# Patient Record
Sex: FEMALE | Race: BLACK OR AFRICAN AMERICAN | NOT HISPANIC OR LATINO | Employment: FULL TIME | URBAN - METROPOLITAN AREA
[De-identification: names, ages, dates, MRNs, and addresses within clinical notes are randomized per-mention and may not be internally consistent; named-entity substitution may affect disease eponyms.]

---

## 2017-05-17 ENCOUNTER — HOSPITAL ENCOUNTER (EMERGENCY)
Facility: OTHER | Age: 27
Discharge: HOME | End: 2017-05-17
Attending: EMERGENCY MEDICINE
Payer: COMMERCIAL

## 2017-05-17 VITALS
HEART RATE: 66 BPM | OXYGEN SATURATION: 98 % | WEIGHT: 114 LBS | TEMPERATURE: 98 F | SYSTOLIC BLOOD PRESSURE: 116 MMHG | BODY MASS INDEX: 22.26 KG/M2 | RESPIRATION RATE: 18 BRPM | DIASTOLIC BLOOD PRESSURE: 63 MMHG
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
## 2017-05-17 DIAGNOSIS — T78.40XA ALLERGIC REACTION, INITIAL ENCOUNTER: ICD-10-CM

## 2017-05-17 PROCEDURE — 96372 THER/PROPH/DIAG INJ SC/IM: CPT

## 2017-05-17 PROCEDURE — 63600175 PHARM REV CODE 636 W HCPCS: Performed by: EMERGENCY MEDICINE

## 2017-05-17 PROCEDURE — 99283 EMERGENCY DEPT VISIT LOW MDM: CPT | Mod: 25

## 2017-05-17 PROCEDURE — 25000003 PHARM REV CODE 250: Performed by: EMERGENCY MEDICINE

## 2017-05-17 RX ORDER — DIPHENHYDRAMINE HCL 25 MG
25 CAPSULE ORAL EVERY 6 HOURS PRN
Qty: 20 CAPSULE | Refills: 0 | Status: SHIPPED | OUTPATIENT
Start: 2017-05-17 | End: 2021-09-16 | Stop reason: ALTCHOICE

## 2017-05-17 RX ORDER — METHYLPREDNISOLONE SOD SUCC 125 MG
125 VIAL (EA) INJECTION
Status: COMPLETED | OUTPATIENT
Start: 2017-05-17 | End: 2017-05-17

## 2017-05-17 RX ORDER — FAMOTIDINE 20 MG/1
20 TABLET, FILM COATED ORAL 2 TIMES DAILY
Qty: 20 TABLET | Refills: 0 | Status: SHIPPED | OUTPATIENT
Start: 2017-05-17 | End: 2021-09-16 | Stop reason: ALTCHOICE

## 2017-05-17 RX ORDER — FAMOTIDINE 20 MG/1
20 TABLET, FILM COATED ORAL
Status: COMPLETED | OUTPATIENT
Start: 2017-05-17 | End: 2017-05-17

## 2017-05-17 RX ORDER — PREDNISONE 20 MG/1
40 TABLET ORAL DAILY
Qty: 10 TABLET | Refills: 0 | Status: SHIPPED | OUTPATIENT
Start: 2017-05-17 | End: 2017-05-22

## 2017-05-17 RX ADMIN — FAMOTIDINE 20 MG: 20 TABLET ORAL at 03:05

## 2017-05-17 RX ADMIN — METHYLPREDNISOLONE SODIUM SUCCINATE 125 MG: 125 INJECTION, POWDER, FOR SOLUTION INTRAMUSCULAR; INTRAVENOUS at 03:05

## 2017-05-17 NOTE — ED AVS SNAPSHOT
[facility] EMERGENCY DEPARTMENT  [address] [facility] [address]               Esperanza Peters   2017  2:28 PM   ED    Description:  Female : 1990   Department:  [facility] Emergency Department           Your Care was Coordinated By:     Provider Role From To    Liza Quintana DO Attending Provider [phone] --      Reason for Visit     Facial Swelling           Diagnoses this Visit        Comments    Insect bite, initial encounter    -  Primary     Allergic reaction, initial encounter           ED Disposition     None           To Do List           Follow-up Information     Follow up with [facility] Emergency Department.    Specialty:  Emergency Medicine    Why:  If symptoms worsen    Contact information:    [address] [facility] [address]  [phone]       These Medications        Disp Refills Start End    predniSONE (DELTASONE) 20 MG tablet 10 tablet 0 2017    Take 2 tablets (40 mg total) by mouth once daily. - Oral    Pharmacy: [facility] [address] [place] [address] Ph #: [phone]       diphenhydrAMINE (BENADRYL) 25 mg capsule 20 capsule 0 2017     Take 1 each (25 mg total) by mouth every 6 (six) hours as needed for Itching or Allergies. - Oral    Pharmacy: [address] Ph #: [phone]       famotidine (PEPCID) 20 MG tablet 20 tablet 0 2017    Take 1 tablet (20 mg total) by mouth 2 (two) times daily. - Oral    Pharmacy: [address] Ph #: [phone]         [facility]     [facility] Nurse Care Line -  Assistance  Unless otherwise directed by your provider, please contact [facility], our nurse care line that is available for  assistance.     Registered nurses in the [facility] provide: appointment scheduling, clinical advisement, health  education, and other advisory services.  Call: [phone] (toll free)               Medications           Message regarding Medications     Verify the changes and/or additions to your medication regime listed below are the same as discussed with your clinician today.  If any of these changes or additions are incorrect, please notify your healthcare provider.        START taking these NEW medications        Refills    predniSONE (DELTASONE) 20 MG tablet 0    Sig: Take 2 tablets (40 mg total) by mouth once daily.    Class: Print    Route: Oral    diphenhydrAMINE (BENADRYL) 25 mg capsule 0    Sig: Take 1 each (25 mg total) by mouth every 6 (six) hours as needed for Itching or Allergies.    Class: Print    Route: Oral    famotidine (PEPCID) 20 MG tablet 0    Sig: Take 1 tablet (20 mg total) by mouth 2 (two) times daily.    Class: Print    Route: Oral      These medications were administered today        Dose Freq    methylPREDNISolone sodium succinate injection 125 mg 125 mg ED 1 Time    Sig: Inject 125 mg into the muscle ED 1 Time.    Class: Normal    Route: Intramuscular    famotidine tablet 20 mg 20 mg ED 1 Time    Sig: Take 1 tablet (20 mg total) by mouth ED 1 Time.    Class: Normal    Route: Oral           Verify that the below list of medications is an accurate representation of the medications you are currently taking.  If none reported, the list may be blank. If incorrect, please contact your healthcare provider. Carry this list with you in case of emergency.           Current Medications     azithromycin (ZITHROMAX) 500 MG tablet 2 tabs PO for a single dose    diphenhydrAMINE (BENADRYL) 25 mg capsule Take 1 each (25 mg total) by mouth every 6 (six) hours as needed for Itching or Allergies.    famotidine (PEPCID) 20 MG tablet Take 1 tablet (20 mg total) by mouth 2 (two) times daily.    famotidine tablet 20 mg Take 1 tablet (20 mg total) by mouth ED 1 Time.    methylPREDNISolone sodium succinate injection 125  mg Inject 125 mg into the muscle ED 1 Time.    norgestimate-ethinyl estradiol (ORTHO-CYCLEN) 0.25-35 mg-mcg per tablet Take 1 tablet by mouth once daily.    predniSONE (DELTASONE) 20 MG tablet Take 2 tablets (40 mg total) by mouth once daily.           Clinical Reference Information           Your Vitals Were     BP Pulse Temp Resp Weight SpO2    106/68 71 99.9 °F (37.7 °C) 18 51.7 kg (114 lb) 100%    BMI                22.26 kg/m2          Allergies as of 5/17/2017     No Known Allergies      Immunizations Administered on Date of Encounter - 5/17/2017     None      ED Micro, Lab, POCT     None      ED Imaging Orders     None        Discharge Instructions         Local Allergic Reaction, Other  You are having an allergic reaction. Almost anything can cause one. Different people are allergic to different things. It is usually something that you ate or swallowed, came into contact with by getting or putting it on your skin or clothes, or something you breathed in the air. This can be very annoying and sometimes scary.  Symptoms of an allergic reaction can include:  · Rash, hives, redness, welts, blisters  · Itching, burning, stinging, pain  · Dry, flaky, cracking, scaly skin  · Swelling of the face, lips or other parts of the body  Sometimes the cause of an allergic reaction may be obvious. To help identify your allergen, remember:  · When it started  · What you were doing at the time or just before that  · Any activities you were involved in  · Any new products or contacts  Here are some common causes, but remember almost anything can cause a reaction, and you may not even be aware that you came into contact with one of these things.  · Dust, mold, pollen  · Plants such as poison ivy and poison oak are common ones, but there are many others  · Animals  · Foods such as shrimp, shellfish, peanuts, milk products, gluten, eggs; also colorings, flavorings, additives  · Insect bites or stings such as bees, mosquitos,  flees, ticks  · Medicines such as penicillin, sulfa drugs, amoxicillin, aspirin, ibuprofen; any medicine can cause a reaction  · Jewelry such as nickel, gold  (new, or something youve worn for a while including zippers, and  buttons)  · Latex such as in gloves, clothes, toys, balloons, or some tapes (some people allergic to latex may also have problems with foods like bananas, avocados, kiwi, papaya, or chestnuts)  · Lotions, perfumes, cosmetics, soaps, shampoos, skincare products, nail products  · Chemicals or dyes in clothing, linen, , hair dyes, soaps, iodine  Home care    The goal of our treatment is to help relieve the symptoms, and get you feeling better. The rash will usually fade over several days, but can sometimes last a couple of weeks. Over the next couple of days, there may be times when it is gets a little worse, and then better again. Here are some things to do:  · If you know what you are allergic to, avoid it because future reactions could be worse than this one.  · Avoid tight clothing and anything that heats up your skin (hot showers/baths, direct sunlight) since heat will make itching worse.  · An ice pack will relieve local areas of intense itching and redness. Dont put the ice directly on the skin, because it can damage the skin. You can also ice put it in a plastic bag. Wrap it in something like a towel, dang shirt, or cloth.  · Oral Benadryl (diphenhydramine) is an antihistamine available at drug and grocery stores. Unless a prescription antihistamine was given, Benadryl may be used to reduce itching if large areas of the skin are involved. It may make you sleepy, so be careful using it in the daytime or when going to school, working, or driving. [NOTE: Do not use Benadryl if you have glaucoma or if you are a man with trouble urinating due to an enlarged prostate.] There are antihistamines that causes less drowsiness and is a good alternatives for daytime use. Ask your pharmacist for  suggestions.  · Do not use Benadryl cream on your skin, because in some people it can cause a further reaction, and make you allergic to Benadryl.  · Try not to scratch. This can tear the skin and cause an infection.  · Using heat-steam to clean your home, using high-efficiency particulate (HEPA) vacuums and filters, avoiding food and pet triggers, exterminating cockroaches, and frequent house cleaning are a few of the strategies used to decrease allergic reactions.  Follow-up care  Follow up with your healthcare provider, or as advised if your symptoms do not continue to improve or get worse.  Call 911  Call 911 if any of these occur:  · Trouble breathing or swallowing, wheezing  · New or worsening swelling in the mouth, throat, or tongue  · Hoarse voice or trouble speaking  · Confused   · Very drowsy or trouble awakening  · Fainting or loss of consciousness  · Rapid heart rate  · Low blood pressure  · Feeling of doom  · Nausea, vomiting, abdominal pain, diarrhea  · Vomiting blood, or large amounts of blood in stool  · Seizure  When to seek medical advice  Call your healthcare provider right away if any of the following occur:  · Spreading areas of itching, redness or swelling  · New or worse swelling in the face, eyelids, or  lips  · Dizziness, weakness  · Signs of infection:  ¨ Spreading redness  ¨ Increased pain or swelling  ¨ Fever of 100.4ºF (38ºC) or higher, or as directed by your healthcare provider  ¨ Colored fluid draining from the inflamed areas  Date Last Reviewed: 7/30/2015  © 0079-9677 [employer]. [address] [place] [address], [place] [address]. All rights reserved. This information is not intended as a substitute for professional medical care. Always follow your healthcare professional's instructions.        Insect, Spider, and Scorpion Bites and Stings  Most insect bites are harmless and cause only minor swelling or itching. But if youre allergic to insects such as wasps or bees, a sting  "can cause a life-threatening allergic reaction. The venom (poison) from scorpions and certain spiders can also be deadly, although this is rare. Knowing when to seek emergency care could save your life.     The black  (top) and brown recluse (bottom) are two poisonous spiders found in the [place].   When to go to the emergency room (ER)  Call 911 right away for any:  · Scorpion sting  · Bite from a black, red, or brown  spider or brown recluse spider  · Signs of an allergic reaction such as:  ¨ Hives  ¨ Swelling of your eyes, lips, or the inside of your throat  ¨ Trouble breathing  ¨ Dizziness or confusion  What to expect in the ER  · If youre having trouble breathing, youll be given oxygen through a mask. In case of severe breathing difficulty, you may have a tube inserted in your throat and be placed on a ventilator (breathing machine).  · If you are having a severe allergic reaction from a sting (called anaphylaxis), you may be given a shot of epinephrine. If it is known that you are allergic to bee or wasp stings, your doctor may give you a prescription for an "epi-pen" that you can keep with you at all times in case of a sting.  · You may receive antivenin (a substance that reverses the effects of poison) for some spider bites and scorpion stings. Because antivenin can sometimes cause other problems, your doctor will weigh the risks and benefits of this treatment.  · Steroids such as prednisone are often used to treat allergic reactions. In many cases, your doctor will prescribe an antihistamine to help relieve itching.  Easing symptoms of an insect bite or sting  · Try to remove a stinger you can see. Use your fingernail, a knife edge, or credit card to scrape against the skin. Do not squeeze or pull.  · Apply ice or a cold compress to reduce pain and swelling (keep a thin cloth between the cold source and the skin).   Date Last Reviewed: 11/20/2014  © 4014-3642 [employer] [address] [place] [address]. All rights reserved. This information is not intended as a substitute for professional medical care. Always follow your healthcare professional's instructions.           [facility] complies with applicable Federal civil rights laws and does not discriminate on the basis of race, color, national origin, age, disability, or sex.        Language Assistance Services     ATTENTION: Language assistance services are available, free of charge. Please call [phone].      ATENCIÓN: Si habla español, tiene a crow disposición servicios gratuitos de asistencia lingüística. Llame al [phone].     CHÚ Ý: N?u b?n nói Ti?ng Vi?t, có các d?ch v? h? tr? ngôn ng? mi?n phí dành cho b?n. G?i s? [phone].

## 2017-05-17 NOTE — ED NOTES
Patient has verified the spelling of their name and  on armband    LOC: The patient is awake, alert, and aware of environment with an appropriate affect, the patient is oriented x 4 and speaking appropriately.     APPEARANCE: Patient resting comfortably and in no acute distress, patient is clean and well groomed, patient's clothing is properly fastened.     RESPIRATORY: Airway is open and patent, respirations are spontaneous, patient has a normal effort and rate, no accessory muscle use noted, bilateral breath sounds clear, denies SOB     CARDIAC:  No chest pain, chest pressure or chest discomfort. No palpitations.     HEENT: -visual changes. Periorbital swelling.     GASTROINTESTINAL: Soft and non tender to palpation, no distention noted, normoactive bowel sounds present in all four quadrants. No anorexia, nausea, vomiting or diarrhea.     SKIN: Rash noted to face and bilateral arms.     COMPLAINT: Patient complain of right periorbital swelling x 1 day. Unknown cause.

## 2017-05-17 NOTE — DISCHARGE INSTRUCTIONS
Local Allergic Reaction, Other  You are having an allergic reaction. Almost anything can cause one. Different people are allergic to different things. It is usually something that you ate or swallowed, came into contact with by getting or putting it on your skin or clothes, or something you breathed in the air. This can be very annoying and sometimes scary.  Symptoms of an allergic reaction can include:  · Rash, hives, redness, welts, blisters  · Itching, burning, stinging, pain  · Dry, flaky, cracking, scaly skin  · Swelling of the face, lips or other parts of the body  Sometimes the cause of an allergic reaction may be obvious. To help identify your allergen, remember:  · When it started  · What you were doing at the time or just before that  · Any activities you were involved in  · Any new products or contacts  Here are some common causes, but remember almost anything can cause a reaction, and you may not even be aware that you came into contact with one of these things.  · Dust, mold, pollen  · Plants such as poison ivy and poison oak are common ones, but there are many others  · Animals  · Foods such as shrimp, shellfish, peanuts, milk products, gluten, eggs; also colorings, flavorings, additives  · Insect bites or stings such as bees, mosquitos, flees, ticks  · Medicines such as penicillin, sulfa drugs, amoxicillin, aspirin, ibuprofen; any medicine can cause a reaction  · Jewelry such as nickel, gold  (new, or something youve worn for a while including zippers, and  buttons)  · Latex such as in gloves, clothes, toys, balloons, or some tapes (some people allergic to latex may also have problems with foods like bananas, avocados, kiwi, papaya, or chestnuts)  · Lotions, perfumes, cosmetics, soaps, shampoos, skincare products, nail products  · Chemicals or dyes in clothing, linen, , hair dyes, soaps, iodine  Home care    The goal of our treatment is to help relieve the symptoms, and get you feeling  better. The rash will usually fade over several days, but can sometimes last a couple of weeks. Over the next couple of days, there may be times when it is gets a little worse, and then better again. Here are some things to do:  · If you know what you are allergic to, avoid it because future reactions could be worse than this one.  · Avoid tight clothing and anything that heats up your skin (hot showers/baths, direct sunlight) since heat will make itching worse.  · An ice pack will relieve local areas of intense itching and redness. Dont put the ice directly on the skin, because it can damage the skin. You can also ice put it in a plastic bag. Wrap it in something like a towel, dang shirt, or cloth.  · Oral Benadryl (diphenhydramine) is an antihistamine available at drug and grocery stores. Unless a prescription antihistamine was given, Benadryl may be used to reduce itching if large areas of the skin are involved. It may make you sleepy, so be careful using it in the daytime or when going to school, working, or driving. [NOTE: Do not use Benadryl if you have glaucoma or if you are a man with trouble urinating due to an enlarged prostate.] There are antihistamines that causes less drowsiness and is a good alternatives for daytime use. Ask your pharmacist for suggestions.  · Do not use Benadryl cream on your skin, because in some people it can cause a further reaction, and make you allergic to Benadryl.  · Try not to scratch. This can tear the skin and cause an infection.  · Using heat-steam to clean your home, using high-efficiency particulate (HEPA) vacuums and filters, avoiding food and pet triggers, exterminating cockroaches, and frequent house cleaning are a few of the strategies used to decrease allergic reactions.  Follow-up care  Follow up with your healthcare provider, or as advised if your symptoms do not continue to improve or get worse.  Call 911  Call 911 if any of these occur:  · Trouble breathing or  swallowing, wheezing  · New or worsening swelling in the mouth, throat, or tongue  · Hoarse voice or trouble speaking  · Confused   · Very drowsy or trouble awakening  · Fainting or loss of consciousness  · Rapid heart rate  · Low blood pressure  · Feeling of doom  · Nausea, vomiting, abdominal pain, diarrhea  · Vomiting blood, or large amounts of blood in stool  · Seizure  When to seek medical advice  Call your healthcare provider right away if any of the following occur:  · Spreading areas of itching, redness or swelling  · New or worse swelling in the face, eyelids, or  lips  · Dizziness, weakness  · Signs of infection:  ¨ Spreading redness  ¨ Increased pain or swelling  ¨ Fever of 100.4ºF (38ºC) or higher, or as directed by your healthcare provider  ¨ Colored fluid draining from the inflamed areas  Date Last Reviewed: 7/30/2015 © 2000-2016 [employer]. [address] [place] [address]. All rights reserved. This information is not intended as a substitute for professional medical care. Always follow your healthcare professional's instructions.        Insect, Spider, and Scorpion Bites and Stings  Most insect bites are harmless and cause only minor swelling or itching. But if youre allergic to insects such as wasps or bees, a sting can cause a life-threatening allergic reaction. The venom (poison) from scorpions and certain spiders can also be deadly, although this is rare. Knowing when to seek emergency care could save your life.     The black  (top) and brown recluse (bottom) are two poisonous spiders found in the [place].   When to go to the emergency room (ER)  Call 911 right away for any:  · Scorpion sting  · Bite from a black, red, or brown  spider or brown recluse spider  · Signs of an allergic reaction such as:  ¨ Hives  ¨ Swelling of your eyes, lips, or the inside of your throat  ¨ Trouble breathing  ¨ Dizziness or confusion  What to expect in the ER  · If youre  "having trouble breathing, youll be given oxygen through a mask. In case of severe breathing difficulty, you may have a tube inserted in your throat and be placed on a ventilator (breathing machine).  · If you are having a severe allergic reaction from a sting (called anaphylaxis), you may be given a shot of epinephrine. If it is known that you are allergic to bee or wasp stings, your doctor may give you a prescription for an "epi-pen" that you can keep with you at all times in case of a sting.  · You may receive antivenin (a substance that reverses the effects of poison) for some spider bites and scorpion stings. Because antivenin can sometimes cause other problems, your doctor will weigh the risks and benefits of this treatment.  · Steroids such as prednisone are often used to treat allergic reactions. In many cases, your doctor will prescribe an antihistamine to help relieve itching.  Easing symptoms of an insect bite or sting  · Try to remove a stinger you can see. Use your fingernail, a knife edge, or credit card to scrape against the skin. Do not squeeze or pull.  · Apply ice or a cold compress to reduce pain and swelling (keep a thin cloth between the cold source and the skin).   Date Last Reviewed: 11/20/2014  © 3157-7217 [employer]. [address] [place] [address], [place] [address]. All rights reserved. This information is not intended as a substitute for professional medical care. Always follow your healthcare professional's instructions.        "

## 2017-05-23 NOTE — ED PROVIDER NOTES
Encounter Date: 5/17/2017       History     Chief Complaint   Patient presents with    Facial Swelling     right eye noticed this a.m.     Review of patient's allergies indicates:  No Known Allergies  CC insect bite to right eye.  Felt a bite last night.  This morning area is red,swollen, and itching.        The history is provided by the patient.   Eye Pain    This is a new problem. The current episode started today. The problem occurs constantly. The problem has been unchanged. The right eye is affected. Injury mechanism: insect bite. The pain is at a severity of 0/10. There is no history of trauma to the eye. There is no known exposure to pink eye. She does not wear contacts. Associated symptoms include eye redness and itching. Pertinent negatives include no numbness, no blurred vision, no decreased vision, no discharge, no double vision, no foreign body sensation, no nausea and no weakness. She has tried nothing for the symptoms. The treatment provided no relief.     No past medical history on file.  No past surgical history on file.  No family history on file.  Social History   Substance Use Topics    Smoking status: Never Smoker    Smokeless tobacco: Never Used    Alcohol use No     Review of Systems   Constitutional: Negative for fever.   HENT: Negative for sore throat.    Eyes: Positive for pain and redness. Negative for blurred vision, double vision and discharge.   Respiratory: Negative for shortness of breath.    Cardiovascular: Negative for chest pain.   Gastrointestinal: Negative for nausea.   Genitourinary: Negative for dysuria.   Musculoskeletal: Negative for back pain.   Skin: Positive for itching. Negative for rash.   Neurological: Negative for weakness and numbness.   Hematological: Does not bruise/bleed easily.   All other systems reviewed and are negative.      Physical Exam     Initial Vitals [05/17/17 1400]   BP Pulse Resp Temp SpO2   106/68 71 18 99.9 °F (37.7 °C) 100 %     Physical  Exam    Nursing note and vitals reviewed.  Constitutional: She appears well-developed and well-nourished.   HENT:   Head: Normocephalic and atraumatic.   Right Ear: External ear normal.   Left Ear: External ear normal.   Nose: Nose normal.   Mouth/Throat: Oropharynx is clear and moist.   Eyes: Conjunctivae and EOM are normal. Pupils are equal, round, and reactive to light. Right eye exhibits no discharge. No scleral icterus.   Neck: Normal range of motion. Neck supple.   Cardiovascular: Normal rate and regular rhythm.   Pulmonary/Chest: Breath sounds normal.   Abdominal: Soft. There is no tenderness. There is no rebound and no guarding.   Musculoskeletal: Normal range of motion. She exhibits no tenderness.   Neurological: She is alert and oriented to person, place, and time. She has normal strength.   Skin: Skin is warm and dry. Capillary refill takes less than 2 seconds. Rash noted. Rash is not vesicular and not urticarial. There is erythema.        Psychiatric: She has a normal mood and affect.         ED Course   Procedures  Labs Reviewed - No data to display                            ED Course       CC Insect bite  DDx insect bite, allergic reaction, localized allergic reaction, cellulitis,  & conjunctivitis.   Treatment in the ED Physical Exam,  Steroids, and Pepcid.  Pt declined benadryl at she wants to drive home.  Patient feels much better and is ready for discharge.  Discussed out patient treatment plan.    Fill and take prescriptions as directed.  Return to the ED if symptoms worsen or do not resolve.   Answered questions and discussed discharge plan.    Follow up with PCP in 1 days.    Clinical Impression:   The primary encounter diagnosis was Insect bite, initial encounter. A diagnosis of Allergic reaction, initial encounter was also pertinent to this visit.          Liza Quintana DO  05/23/17 [id number]

## 2021-01-22 ENCOUNTER — LAB VISIT (OUTPATIENT)
Dept: LAB | Facility: OTHER | Age: 31
End: 2021-01-22
Payer: COMMERCIAL

## 2021-01-22 DIAGNOSIS — Z20.822 ENCOUNTER FOR LABORATORY TESTING FOR COVID-19 VIRUS: ICD-10-CM

## 2021-01-22 PROCEDURE — U0003 INFECTIOUS AGENT DETECTION BY NUCLEIC ACID (DNA OR RNA); SEVERE ACUTE RESPIRATORY SYNDROME CORONAVIRUS 2 (SARS-COV-2) (CORONAVIRUS DISEASE [COVID-19]), AMPLIFIED PROBE TECHNIQUE, MAKING USE OF HIGH THROUGHPUT TECHNOLOGIES AS DESCRIBED BY CMS-2020-01-R: HCPCS

## 2021-01-24 LAB — SARS-COV-2 RNA RESP QL NAA+PROBE: NOT DETECTED

## 2021-01-29 ENCOUNTER — LAB VISIT (OUTPATIENT)
Dept: LAB | Facility: OTHER | Age: 31
End: 2021-01-29
Payer: COMMERCIAL

## 2021-01-29 DIAGNOSIS — Z20.822 ENCOUNTER FOR LABORATORY TESTING FOR COVID-19 VIRUS: ICD-10-CM

## 2021-01-29 PROCEDURE — U0003 INFECTIOUS AGENT DETECTION BY NUCLEIC ACID (DNA OR RNA); SEVERE ACUTE RESPIRATORY SYNDROME CORONAVIRUS 2 (SARS-COV-2) (CORONAVIRUS DISEASE [COVID-19]), AMPLIFIED PROBE TECHNIQUE, MAKING USE OF HIGH THROUGHPUT TECHNOLOGIES AS DESCRIBED BY CMS-2020-01-R: HCPCS

## 2021-01-31 LAB — SARS-COV-2 RNA RESP QL NAA+PROBE: NOT DETECTED

## 2021-02-05 ENCOUNTER — LAB VISIT (OUTPATIENT)
Dept: LAB | Facility: OTHER | Age: 31
End: 2021-02-05
Payer: OTHER GOVERNMENT

## 2021-02-05 DIAGNOSIS — Z20.822 ENCOUNTER FOR LABORATORY TESTING FOR COVID-19 VIRUS: ICD-10-CM

## 2021-02-05 PROCEDURE — U0003 INFECTIOUS AGENT DETECTION BY NUCLEIC ACID (DNA OR RNA); SEVERE ACUTE RESPIRATORY SYNDROME CORONAVIRUS 2 (SARS-COV-2) (CORONAVIRUS DISEASE [COVID-19]), AMPLIFIED PROBE TECHNIQUE, MAKING USE OF HIGH THROUGHPUT TECHNOLOGIES AS DESCRIBED BY CMS-2020-01-R: HCPCS

## 2021-02-07 LAB — SARS-COV-2 RNA RESP QL NAA+PROBE: NOT DETECTED

## 2021-02-26 ENCOUNTER — LAB VISIT (OUTPATIENT)
Dept: LAB | Facility: OTHER | Age: 31
End: 2021-02-26
Payer: OTHER GOVERNMENT

## 2021-02-26 DIAGNOSIS — Z20.822 ENCOUNTER FOR LABORATORY TESTING FOR COVID-19 VIRUS: ICD-10-CM

## 2021-02-26 PROCEDURE — U0003 INFECTIOUS AGENT DETECTION BY NUCLEIC ACID (DNA OR RNA); SEVERE ACUTE RESPIRATORY SYNDROME CORONAVIRUS 2 (SARS-COV-2) (CORONAVIRUS DISEASE [COVID-19]), AMPLIFIED PROBE TECHNIQUE, MAKING USE OF HIGH THROUGHPUT TECHNOLOGIES AS DESCRIBED BY CMS-2020-01-R: HCPCS

## 2021-02-27 LAB — SARS-COV-2 RNA RESP QL NAA+PROBE: NOT DETECTED

## 2021-03-04 ENCOUNTER — LAB VISIT (OUTPATIENT)
Dept: LAB | Facility: OTHER | Age: 31
End: 2021-03-04
Payer: OTHER GOVERNMENT

## 2021-03-04 DIAGNOSIS — Z20.822 ENCOUNTER FOR LABORATORY TESTING FOR COVID-19 VIRUS: ICD-10-CM

## 2021-03-04 PROCEDURE — U0003 INFECTIOUS AGENT DETECTION BY NUCLEIC ACID (DNA OR RNA); SEVERE ACUTE RESPIRATORY SYNDROME CORONAVIRUS 2 (SARS-COV-2) (CORONAVIRUS DISEASE [COVID-19]), AMPLIFIED PROBE TECHNIQUE, MAKING USE OF HIGH THROUGHPUT TECHNOLOGIES AS DESCRIBED BY CMS-2020-01-R: HCPCS | Performed by: NURSE PRACTITIONER

## 2021-03-05 LAB — SARS-COV-2 RNA RESP QL NAA+PROBE: NOT DETECTED

## 2021-03-11 ENCOUNTER — LAB VISIT (OUTPATIENT)
Dept: LAB | Facility: OTHER | Age: 31
End: 2021-03-11
Payer: COMMERCIAL

## 2021-03-11 DIAGNOSIS — Z20.822 ENCOUNTER FOR LABORATORY TESTING FOR COVID-19 VIRUS: ICD-10-CM

## 2021-03-11 PROCEDURE — U0003 INFECTIOUS AGENT DETECTION BY NUCLEIC ACID (DNA OR RNA); SEVERE ACUTE RESPIRATORY SYNDROME CORONAVIRUS 2 (SARS-COV-2) (CORONAVIRUS DISEASE [COVID-19]), AMPLIFIED PROBE TECHNIQUE, MAKING USE OF HIGH THROUGHPUT TECHNOLOGIES AS DESCRIBED BY CMS-2020-01-R: HCPCS | Performed by: NURSE PRACTITIONER

## 2021-03-12 LAB — SARS-COV-2 RNA RESP QL NAA+PROBE: NOT DETECTED

## 2021-03-18 ENCOUNTER — LAB VISIT (OUTPATIENT)
Dept: LAB | Facility: OTHER | Age: 31
End: 2021-03-18
Payer: COMMERCIAL

## 2021-03-18 DIAGNOSIS — Z20.822 ENCOUNTER FOR LABORATORY TESTING FOR COVID-19 VIRUS: ICD-10-CM

## 2021-03-18 PROCEDURE — U0003 INFECTIOUS AGENT DETECTION BY NUCLEIC ACID (DNA OR RNA); SEVERE ACUTE RESPIRATORY SYNDROME CORONAVIRUS 2 (SARS-COV-2) (CORONAVIRUS DISEASE [COVID-19]), AMPLIFIED PROBE TECHNIQUE, MAKING USE OF HIGH THROUGHPUT TECHNOLOGIES AS DESCRIBED BY CMS-2020-01-R: HCPCS | Performed by: NURSE PRACTITIONER

## 2021-03-19 LAB — SARS-COV-2 RNA RESP QL NAA+PROBE: NOT DETECTED

## 2021-03-25 ENCOUNTER — LAB VISIT (OUTPATIENT)
Dept: LAB | Facility: OTHER | Age: 31
End: 2021-03-25
Payer: COMMERCIAL

## 2021-03-25 DIAGNOSIS — Z20.822 ENCOUNTER FOR LABORATORY TESTING FOR COVID-19 VIRUS: ICD-10-CM

## 2021-03-25 PROCEDURE — U0003 INFECTIOUS AGENT DETECTION BY NUCLEIC ACID (DNA OR RNA); SEVERE ACUTE RESPIRATORY SYNDROME CORONAVIRUS 2 (SARS-COV-2) (CORONAVIRUS DISEASE [COVID-19]), AMPLIFIED PROBE TECHNIQUE, MAKING USE OF HIGH THROUGHPUT TECHNOLOGIES AS DESCRIBED BY CMS-2020-01-R: HCPCS | Performed by: NURSE PRACTITIONER

## 2021-03-26 LAB — SARS-COV-2 RNA RESP QL NAA+PROBE: NOT DETECTED

## 2021-04-08 ENCOUNTER — LAB VISIT (OUTPATIENT)
Dept: LAB | Facility: OTHER | Age: 31
End: 2021-04-08
Payer: OTHER GOVERNMENT

## 2021-04-08 DIAGNOSIS — Z20.822 ENCOUNTER FOR LABORATORY TESTING FOR COVID-19 VIRUS: ICD-10-CM

## 2021-04-08 PROCEDURE — U0003 INFECTIOUS AGENT DETECTION BY NUCLEIC ACID (DNA OR RNA); SEVERE ACUTE RESPIRATORY SYNDROME CORONAVIRUS 2 (SARS-COV-2) (CORONAVIRUS DISEASE [COVID-19]), AMPLIFIED PROBE TECHNIQUE, MAKING USE OF HIGH THROUGHPUT TECHNOLOGIES AS DESCRIBED BY CMS-2020-01-R: HCPCS | Performed by: NURSE PRACTITIONER

## 2021-04-09 LAB — SARS-COV-2 RNA RESP QL NAA+PROBE: NOT DETECTED

## 2021-04-15 ENCOUNTER — PATIENT MESSAGE (OUTPATIENT)
Dept: RESEARCH | Facility: HOSPITAL | Age: 31
End: 2021-04-15

## 2021-05-06 ENCOUNTER — LAB VISIT (OUTPATIENT)
Dept: LAB | Facility: OTHER | Age: 31
End: 2021-05-06
Payer: COMMERCIAL

## 2021-05-06 DIAGNOSIS — Z20.822 ENCOUNTER FOR LABORATORY TESTING FOR COVID-19 VIRUS: ICD-10-CM

## 2021-05-06 PROCEDURE — U0003 INFECTIOUS AGENT DETECTION BY NUCLEIC ACID (DNA OR RNA); SEVERE ACUTE RESPIRATORY SYNDROME CORONAVIRUS 2 (SARS-COV-2) (CORONAVIRUS DISEASE [COVID-19]), AMPLIFIED PROBE TECHNIQUE, MAKING USE OF HIGH THROUGHPUT TECHNOLOGIES AS DESCRIBED BY CMS-2020-01-R: HCPCS | Performed by: NURSE PRACTITIONER

## 2021-05-07 LAB — SARS-COV-2 RNA RESP QL NAA+PROBE: NOT DETECTED

## 2021-05-27 ENCOUNTER — LAB VISIT (OUTPATIENT)
Dept: LAB | Facility: OTHER | Age: 31
End: 2021-05-27
Payer: OTHER GOVERNMENT

## 2021-05-27 DIAGNOSIS — Z20.822 ENCOUNTER FOR LABORATORY TESTING FOR COVID-19 VIRUS: ICD-10-CM

## 2021-05-27 PROCEDURE — U0003 INFECTIOUS AGENT DETECTION BY NUCLEIC ACID (DNA OR RNA); SEVERE ACUTE RESPIRATORY SYNDROME CORONAVIRUS 2 (SARS-COV-2) (CORONAVIRUS DISEASE [COVID-19]), AMPLIFIED PROBE TECHNIQUE, MAKING USE OF HIGH THROUGHPUT TECHNOLOGIES AS DESCRIBED BY CMS-2020-01-R: HCPCS | Performed by: NURSE PRACTITIONER

## 2021-05-28 LAB — SARS-COV-2 RNA RESP QL NAA+PROBE: NOT DETECTED

## 2021-07-01 ENCOUNTER — LAB VISIT (OUTPATIENT)
Dept: LAB | Facility: OTHER | Age: 31
End: 2021-07-01
Payer: OTHER GOVERNMENT

## 2021-07-01 DIAGNOSIS — Z20.822 ENCOUNTER FOR LABORATORY TESTING FOR COVID-19 VIRUS: ICD-10-CM

## 2021-07-01 PROCEDURE — U0003 INFECTIOUS AGENT DETECTION BY NUCLEIC ACID (DNA OR RNA); SEVERE ACUTE RESPIRATORY SYNDROME CORONAVIRUS 2 (SARS-COV-2) (CORONAVIRUS DISEASE [COVID-19]), AMPLIFIED PROBE TECHNIQUE, MAKING USE OF HIGH THROUGHPUT TECHNOLOGIES AS DESCRIBED BY CMS-2020-01-R: HCPCS | Performed by: NURSE PRACTITIONER

## 2021-07-02 LAB — SARS-COV-2 RNA RESP QL NAA+PROBE: NOT DETECTED

## 2021-07-08 ENCOUNTER — LAB VISIT (OUTPATIENT)
Dept: LAB | Facility: OTHER | Age: 31
End: 2021-07-08
Payer: COMMERCIAL

## 2021-07-08 DIAGNOSIS — Z20.822 ENCOUNTER FOR LABORATORY TESTING FOR COVID-19 VIRUS: ICD-10-CM

## 2021-07-08 PROCEDURE — U0003 INFECTIOUS AGENT DETECTION BY NUCLEIC ACID (DNA OR RNA); SEVERE ACUTE RESPIRATORY SYNDROME CORONAVIRUS 2 (SARS-COV-2) (CORONAVIRUS DISEASE [COVID-19]), AMPLIFIED PROBE TECHNIQUE, MAKING USE OF HIGH THROUGHPUT TECHNOLOGIES AS DESCRIBED BY CMS-2020-01-R: HCPCS | Performed by: NURSE PRACTITIONER

## 2021-07-09 LAB
SARS-COV-2 RNA RESP QL NAA+PROBE: NOT DETECTED
SARS-COV-2- CYCLE NUMBER: -1

## 2021-07-22 ENCOUNTER — LAB VISIT (OUTPATIENT)
Dept: LAB | Facility: OTHER | Age: 31
End: 2021-07-22
Payer: COMMERCIAL

## 2021-07-22 DIAGNOSIS — Z20.822 ENCOUNTER FOR LABORATORY TESTING FOR COVID-19 VIRUS: ICD-10-CM

## 2021-07-22 PROCEDURE — U0003 INFECTIOUS AGENT DETECTION BY NUCLEIC ACID (DNA OR RNA); SEVERE ACUTE RESPIRATORY SYNDROME CORONAVIRUS 2 (SARS-COV-2) (CORONAVIRUS DISEASE [COVID-19]), AMPLIFIED PROBE TECHNIQUE, MAKING USE OF HIGH THROUGHPUT TECHNOLOGIES AS DESCRIBED BY CMS-2020-01-R: HCPCS | Performed by: NURSE PRACTITIONER

## 2021-07-23 LAB
SARS-COV-2 RNA RESP QL NAA+PROBE: NOT DETECTED
SARS-COV-2- CYCLE NUMBER: -1

## 2021-08-17 DIAGNOSIS — N64.89 GALACTOCELE: Primary | ICD-10-CM

## 2021-08-18 ENCOUNTER — TELEPHONE (OUTPATIENT)
Dept: SURGERY | Facility: CLINIC | Age: 31
End: 2021-08-18

## 2021-08-19 ENCOUNTER — LAB VISIT (OUTPATIENT)
Dept: LAB | Facility: OTHER | Age: 31
End: 2021-08-19
Payer: OTHER GOVERNMENT

## 2021-08-19 DIAGNOSIS — Z20.822 ENCOUNTER FOR LABORATORY TESTING FOR COVID-19 VIRUS: ICD-10-CM

## 2021-08-19 PROCEDURE — U0003 INFECTIOUS AGENT DETECTION BY NUCLEIC ACID (DNA OR RNA); SEVERE ACUTE RESPIRATORY SYNDROME CORONAVIRUS 2 (SARS-COV-2) (CORONAVIRUS DISEASE [COVID-19]), AMPLIFIED PROBE TECHNIQUE, MAKING USE OF HIGH THROUGHPUT TECHNOLOGIES AS DESCRIBED BY CMS-2020-01-R: HCPCS | Performed by: NURSE PRACTITIONER

## 2021-08-20 ENCOUNTER — HOSPITAL ENCOUNTER (OUTPATIENT)
Dept: RADIOLOGY | Facility: HOSPITAL | Age: 31
Discharge: HOME | End: 2021-08-20
Attending: OBSTETRICS & GYNECOLOGY
Payer: COMMERCIAL

## 2021-08-20 VITALS — HEIGHT: 62 IN | WEIGHT: 140 LBS | BODY MASS INDEX: 25.76 KG/M2

## 2021-08-20 DIAGNOSIS — N64.89 GALACTOCELE: ICD-10-CM

## 2021-08-20 LAB
SARS-COV-2 RNA RESP QL NAA+PROBE: NOT DETECTED
SARS-COV-2- CYCLE NUMBER: -1

## 2021-08-20 PROCEDURE — 76642 US BREAST LEFT LIMITED: ICD-10-PCS | Mod: 26,LT,, | Performed by: RADIOLOGY

## 2021-08-20 PROCEDURE — 77062 BREAST TOMOSYNTHESIS BI: CPT | Mod: TC

## 2021-08-20 PROCEDURE — 76642 ULTRASOUND BREAST LIMITED: CPT | Mod: 26,LT,, | Performed by: RADIOLOGY

## 2021-08-20 PROCEDURE — 77066 DX MAMMO INCL CAD BI: CPT | Mod: 26,,, | Performed by: RADIOLOGY

## 2021-08-20 PROCEDURE — 76642 ULTRASOUND BREAST LIMITED: CPT | Mod: TC,LT

## 2021-08-20 PROCEDURE — 77062 MAMMO DIGITAL DIAGNOSTIC BILAT WITH TOMO: ICD-10-PCS | Mod: 26,,, | Performed by: RADIOLOGY

## 2021-08-20 PROCEDURE — 77066 MAMMO DIGITAL DIAGNOSTIC BILAT WITH TOMO: ICD-10-PCS | Mod: 26,,, | Performed by: RADIOLOGY

## 2021-08-20 PROCEDURE — 77062 BREAST TOMOSYNTHESIS BI: CPT | Mod: 26,,, | Performed by: RADIOLOGY

## 2021-08-23 ENCOUNTER — TELEPHONE (OUTPATIENT)
Dept: RADIOLOGY | Facility: HOSPITAL | Age: 31
End: 2021-08-23

## 2021-08-26 ENCOUNTER — LAB VISIT (OUTPATIENT)
Dept: LAB | Facility: OTHER | Age: 31
End: 2021-08-26
Payer: OTHER GOVERNMENT

## 2021-08-26 DIAGNOSIS — Z20.822 ENCOUNTER FOR LABORATORY TESTING FOR COVID-19 VIRUS: ICD-10-CM

## 2021-08-26 PROCEDURE — U0003 INFECTIOUS AGENT DETECTION BY NUCLEIC ACID (DNA OR RNA); SEVERE ACUTE RESPIRATORY SYNDROME CORONAVIRUS 2 (SARS-COV-2) (CORONAVIRUS DISEASE [COVID-19]), AMPLIFIED PROBE TECHNIQUE, MAKING USE OF HIGH THROUGHPUT TECHNOLOGIES AS DESCRIBED BY CMS-2020-01-R: HCPCS | Performed by: NURSE PRACTITIONER

## 2021-08-30 LAB — SARS-COV-2 RNA RESP QL NAA+PROBE: NOT DETECTED

## 2021-09-09 ENCOUNTER — TELEPHONE (OUTPATIENT)
Dept: HEMATOLOGY/ONCOLOGY | Facility: CLINIC | Age: 31
End: 2021-09-09

## 2021-09-12 PROBLEM — C50.812 MALIGNANT NEOPLASM OF OVERLAPPING SITES OF LEFT FEMALE BREAST: Status: ACTIVE | Noted: 2021-09-12

## 2021-09-12 PROBLEM — N63.25 MASS OVERLAPPING MULTIPLE QUADRANTS OF LEFT BREAST: Status: ACTIVE | Noted: 2021-09-12

## 2021-09-12 PROBLEM — N63.24 MASS OF LOWER INNER QUADRANT OF LEFT BREAST: Status: ACTIVE | Noted: 2021-09-12

## 2021-09-12 PROBLEM — N63.21 MASS OF UPPER OUTER QUADRANT OF LEFT BREAST: Status: ACTIVE | Noted: 2021-09-12

## 2021-09-13 ENCOUNTER — OFFICE VISIT (OUTPATIENT)
Dept: HEMATOLOGY/ONCOLOGY | Facility: CLINIC | Age: 31
End: 2021-09-13
Payer: COMMERCIAL

## 2021-09-13 VITALS
TEMPERATURE: 98 F | WEIGHT: 139.13 LBS | RESPIRATION RATE: 18 BRPM | DIASTOLIC BLOOD PRESSURE: 58 MMHG | HEART RATE: 74 BPM | SYSTOLIC BLOOD PRESSURE: 102 MMHG | HEIGHT: 62 IN | BODY MASS INDEX: 25.6 KG/M2

## 2021-09-13 DIAGNOSIS — N63.25 MASS OVERLAPPING MULTIPLE QUADRANTS OF LEFT BREAST: ICD-10-CM

## 2021-09-13 DIAGNOSIS — N63.21 MASS OF UPPER OUTER QUADRANT OF LEFT BREAST: ICD-10-CM

## 2021-09-13 DIAGNOSIS — N63.24 MASS OF LOWER INNER QUADRANT OF LEFT BREAST: ICD-10-CM

## 2021-09-13 DIAGNOSIS — C50.812 MALIGNANT NEOPLASM OF OVERLAPPING SITES OF LEFT FEMALE BREAST, UNSPECIFIED ESTROGEN RECEPTOR STATUS: ICD-10-CM

## 2021-09-13 PROCEDURE — 1160F PR REVIEW ALL MEDS BY PRESCRIBER/CLIN PHARMACIST DOCUMENTED: ICD-10-PCS | Mod: S$GLB,,, | Performed by: INTERNAL MEDICINE

## 2021-09-13 PROCEDURE — 99204 OFFICE O/P NEW MOD 45 MIN: CPT | Mod: S$GLB,,, | Performed by: INTERNAL MEDICINE

## 2021-09-13 PROCEDURE — 3008F PR BODY MASS INDEX (BMI) DOCUMENTED: ICD-10-PCS | Mod: S$GLB,,, | Performed by: INTERNAL MEDICINE

## 2021-09-13 PROCEDURE — 3074F PR MOST RECENT SYSTOLIC BLOOD PRESSURE < 130 MM HG: ICD-10-PCS | Mod: S$GLB,,, | Performed by: INTERNAL MEDICINE

## 2021-09-13 PROCEDURE — 99204 PR OFFICE/OUTPT VISIT, NEW, LEVL IV, 45-59 MIN: ICD-10-PCS | Mod: S$GLB,,, | Performed by: INTERNAL MEDICINE

## 2021-09-13 PROCEDURE — 3078F PR MOST RECENT DIASTOLIC BLOOD PRESSURE < 80 MM HG: ICD-10-PCS | Mod: S$GLB,,, | Performed by: INTERNAL MEDICINE

## 2021-09-13 PROCEDURE — 3074F SYST BP LT 130 MM HG: CPT | Mod: S$GLB,,, | Performed by: INTERNAL MEDICINE

## 2021-09-13 PROCEDURE — 3078F DIAST BP <80 MM HG: CPT | Mod: S$GLB,,, | Performed by: INTERNAL MEDICINE

## 2021-09-13 PROCEDURE — 1160F RVW MEDS BY RX/DR IN RCRD: CPT | Mod: S$GLB,,, | Performed by: INTERNAL MEDICINE

## 2021-09-13 PROCEDURE — 3008F BODY MASS INDEX DOCD: CPT | Mod: S$GLB,,, | Performed by: INTERNAL MEDICINE

## 2021-09-16 ENCOUNTER — LAB VISIT (OUTPATIENT)
Dept: LAB | Facility: HOSPITAL | Age: 31
End: 2021-09-16
Attending: SURGERY
Payer: COMMERCIAL

## 2021-09-16 ENCOUNTER — HOSPITAL ENCOUNTER (OUTPATIENT)
Dept: PREADMISSION TESTING | Facility: HOSPITAL | Age: 31
Discharge: HOME | End: 2021-09-16
Attending: SURGERY
Payer: COMMERCIAL

## 2021-09-16 ENCOUNTER — HOSPITAL ENCOUNTER (OUTPATIENT)
Dept: RADIOLOGY | Facility: HOSPITAL | Age: 31
Discharge: HOME | End: 2021-09-16
Attending: SURGERY
Payer: COMMERCIAL

## 2021-09-16 VITALS
BODY MASS INDEX: 25.76 KG/M2 | SYSTOLIC BLOOD PRESSURE: 117 MMHG | WEIGHT: 140 LBS | DIASTOLIC BLOOD PRESSURE: 77 MMHG | OXYGEN SATURATION: 99 % | TEMPERATURE: 99 F | HEIGHT: 62 IN | HEART RATE: 86 BPM | RESPIRATION RATE: 18 BRPM

## 2021-09-16 DIAGNOSIS — Z01.818 PRE-OP TESTING: Primary | ICD-10-CM

## 2021-09-16 DIAGNOSIS — Z01.818 PRE-OP TESTING: ICD-10-CM

## 2021-09-16 LAB
ALBUMIN SERPL BCP-MCNC: 3.9 G/DL (ref 3.5–5.2)
ALP SERPL-CCNC: 33 U/L (ref 55–135)
ALT SERPL W/O P-5'-P-CCNC: 16 U/L (ref 10–44)
ANION GAP SERPL CALC-SCNC: 8 MMOL/L (ref 8–16)
AST SERPL-CCNC: 17 U/L (ref 10–40)
BILIRUB SERPL-MCNC: 0.5 MG/DL (ref 0.1–1)
BILIRUB UR QL STRIP: NEGATIVE
BUN SERPL-MCNC: 8 MG/DL (ref 6–20)
CALCIUM SERPL-MCNC: 8.9 MG/DL (ref 8.7–10.5)
CHLORIDE SERPL-SCNC: 108 MMOL/L (ref 95–110)
CLARITY UR: CLEAR
CO2 SERPL-SCNC: 26 MMOL/L (ref 23–29)
COLOR UR: YELLOW
CREAT SERPL-MCNC: 0.8 MG/DL (ref 0.5–1.4)
ERYTHROCYTE [DISTWIDTH] IN BLOOD BY AUTOMATED COUNT: 14 % (ref 11.5–14.5)
EST. GFR  (AFRICAN AMERICAN): >60 ML/MIN/1.73 M^2
EST. GFR  (NON AFRICAN AMERICAN): >60 ML/MIN/1.73 M^2
GLUCOSE SERPL-MCNC: 100 MG/DL (ref 70–110)
GLUCOSE UR QL STRIP: NEGATIVE
HCT VFR BLD AUTO: 38.6 % (ref 37–48.5)
HGB BLD-MCNC: 11.9 G/DL (ref 12–16)
HGB UR QL STRIP: NEGATIVE
KETONES UR QL STRIP: NEGATIVE
LEUKOCYTE ESTERASE UR QL STRIP: NEGATIVE
MCH RBC QN AUTO: 24.8 PG (ref 27–31)
MCHC RBC AUTO-ENTMCNC: 30.8 G/DL (ref 32–36)
MCV RBC AUTO: 81 FL (ref 82–98)
NITRITE UR QL STRIP: NEGATIVE
PH UR STRIP: 7 [PH] (ref 5–8)
PLATELET # BLD AUTO: 236 K/UL (ref 150–450)
PMV BLD AUTO: 9.8 FL (ref 9.2–12.9)
POTASSIUM SERPL-SCNC: 3.8 MMOL/L (ref 3.5–5.1)
PROT SERPL-MCNC: 7.4 G/DL (ref 6–8.4)
PROT UR QL STRIP: ABNORMAL
RBC # BLD AUTO: 4.79 M/UL (ref 4–5.4)
SODIUM SERPL-SCNC: 142 MMOL/L (ref 136–145)
SP GR UR STRIP: 1.03 (ref 1–1.03)
URN SPEC COLLECT METH UR: ABNORMAL
UROBILINOGEN UR STRIP-ACNC: ABNORMAL EU/DL
WBC # BLD AUTO: 7.99 K/UL (ref 3.9–12.7)

## 2021-09-16 PROCEDURE — 85027 COMPLETE CBC AUTOMATED: CPT | Performed by: SURGERY

## 2021-09-16 PROCEDURE — 71046 X-RAY EXAM CHEST 2 VIEWS: CPT | Mod: TC

## 2021-09-16 PROCEDURE — 36415 COLL VENOUS BLD VENIPUNCTURE: CPT | Performed by: SURGERY

## 2021-09-16 PROCEDURE — 80053 COMPREHEN METABOLIC PANEL: CPT | Performed by: SURGERY

## 2021-09-16 PROCEDURE — 81003 URINALYSIS AUTO W/O SCOPE: CPT | Performed by: SURGERY

## 2021-09-16 RX ORDER — CEFAZOLIN SODIUM 2 G/50ML
2 SOLUTION INTRAVENOUS ONCE
Status: CANCELLED | OUTPATIENT
Start: 2021-09-20

## 2021-09-20 ENCOUNTER — HOSPITAL ENCOUNTER (OUTPATIENT)
Facility: HOSPITAL | Age: 31
Discharge: HOME | End: 2021-09-20
Attending: SURGERY | Admitting: SURGERY
Payer: COMMERCIAL

## 2021-09-20 ENCOUNTER — ANESTHESIA EVENT (OUTPATIENT)
Dept: SURGERY | Facility: HOSPITAL | Age: 31
End: 2021-09-20
Payer: COMMERCIAL

## 2021-09-20 ENCOUNTER — TELEPHONE (OUTPATIENT)
Dept: HEMATOLOGY/ONCOLOGY | Facility: CLINIC | Age: 31
End: 2021-09-20

## 2021-09-20 ENCOUNTER — HOSPITAL ENCOUNTER (OUTPATIENT)
Dept: RADIOLOGY | Facility: HOSPITAL | Age: 31
Discharge: HOME | End: 2021-09-20
Attending: SURGERY | Admitting: SURGERY
Payer: COMMERCIAL

## 2021-09-20 ENCOUNTER — ANESTHESIA (OUTPATIENT)
Dept: SURGERY | Facility: HOSPITAL | Age: 31
End: 2021-09-20
Payer: COMMERCIAL

## 2021-09-20 VITALS
DIASTOLIC BLOOD PRESSURE: 77 MMHG | HEART RATE: 68 BPM | HEIGHT: 62 IN | TEMPERATURE: 98 F | BODY MASS INDEX: 25.76 KG/M2 | RESPIRATION RATE: 16 BRPM | WEIGHT: 140 LBS | SYSTOLIC BLOOD PRESSURE: 106 MMHG | OXYGEN SATURATION: 100 %

## 2021-09-20 DIAGNOSIS — N63.25 MASS OVERLAPPING MULTIPLE QUADRANTS OF LEFT BREAST: Primary | ICD-10-CM

## 2021-09-20 DIAGNOSIS — C50.812 MALIGNANT NEOPLASM OF OVERLAPPING SITES OF LEFT FEMALE BREAST, UNSPECIFIED ESTROGEN RECEPTOR STATUS: Primary | ICD-10-CM

## 2021-09-20 DIAGNOSIS — C50.919 BREAST CA: ICD-10-CM

## 2021-09-20 DIAGNOSIS — Z01.818 PRE-OP TESTING: ICD-10-CM

## 2021-09-20 LAB
B-HCG UR QL: NEGATIVE
CTP QC/QA: YES
SARS-COV-2 RDRP RESP QL NAA+PROBE: NEGATIVE

## 2021-09-20 PROCEDURE — 27200651 HC AIRWAY, LMA: Performed by: ANESTHESIOLOGY

## 2021-09-20 PROCEDURE — 27000671 HC TUBING MICROBORE EXT: Performed by: ANESTHESIOLOGY

## 2021-09-20 PROCEDURE — 71000015 HC POSTOP RECOV 1ST HR: Performed by: SURGERY

## 2021-09-20 PROCEDURE — 37000009 HC ANESTHESIA EA ADD 15 MINS: Performed by: SURGERY

## 2021-09-20 PROCEDURE — 27000673 HC TUBING BLOOD Y: Performed by: ANESTHESIOLOGY

## 2021-09-20 PROCEDURE — 37000008 HC ANESTHESIA 1ST 15 MINUTES: Performed by: SURGERY

## 2021-09-20 PROCEDURE — 25000003 PHARM REV CODE 250: Performed by: NURSE ANESTHETIST, CERTIFIED REGISTERED

## 2021-09-20 PROCEDURE — 81025 URINE PREGNANCY TEST: CPT | Performed by: SURGERY

## 2021-09-20 PROCEDURE — U0002 COVID-19 LAB TEST NON-CDC: HCPCS | Performed by: SURGERY

## 2021-09-20 PROCEDURE — 71000039 HC RECOVERY, EACH ADD'L HOUR: Performed by: SURGERY

## 2021-09-20 PROCEDURE — 36000707: Performed by: SURGERY

## 2021-09-20 PROCEDURE — 27202107 HC XP QUATRO SENSOR: Performed by: ANESTHESIOLOGY

## 2021-09-20 PROCEDURE — 71000033 HC RECOVERY, INTIAL HOUR: Performed by: SURGERY

## 2021-09-20 PROCEDURE — C1769 GUIDE WIRE: HCPCS | Performed by: SURGERY

## 2021-09-20 PROCEDURE — C1788 PORT, INDWELLING, IMP: HCPCS | Performed by: SURGERY

## 2021-09-20 PROCEDURE — 25000003 PHARM REV CODE 250: Performed by: SURGERY

## 2021-09-20 PROCEDURE — 27201423 OPTIME MED/SURG SUP & DEVICES STERILE SUPPLY: Performed by: SURGERY

## 2021-09-20 PROCEDURE — 77001 FLUOROGUIDE FOR VEIN DEVICE: CPT | Mod: TC

## 2021-09-20 PROCEDURE — 63600175 PHARM REV CODE 636 W HCPCS: Performed by: SURGERY

## 2021-09-20 PROCEDURE — 63600175 PHARM REV CODE 636 W HCPCS: Performed by: NURSE ANESTHETIST, CERTIFIED REGISTERED

## 2021-09-20 PROCEDURE — 36000706: Performed by: SURGERY

## 2021-09-20 DEVICE — PORT POWER MRI 8FR 1808000: Type: IMPLANTABLE DEVICE | Site: SUBCLAVIAN | Status: FUNCTIONAL

## 2021-09-20 RX ORDER — HYDROCODONE BITARTRATE AND ACETAMINOPHEN 5; 325 MG/1; MG/1
1 TABLET ORAL EVERY 6 HOURS PRN
Qty: 15 TABLET | Refills: 0 | Status: SHIPPED | OUTPATIENT
Start: 2021-09-20 | End: 2021-10-15

## 2021-09-20 RX ORDER — SODIUM CHLORIDE, SODIUM LACTATE, POTASSIUM CHLORIDE, CALCIUM CHLORIDE 600; 310; 30; 20 MG/100ML; MG/100ML; MG/100ML; MG/100ML
INJECTION, SOLUTION INTRAVENOUS CONTINUOUS PRN
Status: DISCONTINUED | OUTPATIENT
Start: 2021-09-20 | End: 2021-09-20

## 2021-09-20 RX ORDER — FENTANYL CITRATE 50 UG/ML
25 INJECTION, SOLUTION INTRAMUSCULAR; INTRAVENOUS
Status: DISCONTINUED | OUTPATIENT
Start: 2021-09-20 | End: 2021-09-20 | Stop reason: HOSPADM

## 2021-09-20 RX ORDER — BUPIVACAINE HYDROCHLORIDE 5 MG/ML
INJECTION, SOLUTION EPIDURAL; INTRACAUDAL
Status: DISCONTINUED | OUTPATIENT
Start: 2021-09-20 | End: 2021-09-20 | Stop reason: HOSPADM

## 2021-09-20 RX ORDER — LIDOCAINE HYDROCHLORIDE 20 MG/ML
INJECTION, SOLUTION EPIDURAL; INFILTRATION; INTRACAUDAL; PERINEURAL
Status: DISCONTINUED | OUTPATIENT
Start: 2021-09-20 | End: 2021-09-20

## 2021-09-20 RX ORDER — OXYCODONE HYDROCHLORIDE 5 MG/1
5 TABLET ORAL
Status: DISCONTINUED | OUTPATIENT
Start: 2021-09-20 | End: 2021-09-20 | Stop reason: HOSPADM

## 2021-09-20 RX ORDER — HYDROCODONE BITARTRATE AND ACETAMINOPHEN 5; 325 MG/1; MG/1
1 TABLET ORAL EVERY 6 HOURS PRN
Status: CANCELLED | OUTPATIENT
Start: 2021-09-20

## 2021-09-20 RX ORDER — FENTANYL CITRATE 50 UG/ML
INJECTION, SOLUTION INTRAMUSCULAR; INTRAVENOUS
Status: DISCONTINUED | OUTPATIENT
Start: 2021-09-20 | End: 2021-09-20

## 2021-09-20 RX ORDER — ACETAMINOPHEN 325 MG/1
650 TABLET ORAL EVERY 4 HOURS PRN
Status: CANCELLED | OUTPATIENT
Start: 2021-09-20

## 2021-09-20 RX ORDER — HEPARIN SODIUM 1000 [USP'U]/ML
INJECTION, SOLUTION INTRAVENOUS; SUBCUTANEOUS
Status: DISCONTINUED | OUTPATIENT
Start: 2021-09-20 | End: 2021-09-20 | Stop reason: HOSPADM

## 2021-09-20 RX ORDER — SODIUM CHLORIDE 0.9 % (FLUSH) 0.9 %
10 SYRINGE (ML) INJECTION
Status: DISCONTINUED | OUTPATIENT
Start: 2021-09-20 | End: 2021-09-20 | Stop reason: HOSPADM

## 2021-09-20 RX ORDER — ONDANSETRON 2 MG/ML
4 INJECTION INTRAMUSCULAR; INTRAVENOUS DAILY PRN
Status: DISCONTINUED | OUTPATIENT
Start: 2021-09-20 | End: 2021-09-20 | Stop reason: HOSPADM

## 2021-09-20 RX ORDER — CEFAZOLIN SODIUM 2 G/50ML
2 SOLUTION INTRAVENOUS ONCE
Status: COMPLETED | OUTPATIENT
Start: 2021-09-20 | End: 2021-09-20

## 2021-09-20 RX ORDER — DEXAMETHASONE SODIUM PHOSPHATE 4 MG/ML
INJECTION, SOLUTION INTRA-ARTICULAR; INTRALESIONAL; INTRAMUSCULAR; INTRAVENOUS; SOFT TISSUE
Status: DISCONTINUED | OUTPATIENT
Start: 2021-09-20 | End: 2021-09-20

## 2021-09-20 RX ORDER — ONDANSETRON 2 MG/ML
4 INJECTION INTRAMUSCULAR; INTRAVENOUS EVERY 12 HOURS PRN
Status: CANCELLED | OUTPATIENT
Start: 2021-09-20

## 2021-09-20 RX ORDER — MEPERIDINE HYDROCHLORIDE 50 MG/ML
12.5 INJECTION INTRAMUSCULAR; INTRAVENOUS; SUBCUTANEOUS EVERY 10 MIN PRN
Status: DISCONTINUED | OUTPATIENT
Start: 2021-09-20 | End: 2021-09-20 | Stop reason: HOSPADM

## 2021-09-20 RX ORDER — MIDAZOLAM HYDROCHLORIDE 1 MG/ML
INJECTION INTRAMUSCULAR; INTRAVENOUS
Status: DISCONTINUED | OUTPATIENT
Start: 2021-09-20 | End: 2021-09-20

## 2021-09-20 RX ORDER — PHENYLEPHRINE HYDROCHLORIDE 10 MG/ML
INJECTION INTRAVENOUS
Status: DISCONTINUED | OUTPATIENT
Start: 2021-09-20 | End: 2021-09-20

## 2021-09-20 RX ORDER — PROPOFOL 10 MG/ML
VIAL (ML) INTRAVENOUS
Status: DISCONTINUED | OUTPATIENT
Start: 2021-09-20 | End: 2021-09-20

## 2021-09-20 RX ORDER — ACETAMINOPHEN 10 MG/ML
1000 INJECTION, SOLUTION INTRAVENOUS ONCE
Status: CANCELLED | OUTPATIENT
Start: 2021-09-20 | End: 2021-09-20

## 2021-09-20 RX ORDER — FAMOTIDINE 10 MG/ML
INJECTION INTRAVENOUS
Status: DISCONTINUED | OUTPATIENT
Start: 2021-09-20 | End: 2021-09-20

## 2021-09-20 RX ORDER — LIDOCAINE HYDROCHLORIDE 20 MG/ML
JELLY TOPICAL
Status: DISCONTINUED | OUTPATIENT
Start: 2021-09-20 | End: 2021-09-20

## 2021-09-20 RX ORDER — ONDANSETRON 2 MG/ML
INJECTION INTRAMUSCULAR; INTRAVENOUS
Status: DISCONTINUED | OUTPATIENT
Start: 2021-09-20 | End: 2021-09-20

## 2021-09-20 RX ORDER — DIPHENHYDRAMINE HYDROCHLORIDE 50 MG/ML
12.5 INJECTION INTRAMUSCULAR; INTRAVENOUS
Status: DISCONTINUED | OUTPATIENT
Start: 2021-09-20 | End: 2021-09-20 | Stop reason: HOSPADM

## 2021-09-20 RX ADMIN — ONDANSETRON 4 MG: 2 INJECTION INTRAMUSCULAR; INTRAVENOUS at 09:09

## 2021-09-20 RX ADMIN — LIDOCAINE HYDROCHLORIDE 5 ML: 20 JELLY TOPICAL at 09:09

## 2021-09-20 RX ADMIN — DEXAMETHASONE SODIUM PHOSPHATE 8 MG: 4 INJECTION, SOLUTION INTRAMUSCULAR; INTRAVENOUS at 09:09

## 2021-09-20 RX ADMIN — SODIUM CHLORIDE, SODIUM LACTATE, POTASSIUM CHLORIDE, AND CALCIUM CHLORIDE: .6; .31; .03; .02 INJECTION, SOLUTION INTRAVENOUS at 10:09

## 2021-09-20 RX ADMIN — PHENYLEPHRINE HYDROCHLORIDE 100 MCG: 10 INJECTION INTRAVENOUS at 09:09

## 2021-09-20 RX ADMIN — PROPOFOL 130 MG: 10 INJECTION, EMULSION INTRAVENOUS at 09:09

## 2021-09-20 RX ADMIN — LIDOCAINE HYDROCHLORIDE 100 MG: 20 INJECTION, SOLUTION INTRAVENOUS at 09:09

## 2021-09-20 RX ADMIN — FENTANYL CITRATE 50 MCG: 50 INJECTION INTRAMUSCULAR; INTRAVENOUS at 09:09

## 2021-09-20 RX ADMIN — PHENYLEPHRINE HYDROCHLORIDE 100 MCG: 10 INJECTION INTRAVENOUS at 10:09

## 2021-09-20 RX ADMIN — SODIUM CHLORIDE, SODIUM LACTATE, POTASSIUM CHLORIDE, AND CALCIUM CHLORIDE: .6; .31; .03; .02 INJECTION, SOLUTION INTRAVENOUS at 09:09

## 2021-09-20 RX ADMIN — CEFAZOLIN SODIUM 2 G: 2 SOLUTION INTRAVENOUS at 09:09

## 2021-09-20 RX ADMIN — FAMOTIDINE 20 MG: 10 INJECTION, SOLUTION INTRAVENOUS at 09:09

## 2021-09-20 RX ADMIN — MIDAZOLAM HYDROCHLORIDE 2 MG: 1 INJECTION, SOLUTION INTRAMUSCULAR; INTRAVENOUS at 09:09

## 2021-09-21 ENCOUNTER — OFFICE VISIT (OUTPATIENT)
Dept: HEMATOLOGY/ONCOLOGY | Facility: CLINIC | Age: 31
End: 2021-09-21
Payer: COMMERCIAL

## 2021-09-21 VITALS
HEART RATE: 86 BPM | RESPIRATION RATE: 18 BRPM | DIASTOLIC BLOOD PRESSURE: 77 MMHG | HEIGHT: 62 IN | SYSTOLIC BLOOD PRESSURE: 115 MMHG | BODY MASS INDEX: 25.95 KG/M2 | WEIGHT: 141 LBS

## 2021-09-21 DIAGNOSIS — C50.812 MALIGNANT NEOPLASM OF OVERLAPPING SITES OF LEFT FEMALE BREAST, UNSPECIFIED ESTROGEN RECEPTOR STATUS: ICD-10-CM

## 2021-09-21 PROCEDURE — 99215 PR OFFICE/OUTPT VISIT, EST, LEVL V, 40-54 MIN: ICD-10-PCS | Mod: S$GLB,,, | Performed by: NURSE PRACTITIONER

## 2021-09-21 PROCEDURE — 99215 OFFICE O/P EST HI 40 MIN: CPT | Mod: S$GLB,,, | Performed by: NURSE PRACTITIONER

## 2021-09-21 PROCEDURE — 3008F PR BODY MASS INDEX (BMI) DOCUMENTED: ICD-10-PCS | Mod: S$GLB,,, | Performed by: NURSE PRACTITIONER

## 2021-09-21 PROCEDURE — 3008F BODY MASS INDEX DOCD: CPT | Mod: S$GLB,,, | Performed by: NURSE PRACTITIONER

## 2021-09-21 PROCEDURE — 3078F PR MOST RECENT DIASTOLIC BLOOD PRESSURE < 80 MM HG: ICD-10-PCS | Mod: S$GLB,,, | Performed by: NURSE PRACTITIONER

## 2021-09-21 PROCEDURE — 3078F DIAST BP <80 MM HG: CPT | Mod: S$GLB,,, | Performed by: NURSE PRACTITIONER

## 2021-09-21 PROCEDURE — 3074F PR MOST RECENT SYSTOLIC BLOOD PRESSURE < 130 MM HG: ICD-10-PCS | Mod: S$GLB,,, | Performed by: NURSE PRACTITIONER

## 2021-09-21 PROCEDURE — 3074F SYST BP LT 130 MM HG: CPT | Mod: S$GLB,,, | Performed by: NURSE PRACTITIONER

## 2021-09-21 RX ORDER — DEXAMETHASONE 4 MG/1
TABLET ORAL
Qty: 120 TABLET | Refills: 1 | Status: SHIPPED | OUTPATIENT
Start: 2021-09-21 | End: 2021-10-15

## 2021-09-21 RX ORDER — ONDANSETRON HYDROCHLORIDE 8 MG/1
8 TABLET, FILM COATED ORAL EVERY 8 HOURS PRN
Qty: 30 TABLET | Refills: 2 | Status: SHIPPED | OUTPATIENT
Start: 2021-09-21 | End: 2022-01-26

## 2021-09-21 RX ORDER — PROMETHAZINE HYDROCHLORIDE 25 MG/1
25 TABLET ORAL
Qty: 30 TABLET | Refills: 2 | Status: SHIPPED | OUTPATIENT
Start: 2021-09-21 | End: 2021-10-15

## 2021-09-23 ENCOUNTER — TELEPHONE (OUTPATIENT)
Dept: CARDIOLOGY | Facility: HOSPITAL | Age: 31
End: 2021-09-23

## 2021-09-23 ENCOUNTER — TELEPHONE (OUTPATIENT)
Dept: HEMATOLOGY/ONCOLOGY | Facility: CLINIC | Age: 31
End: 2021-09-23

## 2021-09-23 DIAGNOSIS — N63.21 MASS OF UPPER OUTER QUADRANT OF LEFT BREAST: Primary | ICD-10-CM

## 2021-09-24 ENCOUNTER — CLINICAL SUPPORT (OUTPATIENT)
Dept: CARDIOLOGY | Facility: HOSPITAL | Age: 31
End: 2021-09-24
Attending: INTERNAL MEDICINE
Payer: COMMERCIAL

## 2021-09-24 DIAGNOSIS — C50.812 MALIGNANT NEOPLASM OF OVERLAPPING SITES OF LEFT FEMALE BREAST, UNSPECIFIED ESTROGEN RECEPTOR STATUS: ICD-10-CM

## 2021-09-24 DIAGNOSIS — N63.25 MASS OVERLAPPING MULTIPLE QUADRANTS OF LEFT BREAST: ICD-10-CM

## 2021-09-24 DIAGNOSIS — N63.24 MASS OF LOWER INNER QUADRANT OF LEFT BREAST: ICD-10-CM

## 2021-09-24 DIAGNOSIS — N63.21 MASS OF UPPER OUTER QUADRANT OF LEFT BREAST: ICD-10-CM

## 2021-09-24 LAB
AV INDEX (PROSTH): 0.68
AV MEAN GRADIENT: 4 MMHG
AV PEAK GRADIENT: 6 MMHG
AV VALVE AREA: 1.45 CM2
AV VELOCITY RATIO: 80.15
CV ECHO LV RWT: 0.34 CM
DOP CALC AO PEAK VEL: 1.22 M/S
DOP CALC AO VTI: 26.37 CM
DOP CALC LVOT AREA: 2.1 CM2
DOP CALC LVOT DIAMETER: 1.65 CM
DOP CALC LVOT PEAK VEL: 97.78 M/S
DOP CALC LVOT STROKE VOLUME: 38.36 CM3
DOP CALCLVOT PEAK VEL VTI: 17.95 CM
E WAVE DECELERATION TIME: 236.2 MSEC
E/A RATIO: 1.37
E/E' RATIO: 8.19 M/S
ECHO LV POSTERIOR WALL: 0.74 CM (ref 0.6–1.1)
EJECTION FRACTION: 60 %
FRACTIONAL SHORTENING: 34 % (ref 28–44)
INTERVENTRICULAR SEPTUM: 0.59 CM (ref 0.6–1.1)
LEFT ATRIUM SIZE: 2.89 CM
LEFT INTERNAL DIMENSION IN SYSTOLE: 2.89 CM (ref 2.1–4)
LEFT VENTRICLE DIASTOLIC VOLUME: 51.2 ML
LEFT VENTRICLE SYSTOLIC VOLUME: 13.9 ML
LEFT VENTRICULAR INTERNAL DIMENSION IN DIASTOLE: 4.36 CM (ref 3.5–6)
LEFT VENTRICULAR MASS: 84.91 G
LV LATERAL E/E' RATIO: 6.62 M/S
LV SEPTAL E/E' RATIO: 10.75 M/S
MV PEAK A VEL: 0.63 M/S
MV PEAK E VEL: 0.86 M/S
PISA TR MAX VEL: 1.96 M/S
PV PEAK VELOCITY: 102.67 CM/S
RA PRESSURE: 3 MMHG
RIGHT VENTRICULAR END-DIASTOLIC DIMENSION: 207 CM
TDI LATERAL: 0.13 M/S
TDI SEPTAL: 0.08 M/S
TDI: 0.11 M/S
TR MAX PG: 15 MMHG
TV REST PULMONARY ARTERY PRESSURE: 18 MMHG

## 2021-09-24 PROCEDURE — 93306 TTE W/DOPPLER COMPLETE: CPT | Mod: 26,,, | Performed by: INTERNAL MEDICINE

## 2021-09-24 PROCEDURE — 93306 ECHO (CUPID ONLY): ICD-10-PCS | Mod: 26,,, | Performed by: INTERNAL MEDICINE

## 2021-09-24 PROCEDURE — 93306 TTE W/DOPPLER COMPLETE: CPT

## 2021-09-27 ENCOUNTER — HOSPITAL ENCOUNTER (OUTPATIENT)
Dept: RADIOLOGY | Facility: HOSPITAL | Age: 31
Discharge: HOME | End: 2021-09-27
Attending: NURSE PRACTITIONER
Payer: COMMERCIAL

## 2021-09-27 DIAGNOSIS — N63.25 MASS OVERLAPPING MULTIPLE QUADRANTS OF LEFT BREAST: ICD-10-CM

## 2021-09-27 PROCEDURE — 78306 BONE IMAGING WHOLE BODY: CPT | Mod: TC

## 2021-09-27 PROCEDURE — A9503 TC99M MEDRONATE: HCPCS

## 2021-09-28 ENCOUNTER — HOSPITAL ENCOUNTER (OUTPATIENT)
Dept: RADIOLOGY | Facility: HOSPITAL | Age: 31
Discharge: HOME | End: 2021-09-28
Attending: INTERNAL MEDICINE
Payer: COMMERCIAL

## 2021-09-28 DIAGNOSIS — C50.812 MALIGNANT NEOPLASM OF OVERLAPPING SITES OF LEFT FEMALE BREAST, UNSPECIFIED ESTROGEN RECEPTOR STATUS: ICD-10-CM

## 2021-09-28 DIAGNOSIS — N63.25 MASS OVERLAPPING MULTIPLE QUADRANTS OF LEFT BREAST: ICD-10-CM

## 2021-09-28 DIAGNOSIS — N63.21 MASS OF UPPER OUTER QUADRANT OF LEFT BREAST: ICD-10-CM

## 2021-09-28 DIAGNOSIS — N63.24 MASS OF LOWER INNER QUADRANT OF LEFT BREAST: ICD-10-CM

## 2021-09-28 PROCEDURE — A9585 GADOBUTROL INJECTION: HCPCS | Mod: PO | Performed by: INTERNAL MEDICINE

## 2021-09-28 PROCEDURE — 25500020 PHARM REV CODE 255: Mod: PO | Performed by: INTERNAL MEDICINE

## 2021-09-28 PROCEDURE — 77049 MRI BREAST C-+ W/CAD BI: CPT | Mod: TC,PO

## 2021-09-28 RX ORDER — GADOBUTROL 604.72 MG/ML
6.5 INJECTION INTRAVENOUS
Status: COMPLETED | OUTPATIENT
Start: 2021-09-28 | End: 2021-09-28

## 2021-09-28 RX ADMIN — GADOBUTROL 6.5 ML: 604.72 INJECTION INTRAVENOUS at 08:09

## 2021-09-29 ENCOUNTER — HOSPITAL ENCOUNTER (OUTPATIENT)
Dept: RADIOLOGY | Facility: HOSPITAL | Age: 31
Discharge: HOME | End: 2021-09-29
Attending: NURSE PRACTITIONER
Payer: COMMERCIAL

## 2021-09-29 ENCOUNTER — TELEPHONE (OUTPATIENT)
Dept: HEMATOLOGY/ONCOLOGY | Facility: CLINIC | Age: 31
End: 2021-09-29

## 2021-09-29 DIAGNOSIS — N63.25 MASS OVERLAPPING MULTIPLE QUADRANTS OF LEFT BREAST: ICD-10-CM

## 2021-09-29 DIAGNOSIS — R59.0 HILAR LYMPHADENOPATHY: ICD-10-CM

## 2021-09-29 DIAGNOSIS — N63.25 MASS OVERLAPPING MULTIPLE QUADRANTS OF LEFT BREAST: Primary | ICD-10-CM

## 2021-09-29 DIAGNOSIS — R93.89 ABNORMAL CT SCAN: ICD-10-CM

## 2021-09-29 PROBLEM — Z17.0 ER+ (ESTROGEN RECEPTOR POSITIVE STATUS): Status: ACTIVE | Noted: 2021-09-29

## 2021-09-29 PROBLEM — C50.919 HER2-POSITIVE CARCINOMA OF BREAST: Status: ACTIVE | Noted: 2021-09-29

## 2021-09-29 PROCEDURE — 25500020 PHARM REV CODE 255: Mod: PO | Performed by: NURSE PRACTITIONER

## 2021-09-29 PROCEDURE — 74177 CT ABD & PELVIS W/CONTRAST: CPT | Mod: TC,PO

## 2021-09-29 PROCEDURE — 71260 CT THORAX DX C+: CPT | Mod: TC,PO

## 2021-09-29 RX ADMIN — IOHEXOL 100 ML: 350 INJECTION, SOLUTION INTRAVENOUS at 02:09

## 2021-09-30 ENCOUNTER — TELEPHONE (OUTPATIENT)
Dept: HEMATOLOGY/ONCOLOGY | Facility: CLINIC | Age: 31
End: 2021-09-30

## 2021-09-30 ENCOUNTER — OFFICE VISIT (OUTPATIENT)
Dept: HEMATOLOGY/ONCOLOGY | Facility: CLINIC | Age: 31
End: 2021-09-30
Payer: COMMERCIAL

## 2021-09-30 VITALS
RESPIRATION RATE: 18 BRPM | DIASTOLIC BLOOD PRESSURE: 75 MMHG | SYSTOLIC BLOOD PRESSURE: 110 MMHG | WEIGHT: 138 LBS | BODY MASS INDEX: 25.4 KG/M2 | HEART RATE: 82 BPM | HEIGHT: 62 IN

## 2021-09-30 DIAGNOSIS — N63.25 MASS OVERLAPPING MULTIPLE QUADRANTS OF LEFT BREAST: ICD-10-CM

## 2021-09-30 DIAGNOSIS — N63.21 MASS OF UPPER OUTER QUADRANT OF LEFT BREAST: ICD-10-CM

## 2021-09-30 DIAGNOSIS — N63.24 MASS OF LOWER INNER QUADRANT OF LEFT BREAST: ICD-10-CM

## 2021-09-30 DIAGNOSIS — C50.812 MALIGNANT NEOPLASM OF OVERLAPPING SITES OF LEFT FEMALE BREAST, UNSPECIFIED ESTROGEN RECEPTOR STATUS: Primary | ICD-10-CM

## 2021-09-30 DIAGNOSIS — C50.919 HER2-POSITIVE CARCINOMA OF BREAST: ICD-10-CM

## 2021-09-30 DIAGNOSIS — Z17.0 ER+ (ESTROGEN RECEPTOR POSITIVE STATUS): ICD-10-CM

## 2021-09-30 PROCEDURE — 3074F PR MOST RECENT SYSTOLIC BLOOD PRESSURE < 130 MM HG: ICD-10-PCS | Mod: S$GLB,,, | Performed by: INTERNAL MEDICINE

## 2021-09-30 PROCEDURE — 99215 OFFICE O/P EST HI 40 MIN: CPT | Mod: S$GLB,,, | Performed by: INTERNAL MEDICINE

## 2021-09-30 PROCEDURE — 99215 PR OFFICE/OUTPT VISIT, EST, LEVL V, 40-54 MIN: ICD-10-PCS | Mod: S$GLB,,, | Performed by: INTERNAL MEDICINE

## 2021-09-30 PROCEDURE — 1160F PR REVIEW ALL MEDS BY PRESCRIBER/CLIN PHARMACIST DOCUMENTED: ICD-10-PCS | Mod: S$GLB,,, | Performed by: INTERNAL MEDICINE

## 2021-09-30 PROCEDURE — 1160F RVW MEDS BY RX/DR IN RCRD: CPT | Mod: S$GLB,,, | Performed by: INTERNAL MEDICINE

## 2021-09-30 PROCEDURE — 3008F BODY MASS INDEX DOCD: CPT | Mod: S$GLB,,, | Performed by: INTERNAL MEDICINE

## 2021-09-30 PROCEDURE — 3008F PR BODY MASS INDEX (BMI) DOCUMENTED: ICD-10-PCS | Mod: S$GLB,,, | Performed by: INTERNAL MEDICINE

## 2021-09-30 PROCEDURE — 3078F PR MOST RECENT DIASTOLIC BLOOD PRESSURE < 80 MM HG: ICD-10-PCS | Mod: S$GLB,,, | Performed by: INTERNAL MEDICINE

## 2021-09-30 PROCEDURE — 3074F SYST BP LT 130 MM HG: CPT | Mod: S$GLB,,, | Performed by: INTERNAL MEDICINE

## 2021-09-30 PROCEDURE — 3078F DIAST BP <80 MM HG: CPT | Mod: S$GLB,,, | Performed by: INTERNAL MEDICINE

## 2021-10-03 ENCOUNTER — LAB VISIT (OUTPATIENT)
Dept: PRIMARY CARE CLINIC | Facility: CLINIC | Age: 31
End: 2021-10-03
Payer: COMMERCIAL

## 2021-10-03 DIAGNOSIS — Z01.818 PREOP TESTING: ICD-10-CM

## 2021-10-03 PROCEDURE — U0005 INFEC AGEN DETEC AMPLI PROBE: HCPCS | Performed by: INTERNAL MEDICINE

## 2021-10-03 PROCEDURE — U0003 INFECTIOUS AGENT DETECTION BY NUCLEIC ACID (DNA OR RNA); SEVERE ACUTE RESPIRATORY SYNDROME CORONAVIRUS 2 (SARS-COV-2) (CORONAVIRUS DISEASE [COVID-19]), AMPLIFIED PROBE TECHNIQUE, MAKING USE OF HIGH THROUGHPUT TECHNOLOGIES AS DESCRIBED BY CMS-2020-01-R: HCPCS | Performed by: INTERNAL MEDICINE

## 2021-10-04 LAB
SARS-COV-2 RNA RESP QL NAA+PROBE: NOT DETECTED
SARS-COV-2- CYCLE NUMBER: NORMAL

## 2021-10-05 ENCOUNTER — OFFICE VISIT (OUTPATIENT)
Dept: HEMATOLOGY/ONCOLOGY | Facility: CLINIC | Age: 31
End: 2021-10-05
Payer: MEDICAID

## 2021-10-05 ENCOUNTER — INFUSION (OUTPATIENT)
Dept: INFUSION THERAPY | Facility: HOSPITAL | Age: 31
End: 2021-10-05
Attending: INTERNAL MEDICINE
Payer: COMMERCIAL

## 2021-10-05 ENCOUNTER — CLINICAL SUPPORT (OUTPATIENT)
Dept: HEMATOLOGY/ONCOLOGY | Facility: CLINIC | Age: 31
End: 2021-10-05
Payer: MEDICAID

## 2021-10-05 ENCOUNTER — DOCUMENTATION ONLY (OUTPATIENT)
Dept: HEMATOLOGY/ONCOLOGY | Facility: CLINIC | Age: 31
End: 2021-10-05

## 2021-10-05 VITALS
WEIGHT: 145.63 LBS | RESPIRATION RATE: 18 BRPM | HEART RATE: 101 BPM | BODY MASS INDEX: 26.8 KG/M2 | SYSTOLIC BLOOD PRESSURE: 119 MMHG | TEMPERATURE: 98 F | HEIGHT: 62 IN | DIASTOLIC BLOOD PRESSURE: 79 MMHG

## 2021-10-05 VITALS
HEIGHT: 62 IN | BODY MASS INDEX: 26.68 KG/M2 | WEIGHT: 145 LBS | TEMPERATURE: 98 F | HEART RATE: 108 BPM | SYSTOLIC BLOOD PRESSURE: 125 MMHG | RESPIRATION RATE: 16 BRPM | DIASTOLIC BLOOD PRESSURE: 77 MMHG

## 2021-10-05 DIAGNOSIS — C50.812 MALIGNANT NEOPLASM OF OVERLAPPING SITES OF LEFT FEMALE BREAST, UNSPECIFIED ESTROGEN RECEPTOR STATUS: ICD-10-CM

## 2021-10-05 DIAGNOSIS — C50.919 HER2-POSITIVE CARCINOMA OF BREAST: ICD-10-CM

## 2021-10-05 DIAGNOSIS — C50.812 MALIGNANT NEOPLASM OF OVERLAPPING SITES OF LEFT FEMALE BREAST, UNSPECIFIED ESTROGEN RECEPTOR STATUS: Primary | ICD-10-CM

## 2021-10-05 DIAGNOSIS — Z17.0 MALIGNANT NEOPLASM OF OVERLAPPING SITES OF LEFT BREAST IN FEMALE, ESTROGEN RECEPTOR POSITIVE: Primary | ICD-10-CM

## 2021-10-05 DIAGNOSIS — K13.79 MOUTH SORES: ICD-10-CM

## 2021-10-05 DIAGNOSIS — C50.812 MALIGNANT NEOPLASM OF OVERLAPPING SITES OF LEFT BREAST IN FEMALE, ESTROGEN RECEPTOR POSITIVE: Primary | ICD-10-CM

## 2021-10-05 PROCEDURE — 63600175 PHARM REV CODE 636 W HCPCS: Performed by: INTERNAL MEDICINE

## 2021-10-05 PROCEDURE — 99214 PR OFFICE/OUTPT VISIT, EST, LEVL IV, 30-39 MIN: ICD-10-PCS | Mod: S$GLB,,, | Performed by: NURSE PRACTITIONER

## 2021-10-05 PROCEDURE — 96413 CHEMO IV INFUSION 1 HR: CPT

## 2021-10-05 PROCEDURE — 96417 CHEMO IV INFUS EACH ADDL SEQ: CPT

## 2021-10-05 PROCEDURE — 25000003 PHARM REV CODE 250: Performed by: INTERNAL MEDICINE

## 2021-10-05 PROCEDURE — 99214 OFFICE O/P EST MOD 30 MIN: CPT | Mod: S$GLB,,, | Performed by: NURSE PRACTITIONER

## 2021-10-05 PROCEDURE — 96375 TX/PRO/DX INJ NEW DRUG ADDON: CPT

## 2021-10-05 PROCEDURE — 96367 TX/PROPH/DG ADDL SEQ IV INF: CPT

## 2021-10-05 PROCEDURE — 96415 CHEMO IV INFUSION ADDL HR: CPT

## 2021-10-05 RX ORDER — HEPARIN 100 UNIT/ML
500 SYRINGE INTRAVENOUS
Status: CANCELLED | OUTPATIENT
Start: 2021-10-05

## 2021-10-05 RX ORDER — SODIUM CHLORIDE 0.9 % (FLUSH) 0.9 %
10 SYRINGE (ML) INJECTION
Status: CANCELLED | OUTPATIENT
Start: 2021-10-05

## 2021-10-05 RX ORDER — HEPARIN 100 UNIT/ML
500 SYRINGE INTRAVENOUS
Status: DISCONTINUED | OUTPATIENT
Start: 2021-10-05 | End: 2021-10-05 | Stop reason: HOSPADM

## 2021-10-05 RX ORDER — DIPHENHYDRAMINE HYDROCHLORIDE 50 MG/ML
50 INJECTION INTRAMUSCULAR; INTRAVENOUS ONCE AS NEEDED
Status: CANCELLED | OUTPATIENT
Start: 2021-10-05

## 2021-10-05 RX ORDER — EPINEPHRINE 0.3 MG/.3ML
0.3 INJECTION SUBCUTANEOUS ONCE AS NEEDED
Status: CANCELLED | OUTPATIENT
Start: 2021-10-05

## 2021-10-05 RX ADMIN — CARBOPLATIN 760 MG: 10 INJECTION, SOLUTION INTRAVENOUS at 02:10

## 2021-10-05 RX ADMIN — HEPARIN 500 UNITS: 100 SYRINGE at 03:10

## 2021-10-05 RX ADMIN — TRASTUZUMAB 512 MG: 150 INJECTION, POWDER, LYOPHILIZED, FOR SOLUTION INTRAVENOUS at 10:10

## 2021-10-05 RX ADMIN — DEXAMETHASONE SODIUM PHOSPHATE: 4 INJECTION, SOLUTION INTRA-ARTICULAR; INTRALESIONAL; INTRAMUSCULAR; INTRAVENOUS; SOFT TISSUE at 12:10

## 2021-10-05 RX ADMIN — APREPITANT 130 MG: 130 INJECTION, EMULSION INTRAVENOUS at 12:10

## 2021-10-05 RX ADMIN — DOCETAXEL ANHYDROUS 125 MG: 10 INJECTION, SOLUTION INTRAVENOUS at 12:10

## 2021-10-05 RX ADMIN — PERTUZUMAB 840 MG: 30 INJECTION, SOLUTION, CONCENTRATE INTRAVENOUS at 08:10

## 2021-10-11 ENCOUNTER — LAB VISIT (OUTPATIENT)
Dept: LAB | Facility: HOSPITAL | Age: 31
End: 2021-10-11
Attending: INTERNAL MEDICINE
Payer: COMMERCIAL

## 2021-10-11 DIAGNOSIS — C50.812 MALIGNANT NEOPLASM OF OVERLAPPING SITES OF LEFT FEMALE BREAST, UNSPECIFIED ESTROGEN RECEPTOR STATUS: ICD-10-CM

## 2021-10-11 DIAGNOSIS — N63.24 MASS OF LOWER INNER QUADRANT OF LEFT BREAST: ICD-10-CM

## 2021-10-11 DIAGNOSIS — N63.21 MASS OF UPPER OUTER QUADRANT OF LEFT BREAST: ICD-10-CM

## 2021-10-11 DIAGNOSIS — N63.25 MASS OVERLAPPING MULTIPLE QUADRANTS OF LEFT BREAST: ICD-10-CM

## 2021-10-11 LAB
ALBUMIN SERPL BCP-MCNC: 4.1 G/DL (ref 3.5–5.2)
ALP SERPL-CCNC: 34 U/L (ref 55–135)
ALT SERPL W/O P-5'-P-CCNC: 52 U/L (ref 10–44)
ANION GAP SERPL CALC-SCNC: 8 MMOL/L (ref 8–16)
AST SERPL-CCNC: 38 U/L (ref 10–40)
BASOPHILS # BLD AUTO: 0.04 K/UL (ref 0–0.2)
BASOPHILS NFR BLD: 1.4 % (ref 0–1.9)
BILIRUB SERPL-MCNC: 0.9 MG/DL (ref 0.1–1)
BUN SERPL-MCNC: 13 MG/DL (ref 6–20)
CALCIUM SERPL-MCNC: 8.8 MG/DL (ref 8.7–10.5)
CHLORIDE SERPL-SCNC: 99 MMOL/L (ref 95–110)
CO2 SERPL-SCNC: 23 MMOL/L (ref 23–29)
CREAT SERPL-MCNC: 1 MG/DL (ref 0.5–1.4)
DIFFERENTIAL METHOD: ABNORMAL
EOSINOPHIL # BLD AUTO: 0 K/UL (ref 0–0.5)
EOSINOPHIL NFR BLD: 0.4 % (ref 0–8)
ERYTHROCYTE [DISTWIDTH] IN BLOOD BY AUTOMATED COUNT: 13.2 % (ref 11.5–14.5)
EST. GFR  (AFRICAN AMERICAN): >60 ML/MIN/1.73 M^2
EST. GFR  (NON AFRICAN AMERICAN): >60 ML/MIN/1.73 M^2
GLUCOSE SERPL-MCNC: 119 MG/DL (ref 70–110)
HCT VFR BLD AUTO: 38.2 % (ref 37–48.5)
HGB BLD-MCNC: 12.1 G/DL (ref 12–16)
IMM GRANULOCYTES # BLD AUTO: 0.05 K/UL (ref 0–0.04)
IMM GRANULOCYTES NFR BLD AUTO: 1.8 % (ref 0–0.5)
LYMPHOCYTES # BLD AUTO: 1.1 K/UL (ref 1–4.8)
LYMPHOCYTES NFR BLD: 37.3 % (ref 18–48)
MAGNESIUM SERPL-MCNC: 2.2 MG/DL (ref 1.6–2.6)
MCH RBC QN AUTO: 24.4 PG (ref 27–31)
MCHC RBC AUTO-ENTMCNC: 31.7 G/DL (ref 32–36)
MCV RBC AUTO: 77 FL (ref 82–98)
MONOCYTES # BLD AUTO: 0.1 K/UL (ref 0.3–1)
MONOCYTES NFR BLD: 2.8 % (ref 4–15)
NEUTROPHILS # BLD AUTO: 1.6 K/UL (ref 1.8–7.7)
NEUTROPHILS NFR BLD: 56.3 % (ref 38–73)
NRBC BLD-RTO: 0 /100 WBC
PLATELET # BLD AUTO: 181 K/UL (ref 150–450)
PMV BLD AUTO: 10.8 FL (ref 9.2–12.9)
POTASSIUM SERPL-SCNC: 3.7 MMOL/L (ref 3.5–5.1)
PROT SERPL-MCNC: 7.7 G/DL (ref 6–8.4)
RBC # BLD AUTO: 4.95 M/UL (ref 4–5.4)
SODIUM SERPL-SCNC: 130 MMOL/L (ref 136–145)
WBC # BLD AUTO: 2.84 K/UL (ref 3.9–12.7)

## 2021-10-11 PROCEDURE — 83735 ASSAY OF MAGNESIUM: CPT | Performed by: NURSE PRACTITIONER

## 2021-10-11 PROCEDURE — 80053 COMPREHEN METABOLIC PANEL: CPT | Performed by: INTERNAL MEDICINE

## 2021-10-11 PROCEDURE — 36415 COLL VENOUS BLD VENIPUNCTURE: CPT | Performed by: INTERNAL MEDICINE

## 2021-10-11 PROCEDURE — 85025 COMPLETE CBC W/AUTO DIFF WBC: CPT | Performed by: INTERNAL MEDICINE

## 2021-10-13 ENCOUNTER — TELEPHONE (OUTPATIENT)
Dept: HEMATOLOGY/ONCOLOGY | Facility: CLINIC | Age: 31
End: 2021-10-13

## 2021-10-13 ENCOUNTER — PATIENT MESSAGE (OUTPATIENT)
Dept: HEMATOLOGY/ONCOLOGY | Facility: CLINIC | Age: 31
End: 2021-10-13
Payer: COMMERCIAL

## 2021-10-13 DIAGNOSIS — R30.0 DYSURIA: Primary | ICD-10-CM

## 2021-10-13 RX ORDER — PHENAZOPYRIDINE HYDROCHLORIDE 200 MG/1
200 TABLET, FILM COATED ORAL 3 TIMES DAILY PRN
Qty: 20 TABLET | Refills: 0 | Status: SHIPPED | OUTPATIENT
Start: 2021-10-13 | End: 2022-01-26

## 2021-10-13 RX ORDER — NITROFURANTOIN 25; 75 MG/1; MG/1
100 CAPSULE ORAL 2 TIMES DAILY
Qty: 14 CAPSULE | Refills: 0 | Status: SHIPPED | OUTPATIENT
Start: 2021-10-13 | End: 2021-10-20

## 2021-10-14 ENCOUNTER — PATIENT MESSAGE (OUTPATIENT)
Dept: HEMATOLOGY/ONCOLOGY | Facility: CLINIC | Age: 31
End: 2021-10-14

## 2021-10-14 ENCOUNTER — TELEPHONE (OUTPATIENT)
Dept: HEMATOLOGY/ONCOLOGY | Facility: CLINIC | Age: 31
End: 2021-10-14

## 2021-10-14 DIAGNOSIS — L70.0 ACNE VULGARIS: Primary | ICD-10-CM

## 2021-10-14 RX ORDER — MINOCYCLINE HYDROCHLORIDE 100 MG/1
100 CAPSULE ORAL EVERY 12 HOURS
Qty: 20 CAPSULE | Refills: 0 | Status: SHIPPED | OUTPATIENT
Start: 2021-10-14 | End: 2022-01-26

## 2021-10-15 ENCOUNTER — INFUSION (OUTPATIENT)
Dept: INFUSION THERAPY | Facility: HOSPITAL | Age: 31
DRG: 810 | End: 2021-10-15
Attending: INTERNAL MEDICINE
Payer: COMMERCIAL

## 2021-10-15 ENCOUNTER — TELEPHONE (OUTPATIENT)
Dept: HEMATOLOGY/ONCOLOGY | Facility: CLINIC | Age: 31
End: 2021-10-15

## 2021-10-15 ENCOUNTER — OFFICE VISIT (OUTPATIENT)
Dept: HEMATOLOGY/ONCOLOGY | Facility: CLINIC | Age: 31
End: 2021-10-15
Payer: MEDICAID

## 2021-10-15 VITALS
RESPIRATION RATE: 18 BRPM | DIASTOLIC BLOOD PRESSURE: 81 MMHG | TEMPERATURE: 100 F | SYSTOLIC BLOOD PRESSURE: 118 MMHG | BODY MASS INDEX: 24.84 KG/M2 | HEART RATE: 98 BPM | WEIGHT: 135 LBS | HEIGHT: 62 IN

## 2021-10-15 VITALS
WEIGHT: 135.31 LBS | HEIGHT: 62 IN | BODY MASS INDEX: 24.9 KG/M2 | HEART RATE: 90 BPM | SYSTOLIC BLOOD PRESSURE: 117 MMHG | DIASTOLIC BLOOD PRESSURE: 71 MMHG | OXYGEN SATURATION: 97 % | TEMPERATURE: 99 F | RESPIRATION RATE: 18 BRPM

## 2021-10-15 DIAGNOSIS — R59.0 HILAR LYMPHADENOPATHY: ICD-10-CM

## 2021-10-15 DIAGNOSIS — T45.1X5A CHEMOTHERAPY INDUCED NEUTROPENIA: ICD-10-CM

## 2021-10-15 DIAGNOSIS — E86.0 DEHYDRATION: ICD-10-CM

## 2021-10-15 DIAGNOSIS — C50.812 MALIGNANT NEOPLASM OF OVERLAPPING SITES OF LEFT BREAST IN FEMALE, ESTROGEN RECEPTOR POSITIVE: Primary | ICD-10-CM

## 2021-10-15 DIAGNOSIS — D70.1 CHEMOTHERAPY INDUCED NEUTROPENIA: ICD-10-CM

## 2021-10-15 DIAGNOSIS — D70.1 CHEMOTHERAPY-INDUCED NEUTROPENIA: ICD-10-CM

## 2021-10-15 DIAGNOSIS — T45.1X5A CHEMOTHERAPY-INDUCED NEUTROPENIA: Primary | ICD-10-CM

## 2021-10-15 DIAGNOSIS — Z17.0 MALIGNANT NEOPLASM OF OVERLAPPING SITES OF LEFT BREAST IN FEMALE, ESTROGEN RECEPTOR POSITIVE: Primary | ICD-10-CM

## 2021-10-15 DIAGNOSIS — C50.919 HER2-POSITIVE CARCINOMA OF BREAST: ICD-10-CM

## 2021-10-15 DIAGNOSIS — L70.0 ACNE VULGARIS: ICD-10-CM

## 2021-10-15 DIAGNOSIS — T45.1X5A CHEMOTHERAPY-INDUCED NEUTROPENIA: ICD-10-CM

## 2021-10-15 DIAGNOSIS — D70.1 CHEMOTHERAPY-INDUCED NEUTROPENIA: Primary | ICD-10-CM

## 2021-10-15 PROCEDURE — 96360 HYDRATION IV INFUSION INIT: CPT

## 2021-10-15 PROCEDURE — 96361 HYDRATE IV INFUSION ADD-ON: CPT

## 2021-10-15 PROCEDURE — 99214 OFFICE O/P EST MOD 30 MIN: CPT | Mod: S$GLB,,, | Performed by: NURSE PRACTITIONER

## 2021-10-15 PROCEDURE — 96372 THER/PROPH/DIAG INJ SC/IM: CPT

## 2021-10-15 PROCEDURE — 25000003 PHARM REV CODE 250: Performed by: NURSE PRACTITIONER

## 2021-10-15 PROCEDURE — 99214 PR OFFICE/OUTPT VISIT, EST, LEVL IV, 30-39 MIN: ICD-10-PCS | Mod: S$GLB,,, | Performed by: NURSE PRACTITIONER

## 2021-10-15 PROCEDURE — 63600175 PHARM REV CODE 636 W HCPCS: Mod: JG | Performed by: NURSE PRACTITIONER

## 2021-10-15 RX ORDER — SODIUM CHLORIDE 0.9 % (FLUSH) 0.9 %
10 SYRINGE (ML) INJECTION
Status: CANCELLED | OUTPATIENT
Start: 2021-10-15

## 2021-10-15 RX ORDER — HEPARIN 100 UNIT/ML
500 SYRINGE INTRAVENOUS
Status: CANCELLED | OUTPATIENT
Start: 2021-10-15

## 2021-10-15 RX ORDER — HEPARIN 100 UNIT/ML
500 SYRINGE INTRAVENOUS
Status: DISCONTINUED | OUTPATIENT
Start: 2021-10-15 | End: 2021-10-15 | Stop reason: HOSPADM

## 2021-10-15 RX ADMIN — HEPARIN 500 UNITS: 100 SYRINGE at 03:10

## 2021-10-15 RX ADMIN — SODIUM CHLORIDE 1000 ML: 0.9 INJECTION, SOLUTION INTRAVENOUS at 01:10

## 2021-10-15 RX ADMIN — FILGRASTIM-SNDZ 300 MCG: 300 INJECTION, SOLUTION INTRAVENOUS; SUBCUTANEOUS at 01:10

## 2021-10-16 ENCOUNTER — INFUSION (OUTPATIENT)
Dept: INFUSION THERAPY | Facility: HOSPITAL | Age: 31
DRG: 810 | End: 2021-10-16
Attending: INTERNAL MEDICINE
Payer: COMMERCIAL

## 2021-10-16 ENCOUNTER — HOSPITAL ENCOUNTER (INPATIENT)
Facility: HOSPITAL | Age: 31
LOS: 1 days | Discharge: HOME | DRG: 810 | End: 2021-10-17
Attending: EMERGENCY MEDICINE | Admitting: INTERNAL MEDICINE
Payer: COMMERCIAL

## 2021-10-16 VITALS
HEART RATE: 105 BPM | TEMPERATURE: 98 F | SYSTOLIC BLOOD PRESSURE: 112 MMHG | RESPIRATION RATE: 16 BRPM | OXYGEN SATURATION: 95 % | DIASTOLIC BLOOD PRESSURE: 87 MMHG

## 2021-10-16 DIAGNOSIS — T45.1X5A CHEMOTHERAPY-INDUCED NEUTROPENIA: Primary | ICD-10-CM

## 2021-10-16 DIAGNOSIS — R50.9 FEVER: ICD-10-CM

## 2021-10-16 DIAGNOSIS — D70.1 CHEMOTHERAPY-INDUCED NEUTROPENIA: Primary | ICD-10-CM

## 2021-10-16 DIAGNOSIS — R07.9 CHEST PAIN: ICD-10-CM

## 2021-10-16 DIAGNOSIS — C50.919 MALIGNANT NEOPLASM OF FEMALE BREAST, UNSPECIFIED ESTROGEN RECEPTOR STATUS, UNSPECIFIED LATERALITY, UNSPECIFIED SITE OF BREAST: Primary | ICD-10-CM

## 2021-10-16 PROBLEM — Z95.828 PORT-A-CATH IN PLACE: Chronic | Status: ACTIVE | Noted: 2021-10-16

## 2021-10-16 PROBLEM — D50.9 MICROCYTIC ANEMIA: Chronic | Status: ACTIVE | Noted: 2021-10-16

## 2021-10-16 PROBLEM — E87.6 HYPOKALEMIA: Status: ACTIVE | Noted: 2021-10-16

## 2021-10-16 LAB
ALBUMIN SERPL BCP-MCNC: 3.5 G/DL (ref 3.5–5.2)
ALP SERPL-CCNC: 37 U/L (ref 55–135)
ALT SERPL W/O P-5'-P-CCNC: 36 U/L (ref 10–44)
ANION GAP SERPL CALC-SCNC: 7 MMOL/L (ref 8–16)
AST SERPL-CCNC: 26 U/L (ref 10–40)
BACTERIA #/AREA URNS HPF: NEGATIVE /HPF
BASOPHILS NFR BLD: 0 % (ref 0–1.9)
BILIRUB SERPL-MCNC: 0.3 MG/DL (ref 0.1–1)
BILIRUB UR QL STRIP: NEGATIVE
BUN SERPL-MCNC: 7 MG/DL (ref 6–20)
CALCIUM SERPL-MCNC: 8.7 MG/DL (ref 8.7–10.5)
CHLORIDE SERPL-SCNC: 112 MMOL/L (ref 95–110)
CLARITY UR: CLEAR
CO2 SERPL-SCNC: 25 MMOL/L (ref 23–29)
COLOR UR: YELLOW
CREAT SERPL-MCNC: 0.8 MG/DL (ref 0.5–1.4)
DIFFERENTIAL METHOD: ABNORMAL
EOSINOPHIL NFR BLD: 0 % (ref 0–8)
ERYTHROCYTE [DISTWIDTH] IN BLOOD BY AUTOMATED COUNT: 13.3 % (ref 11.5–14.5)
EST. GFR  (AFRICAN AMERICAN): >60 ML/MIN/1.73 M^2
EST. GFR  (NON AFRICAN AMERICAN): >60 ML/MIN/1.73 M^2
GLUCOSE SERPL-MCNC: 109 MG/DL (ref 70–110)
GLUCOSE UR QL STRIP: NEGATIVE
HCT VFR BLD AUTO: 33.8 % (ref 37–48.5)
HGB BLD-MCNC: 10.4 G/DL (ref 12–16)
HGB UR QL STRIP: ABNORMAL
HYALINE CASTS #/AREA URNS LPF: 4 /LPF
IMM GRANULOCYTES # BLD AUTO: ABNORMAL K/UL (ref 0–0.04)
IMM GRANULOCYTES NFR BLD AUTO: ABNORMAL % (ref 0–0.5)
INFLUENZA A, MOLECULAR: NEGATIVE
INFLUENZA B, MOLECULAR: NEGATIVE
KETONES UR QL STRIP: NEGATIVE
LACTATE SERPL-SCNC: 1.7 MMOL/L (ref 0.5–1.9)
LEUKOCYTE ESTERASE UR QL STRIP: NEGATIVE
LYMPHOCYTES NFR BLD: 26 % (ref 18–48)
MCH RBC QN AUTO: 24.5 PG (ref 27–31)
MCHC RBC AUTO-ENTMCNC: 30.8 G/DL (ref 32–36)
MCV RBC AUTO: 80 FL (ref 82–98)
METAMYELOCYTES NFR BLD MANUAL: 3 %
MICROSCOPIC COMMENT: ABNORMAL
MONOCYTES NFR BLD: 19 % (ref 4–15)
NEUTROPHILS NFR BLD: 26 % (ref 38–73)
NEUTS BAND NFR BLD MANUAL: 26 %
NITRITE UR QL STRIP: NEGATIVE
NRBC BLD-RTO: 0 /100 WBC
PH UR STRIP: 6 [PH] (ref 5–8)
PLATELET # BLD AUTO: 202 K/UL (ref 150–450)
PMV BLD AUTO: 9.6 FL (ref 9.2–12.9)
POTASSIUM SERPL-SCNC: 3.4 MMOL/L (ref 3.5–5.1)
PROT SERPL-MCNC: 6.8 G/DL (ref 6–8.4)
PROT UR QL STRIP: NEGATIVE
RBC # BLD AUTO: 4.25 M/UL (ref 4–5.4)
RBC #/AREA URNS HPF: 1 /HPF (ref 0–4)
SARS-COV-2 RDRP RESP QL NAA+PROBE: NEGATIVE
SODIUM SERPL-SCNC: 144 MMOL/L (ref 136–145)
SP GR UR STRIP: 1 (ref 1–1.03)
SPECIMEN SOURCE: NORMAL
SQUAMOUS #/AREA URNS HPF: 1 /HPF
URN SPEC COLLECT METH UR: ABNORMAL
UROBILINOGEN UR STRIP-ACNC: NEGATIVE EU/DL
WBC # BLD AUTO: 8.63 K/UL (ref 3.9–12.7)
WBC #/AREA URNS HPF: 1 /HPF (ref 0–5)

## 2021-10-16 PROCEDURE — 25000003 PHARM REV CODE 250: Performed by: EMERGENCY MEDICINE

## 2021-10-16 PROCEDURE — U0002 COVID-19 LAB TEST NON-CDC: HCPCS | Performed by: NURSE PRACTITIONER

## 2021-10-16 PROCEDURE — 80053 COMPREHEN METABOLIC PANEL: CPT | Performed by: NURSE PRACTITIONER

## 2021-10-16 PROCEDURE — 63600175 PHARM REV CODE 636 W HCPCS: Performed by: INTERNAL MEDICINE

## 2021-10-16 PROCEDURE — 85027 COMPLETE CBC AUTOMATED: CPT | Performed by: NURSE PRACTITIONER

## 2021-10-16 PROCEDURE — 63600175 PHARM REV CODE 636 W HCPCS: Performed by: EMERGENCY MEDICINE

## 2021-10-16 PROCEDURE — 87040 BLOOD CULTURE FOR BACTERIA: CPT | Mod: 59 | Performed by: NURSE PRACTITIONER

## 2021-10-16 PROCEDURE — 81003 URINALYSIS AUTO W/O SCOPE: CPT | Performed by: NURSE PRACTITIONER

## 2021-10-16 PROCEDURE — 81001 URINALYSIS AUTO W/SCOPE: CPT | Performed by: NURSE PRACTITIONER

## 2021-10-16 PROCEDURE — 87502 INFLUENZA DNA AMP PROBE: CPT | Performed by: NURSE PRACTITIONER

## 2021-10-16 PROCEDURE — 85007 BL SMEAR W/DIFF WBC COUNT: CPT | Performed by: NURSE PRACTITIONER

## 2021-10-16 PROCEDURE — 87086 URINE CULTURE/COLONY COUNT: CPT | Performed by: NURSE PRACTITIONER

## 2021-10-16 PROCEDURE — 96365 THER/PROPH/DIAG IV INF INIT: CPT

## 2021-10-16 PROCEDURE — 12000002 HC ACUTE/MED SURGE SEMI-PRIVATE ROOM

## 2021-10-16 PROCEDURE — 99284 EMERGENCY DEPT VISIT MOD MDM: CPT | Mod: 25

## 2021-10-16 PROCEDURE — 96372 THER/PROPH/DIAG INJ SC/IM: CPT

## 2021-10-16 PROCEDURE — 83605 ASSAY OF LACTIC ACID: CPT | Performed by: NURSE PRACTITIONER

## 2021-10-16 PROCEDURE — 25000003 PHARM REV CODE 250: Performed by: INTERNAL MEDICINE

## 2021-10-16 PROCEDURE — 36415 COLL VENOUS BLD VENIPUNCTURE: CPT | Performed by: NURSE PRACTITIONER

## 2021-10-16 PROCEDURE — 63600175 PHARM REV CODE 636 W HCPCS: Mod: JG | Performed by: NURSE PRACTITIONER

## 2021-10-16 RX ORDER — MAGNESIUM SULFATE HEPTAHYDRATE 40 MG/ML
4 INJECTION, SOLUTION INTRAVENOUS
Status: DISCONTINUED | OUTPATIENT
Start: 2021-10-16 | End: 2021-10-17 | Stop reason: HOSPADM

## 2021-10-16 RX ORDER — POTASSIUM CHLORIDE 7.45 MG/ML
40 INJECTION INTRAVENOUS
Status: DISCONTINUED | OUTPATIENT
Start: 2021-10-16 | End: 2021-10-17 | Stop reason: HOSPADM

## 2021-10-16 RX ORDER — MAGNESIUM SULFATE 1 G/100ML
1 INJECTION INTRAVENOUS
Status: DISCONTINUED | OUTPATIENT
Start: 2021-10-16 | End: 2021-10-17 | Stop reason: HOSPADM

## 2021-10-16 RX ORDER — POTASSIUM CHLORIDE 20 MEQ/1
40 TABLET, EXTENDED RELEASE ORAL
Status: DISCONTINUED | OUTPATIENT
Start: 2021-10-16 | End: 2021-10-17 | Stop reason: HOSPADM

## 2021-10-16 RX ORDER — ONDANSETRON 2 MG/ML
4 INJECTION INTRAMUSCULAR; INTRAVENOUS EVERY 8 HOURS PRN
Status: DISCONTINUED | OUTPATIENT
Start: 2021-10-16 | End: 2021-10-17 | Stop reason: HOSPADM

## 2021-10-16 RX ORDER — POTASSIUM CHLORIDE 7.45 MG/ML
20 INJECTION INTRAVENOUS
Status: DISCONTINUED | OUTPATIENT
Start: 2021-10-16 | End: 2021-10-17 | Stop reason: HOSPADM

## 2021-10-16 RX ORDER — TALC
6 POWDER (GRAM) TOPICAL NIGHTLY PRN
Status: DISCONTINUED | OUTPATIENT
Start: 2021-10-16 | End: 2021-10-17 | Stop reason: HOSPADM

## 2021-10-16 RX ORDER — VANCOMYCIN HCL IN 5 % DEXTROSE 1G/250ML
1000 PLASTIC BAG, INJECTION (ML) INTRAVENOUS ONCE
Status: COMPLETED | OUTPATIENT
Start: 2021-10-16 | End: 2021-10-16

## 2021-10-16 RX ORDER — MEROPENEM AND SODIUM CHLORIDE 1 G/50ML
1 INJECTION, SOLUTION INTRAVENOUS
Status: COMPLETED | OUTPATIENT
Start: 2021-10-16 | End: 2021-10-16

## 2021-10-16 RX ORDER — MAGNESIUM SULFATE HEPTAHYDRATE 40 MG/ML
2 INJECTION, SOLUTION INTRAVENOUS
Status: DISCONTINUED | OUTPATIENT
Start: 2021-10-16 | End: 2021-10-17 | Stop reason: HOSPADM

## 2021-10-16 RX ORDER — AMOXICILLIN 250 MG
2 CAPSULE ORAL 2 TIMES DAILY PRN
Status: DISCONTINUED | OUTPATIENT
Start: 2021-10-16 | End: 2021-10-17 | Stop reason: HOSPADM

## 2021-10-16 RX ORDER — POTASSIUM CHLORIDE 20 MEQ/1
20 TABLET, EXTENDED RELEASE ORAL
Status: DISCONTINUED | OUTPATIENT
Start: 2021-10-16 | End: 2021-10-17 | Stop reason: HOSPADM

## 2021-10-16 RX ORDER — DEXAMETHASONE 6 MG/1
6 TABLET ORAL
COMMUNITY
End: 2022-01-26

## 2021-10-16 RX ORDER — ACETAMINOPHEN 325 MG/1
650 TABLET ORAL EVERY 4 HOURS PRN
Status: DISCONTINUED | OUTPATIENT
Start: 2021-10-16 | End: 2021-10-17 | Stop reason: HOSPADM

## 2021-10-16 RX ORDER — SODIUM CHLORIDE 0.9 % (FLUSH) 0.9 %
10 SYRINGE (ML) INJECTION EVERY 6 HOURS PRN
Status: DISCONTINUED | OUTPATIENT
Start: 2021-10-16 | End: 2021-10-17 | Stop reason: HOSPADM

## 2021-10-16 RX ORDER — GLUCAGON 1 MG
1 KIT INJECTION
Status: DISCONTINUED | OUTPATIENT
Start: 2021-10-16 | End: 2021-10-17 | Stop reason: HOSPADM

## 2021-10-16 RX ORDER — ACETAMINOPHEN 500 MG
1000 TABLET ORAL
Status: COMPLETED | OUTPATIENT
Start: 2021-10-16 | End: 2021-10-16

## 2021-10-16 RX ORDER — VANCOMYCIN HCL IN 5 % DEXTROSE 1G/250ML
1000 PLASTIC BAG, INJECTION (ML) INTRAVENOUS
Status: DISCONTINUED | OUTPATIENT
Start: 2021-10-17 | End: 2021-10-17 | Stop reason: HOSPADM

## 2021-10-16 RX ORDER — SODIUM CHLORIDE 9 MG/ML
INJECTION, SOLUTION INTRAVENOUS CONTINUOUS
Status: DISCONTINUED | OUTPATIENT
Start: 2021-10-16 | End: 2021-10-17 | Stop reason: HOSPADM

## 2021-10-16 RX ORDER — LANOLIN ALCOHOL/MO/W.PET/CERES
800 CREAM (GRAM) TOPICAL
Status: DISCONTINUED | OUTPATIENT
Start: 2021-10-16 | End: 2021-10-17 | Stop reason: HOSPADM

## 2021-10-16 RX ORDER — ACETAMINOPHEN 325 MG/1
650 TABLET ORAL EVERY 6 HOURS PRN
Status: DISCONTINUED | OUTPATIENT
Start: 2021-10-16 | End: 2021-10-17 | Stop reason: HOSPADM

## 2021-10-16 RX ORDER — IBUPROFEN 200 MG
16 TABLET ORAL
Status: DISCONTINUED | OUTPATIENT
Start: 2021-10-16 | End: 2021-10-17 | Stop reason: HOSPADM

## 2021-10-16 RX ORDER — IBUPROFEN 200 MG
24 TABLET ORAL
Status: DISCONTINUED | OUTPATIENT
Start: 2021-10-16 | End: 2021-10-17 | Stop reason: HOSPADM

## 2021-10-16 RX ORDER — POTASSIUM CHLORIDE 20 MEQ/1
40 TABLET, EXTENDED RELEASE ORAL
Status: COMPLETED | OUTPATIENT
Start: 2021-10-16 | End: 2021-10-16

## 2021-10-16 RX ADMIN — DIPHENHYDRAMINE 12.5 MG/5 ML ORAL LIQUID 10 ML: at 09:10

## 2021-10-16 RX ADMIN — ACETAMINOPHEN 1000 MG: 500 TABLET, FILM COATED ORAL at 06:10

## 2021-10-16 RX ADMIN — VANCOMYCIN HYDROCHLORIDE 1000 MG: 1 INJECTION, POWDER, LYOPHILIZED, FOR SOLUTION INTRAVENOUS at 09:10

## 2021-10-16 RX ADMIN — CEFEPIME HYDROCHLORIDE 500 MG: 1 INJECTION, POWDER, FOR SOLUTION INTRAMUSCULAR; INTRAVENOUS at 09:10

## 2021-10-16 RX ADMIN — MEROPENEM AND SODIUM CHLORIDE 1 G: 1 INJECTION, SOLUTION INTRAVENOUS at 05:10

## 2021-10-16 RX ADMIN — FILGRASTIM-SNDZ 300 MCG: 300 INJECTION, SOLUTION INTRAVENOUS; SUBCUTANEOUS at 09:10

## 2021-10-16 RX ADMIN — POTASSIUM CHLORIDE 40 MEQ: 1500 TABLET, EXTENDED RELEASE ORAL at 05:10

## 2021-10-16 RX ADMIN — SODIUM CHLORIDE: 0.9 INJECTION, SOLUTION INTRAVENOUS at 09:10

## 2021-10-17 VITALS
BODY MASS INDEX: 27.48 KG/M2 | HEIGHT: 60 IN | SYSTOLIC BLOOD PRESSURE: 121 MMHG | TEMPERATURE: 99 F | RESPIRATION RATE: 14 BRPM | HEART RATE: 97 BPM | WEIGHT: 140 LBS | DIASTOLIC BLOOD PRESSURE: 87 MMHG | OXYGEN SATURATION: 100 %

## 2021-10-17 LAB
ALBUMIN SERPL BCP-MCNC: 3.1 G/DL (ref 3.5–5.2)
ALP SERPL-CCNC: 32 U/L (ref 55–135)
ALT SERPL W/O P-5'-P-CCNC: 31 U/L (ref 10–44)
ANION GAP SERPL CALC-SCNC: 6 MMOL/L (ref 8–16)
AST SERPL-CCNC: 20 U/L (ref 10–40)
BASOPHILS # BLD AUTO: ABNORMAL K/UL (ref 0–0.2)
BASOPHILS NFR BLD: 0 % (ref 0–1.9)
BILIRUB SERPL-MCNC: 0.5 MG/DL (ref 0.1–1)
BUN SERPL-MCNC: 6 MG/DL (ref 6–20)
CALCIUM SERPL-MCNC: 8.3 MG/DL (ref 8.7–10.5)
CHLORIDE SERPL-SCNC: 110 MMOL/L (ref 95–110)
CO2 SERPL-SCNC: 25 MMOL/L (ref 23–29)
CREAT SERPL-MCNC: 0.9 MG/DL (ref 0.5–1.4)
CRP SERPL-MCNC: 0.91 MG/DL
DIFFERENTIAL METHOD: ABNORMAL
DOHLE BOD BLD QL SMEAR: PRESENT
EOSINOPHIL # BLD AUTO: ABNORMAL K/UL (ref 0–0.5)
EOSINOPHIL NFR BLD: 0 % (ref 0–8)
ERYTHROCYTE [DISTWIDTH] IN BLOOD BY AUTOMATED COUNT: 13.6 % (ref 11.5–14.5)
EST. GFR  (AFRICAN AMERICAN): >60 ML/MIN/1.73 M^2
EST. GFR  (NON AFRICAN AMERICAN): >60 ML/MIN/1.73 M^2
FERRITIN SERPL-MCNC: 105 NG/ML (ref 20–300)
GLUCOSE SERPL-MCNC: 80 MG/DL (ref 70–110)
HCT VFR BLD AUTO: 32.5 % (ref 37–48.5)
HGB BLD-MCNC: 9.9 G/DL (ref 12–16)
IMM GRANULOCYTES # BLD AUTO: ABNORMAL K/UL (ref 0–0.04)
IMM GRANULOCYTES NFR BLD AUTO: ABNORMAL % (ref 0–0.5)
LYMPHOCYTES # BLD AUTO: ABNORMAL K/UL (ref 1–4.8)
LYMPHOCYTES NFR BLD: 18 % (ref 18–48)
MAGNESIUM SERPL-MCNC: 1.7 MG/DL (ref 1.6–2.6)
MCH RBC QN AUTO: 24.4 PG (ref 27–31)
MCHC RBC AUTO-ENTMCNC: 30.5 G/DL (ref 32–36)
MCV RBC AUTO: 80 FL (ref 82–98)
METAMYELOCYTES NFR BLD MANUAL: 2 %
MONOCYTES # BLD AUTO: ABNORMAL K/UL (ref 0.3–1)
MONOCYTES NFR BLD: 5 % (ref 4–15)
NEUTROPHILS # BLD AUTO: ABNORMAL K/UL (ref 1.8–7.7)
NEUTROPHILS NFR BLD: 57 % (ref 38–73)
NEUTS BAND NFR BLD MANUAL: 18 %
NRBC BLD-RTO: 0 /100 WBC
PLATELET # BLD AUTO: 176 K/UL (ref 150–450)
PLATELET BLD QL SMEAR: ABNORMAL
PMV BLD AUTO: 9.5 FL (ref 9.2–12.9)
POTASSIUM SERPL-SCNC: 3.6 MMOL/L (ref 3.5–5.1)
PROCALCITONIN SERPL IA-MCNC: 0.08 NG/ML (ref 0–0.5)
PROT SERPL-MCNC: 5.8 G/DL (ref 6–8.4)
RBC # BLD AUTO: 4.05 M/UL (ref 4–5.4)
SODIUM SERPL-SCNC: 141 MMOL/L (ref 136–145)
WBC # BLD AUTO: 19.29 K/UL (ref 3.9–12.7)

## 2021-10-17 PROCEDURE — 85027 COMPLETE CBC AUTOMATED: CPT | Performed by: INTERNAL MEDICINE

## 2021-10-17 PROCEDURE — 84145 PROCALCITONIN (PCT): CPT | Performed by: INTERNAL MEDICINE

## 2021-10-17 PROCEDURE — 86140 C-REACTIVE PROTEIN: CPT | Performed by: INTERNAL MEDICINE

## 2021-10-17 PROCEDURE — 82728 ASSAY OF FERRITIN: CPT | Performed by: INTERNAL MEDICINE

## 2021-10-17 PROCEDURE — 25000003 PHARM REV CODE 250: Performed by: INTERNAL MEDICINE

## 2021-10-17 PROCEDURE — 85007 BL SMEAR W/DIFF WBC COUNT: CPT | Performed by: INTERNAL MEDICINE

## 2021-10-17 PROCEDURE — 36415 COLL VENOUS BLD VENIPUNCTURE: CPT | Performed by: INTERNAL MEDICINE

## 2021-10-17 PROCEDURE — 80053 COMPREHEN METABOLIC PANEL: CPT | Performed by: INTERNAL MEDICINE

## 2021-10-17 PROCEDURE — 63600175 PHARM REV CODE 636 W HCPCS: Performed by: INTERNAL MEDICINE

## 2021-10-17 PROCEDURE — 83735 ASSAY OF MAGNESIUM: CPT | Performed by: INTERNAL MEDICINE

## 2021-10-17 RX ORDER — AMOXICILLIN AND CLAVULANATE POTASSIUM 875; 125 MG/1; MG/1
1 TABLET, FILM COATED ORAL EVERY 12 HOURS
Qty: 24 TABLET | Refills: 0 | Status: SHIPPED | OUTPATIENT
Start: 2021-10-17 | End: 2021-10-29

## 2021-10-17 RX ORDER — AMOXICILLIN AND CLAVULANATE POTASSIUM 875; 125 MG/1; MG/1
1 TABLET, FILM COATED ORAL EVERY 12 HOURS
Qty: 24 TABLET | Refills: 0 | Status: SHIPPED | OUTPATIENT
Start: 2021-10-17 | End: 2021-10-17 | Stop reason: SDUPTHER

## 2021-10-17 RX ADMIN — CEFEPIME HYDROCHLORIDE 500 MG: 1 INJECTION, POWDER, FOR SOLUTION INTRAMUSCULAR; INTRAVENOUS at 11:10

## 2021-10-17 RX ADMIN — MAGNESIUM OXIDE 800 MG: 400 TABLET ORAL at 09:10

## 2021-10-17 RX ADMIN — FILGRASTIM-SNDZ 300 MCG: 300 INJECTION, SOLUTION INTRAVENOUS; SUBCUTANEOUS at 09:10

## 2021-10-17 RX ADMIN — VANCOMYCIN HYDROCHLORIDE 1000 MG: 1 INJECTION, POWDER, LYOPHILIZED, FOR SOLUTION INTRAVENOUS at 09:10

## 2021-10-17 RX ADMIN — POTASSIUM CHLORIDE 20 MEQ: 1500 TABLET, EXTENDED RELEASE ORAL at 09:10

## 2021-10-17 RX ADMIN — CEFEPIME HYDROCHLORIDE 500 MG: 1 INJECTION, POWDER, FOR SOLUTION INTRAMUSCULAR; INTRAVENOUS at 04:10

## 2021-10-19 ENCOUNTER — PATIENT MESSAGE (OUTPATIENT)
Dept: HEMATOLOGY/ONCOLOGY | Facility: CLINIC | Age: 31
End: 2021-10-19
Payer: COMMERCIAL

## 2021-10-19 ENCOUNTER — LAB VISIT (OUTPATIENT)
Dept: LAB | Facility: HOSPITAL | Age: 31
End: 2021-10-19
Attending: INTERNAL MEDICINE
Payer: MEDICAID

## 2021-10-19 DIAGNOSIS — N63.25 MASS OVERLAPPING MULTIPLE QUADRANTS OF LEFT BREAST: ICD-10-CM

## 2021-10-19 DIAGNOSIS — N63.21 MASS OF UPPER OUTER QUADRANT OF LEFT BREAST: ICD-10-CM

## 2021-10-19 DIAGNOSIS — C50.812 MALIGNANT NEOPLASM OF OVERLAPPING SITES OF LEFT FEMALE BREAST, UNSPECIFIED ESTROGEN RECEPTOR STATUS: ICD-10-CM

## 2021-10-19 DIAGNOSIS — N63.24 MASS OF LOWER INNER QUADRANT OF LEFT BREAST: ICD-10-CM

## 2021-10-19 LAB
ALBUMIN SERPL BCP-MCNC: 3.4 G/DL (ref 3.5–5.2)
ALP SERPL-CCNC: 72 U/L (ref 55–135)
ALT SERPL W/O P-5'-P-CCNC: 28 U/L (ref 10–44)
ANION GAP SERPL CALC-SCNC: 7 MMOL/L (ref 8–16)
ANISOCYTOSIS BLD QL SMEAR: SLIGHT
AST SERPL-CCNC: 25 U/L (ref 10–40)
BACTERIA UR CULT: NO GROWTH
BASOPHILS NFR BLD: 0 % (ref 0–1.9)
BILIRUB SERPL-MCNC: 0.4 MG/DL (ref 0.1–1)
BUN SERPL-MCNC: 6 MG/DL (ref 6–20)
CALCIUM SERPL-MCNC: 9.2 MG/DL (ref 8.7–10.5)
CHLORIDE SERPL-SCNC: 108 MMOL/L (ref 95–110)
CO2 SERPL-SCNC: 24 MMOL/L (ref 23–29)
CREAT SERPL-MCNC: 0.8 MG/DL (ref 0.5–1.4)
DACRYOCYTES BLD QL SMEAR: ABNORMAL
DIFFERENTIAL METHOD: ABNORMAL
EOSINOPHIL NFR BLD: 0 % (ref 0–8)
ERYTHROCYTE [DISTWIDTH] IN BLOOD BY AUTOMATED COUNT: 14.1 % (ref 11.5–14.5)
EST. GFR  (AFRICAN AMERICAN): >60 ML/MIN/1.73 M^2
EST. GFR  (NON AFRICAN AMERICAN): >60 ML/MIN/1.73 M^2
GLUCOSE SERPL-MCNC: 107 MG/DL (ref 70–110)
HCT VFR BLD AUTO: 32.7 % (ref 37–48.5)
HGB BLD-MCNC: 10.3 G/DL (ref 12–16)
IMM GRANULOCYTES # BLD AUTO: ABNORMAL K/UL (ref 0–0.04)
IMM GRANULOCYTES NFR BLD AUTO: ABNORMAL % (ref 0–0.5)
LYMPHOCYTES NFR BLD: 11 % (ref 18–48)
MAGNESIUM SERPL-MCNC: 1.7 MG/DL (ref 1.6–2.6)
MCH RBC QN AUTO: 25.1 PG (ref 27–31)
MCHC RBC AUTO-ENTMCNC: 31.5 G/DL (ref 32–36)
MCV RBC AUTO: 80 FL (ref 82–98)
METAMYELOCYTES NFR BLD MANUAL: 2 %
MONOCYTES NFR BLD: 9 % (ref 4–15)
MYELOCYTES NFR BLD MANUAL: 18 %
NEUTROPHILS NFR BLD: 30 % (ref 38–73)
NEUTS BAND NFR BLD MANUAL: 30 %
NRBC BLD-RTO: 0 /100 WBC
OVALOCYTES BLD QL SMEAR: ABNORMAL
PLATELET # BLD AUTO: 145 K/UL (ref 150–450)
PLATELET BLD QL SMEAR: ABNORMAL
PMV BLD AUTO: 9.3 FL (ref 9.2–12.9)
POIKILOCYTOSIS BLD QL SMEAR: SLIGHT
POTASSIUM SERPL-SCNC: 3.8 MMOL/L (ref 3.5–5.1)
PROT SERPL-MCNC: 6.6 G/DL (ref 6–8.4)
RBC # BLD AUTO: 4.11 M/UL (ref 4–5.4)
SODIUM SERPL-SCNC: 139 MMOL/L (ref 136–145)
WBC # BLD AUTO: 22.83 K/UL (ref 3.9–12.7)

## 2021-10-19 PROCEDURE — 85027 COMPLETE CBC AUTOMATED: CPT | Performed by: INTERNAL MEDICINE

## 2021-10-19 PROCEDURE — 80053 COMPREHEN METABOLIC PANEL: CPT | Performed by: INTERNAL MEDICINE

## 2021-10-19 PROCEDURE — 83735 ASSAY OF MAGNESIUM: CPT | Performed by: NURSE PRACTITIONER

## 2021-10-19 PROCEDURE — 36415 COLL VENOUS BLD VENIPUNCTURE: CPT | Performed by: INTERNAL MEDICINE

## 2021-10-19 PROCEDURE — 85007 BL SMEAR W/DIFF WBC COUNT: CPT | Performed by: INTERNAL MEDICINE

## 2021-10-21 ENCOUNTER — HOSPITAL ENCOUNTER (OUTPATIENT)
Dept: RADIOLOGY | Facility: HOSPITAL | Age: 31
Discharge: HOME | End: 2021-10-21
Attending: NURSE PRACTITIONER
Payer: COMMERCIAL

## 2021-10-21 VITALS — BODY MASS INDEX: 26.5 KG/M2 | HEIGHT: 60 IN | WEIGHT: 135 LBS

## 2021-10-21 DIAGNOSIS — R93.89 ABNORMAL CT SCAN: ICD-10-CM

## 2021-10-21 DIAGNOSIS — N63.25 MASS OVERLAPPING MULTIPLE QUADRANTS OF LEFT BREAST: ICD-10-CM

## 2021-10-21 DIAGNOSIS — R59.0 HILAR LYMPHADENOPATHY: ICD-10-CM

## 2021-10-21 LAB
BACTERIA BLD CULT: NORMAL
BACTERIA BLD CULT: NORMAL
GLUCOSE SERPL-MCNC: 81 MG/DL (ref 70–110)

## 2021-10-21 PROCEDURE — 78815 PET IMAGE W/CT SKULL-THIGH: CPT | Mod: TC,PO

## 2021-10-25 ENCOUNTER — LAB VISIT (OUTPATIENT)
Dept: LAB | Facility: HOSPITAL | Age: 31
End: 2021-10-25
Attending: INTERNAL MEDICINE
Payer: COMMERCIAL

## 2021-10-25 DIAGNOSIS — N63.24 MASS OF LOWER INNER QUADRANT OF LEFT BREAST: ICD-10-CM

## 2021-10-25 DIAGNOSIS — N63.21 MASS OF UPPER OUTER QUADRANT OF LEFT BREAST: ICD-10-CM

## 2021-10-25 DIAGNOSIS — N63.25 MASS OVERLAPPING MULTIPLE QUADRANTS OF LEFT BREAST: ICD-10-CM

## 2021-10-25 DIAGNOSIS — C50.812 MALIGNANT NEOPLASM OF OVERLAPPING SITES OF LEFT FEMALE BREAST, UNSPECIFIED ESTROGEN RECEPTOR STATUS: ICD-10-CM

## 2021-10-25 LAB
ALBUMIN SERPL BCP-MCNC: 3.8 G/DL (ref 3.5–5.2)
ALP SERPL-CCNC: 48 U/L (ref 55–135)
ALT SERPL W/O P-5'-P-CCNC: 23 U/L (ref 10–44)
ANION GAP SERPL CALC-SCNC: 8 MMOL/L (ref 8–16)
AST SERPL-CCNC: 22 U/L (ref 10–40)
BASOPHILS # BLD AUTO: 0.04 K/UL (ref 0–0.2)
BASOPHILS NFR BLD: 0.5 % (ref 0–1.9)
BILIRUB SERPL-MCNC: 0.3 MG/DL (ref 0.1–1)
BUN SERPL-MCNC: 11 MG/DL (ref 6–20)
CALCIUM SERPL-MCNC: 9.3 MG/DL (ref 8.7–10.5)
CHLORIDE SERPL-SCNC: 106 MMOL/L (ref 95–110)
CO2 SERPL-SCNC: 25 MMOL/L (ref 23–29)
CREAT SERPL-MCNC: 0.6 MG/DL (ref 0.5–1.4)
DIFFERENTIAL METHOD: ABNORMAL
EOSINOPHIL # BLD AUTO: 0.1 K/UL (ref 0–0.5)
EOSINOPHIL NFR BLD: 0.7 % (ref 0–8)
ERYTHROCYTE [DISTWIDTH] IN BLOOD BY AUTOMATED COUNT: 14.7 % (ref 11.5–14.5)
EST. GFR  (AFRICAN AMERICAN): >60 ML/MIN/1.73 M^2
EST. GFR  (NON AFRICAN AMERICAN): >60 ML/MIN/1.73 M^2
GLUCOSE SERPL-MCNC: 94 MG/DL (ref 70–110)
HCT VFR BLD AUTO: 36.5 % (ref 37–48.5)
HGB BLD-MCNC: 11.2 G/DL (ref 12–16)
IMM GRANULOCYTES # BLD AUTO: 0.28 K/UL (ref 0–0.04)
IMM GRANULOCYTES NFR BLD AUTO: 3.5 % (ref 0–0.5)
LYMPHOCYTES # BLD AUTO: 1.9 K/UL (ref 1–4.8)
LYMPHOCYTES NFR BLD: 24 % (ref 18–48)
MAGNESIUM SERPL-MCNC: 1.9 MG/DL (ref 1.6–2.6)
MCH RBC QN AUTO: 24.1 PG (ref 27–31)
MCHC RBC AUTO-ENTMCNC: 30.7 G/DL (ref 32–36)
MCV RBC AUTO: 79 FL (ref 82–98)
MONOCYTES # BLD AUTO: 0.6 K/UL (ref 0.3–1)
MONOCYTES NFR BLD: 7.1 % (ref 4–15)
NEUTROPHILS # BLD AUTO: 5.2 K/UL (ref 1.8–7.7)
NEUTROPHILS NFR BLD: 64.2 % (ref 38–73)
NRBC BLD-RTO: 0 /100 WBC
PLATELET # BLD AUTO: 178 K/UL (ref 150–450)
PMV BLD AUTO: 10 FL (ref 9.2–12.9)
POTASSIUM SERPL-SCNC: 3.9 MMOL/L (ref 3.5–5.1)
PROT SERPL-MCNC: 7.2 G/DL (ref 6–8.4)
RBC # BLD AUTO: 4.64 M/UL (ref 4–5.4)
SODIUM SERPL-SCNC: 139 MMOL/L (ref 136–145)
WBC # BLD AUTO: 8.08 K/UL (ref 3.9–12.7)

## 2021-10-25 PROCEDURE — 85025 COMPLETE CBC W/AUTO DIFF WBC: CPT | Performed by: INTERNAL MEDICINE

## 2021-10-25 PROCEDURE — 83735 ASSAY OF MAGNESIUM: CPT | Performed by: NURSE PRACTITIONER

## 2021-10-25 PROCEDURE — 36415 COLL VENOUS BLD VENIPUNCTURE: CPT | Performed by: INTERNAL MEDICINE

## 2021-10-25 PROCEDURE — 80053 COMPREHEN METABOLIC PANEL: CPT | Performed by: INTERNAL MEDICINE

## 2021-10-25 RX ORDER — SODIUM CHLORIDE 0.9 % (FLUSH) 0.9 %
10 SYRINGE (ML) INJECTION
Status: CANCELLED | OUTPATIENT
Start: 2021-10-26

## 2021-10-25 RX ORDER — EPINEPHRINE 0.3 MG/.3ML
0.3 INJECTION SUBCUTANEOUS ONCE AS NEEDED
Status: CANCELLED | OUTPATIENT
Start: 2021-10-26

## 2021-10-25 RX ORDER — HEPARIN 100 UNIT/ML
500 SYRINGE INTRAVENOUS
Status: CANCELLED | OUTPATIENT
Start: 2021-10-26

## 2021-10-25 RX ORDER — DIPHENHYDRAMINE HYDROCHLORIDE 50 MG/ML
50 INJECTION INTRAMUSCULAR; INTRAVENOUS ONCE AS NEEDED
Status: CANCELLED | OUTPATIENT
Start: 2021-10-26

## 2021-10-26 ENCOUNTER — INFUSION (OUTPATIENT)
Dept: INFUSION THERAPY | Facility: HOSPITAL | Age: 31
End: 2021-10-26
Attending: INTERNAL MEDICINE
Payer: COMMERCIAL

## 2021-10-26 ENCOUNTER — OFFICE VISIT (OUTPATIENT)
Dept: HEMATOLOGY/ONCOLOGY | Facility: CLINIC | Age: 31
End: 2021-10-26
Payer: MEDICAID

## 2021-10-26 VITALS
WEIGHT: 140.13 LBS | HEIGHT: 60 IN | BODY MASS INDEX: 27.51 KG/M2 | TEMPERATURE: 98 F | OXYGEN SATURATION: 100 % | RESPIRATION RATE: 18 BRPM | SYSTOLIC BLOOD PRESSURE: 141 MMHG | HEART RATE: 66 BPM | DIASTOLIC BLOOD PRESSURE: 85 MMHG

## 2021-10-26 VITALS
WEIGHT: 140 LBS | HEART RATE: 97 BPM | TEMPERATURE: 97 F | OXYGEN SATURATION: 98 % | BODY MASS INDEX: 27.48 KG/M2 | SYSTOLIC BLOOD PRESSURE: 111 MMHG | HEIGHT: 60 IN | DIASTOLIC BLOOD PRESSURE: 72 MMHG | RESPIRATION RATE: 18 BRPM

## 2021-10-26 DIAGNOSIS — N63.21 MASS OF UPPER OUTER QUADRANT OF LEFT BREAST: ICD-10-CM

## 2021-10-26 DIAGNOSIS — Z95.828 PORT-A-CATH IN PLACE: Chronic | ICD-10-CM

## 2021-10-26 DIAGNOSIS — N63.25 MASS OVERLAPPING MULTIPLE QUADRANTS OF LEFT BREAST: ICD-10-CM

## 2021-10-26 DIAGNOSIS — Z17.0 ER+ (ESTROGEN RECEPTOR POSITIVE STATUS): ICD-10-CM

## 2021-10-26 DIAGNOSIS — T45.1X5A CHEMOTHERAPY-INDUCED NEUTROPENIA: ICD-10-CM

## 2021-10-26 DIAGNOSIS — C50.812 MALIGNANT NEOPLASM OF OVERLAPPING SITES OF LEFT FEMALE BREAST, UNSPECIFIED ESTROGEN RECEPTOR STATUS: Primary | ICD-10-CM

## 2021-10-26 DIAGNOSIS — D70.1 CHEMOTHERAPY-INDUCED NEUTROPENIA: ICD-10-CM

## 2021-10-26 DIAGNOSIS — D50.9 MICROCYTIC ANEMIA: Chronic | ICD-10-CM

## 2021-10-26 DIAGNOSIS — N63.24 MASS OF LOWER INNER QUADRANT OF LEFT BREAST: ICD-10-CM

## 2021-10-26 DIAGNOSIS — C50.919 HER2-POSITIVE CARCINOMA OF BREAST: ICD-10-CM

## 2021-10-26 PROCEDURE — 63600175 PHARM REV CODE 636 W HCPCS: Performed by: INTERNAL MEDICINE

## 2021-10-26 PROCEDURE — 25000003 PHARM REV CODE 250: Performed by: INTERNAL MEDICINE

## 2021-10-26 PROCEDURE — 99214 OFFICE O/P EST MOD 30 MIN: CPT | Mod: S$GLB,,, | Performed by: INTERNAL MEDICINE

## 2021-10-26 PROCEDURE — 96375 TX/PRO/DX INJ NEW DRUG ADDON: CPT

## 2021-10-26 PROCEDURE — 96417 CHEMO IV INFUS EACH ADDL SEQ: CPT

## 2021-10-26 PROCEDURE — 96367 TX/PROPH/DG ADDL SEQ IV INF: CPT

## 2021-10-26 PROCEDURE — 99214 PR OFFICE/OUTPT VISIT, EST, LEVL IV, 30-39 MIN: ICD-10-PCS | Mod: S$GLB,,, | Performed by: INTERNAL MEDICINE

## 2021-10-26 PROCEDURE — 96413 CHEMO IV INFUSION 1 HR: CPT

## 2021-10-26 RX ORDER — EPINEPHRINE 0.3 MG/.3ML
0.3 INJECTION SUBCUTANEOUS ONCE AS NEEDED
Status: DISCONTINUED | OUTPATIENT
Start: 2021-10-26 | End: 2021-10-26 | Stop reason: HOSPADM

## 2021-10-26 RX ORDER — HEPARIN 100 UNIT/ML
500 SYRINGE INTRAVENOUS
Status: DISCONTINUED | OUTPATIENT
Start: 2021-10-26 | End: 2021-10-26 | Stop reason: HOSPADM

## 2021-10-26 RX ORDER — DIPHENHYDRAMINE HYDROCHLORIDE 50 MG/ML
50 INJECTION INTRAMUSCULAR; INTRAVENOUS ONCE AS NEEDED
Status: DISCONTINUED | OUTPATIENT
Start: 2021-10-26 | End: 2021-10-26 | Stop reason: HOSPADM

## 2021-10-26 RX ORDER — SODIUM CHLORIDE 0.9 % (FLUSH) 0.9 %
10 SYRINGE (ML) INJECTION
Status: DISCONTINUED | OUTPATIENT
Start: 2021-10-26 | End: 2021-10-26 | Stop reason: HOSPADM

## 2021-10-26 RX ADMIN — DEXAMETHASONE SODIUM PHOSPHATE: 4 INJECTION, SOLUTION INTRA-ARTICULAR; INTRALESIONAL; INTRAMUSCULAR; INTRAVENOUS; SOFT TISSUE at 10:10

## 2021-10-26 RX ADMIN — CARBOPLATIN 760 MG: 10 INJECTION, SOLUTION INTRAVENOUS at 12:10

## 2021-10-26 RX ADMIN — APREPITANT 130 MG: 130 INJECTION, EMULSION INTRAVENOUS at 11:10

## 2021-10-26 RX ADMIN — TRASTUZUMAB 384 MG: KIT at 09:10

## 2021-10-26 RX ADMIN — HEPARIN 500 UNITS: 100 SYRINGE at 01:10

## 2021-10-26 RX ADMIN — SODIUM CHLORIDE: 9 INJECTION, SOLUTION INTRAVENOUS at 08:10

## 2021-10-26 RX ADMIN — PERTUZUMAB 420 MG: 30 INJECTION, SOLUTION, CONCENTRATE INTRAVENOUS at 08:10

## 2021-10-26 RX ADMIN — DOCETAXEL ANHYDROUS 125 MG: 10 INJECTION, SOLUTION INTRAVENOUS at 11:10

## 2021-10-27 ENCOUNTER — INFUSION (OUTPATIENT)
Dept: INFUSION THERAPY | Facility: HOSPITAL | Age: 31
End: 2021-10-27
Attending: INTERNAL MEDICINE
Payer: COMMERCIAL

## 2021-10-27 VITALS
RESPIRATION RATE: 18 BRPM | BODY MASS INDEX: 27.09 KG/M2 | HEART RATE: 80 BPM | WEIGHT: 138.69 LBS | DIASTOLIC BLOOD PRESSURE: 82 MMHG | TEMPERATURE: 97 F | SYSTOLIC BLOOD PRESSURE: 125 MMHG | OXYGEN SATURATION: 100 %

## 2021-10-27 DIAGNOSIS — T45.1X5A CHEMOTHERAPY-INDUCED NEUTROPENIA: ICD-10-CM

## 2021-10-27 DIAGNOSIS — E86.0 DEHYDRATION: Primary | ICD-10-CM

## 2021-10-27 DIAGNOSIS — D70.1 CHEMOTHERAPY-INDUCED NEUTROPENIA: ICD-10-CM

## 2021-10-27 DIAGNOSIS — C50.812 MALIGNANT NEOPLASM OF OVERLAPPING SITES OF LEFT FEMALE BREAST, UNSPECIFIED ESTROGEN RECEPTOR STATUS: ICD-10-CM

## 2021-10-27 PROCEDURE — 96369 SC THER INFUSION UP TO 1 HR: CPT

## 2021-10-27 PROCEDURE — 63600175 PHARM REV CODE 636 W HCPCS: Mod: TB | Performed by: INTERNAL MEDICINE

## 2021-10-27 PROCEDURE — 25000003 PHARM REV CODE 250: Performed by: NURSE PRACTITIONER

## 2021-10-27 PROCEDURE — 96361 HYDRATE IV INFUSION ADD-ON: CPT

## 2021-10-27 PROCEDURE — 96360 HYDRATION IV INFUSION INIT: CPT

## 2021-10-27 PROCEDURE — 96372 THER/PROPH/DIAG INJ SC/IM: CPT | Mod: 59

## 2021-10-27 RX ORDER — HEPARIN 100 UNIT/ML
500 SYRINGE INTRAVENOUS
Status: CANCELLED
Start: 2021-10-27

## 2021-10-27 RX ORDER — HEPARIN 100 UNIT/ML
500 SYRINGE INTRAVENOUS
Status: COMPLETED | OUTPATIENT
Start: 2021-10-27 | End: 2021-10-27

## 2021-10-27 RX ADMIN — HEPARIN 500 UNITS: 100 SYRINGE at 09:10

## 2021-10-27 RX ADMIN — PEGFILGRASTIM-CBQV 6 MG: 6 INJECTION, SOLUTION SUBCUTANEOUS at 09:10

## 2021-10-27 RX ADMIN — SODIUM CHLORIDE 1000 ML: 0.9 INJECTION, SOLUTION INTRAVENOUS at 07:10

## 2021-11-01 ENCOUNTER — TELEPHONE (OUTPATIENT)
Dept: HEMATOLOGY/ONCOLOGY | Facility: CLINIC | Age: 31
End: 2021-11-01
Payer: COMMERCIAL

## 2021-11-01 ENCOUNTER — LAB VISIT (OUTPATIENT)
Dept: LAB | Facility: HOSPITAL | Age: 31
End: 2021-11-01
Attending: INTERNAL MEDICINE
Payer: COMMERCIAL

## 2021-11-01 DIAGNOSIS — C50.812 MALIGNANT NEOPLASM OF OVERLAPPING SITES OF LEFT FEMALE BREAST, UNSPECIFIED ESTROGEN RECEPTOR STATUS: ICD-10-CM

## 2021-11-01 DIAGNOSIS — N63.25 MASS OVERLAPPING MULTIPLE QUADRANTS OF LEFT BREAST: ICD-10-CM

## 2021-11-01 DIAGNOSIS — N63.24 MASS OF LOWER INNER QUADRANT OF LEFT BREAST: ICD-10-CM

## 2021-11-01 DIAGNOSIS — N63.21 MASS OF UPPER OUTER QUADRANT OF LEFT BREAST: ICD-10-CM

## 2021-11-01 LAB
ALBUMIN SERPL BCP-MCNC: 3.9 G/DL (ref 3.5–5.2)
ALP SERPL-CCNC: 55 U/L (ref 55–135)
ALT SERPL W/O P-5'-P-CCNC: 40 U/L (ref 10–44)
ANION GAP SERPL CALC-SCNC: 7 MMOL/L (ref 8–16)
AST SERPL-CCNC: 27 U/L (ref 10–40)
BASOPHILS NFR BLD: 0 % (ref 0–1.9)
BILIRUB SERPL-MCNC: 0.5 MG/DL (ref 0.1–1)
BUN SERPL-MCNC: 14 MG/DL (ref 6–20)
CALCIUM SERPL-MCNC: 9.2 MG/DL (ref 8.7–10.5)
CHLORIDE SERPL-SCNC: 100 MMOL/L (ref 95–110)
CO2 SERPL-SCNC: 30 MMOL/L (ref 23–29)
CREAT SERPL-MCNC: 0.8 MG/DL (ref 0.5–1.4)
DIFFERENTIAL METHOD: ABNORMAL
EOSINOPHIL NFR BLD: 0 % (ref 0–8)
ERYTHROCYTE [DISTWIDTH] IN BLOOD BY AUTOMATED COUNT: 14.3 % (ref 11.5–14.5)
EST. GFR  (AFRICAN AMERICAN): >60 ML/MIN/1.73 M^2
EST. GFR  (NON AFRICAN AMERICAN): >60 ML/MIN/1.73 M^2
GLUCOSE SERPL-MCNC: 129 MG/DL (ref 70–110)
HCT VFR BLD AUTO: 34.3 % (ref 37–48.5)
HGB BLD-MCNC: 10.6 G/DL (ref 12–16)
IMM GRANULOCYTES # BLD AUTO: ABNORMAL K/UL (ref 0–0.04)
IMM GRANULOCYTES NFR BLD AUTO: ABNORMAL % (ref 0–0.5)
LYMPHOCYTES NFR BLD: 27 % (ref 18–48)
MCH RBC QN AUTO: 24.5 PG (ref 27–31)
MCHC RBC AUTO-ENTMCNC: 30.9 G/DL (ref 32–36)
MCV RBC AUTO: 79 FL (ref 82–98)
MONOCYTES NFR BLD: 3 % (ref 4–15)
NEUTROPHILS NFR BLD: 47 % (ref 38–73)
NEUTS BAND NFR BLD MANUAL: 23 %
NRBC BLD-RTO: 0 /100 WBC
PLATELET # BLD AUTO: 114 K/UL (ref 150–450)
PMV BLD AUTO: 10.5 FL (ref 9.2–12.9)
POTASSIUM SERPL-SCNC: 3.7 MMOL/L (ref 3.5–5.1)
PROT SERPL-MCNC: 7.2 G/DL (ref 6–8.4)
RBC # BLD AUTO: 4.32 M/UL (ref 4–5.4)
SODIUM SERPL-SCNC: 137 MMOL/L (ref 136–145)
WBC # BLD AUTO: 3.92 K/UL (ref 3.9–12.7)

## 2021-11-01 PROCEDURE — 85007 BL SMEAR W/DIFF WBC COUNT: CPT | Performed by: INTERNAL MEDICINE

## 2021-11-01 PROCEDURE — 85027 COMPLETE CBC AUTOMATED: CPT | Performed by: INTERNAL MEDICINE

## 2021-11-01 PROCEDURE — 36415 COLL VENOUS BLD VENIPUNCTURE: CPT | Performed by: INTERNAL MEDICINE

## 2021-11-01 PROCEDURE — 80053 COMPREHEN METABOLIC PANEL: CPT | Performed by: INTERNAL MEDICINE

## 2021-11-08 ENCOUNTER — PATIENT MESSAGE (OUTPATIENT)
Dept: HEMATOLOGY/ONCOLOGY | Facility: CLINIC | Age: 31
End: 2021-11-08
Payer: COMMERCIAL

## 2021-11-08 ENCOUNTER — LAB VISIT (OUTPATIENT)
Dept: LAB | Facility: HOSPITAL | Age: 31
End: 2021-11-08
Attending: INTERNAL MEDICINE
Payer: COMMERCIAL

## 2021-11-08 DIAGNOSIS — N63.25 MASS OVERLAPPING MULTIPLE QUADRANTS OF LEFT BREAST: ICD-10-CM

## 2021-11-08 DIAGNOSIS — C50.812 MALIGNANT NEOPLASM OF OVERLAPPING SITES OF LEFT FEMALE BREAST, UNSPECIFIED ESTROGEN RECEPTOR STATUS: ICD-10-CM

## 2021-11-08 DIAGNOSIS — N63.21 MASS OF UPPER OUTER QUADRANT OF LEFT BREAST: ICD-10-CM

## 2021-11-08 DIAGNOSIS — N63.24 MASS OF LOWER INNER QUADRANT OF LEFT BREAST: ICD-10-CM

## 2021-11-08 LAB
ALBUMIN SERPL BCP-MCNC: 3.8 G/DL (ref 3.5–5.2)
ALP SERPL-CCNC: 62 U/L (ref 55–135)
ALT SERPL W/O P-5'-P-CCNC: 59 U/L (ref 10–44)
ANION GAP SERPL CALC-SCNC: 8 MMOL/L (ref 8–16)
ANISOCYTOSIS BLD QL SMEAR: SLIGHT
AST SERPL-CCNC: 34 U/L (ref 10–40)
BASOPHILS # BLD AUTO: ABNORMAL K/UL (ref 0–0.2)
BASOPHILS NFR BLD: 0 % (ref 0–1.9)
BILIRUB SERPL-MCNC: 0.6 MG/DL (ref 0.1–1)
BUN SERPL-MCNC: 8 MG/DL (ref 6–20)
CALCIUM SERPL-MCNC: 9.2 MG/DL (ref 8.7–10.5)
CHLORIDE SERPL-SCNC: 102 MMOL/L (ref 95–110)
CO2 SERPL-SCNC: 26 MMOL/L (ref 23–29)
CREAT SERPL-MCNC: 0.7 MG/DL (ref 0.5–1.4)
DIFFERENTIAL METHOD: ABNORMAL
EOSINOPHIL # BLD AUTO: ABNORMAL K/UL (ref 0–0.5)
EOSINOPHIL NFR BLD: 0 % (ref 0–8)
ERYTHROCYTE [DISTWIDTH] IN BLOOD BY AUTOMATED COUNT: 15.9 % (ref 11.5–14.5)
EST. GFR  (AFRICAN AMERICAN): >60 ML/MIN/1.73 M^2
EST. GFR  (NON AFRICAN AMERICAN): >60 ML/MIN/1.73 M^2
GLUCOSE SERPL-MCNC: 80 MG/DL (ref 70–110)
HCT VFR BLD AUTO: 33.5 % (ref 37–48.5)
HGB BLD-MCNC: 10.4 G/DL (ref 12–16)
IMM GRANULOCYTES # BLD AUTO: ABNORMAL K/UL (ref 0–0.04)
IMM GRANULOCYTES NFR BLD AUTO: ABNORMAL % (ref 0–0.5)
LYMPHOCYTES # BLD AUTO: ABNORMAL K/UL (ref 1–4.8)
LYMPHOCYTES NFR BLD: 13 % (ref 18–48)
MAGNESIUM SERPL-MCNC: 1.8 MG/DL (ref 1.6–2.6)
MCH RBC QN AUTO: 24.6 PG (ref 27–31)
MCHC RBC AUTO-ENTMCNC: 31 G/DL (ref 32–36)
MCV RBC AUTO: 79 FL (ref 82–98)
METAMYELOCYTES NFR BLD MANUAL: 2 %
MONOCYTES # BLD AUTO: ABNORMAL K/UL (ref 0.3–1)
MONOCYTES NFR BLD: 4 % (ref 4–15)
MYELOCYTES NFR BLD MANUAL: 2 %
NEUTROPHILS # BLD AUTO: ABNORMAL K/UL (ref 1.8–7.7)
NEUTROPHILS NFR BLD: 64 % (ref 38–73)
NEUTS BAND NFR BLD MANUAL: 15 %
NRBC BLD-RTO: 0 /100 WBC
PLATELET # BLD AUTO: 134 K/UL (ref 150–450)
PMV BLD AUTO: 9.9 FL (ref 9.2–12.9)
POIKILOCYTOSIS BLD QL SMEAR: SLIGHT
POTASSIUM SERPL-SCNC: 3.8 MMOL/L (ref 3.5–5.1)
PROT SERPL-MCNC: 6.6 G/DL (ref 6–8.4)
RBC # BLD AUTO: 4.23 M/UL (ref 4–5.4)
SODIUM SERPL-SCNC: 136 MMOL/L (ref 136–145)
TOXIC GRANULES BLD QL SMEAR: PRESENT
WBC # BLD AUTO: 13.6 K/UL (ref 3.9–12.7)

## 2021-11-08 PROCEDURE — 85007 BL SMEAR W/DIFF WBC COUNT: CPT | Performed by: INTERNAL MEDICINE

## 2021-11-08 PROCEDURE — 85027 COMPLETE CBC AUTOMATED: CPT | Performed by: INTERNAL MEDICINE

## 2021-11-08 PROCEDURE — 83735 ASSAY OF MAGNESIUM: CPT | Performed by: NURSE PRACTITIONER

## 2021-11-08 PROCEDURE — 36415 COLL VENOUS BLD VENIPUNCTURE: CPT | Performed by: INTERNAL MEDICINE

## 2021-11-08 PROCEDURE — 80053 COMPREHEN METABOLIC PANEL: CPT | Performed by: INTERNAL MEDICINE

## 2021-11-15 ENCOUNTER — LAB VISIT (OUTPATIENT)
Dept: LAB | Facility: HOSPITAL | Age: 31
End: 2021-11-15
Attending: INTERNAL MEDICINE
Payer: COMMERCIAL

## 2021-11-15 DIAGNOSIS — N63.25 MASS OVERLAPPING MULTIPLE QUADRANTS OF LEFT BREAST: ICD-10-CM

## 2021-11-15 DIAGNOSIS — N63.24 MASS OF LOWER INNER QUADRANT OF LEFT BREAST: ICD-10-CM

## 2021-11-15 DIAGNOSIS — C50.812 MALIGNANT NEOPLASM OF OVERLAPPING SITES OF LEFT FEMALE BREAST, UNSPECIFIED ESTROGEN RECEPTOR STATUS: ICD-10-CM

## 2021-11-15 DIAGNOSIS — N63.21 MASS OF UPPER OUTER QUADRANT OF LEFT BREAST: ICD-10-CM

## 2021-11-15 LAB
ALBUMIN SERPL BCP-MCNC: 4 G/DL (ref 3.5–5.2)
ALP SERPL-CCNC: 42 U/L (ref 55–135)
ALT SERPL W/O P-5'-P-CCNC: 49 U/L (ref 10–44)
ANION GAP SERPL CALC-SCNC: 7 MMOL/L (ref 8–16)
AST SERPL-CCNC: 26 U/L (ref 10–40)
BASOPHILS # BLD AUTO: 0.03 K/UL (ref 0–0.2)
BASOPHILS NFR BLD: 0.5 % (ref 0–1.9)
BILIRUB SERPL-MCNC: 0.6 MG/DL (ref 0.1–1)
BUN SERPL-MCNC: 10 MG/DL (ref 6–20)
CALCIUM SERPL-MCNC: 9.2 MG/DL (ref 8.7–10.5)
CHLORIDE SERPL-SCNC: 105 MMOL/L (ref 95–110)
CO2 SERPL-SCNC: 26 MMOL/L (ref 23–29)
CREAT SERPL-MCNC: 0.7 MG/DL (ref 0.5–1.4)
DIFFERENTIAL METHOD: ABNORMAL
EOSINOPHIL # BLD AUTO: 0 K/UL (ref 0–0.5)
EOSINOPHIL NFR BLD: 0.2 % (ref 0–8)
ERYTHROCYTE [DISTWIDTH] IN BLOOD BY AUTOMATED COUNT: 17.2 % (ref 11.5–14.5)
EST. GFR  (AFRICAN AMERICAN): >60 ML/MIN/1.73 M^2
EST. GFR  (NON AFRICAN AMERICAN): >60 ML/MIN/1.73 M^2
GLUCOSE SERPL-MCNC: 90 MG/DL (ref 70–110)
HCT VFR BLD AUTO: 34.7 % (ref 37–48.5)
HGB BLD-MCNC: 10.5 G/DL (ref 12–16)
IMM GRANULOCYTES # BLD AUTO: 0.04 K/UL (ref 0–0.04)
IMM GRANULOCYTES NFR BLD AUTO: 0.6 % (ref 0–0.5)
LYMPHOCYTES # BLD AUTO: 1.6 K/UL (ref 1–4.8)
LYMPHOCYTES NFR BLD: 25.3 % (ref 18–48)
MAGNESIUM SERPL-MCNC: 1.9 MG/DL (ref 1.6–2.6)
MCH RBC QN AUTO: 24.6 PG (ref 27–31)
MCHC RBC AUTO-ENTMCNC: 30.3 G/DL (ref 32–36)
MCV RBC AUTO: 81 FL (ref 82–98)
MONOCYTES # BLD AUTO: 1 K/UL (ref 0.3–1)
MONOCYTES NFR BLD: 16.5 % (ref 4–15)
NEUTROPHILS # BLD AUTO: 3.6 K/UL (ref 1.8–7.7)
NEUTROPHILS NFR BLD: 56.9 % (ref 38–73)
NRBC BLD-RTO: 0 /100 WBC
PLATELET # BLD AUTO: 156 K/UL (ref 150–450)
PMV BLD AUTO: 8.8 FL (ref 9.2–12.9)
POTASSIUM SERPL-SCNC: 3.9 MMOL/L (ref 3.5–5.1)
PROT SERPL-MCNC: 7.3 G/DL (ref 6–8.4)
RBC # BLD AUTO: 4.27 M/UL (ref 4–5.4)
SODIUM SERPL-SCNC: 138 MMOL/L (ref 136–145)
WBC # BLD AUTO: 6.29 K/UL (ref 3.9–12.7)

## 2021-11-15 PROCEDURE — 83735 ASSAY OF MAGNESIUM: CPT | Performed by: NURSE PRACTITIONER

## 2021-11-15 PROCEDURE — 36415 COLL VENOUS BLD VENIPUNCTURE: CPT | Performed by: INTERNAL MEDICINE

## 2021-11-15 PROCEDURE — 85025 COMPLETE CBC W/AUTO DIFF WBC: CPT | Performed by: INTERNAL MEDICINE

## 2021-11-15 PROCEDURE — 80053 COMPREHEN METABOLIC PANEL: CPT | Performed by: INTERNAL MEDICINE

## 2021-11-15 RX ORDER — EPINEPHRINE 0.3 MG/.3ML
0.3 INJECTION SUBCUTANEOUS ONCE AS NEEDED
Status: CANCELLED | OUTPATIENT
Start: 2021-11-16

## 2021-11-15 RX ORDER — HEPARIN 100 UNIT/ML
500 SYRINGE INTRAVENOUS
Status: CANCELLED | OUTPATIENT
Start: 2021-11-16

## 2021-11-15 RX ORDER — SODIUM CHLORIDE 0.9 % (FLUSH) 0.9 %
10 SYRINGE (ML) INJECTION
Status: CANCELLED | OUTPATIENT
Start: 2021-11-16

## 2021-11-15 RX ORDER — DIPHENHYDRAMINE HYDROCHLORIDE 50 MG/ML
50 INJECTION INTRAMUSCULAR; INTRAVENOUS ONCE AS NEEDED
Status: CANCELLED | OUTPATIENT
Start: 2021-11-16

## 2021-11-15 RX ORDER — HEPARIN 100 UNIT/ML
500 SYRINGE INTRAVENOUS
Status: CANCELLED
Start: 2021-11-17

## 2021-11-16 ENCOUNTER — INFUSION (OUTPATIENT)
Dept: INFUSION THERAPY | Facility: HOSPITAL | Age: 31
End: 2021-11-16
Attending: INTERNAL MEDICINE
Payer: MEDICAID

## 2021-11-16 ENCOUNTER — PATIENT MESSAGE (OUTPATIENT)
Dept: HEMATOLOGY/ONCOLOGY | Facility: CLINIC | Age: 31
End: 2021-11-16

## 2021-11-16 ENCOUNTER — OFFICE VISIT (OUTPATIENT)
Dept: HEMATOLOGY/ONCOLOGY | Facility: CLINIC | Age: 31
End: 2021-11-16
Payer: COMMERCIAL

## 2021-11-16 VITALS
SYSTOLIC BLOOD PRESSURE: 125 MMHG | RESPIRATION RATE: 18 BRPM | DIASTOLIC BLOOD PRESSURE: 80 MMHG | HEART RATE: 103 BPM | WEIGHT: 140 LBS | HEIGHT: 60 IN | BODY MASS INDEX: 27.48 KG/M2 | TEMPERATURE: 97 F

## 2021-11-16 VITALS
TEMPERATURE: 97 F | SYSTOLIC BLOOD PRESSURE: 122 MMHG | BODY MASS INDEX: 27.48 KG/M2 | DIASTOLIC BLOOD PRESSURE: 76 MMHG | HEART RATE: 116 BPM | OXYGEN SATURATION: 99 % | WEIGHT: 140 LBS | HEIGHT: 60 IN | RESPIRATION RATE: 18 BRPM

## 2021-11-16 DIAGNOSIS — C50.919 HER2-POSITIVE CARCINOMA OF BREAST: ICD-10-CM

## 2021-11-16 DIAGNOSIS — T45.1X5A CHEMOTHERAPY INDUCED NEUTROPENIA: ICD-10-CM

## 2021-11-16 DIAGNOSIS — D70.1 CHEMOTHERAPY INDUCED NEUTROPENIA: ICD-10-CM

## 2021-11-16 DIAGNOSIS — C50.812 MALIGNANT NEOPLASM OF OVERLAPPING SITES OF LEFT FEMALE BREAST, UNSPECIFIED ESTROGEN RECEPTOR STATUS: ICD-10-CM

## 2021-11-16 DIAGNOSIS — C50.812 MALIGNANT NEOPLASM OF OVERLAPPING SITES OF LEFT FEMALE BREAST, UNSPECIFIED ESTROGEN RECEPTOR STATUS: Primary | ICD-10-CM

## 2021-11-16 DIAGNOSIS — T45.1X5A CHEMOTHERAPY-INDUCED NEUTROPENIA: Primary | ICD-10-CM

## 2021-11-16 DIAGNOSIS — D70.1 CHEMOTHERAPY-INDUCED NEUTROPENIA: Primary | ICD-10-CM

## 2021-11-16 DIAGNOSIS — E86.0 DEHYDRATION: ICD-10-CM

## 2021-11-16 DIAGNOSIS — Z17.0 ER+ (ESTROGEN RECEPTOR POSITIVE STATUS): ICD-10-CM

## 2021-11-16 PROCEDURE — 1111F DSCHRG MED/CURRENT MED MERGE: CPT | Mod: S$GLB,,, | Performed by: NURSE PRACTITIONER

## 2021-11-16 PROCEDURE — 3008F BODY MASS INDEX DOCD: CPT | Mod: S$GLB,,, | Performed by: NURSE PRACTITIONER

## 2021-11-16 PROCEDURE — 3008F PR BODY MASS INDEX (BMI) DOCUMENTED: ICD-10-PCS | Mod: S$GLB,,, | Performed by: NURSE PRACTITIONER

## 2021-11-16 PROCEDURE — 1111F PR DISCHARGE MEDS RECONCILED W/ CURRENT OUTPATIENT MED LIST: ICD-10-PCS | Mod: S$GLB,,, | Performed by: NURSE PRACTITIONER

## 2021-11-16 PROCEDURE — 3078F PR MOST RECENT DIASTOLIC BLOOD PRESSURE < 80 MM HG: ICD-10-PCS | Mod: S$GLB,,, | Performed by: NURSE PRACTITIONER

## 2021-11-16 PROCEDURE — 63600175 PHARM REV CODE 636 W HCPCS: Mod: JG | Performed by: INTERNAL MEDICINE

## 2021-11-16 PROCEDURE — 3078F DIAST BP <80 MM HG: CPT | Mod: S$GLB,,, | Performed by: NURSE PRACTITIONER

## 2021-11-16 PROCEDURE — 96375 TX/PRO/DX INJ NEW DRUG ADDON: CPT

## 2021-11-16 PROCEDURE — 25000003 PHARM REV CODE 250: Performed by: INTERNAL MEDICINE

## 2021-11-16 PROCEDURE — 99214 OFFICE O/P EST MOD 30 MIN: CPT | Mod: S$GLB,,, | Performed by: NURSE PRACTITIONER

## 2021-11-16 PROCEDURE — 99214 PR OFFICE/OUTPT VISIT, EST, LEVL IV, 30-39 MIN: ICD-10-PCS | Mod: S$GLB,,, | Performed by: NURSE PRACTITIONER

## 2021-11-16 PROCEDURE — 96417 CHEMO IV INFUS EACH ADDL SEQ: CPT

## 2021-11-16 PROCEDURE — 3074F SYST BP LT 130 MM HG: CPT | Mod: S$GLB,,, | Performed by: NURSE PRACTITIONER

## 2021-11-16 PROCEDURE — 96367 TX/PROPH/DG ADDL SEQ IV INF: CPT

## 2021-11-16 PROCEDURE — 96413 CHEMO IV INFUSION 1 HR: CPT

## 2021-11-16 PROCEDURE — 3074F PR MOST RECENT SYSTOLIC BLOOD PRESSURE < 130 MM HG: ICD-10-PCS | Mod: S$GLB,,, | Performed by: NURSE PRACTITIONER

## 2021-11-16 RX ORDER — HEPARIN 100 UNIT/ML
500 SYRINGE INTRAVENOUS
Status: DISCONTINUED | OUTPATIENT
Start: 2021-11-16 | End: 2021-11-16 | Stop reason: HOSPADM

## 2021-11-16 RX ADMIN — PERTUZUMAB 420 MG: 30 INJECTION, SOLUTION, CONCENTRATE INTRAVENOUS at 08:11

## 2021-11-16 RX ADMIN — TRASTUZUMAB 384 MG: 420 INJECTION, POWDER, LYOPHILIZED, FOR SOLUTION INTRAVENOUS at 09:11

## 2021-11-16 RX ADMIN — DEXAMETHASONE SODIUM PHOSPHATE: 4 INJECTION, SOLUTION INTRA-ARTICULAR; INTRALESIONAL; INTRAMUSCULAR; INTRAVENOUS; SOFT TISSUE at 10:11

## 2021-11-16 RX ADMIN — DOCETAXEL 125 MG: 10 INJECTION INTRAVENOUS at 10:11

## 2021-11-16 RX ADMIN — CARBOPLATIN 760 MG: 10 INJECTION, SOLUTION INTRAVENOUS at 11:11

## 2021-11-16 RX ADMIN — HEPARIN 500 UNITS: 100 SYRINGE at 12:11

## 2021-11-16 RX ADMIN — APREPITANT 130 MG: 130 INJECTION, EMULSION INTRAVENOUS at 10:11

## 2021-11-17 ENCOUNTER — INFUSION (OUTPATIENT)
Dept: INFUSION THERAPY | Facility: HOSPITAL | Age: 31
End: 2021-11-17
Attending: INTERNAL MEDICINE
Payer: MEDICAID

## 2021-11-17 VITALS
BODY MASS INDEX: 26.68 KG/M2 | DIASTOLIC BLOOD PRESSURE: 72 MMHG | SYSTOLIC BLOOD PRESSURE: 103 MMHG | RESPIRATION RATE: 12 BRPM | HEIGHT: 60 IN | HEART RATE: 90 BPM | WEIGHT: 135.88 LBS | TEMPERATURE: 98 F

## 2021-11-17 DIAGNOSIS — E86.0 DEHYDRATION: ICD-10-CM

## 2021-11-17 DIAGNOSIS — C50.812 MALIGNANT NEOPLASM OF OVERLAPPING SITES OF LEFT FEMALE BREAST, UNSPECIFIED ESTROGEN RECEPTOR STATUS: ICD-10-CM

## 2021-11-17 DIAGNOSIS — D70.1 CHEMOTHERAPY-INDUCED NEUTROPENIA: Primary | ICD-10-CM

## 2021-11-17 DIAGNOSIS — T45.1X5A CHEMOTHERAPY-INDUCED NEUTROPENIA: Primary | ICD-10-CM

## 2021-11-17 PROCEDURE — 25000003 PHARM REV CODE 250: Performed by: INTERNAL MEDICINE

## 2021-11-17 PROCEDURE — 96361 HYDRATE IV INFUSION ADD-ON: CPT

## 2021-11-17 PROCEDURE — 96372 THER/PROPH/DIAG INJ SC/IM: CPT | Mod: 59

## 2021-11-17 PROCEDURE — 96360 HYDRATION IV INFUSION INIT: CPT

## 2021-11-17 PROCEDURE — 63600175 PHARM REV CODE 636 W HCPCS: Performed by: INTERNAL MEDICINE

## 2021-11-17 RX ORDER — HEPARIN 100 UNIT/ML
500 SYRINGE INTRAVENOUS
Status: COMPLETED | OUTPATIENT
Start: 2021-11-17 | End: 2021-11-17

## 2021-11-17 RX ADMIN — HEPARIN 500 UNITS: 100 SYRINGE at 10:11

## 2021-11-17 RX ADMIN — PEGFILGRASTIM-CBQV 6 MG: 6 INJECTION, SOLUTION SUBCUTANEOUS at 10:11

## 2021-11-17 RX ADMIN — SODIUM CHLORIDE 1000 ML: 0.9 INJECTION, SOLUTION INTRAVENOUS at 08:11

## 2021-11-22 ENCOUNTER — LAB VISIT (OUTPATIENT)
Dept: LAB | Facility: HOSPITAL | Age: 31
End: 2021-11-22
Attending: INTERNAL MEDICINE
Payer: COMMERCIAL

## 2021-11-22 DIAGNOSIS — N63.24 MASS OF LOWER INNER QUADRANT OF LEFT BREAST: ICD-10-CM

## 2021-11-22 DIAGNOSIS — N63.21 MASS OF UPPER OUTER QUADRANT OF LEFT BREAST: ICD-10-CM

## 2021-11-22 DIAGNOSIS — C50.812 MALIGNANT NEOPLASM OF OVERLAPPING SITES OF LEFT FEMALE BREAST, UNSPECIFIED ESTROGEN RECEPTOR STATUS: ICD-10-CM

## 2021-11-22 DIAGNOSIS — N63.25 MASS OVERLAPPING MULTIPLE QUADRANTS OF LEFT BREAST: ICD-10-CM

## 2021-11-22 LAB
ALBUMIN SERPL BCP-MCNC: 4.3 G/DL (ref 3.5–5.2)
ALP SERPL-CCNC: 58 U/L (ref 55–135)
ALT SERPL W/O P-5'-P-CCNC: 41 U/L (ref 10–44)
ANION GAP SERPL CALC-SCNC: 8 MMOL/L (ref 8–16)
AST SERPL-CCNC: 28 U/L (ref 10–40)
BASOPHILS # BLD AUTO: 0.05 K/UL (ref 0–0.2)
BASOPHILS NFR BLD: 1.3 % (ref 0–1.9)
BILIRUB SERPL-MCNC: 0.6 MG/DL (ref 0.1–1)
BUN SERPL-MCNC: 9 MG/DL (ref 6–20)
CALCIUM SERPL-MCNC: 9.4 MG/DL (ref 8.7–10.5)
CHLORIDE SERPL-SCNC: 98 MMOL/L (ref 95–110)
CO2 SERPL-SCNC: 29 MMOL/L (ref 23–29)
CREAT SERPL-MCNC: 0.8 MG/DL (ref 0.5–1.4)
DIFFERENTIAL METHOD: ABNORMAL
EOSINOPHIL # BLD AUTO: 0 K/UL (ref 0–0.5)
EOSINOPHIL NFR BLD: 0.3 % (ref 0–8)
ERYTHROCYTE [DISTWIDTH] IN BLOOD BY AUTOMATED COUNT: 16.4 % (ref 11.5–14.5)
EST. GFR  (AFRICAN AMERICAN): >60 ML/MIN/1.73 M^2
EST. GFR  (NON AFRICAN AMERICAN): >60 ML/MIN/1.73 M^2
GLUCOSE SERPL-MCNC: 116 MG/DL (ref 70–110)
HCT VFR BLD AUTO: 37.3 % (ref 37–48.5)
HGB BLD-MCNC: 11.6 G/DL (ref 12–16)
IMM GRANULOCYTES # BLD AUTO: 0.01 K/UL (ref 0–0.04)
IMM GRANULOCYTES NFR BLD AUTO: 0.3 % (ref 0–0.5)
LYMPHOCYTES # BLD AUTO: 1.2 K/UL (ref 1–4.8)
LYMPHOCYTES NFR BLD: 30.4 % (ref 18–48)
MAGNESIUM SERPL-MCNC: 1.8 MG/DL (ref 1.6–2.6)
MCH RBC QN AUTO: 24.9 PG (ref 27–31)
MCHC RBC AUTO-ENTMCNC: 31.1 G/DL (ref 32–36)
MCV RBC AUTO: 80 FL (ref 82–98)
MONOCYTES # BLD AUTO: 0.5 K/UL (ref 0.3–1)
MONOCYTES NFR BLD: 13.1 % (ref 4–15)
NEUTROPHILS # BLD AUTO: 2.1 K/UL (ref 1.8–7.7)
NEUTROPHILS NFR BLD: 54.6 % (ref 38–73)
NRBC BLD-RTO: 0 /100 WBC
PLATELET # BLD AUTO: 154 K/UL (ref 150–450)
PMV BLD AUTO: 10.7 FL (ref 9.2–12.9)
POTASSIUM SERPL-SCNC: 3.9 MMOL/L (ref 3.5–5.1)
PROT SERPL-MCNC: 7.8 G/DL (ref 6–8.4)
RBC # BLD AUTO: 4.65 M/UL (ref 4–5.4)
SODIUM SERPL-SCNC: 135 MMOL/L (ref 136–145)
WBC # BLD AUTO: 3.82 K/UL (ref 3.9–12.7)

## 2021-11-22 PROCEDURE — 83735 ASSAY OF MAGNESIUM: CPT | Performed by: NURSE PRACTITIONER

## 2021-11-22 PROCEDURE — 85025 COMPLETE CBC W/AUTO DIFF WBC: CPT | Performed by: INTERNAL MEDICINE

## 2021-11-22 PROCEDURE — 80053 COMPREHEN METABOLIC PANEL: CPT | Performed by: INTERNAL MEDICINE

## 2021-11-22 PROCEDURE — 36415 COLL VENOUS BLD VENIPUNCTURE: CPT | Performed by: INTERNAL MEDICINE

## 2021-11-23 ENCOUNTER — PATIENT MESSAGE (OUTPATIENT)
Dept: HEMATOLOGY/ONCOLOGY | Facility: CLINIC | Age: 31
End: 2021-11-23

## 2021-11-23 ENCOUNTER — OFFICE VISIT (OUTPATIENT)
Dept: HEMATOLOGY/ONCOLOGY | Facility: CLINIC | Age: 31
End: 2021-11-23
Payer: COMMERCIAL

## 2021-11-23 ENCOUNTER — HOSPITAL ENCOUNTER (EMERGENCY)
Facility: HOSPITAL | Age: 31
Discharge: HOME | End: 2021-11-23
Attending: EMERGENCY MEDICINE
Payer: COMMERCIAL

## 2021-11-23 ENCOUNTER — TELEPHONE (OUTPATIENT)
Dept: HEMATOLOGY/ONCOLOGY | Facility: CLINIC | Age: 31
End: 2021-11-23
Payer: COMMERCIAL

## 2021-11-23 VITALS
TEMPERATURE: 99 F | DIASTOLIC BLOOD PRESSURE: 67 MMHG | HEIGHT: 60 IN | SYSTOLIC BLOOD PRESSURE: 118 MMHG | RESPIRATION RATE: 18 BRPM | OXYGEN SATURATION: 100 % | WEIGHT: 130 LBS | HEART RATE: 94 BPM | BODY MASS INDEX: 25.52 KG/M2

## 2021-11-23 VITALS
DIASTOLIC BLOOD PRESSURE: 76 MMHG | RESPIRATION RATE: 18 BRPM | HEIGHT: 60 IN | HEART RATE: 114 BPM | WEIGHT: 131.69 LBS | TEMPERATURE: 99 F | BODY MASS INDEX: 25.85 KG/M2 | SYSTOLIC BLOOD PRESSURE: 115 MMHG

## 2021-11-23 DIAGNOSIS — R50.9 LOW GRADE FEVER: ICD-10-CM

## 2021-11-23 DIAGNOSIS — G62.0 CHEMOTHERAPY-INDUCED NEUROPATHY: ICD-10-CM

## 2021-11-23 DIAGNOSIS — R52 BODY ACHES: ICD-10-CM

## 2021-11-23 DIAGNOSIS — R50.9 FEVER, UNSPECIFIED FEVER CAUSE: Primary | ICD-10-CM

## 2021-11-23 DIAGNOSIS — R50.9 FEVER: ICD-10-CM

## 2021-11-23 DIAGNOSIS — T45.1X5A CHEMOTHERAPY-INDUCED NEUROPATHY: ICD-10-CM

## 2021-11-23 DIAGNOSIS — C50.812 MALIGNANT NEOPLASM OF OVERLAPPING SITES OF LEFT FEMALE BREAST, UNSPECIFIED ESTROGEN RECEPTOR STATUS: Primary | ICD-10-CM

## 2021-11-23 DIAGNOSIS — R68.83 CHILLS: ICD-10-CM

## 2021-11-23 LAB
ALBUMIN SERPL BCP-MCNC: 4.1 G/DL (ref 3.5–5.2)
ALP SERPL-CCNC: 55 U/L (ref 55–135)
ALT SERPL W/O P-5'-P-CCNC: 39 U/L (ref 10–44)
ANION GAP SERPL CALC-SCNC: 11 MMOL/L (ref 8–16)
AST SERPL-CCNC: 28 U/L (ref 10–40)
B-HCG UR QL: NEGATIVE
BACTERIA #/AREA URNS HPF: NEGATIVE /HPF
BASOPHILS # BLD AUTO: 0.03 K/UL (ref 0–0.2)
BASOPHILS NFR BLD: 0.6 % (ref 0–1.9)
BILIRUB SERPL-MCNC: 0.7 MG/DL (ref 0.1–1)
BILIRUB UR QL STRIP: NEGATIVE
BUN SERPL-MCNC: 6 MG/DL (ref 6–20)
CALCIUM SERPL-MCNC: 9.3 MG/DL (ref 8.7–10.5)
CHLORIDE SERPL-SCNC: 98 MMOL/L (ref 95–110)
CLARITY UR: CLEAR
CO2 SERPL-SCNC: 26 MMOL/L (ref 23–29)
COLOR UR: YELLOW
CREAT SERPL-MCNC: 0.7 MG/DL (ref 0.5–1.4)
CTP QC/QA: YES
DIFFERENTIAL METHOD: ABNORMAL
EOSINOPHIL # BLD AUTO: 0 K/UL (ref 0–0.5)
EOSINOPHIL NFR BLD: 0.4 % (ref 0–8)
ERYTHROCYTE [DISTWIDTH] IN BLOOD BY AUTOMATED COUNT: 16.3 % (ref 11.5–14.5)
EST. GFR  (AFRICAN AMERICAN): >60 ML/MIN/1.73 M^2
EST. GFR  (NON AFRICAN AMERICAN): >60 ML/MIN/1.73 M^2
GLUCOSE SERPL-MCNC: 101 MG/DL (ref 70–110)
GLUCOSE UR QL STRIP: NEGATIVE
HCT VFR BLD AUTO: 34.3 % (ref 37–48.5)
HGB BLD-MCNC: 10.9 G/DL (ref 12–16)
HGB UR QL STRIP: NEGATIVE
HYALINE CASTS #/AREA URNS LPF: 0 /LPF
IMM GRANULOCYTES # BLD AUTO: 0.06 K/UL (ref 0–0.04)
IMM GRANULOCYTES NFR BLD AUTO: 1.3 % (ref 0–0.5)
KETONES UR QL STRIP: NEGATIVE
LACTATE SERPL-SCNC: 1.2 MMOL/L (ref 0.5–1.9)
LEUKOCYTE ESTERASE UR QL STRIP: ABNORMAL
LYMPHOCYTES # BLD AUTO: 1.3 K/UL (ref 1–4.8)
LYMPHOCYTES NFR BLD: 26.5 % (ref 18–48)
MCH RBC QN AUTO: 25.6 PG (ref 27–31)
MCHC RBC AUTO-ENTMCNC: 31.8 G/DL (ref 32–36)
MCV RBC AUTO: 81 FL (ref 82–98)
MICROSCOPIC COMMENT: NORMAL
MONOCYTES # BLD AUTO: 1.3 K/UL (ref 0.3–1)
MONOCYTES NFR BLD: 26.3 % (ref 4–15)
NEUTROPHILS # BLD AUTO: 2.1 K/UL (ref 1.8–7.7)
NEUTROPHILS NFR BLD: 44.9 % (ref 38–73)
NITRITE UR QL STRIP: NEGATIVE
NRBC BLD-RTO: 0 /100 WBC
PH UR STRIP: 8 [PH] (ref 5–8)
PLATELET # BLD AUTO: 167 K/UL (ref 150–450)
PMV BLD AUTO: 11.4 FL (ref 9.2–12.9)
POTASSIUM SERPL-SCNC: 3.6 MMOL/L (ref 3.5–5.1)
PROT SERPL-MCNC: 7.5 G/DL (ref 6–8.4)
PROT UR QL STRIP: NEGATIVE
RBC # BLD AUTO: 4.25 M/UL (ref 4–5.4)
RBC #/AREA URNS HPF: 0 /HPF (ref 0–4)
SARS-COV-2 RDRP RESP QL NAA+PROBE: NEGATIVE
SODIUM SERPL-SCNC: 135 MMOL/L (ref 136–145)
SP GR UR STRIP: 1 (ref 1–1.03)
SQUAMOUS #/AREA URNS HPF: 2 /HPF
URN SPEC COLLECT METH UR: ABNORMAL
UROBILINOGEN UR STRIP-ACNC: NEGATIVE EU/DL
WBC # BLD AUTO: 4.77 K/UL (ref 3.9–12.7)
WBC #/AREA URNS HPF: 2 /HPF (ref 0–5)

## 2021-11-23 PROCEDURE — 99215 OFFICE O/P EST HI 40 MIN: CPT | Mod: S$GLB,,, | Performed by: NURSE PRACTITIONER

## 2021-11-23 PROCEDURE — 99284 EMERGENCY DEPT VISIT MOD MDM: CPT | Mod: 25

## 2021-11-23 PROCEDURE — 81025 URINE PREGNANCY TEST: CPT | Performed by: EMERGENCY MEDICINE

## 2021-11-23 PROCEDURE — 87040 BLOOD CULTURE FOR BACTERIA: CPT | Performed by: EMERGENCY MEDICINE

## 2021-11-23 PROCEDURE — 83605 ASSAY OF LACTIC ACID: CPT | Performed by: EMERGENCY MEDICINE

## 2021-11-23 PROCEDURE — 93010 ELECTROCARDIOGRAM REPORT: CPT | Mod: ,,, | Performed by: INTERNAL MEDICINE

## 2021-11-23 PROCEDURE — 93005 ELECTROCARDIOGRAM TRACING: CPT | Performed by: INTERNAL MEDICINE

## 2021-11-23 PROCEDURE — 99215 PR OFFICE/OUTPT VISIT, EST, LEVL V, 40-54 MIN: ICD-10-PCS | Mod: S$GLB,,, | Performed by: NURSE PRACTITIONER

## 2021-11-23 PROCEDURE — U0002 COVID-19 LAB TEST NON-CDC: HCPCS | Performed by: EMERGENCY MEDICINE

## 2021-11-23 PROCEDURE — 96365 THER/PROPH/DIAG IV INF INIT: CPT

## 2021-11-23 PROCEDURE — 81001 URINALYSIS AUTO W/SCOPE: CPT | Performed by: EMERGENCY MEDICINE

## 2021-11-23 PROCEDURE — 85025 COMPLETE CBC W/AUTO DIFF WBC: CPT | Performed by: EMERGENCY MEDICINE

## 2021-11-23 PROCEDURE — 93010 EKG 12-LEAD: ICD-10-PCS | Mod: ,,, | Performed by: INTERNAL MEDICINE

## 2021-11-23 PROCEDURE — 63600175 PHARM REV CODE 636 W HCPCS: Performed by: EMERGENCY MEDICINE

## 2021-11-23 PROCEDURE — 80053 COMPREHEN METABOLIC PANEL: CPT | Performed by: EMERGENCY MEDICINE

## 2021-11-23 PROCEDURE — 36415 COLL VENOUS BLD VENIPUNCTURE: CPT | Performed by: EMERGENCY MEDICINE

## 2021-11-23 RX ORDER — CEPHALEXIN 500 MG/1
500 CAPSULE ORAL EVERY 12 HOURS
Qty: 20 CAPSULE | Refills: 0 | Status: SHIPPED | OUTPATIENT
Start: 2021-11-23 | End: 2021-12-03

## 2021-11-23 RX ORDER — AMOXICILLIN AND CLAVULANATE POTASSIUM 875; 125 MG/1; MG/1
1 TABLET, FILM COATED ORAL 2 TIMES DAILY
Qty: 20 TABLET | Refills: 0 | Status: SHIPPED | OUTPATIENT
Start: 2021-11-23 | End: 2021-11-23

## 2021-11-23 RX ORDER — GABAPENTIN 100 MG/1
100 CAPSULE ORAL 3 TIMES DAILY
Qty: 90 CAPSULE | Refills: 2 | Status: SHIPPED | OUTPATIENT
Start: 2021-11-23 | End: 2022-01-26

## 2021-11-23 RX ORDER — ACETAMINOPHEN 500 MG
500 TABLET ORAL ONCE AS NEEDED
COMMUNITY

## 2021-11-23 RX ADMIN — CEFTRIAXONE 1 G: 1 INJECTION, SOLUTION INTRAVENOUS at 02:11

## 2021-11-23 RX ADMIN — SODIUM CHLORIDE, SODIUM LACTATE, POTASSIUM CHLORIDE, AND CALCIUM CHLORIDE 1000 ML: .6; .31; .03; .02 INJECTION, SOLUTION INTRAVENOUS at 02:11

## 2021-11-28 LAB
BACTERIA BLD CULT: NORMAL
BACTERIA BLD CULT: NORMAL

## 2021-11-29 ENCOUNTER — PATIENT OUTREACH (OUTPATIENT)
Dept: EMERGENCY MEDICINE | Facility: HOSPITAL | Age: 31
End: 2021-11-29
Payer: COMMERCIAL

## 2021-11-29 ENCOUNTER — LAB VISIT (OUTPATIENT)
Dept: LAB | Facility: HOSPITAL | Age: 31
End: 2021-11-29
Attending: NURSE PRACTITIONER
Payer: COMMERCIAL

## 2021-11-29 DIAGNOSIS — R50.9 LOW GRADE FEVER: ICD-10-CM

## 2021-11-29 DIAGNOSIS — T45.1X5A CHEMOTHERAPY-INDUCED NEUROPATHY: ICD-10-CM

## 2021-11-29 DIAGNOSIS — G62.0 CHEMOTHERAPY-INDUCED NEUROPATHY: ICD-10-CM

## 2021-11-29 LAB
ALBUMIN SERPL BCP-MCNC: 3.8 G/DL (ref 3.5–5.2)
ALP SERPL-CCNC: 50 U/L (ref 55–135)
ALT SERPL W/O P-5'-P-CCNC: 74 U/L (ref 10–44)
ANION GAP SERPL CALC-SCNC: 8 MMOL/L (ref 8–16)
AST SERPL-CCNC: 50 U/L (ref 10–40)
BASOPHILS # BLD AUTO: 0.03 K/UL (ref 0–0.2)
BASOPHILS NFR BLD: 0.3 % (ref 0–1.9)
BILIRUB SERPL-MCNC: 0.5 MG/DL (ref 0.1–1)
BUN SERPL-MCNC: 6 MG/DL (ref 6–20)
CALCIUM SERPL-MCNC: 9 MG/DL (ref 8.7–10.5)
CHLORIDE SERPL-SCNC: 102 MMOL/L (ref 95–110)
CO2 SERPL-SCNC: 27 MMOL/L (ref 23–29)
CREAT SERPL-MCNC: 0.7 MG/DL (ref 0.5–1.4)
DIFFERENTIAL METHOD: ABNORMAL
EOSINOPHIL # BLD AUTO: 0 K/UL (ref 0–0.5)
EOSINOPHIL NFR BLD: 0 % (ref 0–8)
ERYTHROCYTE [DISTWIDTH] IN BLOOD BY AUTOMATED COUNT: 18.5 % (ref 11.5–14.5)
EST. GFR  (AFRICAN AMERICAN): >60 ML/MIN/1.73 M^2
EST. GFR  (NON AFRICAN AMERICAN): >60 ML/MIN/1.73 M^2
GLUCOSE SERPL-MCNC: 103 MG/DL (ref 70–110)
HCT VFR BLD AUTO: 31.8 % (ref 37–48.5)
HGB BLD-MCNC: 9.7 G/DL (ref 12–16)
IMM GRANULOCYTES # BLD AUTO: 0.49 K/UL (ref 0–0.04)
IMM GRANULOCYTES NFR BLD AUTO: 4.4 % (ref 0–0.5)
LYMPHOCYTES # BLD AUTO: 1.5 K/UL (ref 1–4.8)
LYMPHOCYTES NFR BLD: 13.6 % (ref 18–48)
MCH RBC QN AUTO: 25.1 PG (ref 27–31)
MCHC RBC AUTO-ENTMCNC: 30.5 G/DL (ref 32–36)
MCV RBC AUTO: 82 FL (ref 82–98)
MONOCYTES # BLD AUTO: 1.1 K/UL (ref 0.3–1)
MONOCYTES NFR BLD: 9.7 % (ref 4–15)
NEUTROPHILS # BLD AUTO: 8.1 K/UL (ref 1.8–7.7)
NEUTROPHILS NFR BLD: 72 % (ref 38–73)
NRBC BLD-RTO: 0 /100 WBC
PLATELET # BLD AUTO: 128 K/UL (ref 150–450)
PMV BLD AUTO: 9.5 FL (ref 9.2–12.9)
POTASSIUM SERPL-SCNC: 3.6 MMOL/L (ref 3.5–5.1)
PROT SERPL-MCNC: 6.7 G/DL (ref 6–8.4)
RBC # BLD AUTO: 3.87 M/UL (ref 4–5.4)
SODIUM SERPL-SCNC: 137 MMOL/L (ref 136–145)
WBC # BLD AUTO: 11.19 K/UL (ref 3.9–12.7)

## 2021-11-29 PROCEDURE — 36415 COLL VENOUS BLD VENIPUNCTURE: CPT | Performed by: NURSE PRACTITIONER

## 2021-11-29 PROCEDURE — 80053 COMPREHEN METABOLIC PANEL: CPT | Performed by: NURSE PRACTITIONER

## 2021-11-29 PROCEDURE — 85025 COMPLETE CBC W/AUTO DIFF WBC: CPT | Performed by: NURSE PRACTITIONER

## 2021-12-01 ENCOUNTER — INFUSION (OUTPATIENT)
Dept: INFUSION THERAPY | Facility: HOSPITAL | Age: 31
End: 2021-12-01
Attending: INTERNAL MEDICINE
Payer: COMMERCIAL

## 2021-12-01 VITALS
WEIGHT: 135.81 LBS | TEMPERATURE: 98 F | BODY MASS INDEX: 26.66 KG/M2 | HEART RATE: 91 BPM | RESPIRATION RATE: 18 BRPM | HEIGHT: 60 IN | OXYGEN SATURATION: 100 % | DIASTOLIC BLOOD PRESSURE: 71 MMHG | SYSTOLIC BLOOD PRESSURE: 104 MMHG

## 2021-12-01 DIAGNOSIS — E86.0 DEHYDRATION: Primary | ICD-10-CM

## 2021-12-01 PROCEDURE — 96360 HYDRATION IV INFUSION INIT: CPT

## 2021-12-01 PROCEDURE — 63600175 PHARM REV CODE 636 W HCPCS: Performed by: NURSE PRACTITIONER

## 2021-12-01 PROCEDURE — 25000003 PHARM REV CODE 250: Performed by: NURSE PRACTITIONER

## 2021-12-01 PROCEDURE — 96361 HYDRATE IV INFUSION ADD-ON: CPT

## 2021-12-01 PROCEDURE — A4216 STERILE WATER/SALINE, 10 ML: HCPCS | Performed by: NURSE PRACTITIONER

## 2021-12-01 RX ORDER — HEPARIN 100 UNIT/ML
500 SYRINGE INTRAVENOUS
Status: CANCELLED | OUTPATIENT
Start: 2021-12-01

## 2021-12-01 RX ORDER — HEPARIN 100 UNIT/ML
500 SYRINGE INTRAVENOUS
Status: DISCONTINUED | OUTPATIENT
Start: 2021-12-01 | End: 2021-12-01 | Stop reason: HOSPADM

## 2021-12-01 RX ORDER — SODIUM CHLORIDE 0.9 % (FLUSH) 0.9 %
10 SYRINGE (ML) INJECTION
Status: CANCELLED | OUTPATIENT
Start: 2021-12-01

## 2021-12-01 RX ORDER — SODIUM CHLORIDE 0.9 % (FLUSH) 0.9 %
10 SYRINGE (ML) INJECTION
Status: DISCONTINUED | OUTPATIENT
Start: 2021-12-01 | End: 2021-12-01 | Stop reason: HOSPADM

## 2021-12-01 RX ADMIN — SODIUM CHLORIDE 1000 ML: 0.9 INJECTION, SOLUTION INTRAVENOUS at 10:12

## 2021-12-01 RX ADMIN — SODIUM CHLORIDE 10 ML: 9 INJECTION INTRAMUSCULAR; INTRAVENOUS; SUBCUTANEOUS at 12:12

## 2021-12-01 RX ADMIN — HEPARIN 500 UNITS: 100 SYRINGE at 12:12

## 2021-12-06 ENCOUNTER — LAB VISIT (OUTPATIENT)
Dept: LAB | Facility: HOSPITAL | Age: 31
End: 2021-12-06
Attending: INTERNAL MEDICINE
Payer: COMMERCIAL

## 2021-12-06 DIAGNOSIS — N63.24 MASS OF LOWER INNER QUADRANT OF LEFT BREAST: ICD-10-CM

## 2021-12-06 DIAGNOSIS — C50.812 MALIGNANT NEOPLASM OF OVERLAPPING SITES OF LEFT FEMALE BREAST, UNSPECIFIED ESTROGEN RECEPTOR STATUS: ICD-10-CM

## 2021-12-06 DIAGNOSIS — N63.21 MASS OF UPPER OUTER QUADRANT OF LEFT BREAST: ICD-10-CM

## 2021-12-06 DIAGNOSIS — N63.25 MASS OVERLAPPING MULTIPLE QUADRANTS OF LEFT BREAST: ICD-10-CM

## 2021-12-06 LAB
ALBUMIN SERPL BCP-MCNC: 3.9 G/DL (ref 3.5–5.2)
ALP SERPL-CCNC: 34 U/L (ref 55–135)
ALT SERPL W/O P-5'-P-CCNC: 38 U/L (ref 10–44)
ANION GAP SERPL CALC-SCNC: 8 MMOL/L (ref 8–16)
AST SERPL-CCNC: 26 U/L (ref 10–40)
BASOPHILS # BLD AUTO: 0.02 K/UL (ref 0–0.2)
BASOPHILS NFR BLD: 0.3 % (ref 0–1.9)
BILIRUB SERPL-MCNC: 0.7 MG/DL (ref 0.1–1)
BUN SERPL-MCNC: 9 MG/DL (ref 6–20)
CALCIUM SERPL-MCNC: 9.4 MG/DL (ref 8.7–10.5)
CHLORIDE SERPL-SCNC: 104 MMOL/L (ref 95–110)
CO2 SERPL-SCNC: 27 MMOL/L (ref 23–29)
CREAT SERPL-MCNC: 0.7 MG/DL (ref 0.5–1.4)
DIFFERENTIAL METHOD: ABNORMAL
EOSINOPHIL # BLD AUTO: 0 K/UL (ref 0–0.5)
EOSINOPHIL NFR BLD: 0.2 % (ref 0–8)
ERYTHROCYTE [DISTWIDTH] IN BLOOD BY AUTOMATED COUNT: 19.6 % (ref 11.5–14.5)
EST. GFR  (AFRICAN AMERICAN): >60 ML/MIN/1.73 M^2
EST. GFR  (NON AFRICAN AMERICAN): >60 ML/MIN/1.73 M^2
GLUCOSE SERPL-MCNC: 87 MG/DL (ref 70–110)
HCT VFR BLD AUTO: 32.6 % (ref 37–48.5)
HGB BLD-MCNC: 10.1 G/DL (ref 12–16)
IMM GRANULOCYTES # BLD AUTO: 0.03 K/UL (ref 0–0.04)
IMM GRANULOCYTES NFR BLD AUTO: 0.5 % (ref 0–0.5)
LYMPHOCYTES # BLD AUTO: 1.2 K/UL (ref 1–4.8)
LYMPHOCYTES NFR BLD: 19.7 % (ref 18–48)
MAGNESIUM SERPL-MCNC: 1.8 MG/DL (ref 1.6–2.6)
MCH RBC QN AUTO: 26 PG (ref 27–31)
MCHC RBC AUTO-ENTMCNC: 31 G/DL (ref 32–36)
MCV RBC AUTO: 84 FL (ref 82–98)
MONOCYTES # BLD AUTO: 0.9 K/UL (ref 0.3–1)
MONOCYTES NFR BLD: 15.8 % (ref 4–15)
NEUTROPHILS # BLD AUTO: 3.8 K/UL (ref 1.8–7.7)
NEUTROPHILS NFR BLD: 63.5 % (ref 38–73)
NRBC BLD-RTO: 0 /100 WBC
PLATELET # BLD AUTO: 108 K/UL (ref 150–450)
PMV BLD AUTO: 9.3 FL (ref 9.2–12.9)
POTASSIUM SERPL-SCNC: 4.3 MMOL/L (ref 3.5–5.1)
PROT SERPL-MCNC: 6.8 G/DL (ref 6–8.4)
RBC # BLD AUTO: 3.89 M/UL (ref 4–5.4)
SODIUM SERPL-SCNC: 139 MMOL/L (ref 136–145)
WBC # BLD AUTO: 5.9 K/UL (ref 3.9–12.7)

## 2021-12-06 PROCEDURE — 83735 ASSAY OF MAGNESIUM: CPT | Performed by: NURSE PRACTITIONER

## 2021-12-06 PROCEDURE — 80053 COMPREHEN METABOLIC PANEL: CPT | Performed by: INTERNAL MEDICINE

## 2021-12-06 PROCEDURE — 85025 COMPLETE CBC W/AUTO DIFF WBC: CPT | Performed by: INTERNAL MEDICINE

## 2021-12-06 PROCEDURE — 36415 COLL VENOUS BLD VENIPUNCTURE: CPT | Performed by: INTERNAL MEDICINE

## 2021-12-06 RX ORDER — EPINEPHRINE 0.3 MG/.3ML
0.3 INJECTION SUBCUTANEOUS ONCE AS NEEDED
Status: CANCELLED | OUTPATIENT
Start: 2021-12-07

## 2021-12-06 RX ORDER — DIPHENHYDRAMINE HYDROCHLORIDE 50 MG/ML
50 INJECTION INTRAMUSCULAR; INTRAVENOUS ONCE AS NEEDED
Status: CANCELLED | OUTPATIENT
Start: 2021-12-07

## 2021-12-06 RX ORDER — HEPARIN 100 UNIT/ML
500 SYRINGE INTRAVENOUS
Status: CANCELLED
Start: 2021-12-08

## 2021-12-06 RX ORDER — HEPARIN 100 UNIT/ML
500 SYRINGE INTRAVENOUS
Status: CANCELLED | OUTPATIENT
Start: 2021-12-07

## 2021-12-06 RX ORDER — SODIUM CHLORIDE 0.9 % (FLUSH) 0.9 %
10 SYRINGE (ML) INJECTION
Status: CANCELLED | OUTPATIENT
Start: 2021-12-07

## 2021-12-07 ENCOUNTER — INFUSION (OUTPATIENT)
Dept: INFUSION THERAPY | Facility: HOSPITAL | Age: 31
End: 2021-12-07
Attending: INTERNAL MEDICINE
Payer: COMMERCIAL

## 2021-12-07 ENCOUNTER — OFFICE VISIT (OUTPATIENT)
Dept: HEMATOLOGY/ONCOLOGY | Facility: CLINIC | Age: 31
End: 2021-12-07
Payer: COMMERCIAL

## 2021-12-07 VITALS
OXYGEN SATURATION: 97 % | DIASTOLIC BLOOD PRESSURE: 77 MMHG | WEIGHT: 141 LBS | SYSTOLIC BLOOD PRESSURE: 130 MMHG | TEMPERATURE: 97 F | RESPIRATION RATE: 17 BRPM | HEIGHT: 60 IN | BODY MASS INDEX: 27.68 KG/M2 | HEART RATE: 100 BPM

## 2021-12-07 VITALS
TEMPERATURE: 97 F | RESPIRATION RATE: 18 BRPM | OXYGEN SATURATION: 97 % | BODY MASS INDEX: 27.76 KG/M2 | DIASTOLIC BLOOD PRESSURE: 74 MMHG | WEIGHT: 141.38 LBS | SYSTOLIC BLOOD PRESSURE: 116 MMHG | HEART RATE: 86 BPM | HEIGHT: 60 IN

## 2021-12-07 DIAGNOSIS — T45.1X5A CHEMOTHERAPY-INDUCED NEUTROPENIA: Primary | ICD-10-CM

## 2021-12-07 DIAGNOSIS — C50.812 MALIGNANT NEOPLASM OF OVERLAPPING SITES OF LEFT FEMALE BREAST, UNSPECIFIED ESTROGEN RECEPTOR STATUS: ICD-10-CM

## 2021-12-07 DIAGNOSIS — N63.24 MASS OF LOWER INNER QUADRANT OF LEFT BREAST: ICD-10-CM

## 2021-12-07 DIAGNOSIS — Z17.0 ER+ (ESTROGEN RECEPTOR POSITIVE STATUS): ICD-10-CM

## 2021-12-07 DIAGNOSIS — E86.0 DEHYDRATION: ICD-10-CM

## 2021-12-07 DIAGNOSIS — N63.21 MASS OF UPPER OUTER QUADRANT OF LEFT BREAST: ICD-10-CM

## 2021-12-07 DIAGNOSIS — N63.25 MASS OVERLAPPING MULTIPLE QUADRANTS OF LEFT BREAST: ICD-10-CM

## 2021-12-07 DIAGNOSIS — T45.1X5A CHEMOTHERAPY-INDUCED NEUTROPENIA: ICD-10-CM

## 2021-12-07 DIAGNOSIS — Z95.828 PORT-A-CATH IN PLACE: Chronic | ICD-10-CM

## 2021-12-07 DIAGNOSIS — D50.9 MICROCYTIC ANEMIA: Chronic | ICD-10-CM

## 2021-12-07 DIAGNOSIS — C50.812 MALIGNANT NEOPLASM OF OVERLAPPING SITES OF LEFT FEMALE BREAST, UNSPECIFIED ESTROGEN RECEPTOR STATUS: Primary | ICD-10-CM

## 2021-12-07 DIAGNOSIS — D70.1 CHEMOTHERAPY-INDUCED NEUTROPENIA: ICD-10-CM

## 2021-12-07 DIAGNOSIS — C50.919 HER2-POSITIVE CARCINOMA OF BREAST: ICD-10-CM

## 2021-12-07 DIAGNOSIS — D70.1 CHEMOTHERAPY-INDUCED NEUTROPENIA: Primary | ICD-10-CM

## 2021-12-07 PROCEDURE — 25000003 PHARM REV CODE 250: Performed by: INTERNAL MEDICINE

## 2021-12-07 PROCEDURE — 99214 OFFICE O/P EST MOD 30 MIN: CPT | Mod: S$GLB,,, | Performed by: INTERNAL MEDICINE

## 2021-12-07 PROCEDURE — A4216 STERILE WATER/SALINE, 10 ML: HCPCS | Performed by: INTERNAL MEDICINE

## 2021-12-07 PROCEDURE — 96367 TX/PROPH/DG ADDL SEQ IV INF: CPT

## 2021-12-07 PROCEDURE — 63600175 PHARM REV CODE 636 W HCPCS: Performed by: INTERNAL MEDICINE

## 2021-12-07 PROCEDURE — 96375 TX/PRO/DX INJ NEW DRUG ADDON: CPT

## 2021-12-07 PROCEDURE — 99214 PR OFFICE/OUTPT VISIT, EST, LEVL IV, 30-39 MIN: ICD-10-PCS | Mod: S$GLB,,, | Performed by: INTERNAL MEDICINE

## 2021-12-07 PROCEDURE — 96417 CHEMO IV INFUS EACH ADDL SEQ: CPT

## 2021-12-07 PROCEDURE — 96413 CHEMO IV INFUSION 1 HR: CPT

## 2021-12-07 RX ORDER — HEPARIN 100 UNIT/ML
500 SYRINGE INTRAVENOUS
Status: DISCONTINUED | OUTPATIENT
Start: 2021-12-07 | End: 2021-12-07 | Stop reason: HOSPADM

## 2021-12-07 RX ORDER — SODIUM CHLORIDE 0.9 % (FLUSH) 0.9 %
10 SYRINGE (ML) INJECTION
Status: DISCONTINUED | OUTPATIENT
Start: 2021-12-07 | End: 2021-12-07 | Stop reason: HOSPADM

## 2021-12-07 RX ORDER — EPINEPHRINE 0.3 MG/.3ML
0.3 INJECTION SUBCUTANEOUS ONCE AS NEEDED
Status: DISCONTINUED | OUTPATIENT
Start: 2021-12-07 | End: 2021-12-07 | Stop reason: HOSPADM

## 2021-12-07 RX ADMIN — CARBOPLATIN 760 MG: 10 INJECTION, SOLUTION INTRAVENOUS at 11:12

## 2021-12-07 RX ADMIN — SODIUM CHLORIDE 10 ML: 9 INJECTION INTRAMUSCULAR; INTRAVENOUS; SUBCUTANEOUS at 12:12

## 2021-12-07 RX ADMIN — APREPITANT 130 MG: 130 INJECTION, EMULSION INTRAVENOUS at 10:12

## 2021-12-07 RX ADMIN — HEPARIN 500 UNITS: 100 SYRINGE at 12:12

## 2021-12-07 RX ADMIN — DOCETAXEL 125 MG: 10 INJECTION INTRAVENOUS at 10:12

## 2021-12-07 RX ADMIN — TRASTUZUMAB 384 MG: 420 INJECTION, POWDER, LYOPHILIZED, FOR SOLUTION INTRAVENOUS at 09:12

## 2021-12-07 RX ADMIN — PERTUZUMAB 420 MG: 30 INJECTION, SOLUTION, CONCENTRATE INTRAVENOUS at 08:12

## 2021-12-07 RX ADMIN — DEXAMETHASONE SODIUM PHOSPHATE: 4 INJECTION, SOLUTION INTRA-ARTICULAR; INTRALESIONAL; INTRAMUSCULAR; INTRAVENOUS; SOFT TISSUE at 09:12

## 2021-12-08 ENCOUNTER — INFUSION (OUTPATIENT)
Dept: INFUSION THERAPY | Facility: HOSPITAL | Age: 31
End: 2021-12-08
Attending: INTERNAL MEDICINE
Payer: COMMERCIAL

## 2021-12-08 VITALS
TEMPERATURE: 98 F | RESPIRATION RATE: 18 BRPM | DIASTOLIC BLOOD PRESSURE: 78 MMHG | HEART RATE: 85 BPM | SYSTOLIC BLOOD PRESSURE: 115 MMHG | OXYGEN SATURATION: 99 % | WEIGHT: 134.5 LBS | HEIGHT: 60 IN | BODY MASS INDEX: 26.41 KG/M2

## 2021-12-08 DIAGNOSIS — T45.1X5A CHEMOTHERAPY-INDUCED NEUTROPENIA: Primary | ICD-10-CM

## 2021-12-08 DIAGNOSIS — C50.812 MALIGNANT NEOPLASM OF OVERLAPPING SITES OF LEFT FEMALE BREAST, UNSPECIFIED ESTROGEN RECEPTOR STATUS: ICD-10-CM

## 2021-12-08 DIAGNOSIS — D70.1 CHEMOTHERAPY-INDUCED NEUTROPENIA: Primary | ICD-10-CM

## 2021-12-08 DIAGNOSIS — E86.0 DEHYDRATION: ICD-10-CM

## 2021-12-08 PROCEDURE — 96361 HYDRATE IV INFUSION ADD-ON: CPT

## 2021-12-08 PROCEDURE — 96360 HYDRATION IV INFUSION INIT: CPT

## 2021-12-08 PROCEDURE — 25000003 PHARM REV CODE 250: Performed by: INTERNAL MEDICINE

## 2021-12-08 PROCEDURE — 63600175 PHARM REV CODE 636 W HCPCS: Mod: TB | Performed by: INTERNAL MEDICINE

## 2021-12-08 PROCEDURE — 96372 THER/PROPH/DIAG INJ SC/IM: CPT | Mod: 59

## 2021-12-08 RX ORDER — HEPARIN 100 UNIT/ML
500 SYRINGE INTRAVENOUS
Status: COMPLETED | OUTPATIENT
Start: 2021-12-08 | End: 2021-12-08

## 2021-12-08 RX ADMIN — PEGFILGRASTIM-CBQV 6 MG: 6 INJECTION, SOLUTION SUBCUTANEOUS at 09:12

## 2021-12-08 RX ADMIN — SODIUM CHLORIDE 1000 ML: 0.9 INJECTION, SOLUTION INTRAVENOUS at 09:12

## 2021-12-08 RX ADMIN — HEPARIN 500 UNITS: 100 SYRINGE at 11:12

## 2021-12-11 ENCOUNTER — HOSPITAL ENCOUNTER (EMERGENCY)
Facility: HOSPITAL | Age: 31
Discharge: HOME | End: 2021-12-11
Attending: EMERGENCY MEDICINE
Payer: COMMERCIAL

## 2021-12-11 VITALS
SYSTOLIC BLOOD PRESSURE: 108 MMHG | BODY MASS INDEX: 26.5 KG/M2 | DIASTOLIC BLOOD PRESSURE: 67 MMHG | OXYGEN SATURATION: 100 % | HEART RATE: 86 BPM | TEMPERATURE: 99 F | WEIGHT: 135 LBS | HEIGHT: 60 IN | RESPIRATION RATE: 18 BRPM

## 2021-12-11 DIAGNOSIS — R55 SYNCOPE, UNSPECIFIED SYNCOPE TYPE: Primary | ICD-10-CM

## 2021-12-11 LAB
ALBUMIN SERPL BCP-MCNC: 3.5 G/DL (ref 3.5–5.2)
ALP SERPL-CCNC: 46 U/L (ref 55–135)
ALT SERPL W/O P-5'-P-CCNC: 48 U/L (ref 10–44)
ANION GAP SERPL CALC-SCNC: 8 MMOL/L (ref 8–16)
ANISOCYTOSIS BLD QL SMEAR: ABNORMAL
AST SERPL-CCNC: 31 U/L (ref 10–40)
B-HCG UR QL: NEGATIVE
BACTERIA #/AREA URNS HPF: NEGATIVE /HPF
BASOPHILS NFR BLD: 0 % (ref 0–1.9)
BILIRUB SERPL-MCNC: 0.6 MG/DL (ref 0.1–1)
BILIRUB UR QL STRIP: NEGATIVE
BNP SERPL-MCNC: 130 PG/ML (ref 0–99)
BUN SERPL-MCNC: 12 MG/DL (ref 6–20)
CALCIUM SERPL-MCNC: 8.8 MG/DL (ref 8.7–10.5)
CHLORIDE SERPL-SCNC: 100 MMOL/L (ref 95–110)
CLARITY UR: CLEAR
CO2 SERPL-SCNC: 28 MMOL/L (ref 23–29)
COLOR UR: YELLOW
CREAT SERPL-MCNC: 0.8 MG/DL (ref 0.5–1.4)
CTP QC/QA: YES
DIFFERENTIAL METHOD: ABNORMAL
EOSINOPHIL NFR BLD: 11 % (ref 0–8)
ERYTHROCYTE [DISTWIDTH] IN BLOOD BY AUTOMATED COUNT: 18.6 % (ref 11.5–14.5)
EST. GFR  (AFRICAN AMERICAN): >60 ML/MIN/1.73 M^2
EST. GFR  (NON AFRICAN AMERICAN): >60 ML/MIN/1.73 M^2
GLUCOSE SERPL-MCNC: 121 MG/DL (ref 70–110)
GLUCOSE UR QL STRIP: NEGATIVE
HCT VFR BLD AUTO: 32.1 % (ref 37–48.5)
HGB BLD-MCNC: 10 G/DL (ref 12–16)
HGB UR QL STRIP: NEGATIVE
HYALINE CASTS #/AREA URNS LPF: 2 /LPF
HYPOCHROMIA BLD QL SMEAR: ABNORMAL
IMM GRANULOCYTES # BLD AUTO: ABNORMAL K/UL (ref 0–0.04)
IMM GRANULOCYTES NFR BLD AUTO: ABNORMAL % (ref 0–0.5)
INR PPP: 1.2
KETONES UR QL STRIP: NEGATIVE
LEUKOCYTE ESTERASE UR QL STRIP: ABNORMAL
LYMPHOCYTES NFR BLD: 10 % (ref 18–48)
MAGNESIUM SERPL-MCNC: 1.8 MG/DL (ref 1.6–2.6)
MCH RBC QN AUTO: 25.9 PG (ref 27–31)
MCHC RBC AUTO-ENTMCNC: 31.2 G/DL (ref 32–36)
MCV RBC AUTO: 83 FL (ref 82–98)
METAMYELOCYTES NFR BLD MANUAL: 7 %
MICROSCOPIC COMMENT: ABNORMAL
MONOCYTES NFR BLD: 1 % (ref 4–15)
MYELOCYTES NFR BLD MANUAL: 1 %
NEUTROPHILS NFR BLD: 70 % (ref 38–73)
NITRITE UR QL STRIP: NEGATIVE
NRBC BLD-RTO: 0 /100 WBC
PH UR STRIP: 7 [PH] (ref 5–8)
PLATELET # BLD AUTO: 100 K/UL (ref 150–450)
PLATELET BLD QL SMEAR: ABNORMAL
PMV BLD AUTO: 10 FL (ref 9.2–12.9)
POTASSIUM SERPL-SCNC: 3.5 MMOL/L (ref 3.5–5.1)
PROT SERPL-MCNC: 6.2 G/DL (ref 6–8.4)
PROT UR QL STRIP: NEGATIVE
PROTHROMBIN TIME: 14.6 SEC (ref 11.4–13.7)
RBC # BLD AUTO: 3.86 M/UL (ref 4–5.4)
RBC #/AREA URNS HPF: 1 /HPF (ref 0–4)
SARS-COV-2 RDRP RESP QL NAA+PROBE: NEGATIVE
SODIUM SERPL-SCNC: 136 MMOL/L (ref 136–145)
SP GR UR STRIP: 1 (ref 1–1.03)
SQUAMOUS #/AREA URNS HPF: 1 /HPF
TROPONIN I SERPL DL<=0.01 NG/ML-MCNC: <0.03 NG/ML
URN SPEC COLLECT METH UR: ABNORMAL
UROBILINOGEN UR STRIP-ACNC: NEGATIVE EU/DL
WBC # BLD AUTO: 13.77 K/UL (ref 3.9–12.7)
WBC #/AREA URNS HPF: 4 /HPF (ref 0–5)

## 2021-12-11 PROCEDURE — 87086 URINE CULTURE/COLONY COUNT: CPT | Performed by: EMERGENCY MEDICINE

## 2021-12-11 PROCEDURE — 96361 HYDRATE IV INFUSION ADD-ON: CPT

## 2021-12-11 PROCEDURE — 93010 EKG 12-LEAD: ICD-10-PCS | Mod: ,,, | Performed by: INTERNAL MEDICINE

## 2021-12-11 PROCEDURE — 96360 HYDRATION IV INFUSION INIT: CPT

## 2021-12-11 PROCEDURE — 85007 BL SMEAR W/DIFF WBC COUNT: CPT | Performed by: EMERGENCY MEDICINE

## 2021-12-11 PROCEDURE — 93010 ELECTROCARDIOGRAM REPORT: CPT | Mod: ,,, | Performed by: INTERNAL MEDICINE

## 2021-12-11 PROCEDURE — 81001 URINALYSIS AUTO W/SCOPE: CPT | Performed by: EMERGENCY MEDICINE

## 2021-12-11 PROCEDURE — 85610 PROTHROMBIN TIME: CPT | Performed by: EMERGENCY MEDICINE

## 2021-12-11 PROCEDURE — 80053 COMPREHEN METABOLIC PANEL: CPT | Performed by: EMERGENCY MEDICINE

## 2021-12-11 PROCEDURE — 63600175 PHARM REV CODE 636 W HCPCS: Performed by: EMERGENCY MEDICINE

## 2021-12-11 PROCEDURE — 99284 EMERGENCY DEPT VISIT MOD MDM: CPT | Mod: 25

## 2021-12-11 PROCEDURE — 93005 ELECTROCARDIOGRAM TRACING: CPT | Performed by: INTERNAL MEDICINE

## 2021-12-11 PROCEDURE — 83880 ASSAY OF NATRIURETIC PEPTIDE: CPT | Performed by: EMERGENCY MEDICINE

## 2021-12-11 PROCEDURE — 85027 COMPLETE CBC AUTOMATED: CPT | Performed by: EMERGENCY MEDICINE

## 2021-12-11 PROCEDURE — 81025 URINE PREGNANCY TEST: CPT | Performed by: EMERGENCY MEDICINE

## 2021-12-11 PROCEDURE — 25000003 PHARM REV CODE 250: Performed by: EMERGENCY MEDICINE

## 2021-12-11 PROCEDURE — U0002 COVID-19 LAB TEST NON-CDC: HCPCS | Performed by: EMERGENCY MEDICINE

## 2021-12-11 PROCEDURE — 83735 ASSAY OF MAGNESIUM: CPT | Performed by: EMERGENCY MEDICINE

## 2021-12-11 PROCEDURE — 84484 ASSAY OF TROPONIN QUANT: CPT | Performed by: EMERGENCY MEDICINE

## 2021-12-11 PROCEDURE — 36415 COLL VENOUS BLD VENIPUNCTURE: CPT | Performed by: EMERGENCY MEDICINE

## 2021-12-11 RX ORDER — SODIUM CHLORIDE 9 MG/ML
INJECTION, SOLUTION INTRAVENOUS
Status: COMPLETED | OUTPATIENT
Start: 2021-12-11 | End: 2021-12-11

## 2021-12-11 RX ADMIN — SODIUM CHLORIDE, SODIUM LACTATE, POTASSIUM CHLORIDE, AND CALCIUM CHLORIDE 1000 ML: .6; .31; .03; .02 INJECTION, SOLUTION INTRAVENOUS at 05:12

## 2021-12-11 RX ADMIN — SODIUM CHLORIDE 1000 ML/HR: 0.9 INJECTION, SOLUTION INTRAVENOUS at 04:12

## 2021-12-14 ENCOUNTER — PATIENT MESSAGE (OUTPATIENT)
Dept: HEMATOLOGY/ONCOLOGY | Facility: CLINIC | Age: 31
End: 2021-12-14
Payer: COMMERCIAL

## 2021-12-14 LAB — BACTERIA UR CULT: NO GROWTH

## 2021-12-15 ENCOUNTER — INFUSION (OUTPATIENT)
Dept: INFUSION THERAPY | Facility: HOSPITAL | Age: 31
End: 2021-12-15
Attending: INTERNAL MEDICINE
Payer: COMMERCIAL

## 2021-12-15 ENCOUNTER — OFFICE VISIT (OUTPATIENT)
Dept: HEMATOLOGY/ONCOLOGY | Facility: CLINIC | Age: 31
End: 2021-12-15
Payer: COMMERCIAL

## 2021-12-15 ENCOUNTER — LAB VISIT (OUTPATIENT)
Dept: LAB | Facility: HOSPITAL | Age: 31
End: 2021-12-15
Attending: INTERNAL MEDICINE
Payer: COMMERCIAL

## 2021-12-15 ENCOUNTER — TELEPHONE (OUTPATIENT)
Dept: FAMILY MEDICINE | Facility: CLINIC | Age: 31
End: 2021-12-15
Payer: COMMERCIAL

## 2021-12-15 VITALS
TEMPERATURE: 98 F | OXYGEN SATURATION: 98 % | HEIGHT: 60 IN | HEART RATE: 100 BPM | SYSTOLIC BLOOD PRESSURE: 103 MMHG | WEIGHT: 130.38 LBS | DIASTOLIC BLOOD PRESSURE: 62 MMHG | BODY MASS INDEX: 25.6 KG/M2 | RESPIRATION RATE: 18 BRPM

## 2021-12-15 VITALS
WEIGHT: 130 LBS | RESPIRATION RATE: 18 BRPM | TEMPERATURE: 98 F | SYSTOLIC BLOOD PRESSURE: 118 MMHG | OXYGEN SATURATION: 98 % | DIASTOLIC BLOOD PRESSURE: 75 MMHG | BODY MASS INDEX: 25.52 KG/M2 | HEIGHT: 60 IN | HEART RATE: 115 BPM

## 2021-12-15 DIAGNOSIS — N63.24 MASS OF LOWER INNER QUADRANT OF LEFT BREAST: ICD-10-CM

## 2021-12-15 DIAGNOSIS — C50.919 HER2-POSITIVE CARCINOMA OF BREAST: ICD-10-CM

## 2021-12-15 DIAGNOSIS — I95.9 HYPOTENSION, UNSPECIFIED HYPOTENSION TYPE: ICD-10-CM

## 2021-12-15 DIAGNOSIS — C50.812 MALIGNANT NEOPLASM OF OVERLAPPING SITES OF LEFT FEMALE BREAST, UNSPECIFIED ESTROGEN RECEPTOR STATUS: ICD-10-CM

## 2021-12-15 DIAGNOSIS — Z17.0 ER+ (ESTROGEN RECEPTOR POSITIVE STATUS): ICD-10-CM

## 2021-12-15 DIAGNOSIS — Z09 HOSPITAL DISCHARGE FOLLOW-UP: ICD-10-CM

## 2021-12-15 DIAGNOSIS — R00.0 TACHYCARDIA: ICD-10-CM

## 2021-12-15 DIAGNOSIS — C50.812 MALIGNANT NEOPLASM OF OVERLAPPING SITES OF LEFT FEMALE BREAST, UNSPECIFIED ESTROGEN RECEPTOR STATUS: Primary | ICD-10-CM

## 2021-12-15 DIAGNOSIS — N63.21 MASS OF UPPER OUTER QUADRANT OF LEFT BREAST: ICD-10-CM

## 2021-12-15 DIAGNOSIS — E86.0 DEHYDRATION: Primary | ICD-10-CM

## 2021-12-15 DIAGNOSIS — N63.25 MASS OVERLAPPING MULTIPLE QUADRANTS OF LEFT BREAST: ICD-10-CM

## 2021-12-15 LAB
ALBUMIN SERPL BCP-MCNC: 3.9 G/DL (ref 3.5–5.2)
ALP SERPL-CCNC: 48 U/L (ref 55–135)
ALT SERPL W/O P-5'-P-CCNC: 45 U/L (ref 10–44)
ANION GAP SERPL CALC-SCNC: 7 MMOL/L (ref 8–16)
AST SERPL-CCNC: 27 U/L (ref 10–40)
BASOPHILS # BLD AUTO: 0.05 K/UL (ref 0–0.2)
BASOPHILS NFR BLD: 0.7 % (ref 0–1.9)
BILIRUB SERPL-MCNC: 0.4 MG/DL (ref 0.1–1)
BUN SERPL-MCNC: 5 MG/DL (ref 6–20)
CALCIUM SERPL-MCNC: 9.3 MG/DL (ref 8.7–10.5)
CHLORIDE SERPL-SCNC: 100 MMOL/L (ref 95–110)
CO2 SERPL-SCNC: 29 MMOL/L (ref 23–29)
CREAT SERPL-MCNC: 0.7 MG/DL (ref 0.5–1.4)
DIFFERENTIAL METHOD: ABNORMAL
EOSINOPHIL # BLD AUTO: 0 K/UL (ref 0–0.5)
EOSINOPHIL NFR BLD: 0.1 % (ref 0–8)
ERYTHROCYTE [DISTWIDTH] IN BLOOD BY AUTOMATED COUNT: 17.9 % (ref 11.5–14.5)
EST. GFR  (AFRICAN AMERICAN): >60 ML/MIN/1.73 M^2
EST. GFR  (NON AFRICAN AMERICAN): >60 ML/MIN/1.73 M^2
GLUCOSE SERPL-MCNC: 127 MG/DL (ref 70–110)
HCT VFR BLD AUTO: 32.9 % (ref 37–48.5)
HGB BLD-MCNC: 10.3 G/DL (ref 12–16)
IMM GRANULOCYTES # BLD AUTO: 0.06 K/UL (ref 0–0.04)
IMM GRANULOCYTES NFR BLD AUTO: 0.8 % (ref 0–0.5)
LYMPHOCYTES # BLD AUTO: 1.7 K/UL (ref 1–4.8)
LYMPHOCYTES NFR BLD: 22.2 % (ref 18–48)
MAGNESIUM SERPL-MCNC: 1.6 MG/DL (ref 1.6–2.6)
MCH RBC QN AUTO: 25.8 PG (ref 27–31)
MCHC RBC AUTO-ENTMCNC: 31.3 G/DL (ref 32–36)
MCV RBC AUTO: 83 FL (ref 82–98)
MONOCYTES # BLD AUTO: 1.7 K/UL (ref 0.3–1)
MONOCYTES NFR BLD: 22.4 % (ref 4–15)
NEUTROPHILS # BLD AUTO: 4 K/UL (ref 1.8–7.7)
NEUTROPHILS NFR BLD: 53.8 % (ref 38–73)
NRBC BLD-RTO: 0 /100 WBC
PLATELET # BLD AUTO: 151 K/UL (ref 150–450)
PLATELET BLD QL SMEAR: ABNORMAL
PMV BLD AUTO: 10.3 FL (ref 9.2–12.9)
POTASSIUM SERPL-SCNC: 3.4 MMOL/L (ref 3.5–5.1)
PROT SERPL-MCNC: 7 G/DL (ref 6–8.4)
RBC # BLD AUTO: 3.99 M/UL (ref 4–5.4)
SODIUM SERPL-SCNC: 136 MMOL/L (ref 136–145)
WBC # BLD AUTO: 7.44 K/UL (ref 3.9–12.7)

## 2021-12-15 PROCEDURE — 63600175 PHARM REV CODE 636 W HCPCS: Performed by: NURSE PRACTITIONER

## 2021-12-15 PROCEDURE — 85025 COMPLETE CBC W/AUTO DIFF WBC: CPT | Performed by: INTERNAL MEDICINE

## 2021-12-15 PROCEDURE — 96361 HYDRATE IV INFUSION ADD-ON: CPT

## 2021-12-15 PROCEDURE — 83735 ASSAY OF MAGNESIUM: CPT | Performed by: NURSE PRACTITIONER

## 2021-12-15 PROCEDURE — 96360 HYDRATION IV INFUSION INIT: CPT

## 2021-12-15 PROCEDURE — 80053 COMPREHEN METABOLIC PANEL: CPT | Performed by: INTERNAL MEDICINE

## 2021-12-15 PROCEDURE — 36415 COLL VENOUS BLD VENIPUNCTURE: CPT | Performed by: INTERNAL MEDICINE

## 2021-12-15 PROCEDURE — 99214 OFFICE O/P EST MOD 30 MIN: CPT | Mod: S$GLB,,, | Performed by: NURSE PRACTITIONER

## 2021-12-15 PROCEDURE — A4216 STERILE WATER/SALINE, 10 ML: HCPCS | Performed by: NURSE PRACTITIONER

## 2021-12-15 PROCEDURE — 99214 PR OFFICE/OUTPT VISIT, EST, LEVL IV, 30-39 MIN: ICD-10-PCS | Mod: S$GLB,,, | Performed by: NURSE PRACTITIONER

## 2021-12-15 PROCEDURE — 25000003 PHARM REV CODE 250: Performed by: NURSE PRACTITIONER

## 2021-12-15 RX ORDER — SODIUM CHLORIDE 0.9 % (FLUSH) 0.9 %
10 SYRINGE (ML) INJECTION
Status: DISCONTINUED | OUTPATIENT
Start: 2021-12-15 | End: 2021-12-15 | Stop reason: HOSPADM

## 2021-12-15 RX ORDER — HEPARIN 100 UNIT/ML
500 SYRINGE INTRAVENOUS
Status: DISCONTINUED | OUTPATIENT
Start: 2021-12-15 | End: 2021-12-15 | Stop reason: HOSPADM

## 2021-12-15 RX ORDER — HEPARIN 100 UNIT/ML
500 SYRINGE INTRAVENOUS
Status: CANCELLED | OUTPATIENT
Start: 2021-12-15

## 2021-12-15 RX ORDER — SODIUM CHLORIDE 0.9 % (FLUSH) 0.9 %
10 SYRINGE (ML) INJECTION
Status: CANCELLED | OUTPATIENT
Start: 2021-12-15

## 2021-12-15 RX ADMIN — HEPARIN 500 UNITS: 100 SYRINGE at 12:12

## 2021-12-15 RX ADMIN — SODIUM CHLORIDE 10 ML: 9 INJECTION INTRAMUSCULAR; INTRAVENOUS; SUBCUTANEOUS at 12:12

## 2021-12-15 RX ADMIN — SODIUM CHLORIDE 1000 ML: 0.9 INJECTION, SOLUTION INTRAVENOUS at 10:12

## 2021-12-16 ENCOUNTER — HOSPITAL ENCOUNTER (EMERGENCY)
Facility: HOSPITAL | Age: 31
Discharge: HOME | End: 2021-12-16
Attending: EMERGENCY MEDICINE
Payer: COMMERCIAL

## 2021-12-16 ENCOUNTER — PATIENT OUTREACH (OUTPATIENT)
Dept: EMERGENCY MEDICINE | Facility: HOSPITAL | Age: 31
End: 2021-12-16
Payer: COMMERCIAL

## 2021-12-16 VITALS
TEMPERATURE: 99 F | WEIGHT: 130 LBS | RESPIRATION RATE: 20 BRPM | OXYGEN SATURATION: 100 % | DIASTOLIC BLOOD PRESSURE: 68 MMHG | SYSTOLIC BLOOD PRESSURE: 107 MMHG | HEIGHT: 60 IN | HEART RATE: 99 BPM | BODY MASS INDEX: 25.52 KG/M2

## 2021-12-16 DIAGNOSIS — R00.2 PALPITATIONS: Primary | ICD-10-CM

## 2021-12-16 LAB
ALBUMIN SERPL BCP-MCNC: 4.1 G/DL (ref 3.5–5.2)
ALP SERPL-CCNC: 53 U/L (ref 55–135)
ALT SERPL W/O P-5'-P-CCNC: 39 U/L (ref 10–44)
ANION GAP SERPL CALC-SCNC: 6 MMOL/L (ref 8–16)
ANISOCYTOSIS BLD QL SMEAR: SLIGHT
AST SERPL-CCNC: 28 U/L (ref 10–40)
B-HCG UR QL: NEGATIVE
BASOPHILS NFR BLD: 0 % (ref 0–1.9)
BILIRUB SERPL-MCNC: 0.5 MG/DL (ref 0.1–1)
BUN SERPL-MCNC: <5 MG/DL (ref 6–20)
CALCIUM SERPL-MCNC: 9.6 MG/DL (ref 8.7–10.5)
CHLORIDE SERPL-SCNC: 107 MMOL/L (ref 95–110)
CO2 SERPL-SCNC: 27 MMOL/L (ref 23–29)
CREAT SERPL-MCNC: 0.7 MG/DL (ref 0.5–1.4)
CREAT SERPL-MCNC: 0.7 MG/DL (ref 0.5–1.4)
CTP QC/QA: YES
DIFFERENTIAL METHOD: ABNORMAL
EOSINOPHIL NFR BLD: 0 % (ref 0–8)
ERYTHROCYTE [DISTWIDTH] IN BLOOD BY AUTOMATED COUNT: 18.5 % (ref 11.5–14.5)
EST. GFR  (AFRICAN AMERICAN): >60 ML/MIN/1.73 M^2
EST. GFR  (NON AFRICAN AMERICAN): >60 ML/MIN/1.73 M^2
GLUCOSE SERPL-MCNC: 93 MG/DL (ref 70–110)
HCT VFR BLD AUTO: 32.2 % (ref 37–48.5)
HGB BLD-MCNC: 10.1 G/DL (ref 12–16)
IMM GRANULOCYTES # BLD AUTO: ABNORMAL K/UL (ref 0–0.04)
IMM GRANULOCYTES NFR BLD AUTO: ABNORMAL % (ref 0–0.5)
LYMPHOCYTES NFR BLD: 25 % (ref 18–48)
MAGNESIUM SERPL-MCNC: 1.6 MG/DL (ref 1.6–2.6)
MCH RBC QN AUTO: 25.9 PG (ref 27–31)
MCHC RBC AUTO-ENTMCNC: 31.4 G/DL (ref 32–36)
MCV RBC AUTO: 83 FL (ref 82–98)
MONOCYTES NFR BLD: 15 % (ref 4–15)
NEUTROPHILS NFR BLD: 46 % (ref 38–73)
NEUTS BAND NFR BLD MANUAL: 14 %
NRBC BLD-RTO: 0 /100 WBC
PHOSPHATE SERPL-MCNC: 2.6 MG/DL (ref 2.7–4.5)
PLATELET # BLD AUTO: 162 K/UL (ref 150–450)
PMV BLD AUTO: 10.6 FL (ref 9.2–12.9)
POTASSIUM SERPL-SCNC: 3.3 MMOL/L (ref 3.5–5.1)
PROT SERPL-MCNC: 7.3 G/DL (ref 6–8.4)
RBC # BLD AUTO: 3.9 M/UL (ref 4–5.4)
SAMPLE: NORMAL
SARS-COV-2 RDRP RESP QL NAA+PROBE: NEGATIVE
SODIUM SERPL-SCNC: 140 MMOL/L (ref 136–145)
TROPONIN I SERPL DL<=0.01 NG/ML-MCNC: <0.03 NG/ML
WBC # BLD AUTO: 12.86 K/UL (ref 3.9–12.7)

## 2021-12-16 PROCEDURE — 99285 EMERGENCY DEPT VISIT HI MDM: CPT | Mod: 25

## 2021-12-16 PROCEDURE — 84484 ASSAY OF TROPONIN QUANT: CPT | Performed by: EMERGENCY MEDICINE

## 2021-12-16 PROCEDURE — 83735 ASSAY OF MAGNESIUM: CPT | Performed by: EMERGENCY MEDICINE

## 2021-12-16 PROCEDURE — 96361 HYDRATE IV INFUSION ADD-ON: CPT

## 2021-12-16 PROCEDURE — 93010 ELECTROCARDIOGRAM REPORT: CPT | Mod: ,,, | Performed by: SPECIALIST

## 2021-12-16 PROCEDURE — 93005 ELECTROCARDIOGRAM TRACING: CPT | Performed by: SPECIALIST

## 2021-12-16 PROCEDURE — 84100 ASSAY OF PHOSPHORUS: CPT | Performed by: EMERGENCY MEDICINE

## 2021-12-16 PROCEDURE — 81025 URINE PREGNANCY TEST: CPT | Performed by: EMERGENCY MEDICINE

## 2021-12-16 PROCEDURE — U0002 COVID-19 LAB TEST NON-CDC: HCPCS | Performed by: EMERGENCY MEDICINE

## 2021-12-16 PROCEDURE — 25500020 PHARM REV CODE 255: Performed by: EMERGENCY MEDICINE

## 2021-12-16 PROCEDURE — 96360 HYDRATION IV INFUSION INIT: CPT | Mod: 59

## 2021-12-16 PROCEDURE — 80053 COMPREHEN METABOLIC PANEL: CPT | Performed by: EMERGENCY MEDICINE

## 2021-12-16 PROCEDURE — 63600175 PHARM REV CODE 636 W HCPCS: Performed by: EMERGENCY MEDICINE

## 2021-12-16 PROCEDURE — 93010 EKG 12-LEAD: ICD-10-PCS | Mod: ,,, | Performed by: SPECIALIST

## 2021-12-16 RX ORDER — SODIUM CHLORIDE 0.9 % (FLUSH) 0.9 %
10 SYRINGE (ML) INJECTION
Status: DISCONTINUED | OUTPATIENT
Start: 2021-12-16 | End: 2021-12-16 | Stop reason: HOSPADM

## 2021-12-16 RX ADMIN — SODIUM CHLORIDE, SODIUM LACTATE, POTASSIUM CHLORIDE, AND CALCIUM CHLORIDE 1000 ML: .6; .31; .03; .02 INJECTION, SOLUTION INTRAVENOUS at 06:12

## 2021-12-16 RX ADMIN — IOHEXOL 70 ML: 350 INJECTION, SOLUTION INTRAVENOUS at 06:12

## 2021-12-22 ENCOUNTER — LAB VISIT (OUTPATIENT)
Dept: LAB | Facility: HOSPITAL | Age: 31
End: 2021-12-22
Attending: INTERNAL MEDICINE
Payer: COMMERCIAL

## 2021-12-22 ENCOUNTER — INFUSION (OUTPATIENT)
Dept: INFUSION THERAPY | Facility: HOSPITAL | Age: 31
End: 2021-12-22
Attending: INTERNAL MEDICINE
Payer: COMMERCIAL

## 2021-12-22 VITALS
OXYGEN SATURATION: 99 % | SYSTOLIC BLOOD PRESSURE: 114 MMHG | RESPIRATION RATE: 18 BRPM | BODY MASS INDEX: 26.35 KG/M2 | DIASTOLIC BLOOD PRESSURE: 72 MMHG | WEIGHT: 134.25 LBS | TEMPERATURE: 98 F | HEART RATE: 102 BPM | HEIGHT: 60 IN

## 2021-12-22 DIAGNOSIS — C50.812 MALIGNANT NEOPLASM OF OVERLAPPING SITES OF LEFT FEMALE BREAST, UNSPECIFIED ESTROGEN RECEPTOR STATUS: ICD-10-CM

## 2021-12-22 DIAGNOSIS — N63.24 MASS OF LOWER INNER QUADRANT OF LEFT BREAST: ICD-10-CM

## 2021-12-22 DIAGNOSIS — E86.0 DEHYDRATION: Primary | ICD-10-CM

## 2021-12-22 DIAGNOSIS — N63.25 MASS OVERLAPPING MULTIPLE QUADRANTS OF LEFT BREAST: ICD-10-CM

## 2021-12-22 DIAGNOSIS — N63.21 MASS OF UPPER OUTER QUADRANT OF LEFT BREAST: ICD-10-CM

## 2021-12-22 LAB
ALBUMIN SERPL BCP-MCNC: 3.6 G/DL (ref 3.5–5.2)
ALP SERPL-CCNC: 41 U/L (ref 55–135)
ALT SERPL W/O P-5'-P-CCNC: 37 U/L (ref 10–44)
ANION GAP SERPL CALC-SCNC: 7 MMOL/L (ref 8–16)
AST SERPL-CCNC: 31 U/L (ref 10–40)
BASOPHILS # BLD AUTO: 0.02 K/UL (ref 0–0.2)
BASOPHILS NFR BLD: 0.3 % (ref 0–1.9)
BILIRUB SERPL-MCNC: 0.8 MG/DL (ref 0.1–1)
BUN SERPL-MCNC: 6 MG/DL (ref 6–20)
CALCIUM SERPL-MCNC: 9.2 MG/DL (ref 8.7–10.5)
CHLORIDE SERPL-SCNC: 105 MMOL/L (ref 95–110)
CO2 SERPL-SCNC: 28 MMOL/L (ref 23–29)
CREAT SERPL-MCNC: 0.7 MG/DL (ref 0.5–1.4)
DIFFERENTIAL METHOD: ABNORMAL
EOSINOPHIL # BLD AUTO: 0 K/UL (ref 0–0.5)
EOSINOPHIL NFR BLD: 0 % (ref 0–8)
ERYTHROCYTE [DISTWIDTH] IN BLOOD BY AUTOMATED COUNT: 20.1 % (ref 11.5–14.5)
EST. GFR  (AFRICAN AMERICAN): >60 ML/MIN/1.73 M^2
EST. GFR  (NON AFRICAN AMERICAN): >60 ML/MIN/1.73 M^2
GLUCOSE SERPL-MCNC: 124 MG/DL (ref 70–110)
HCT VFR BLD AUTO: 31.5 % (ref 37–48.5)
HGB BLD-MCNC: 9.9 G/DL (ref 12–16)
IMM GRANULOCYTES # BLD AUTO: 0.08 K/UL (ref 0–0.04)
IMM GRANULOCYTES NFR BLD AUTO: 1.2 % (ref 0–0.5)
LYMPHOCYTES # BLD AUTO: 1.5 K/UL (ref 1–4.8)
LYMPHOCYTES NFR BLD: 23.3 % (ref 18–48)
MAGNESIUM SERPL-MCNC: 1.7 MG/DL (ref 1.6–2.6)
MCH RBC QN AUTO: 26.5 PG (ref 27–31)
MCHC RBC AUTO-ENTMCNC: 31.4 G/DL (ref 32–36)
MCV RBC AUTO: 84 FL (ref 82–98)
MONOCYTES # BLD AUTO: 0.6 K/UL (ref 0.3–1)
MONOCYTES NFR BLD: 9.6 % (ref 4–15)
NEUTROPHILS # BLD AUTO: 4.2 K/UL (ref 1.8–7.7)
NEUTROPHILS NFR BLD: 65.6 % (ref 38–73)
NRBC BLD-RTO: 0 /100 WBC
PLATELET # BLD AUTO: 104 K/UL (ref 150–450)
PLATELET BLD QL SMEAR: ABNORMAL
PMV BLD AUTO: 9.1 FL (ref 9.2–12.9)
POTASSIUM SERPL-SCNC: 3.4 MMOL/L (ref 3.5–5.1)
PROT SERPL-MCNC: 6.5 G/DL (ref 6–8.4)
RBC # BLD AUTO: 3.74 M/UL (ref 4–5.4)
SODIUM SERPL-SCNC: 140 MMOL/L (ref 136–145)
WBC # BLD AUTO: 6.47 K/UL (ref 3.9–12.7)

## 2021-12-22 PROCEDURE — 85025 COMPLETE CBC W/AUTO DIFF WBC: CPT | Performed by: INTERNAL MEDICINE

## 2021-12-22 PROCEDURE — 36415 COLL VENOUS BLD VENIPUNCTURE: CPT | Performed by: INTERNAL MEDICINE

## 2021-12-22 PROCEDURE — 80053 COMPREHEN METABOLIC PANEL: CPT | Performed by: INTERNAL MEDICINE

## 2021-12-22 PROCEDURE — 96361 HYDRATE IV INFUSION ADD-ON: CPT

## 2021-12-22 PROCEDURE — 96360 HYDRATION IV INFUSION INIT: CPT

## 2021-12-22 PROCEDURE — 25000003 PHARM REV CODE 250: Performed by: NURSE PRACTITIONER

## 2021-12-22 PROCEDURE — 63600175 PHARM REV CODE 636 W HCPCS: Performed by: NURSE PRACTITIONER

## 2021-12-22 PROCEDURE — 83735 ASSAY OF MAGNESIUM: CPT | Performed by: NURSE PRACTITIONER

## 2021-12-22 RX ORDER — SODIUM CHLORIDE 0.9 % (FLUSH) 0.9 %
10 SYRINGE (ML) INJECTION
Status: DISCONTINUED | OUTPATIENT
Start: 2021-12-22 | End: 2021-12-22 | Stop reason: HOSPADM

## 2021-12-22 RX ORDER — SODIUM CHLORIDE 0.9 % (FLUSH) 0.9 %
10 SYRINGE (ML) INJECTION
Status: CANCELLED | OUTPATIENT
Start: 2021-12-22

## 2021-12-22 RX ORDER — HEPARIN 100 UNIT/ML
500 SYRINGE INTRAVENOUS
Status: CANCELLED | OUTPATIENT
Start: 2021-12-22

## 2021-12-22 RX ORDER — HEPARIN 100 UNIT/ML
500 SYRINGE INTRAVENOUS
Status: DISCONTINUED | OUTPATIENT
Start: 2021-12-22 | End: 2021-12-22 | Stop reason: HOSPADM

## 2021-12-22 RX ADMIN — HEPARIN 500 UNITS: 100 SYRINGE at 12:12

## 2021-12-22 RX ADMIN — SODIUM CHLORIDE 1000 ML: 0.9 INJECTION, SOLUTION INTRAVENOUS at 10:12

## 2021-12-23 ENCOUNTER — HOSPITAL ENCOUNTER (OUTPATIENT)
Dept: RADIOLOGY | Facility: HOSPITAL | Age: 31
Discharge: HOME | End: 2021-12-23
Attending: INTERNAL MEDICINE
Payer: COMMERCIAL

## 2021-12-23 ENCOUNTER — TELEPHONE (OUTPATIENT)
Dept: HEMATOLOGY/ONCOLOGY | Facility: CLINIC | Age: 31
End: 2021-12-23
Payer: COMMERCIAL

## 2021-12-23 DIAGNOSIS — C50.919 HER2-POSITIVE CARCINOMA OF BREAST: ICD-10-CM

## 2021-12-23 DIAGNOSIS — C50.812 MALIGNANT NEOPLASM OF OVERLAPPING SITES OF LEFT FEMALE BREAST, UNSPECIFIED ESTROGEN RECEPTOR STATUS: ICD-10-CM

## 2021-12-23 PROCEDURE — A9585 GADOBUTROL INJECTION: HCPCS | Mod: PO | Performed by: INTERNAL MEDICINE

## 2021-12-23 PROCEDURE — 25500020 PHARM REV CODE 255: Mod: PO | Performed by: INTERNAL MEDICINE

## 2021-12-23 PROCEDURE — 77049 MRI BREAST C-+ W/CAD BI: CPT | Mod: TC,PO

## 2021-12-23 RX ORDER — GADOBUTROL 604.72 MG/ML
5.5 INJECTION INTRAVENOUS
Status: COMPLETED | OUTPATIENT
Start: 2021-12-23 | End: 2021-12-23

## 2021-12-23 RX ADMIN — GADOBUTROL 5.5 ML: 604.72 INJECTION INTRAVENOUS at 01:12

## 2021-12-27 ENCOUNTER — OFFICE VISIT (OUTPATIENT)
Dept: HEMATOLOGY/ONCOLOGY | Facility: CLINIC | Age: 31
End: 2021-12-27
Payer: COMMERCIAL

## 2021-12-27 ENCOUNTER — LAB VISIT (OUTPATIENT)
Dept: LAB | Facility: HOSPITAL | Age: 31
End: 2021-12-27
Attending: INTERNAL MEDICINE
Payer: COMMERCIAL

## 2021-12-27 ENCOUNTER — CLINICAL SUPPORT (OUTPATIENT)
Dept: CARDIOLOGY | Facility: HOSPITAL | Age: 31
End: 2021-12-27
Attending: NURSE PRACTITIONER
Payer: COMMERCIAL

## 2021-12-27 ENCOUNTER — LAB VISIT (OUTPATIENT)
Dept: LAB | Facility: HOSPITAL | Age: 31
End: 2021-12-27
Attending: NURSE PRACTITIONER
Payer: COMMERCIAL

## 2021-12-27 VITALS — BODY MASS INDEX: 26.11 KG/M2 | WEIGHT: 133 LBS | HEIGHT: 60 IN

## 2021-12-27 VITALS
WEIGHT: 133 LBS | HEART RATE: 109 BPM | DIASTOLIC BLOOD PRESSURE: 77 MMHG | BODY MASS INDEX: 25.97 KG/M2 | SYSTOLIC BLOOD PRESSURE: 114 MMHG

## 2021-12-27 DIAGNOSIS — C50.919 HER2-POSITIVE CARCINOMA OF BREAST: ICD-10-CM

## 2021-12-27 DIAGNOSIS — N63.25 MASS OVERLAPPING MULTIPLE QUADRANTS OF LEFT BREAST: ICD-10-CM

## 2021-12-27 DIAGNOSIS — N63.24 MASS OF LOWER INNER QUADRANT OF LEFT BREAST: ICD-10-CM

## 2021-12-27 DIAGNOSIS — C50.812 MALIGNANT NEOPLASM OF OVERLAPPING SITES OF LEFT FEMALE BREAST, UNSPECIFIED ESTROGEN RECEPTOR STATUS: ICD-10-CM

## 2021-12-27 DIAGNOSIS — Z17.0 ER+ (ESTROGEN RECEPTOR POSITIVE STATUS): ICD-10-CM

## 2021-12-27 DIAGNOSIS — R00.0 TACHYCARDIA: ICD-10-CM

## 2021-12-27 DIAGNOSIS — I95.9 HYPOTENSION, UNSPECIFIED HYPOTENSION TYPE: ICD-10-CM

## 2021-12-27 DIAGNOSIS — N63.21 MASS OF UPPER OUTER QUADRANT OF LEFT BREAST: ICD-10-CM

## 2021-12-27 DIAGNOSIS — C50.812 MALIGNANT NEOPLASM OF OVERLAPPING SITES OF LEFT FEMALE BREAST, UNSPECIFIED ESTROGEN RECEPTOR STATUS: Primary | ICD-10-CM

## 2021-12-27 DIAGNOSIS — T45.1X5A CHEMOTHERAPY-INDUCED NEUROPATHY: ICD-10-CM

## 2021-12-27 DIAGNOSIS — G62.0 CHEMOTHERAPY-INDUCED NEUROPATHY: ICD-10-CM

## 2021-12-27 DIAGNOSIS — E86.0 DEHYDRATION: ICD-10-CM

## 2021-12-27 LAB
ALBUMIN SERPL BCP-MCNC: 3.6 G/DL (ref 3.5–5.2)
ALP SERPL-CCNC: 32 U/L (ref 55–135)
ALT SERPL W/O P-5'-P-CCNC: 42 U/L (ref 10–44)
ANION GAP SERPL CALC-SCNC: 8 MMOL/L (ref 8–16)
AST SERPL-CCNC: 39 U/L (ref 10–40)
BASOPHILS # BLD AUTO: 0.02 K/UL (ref 0–0.2)
BASOPHILS NFR BLD: 0.4 % (ref 0–1.9)
BILIRUB SERPL-MCNC: 0.6 MG/DL (ref 0.1–1)
BUN SERPL-MCNC: 8 MG/DL (ref 6–20)
CALCIUM SERPL-MCNC: 8.8 MG/DL (ref 8.7–10.5)
CHLORIDE SERPL-SCNC: 99 MMOL/L (ref 95–110)
CO2 SERPL-SCNC: 27 MMOL/L (ref 23–29)
CREAT SERPL-MCNC: 0.9 MG/DL (ref 0.5–1.4)
DIFFERENTIAL METHOD: ABNORMAL
EOSINOPHIL # BLD AUTO: 0 K/UL (ref 0–0.5)
EOSINOPHIL NFR BLD: 0 % (ref 0–8)
ERYTHROCYTE [DISTWIDTH] IN BLOOD BY AUTOMATED COUNT: 19.9 % (ref 11.5–14.5)
EST. GFR  (AFRICAN AMERICAN): >60 ML/MIN/1.73 M^2
EST. GFR  (NON AFRICAN AMERICAN): >60 ML/MIN/1.73 M^2
GLUCOSE SERPL-MCNC: 96 MG/DL (ref 70–110)
HCT VFR BLD AUTO: 32.8 % (ref 37–48.5)
HGB BLD-MCNC: 10.2 G/DL (ref 12–16)
IMM GRANULOCYTES # BLD AUTO: 0.02 K/UL (ref 0–0.04)
IMM GRANULOCYTES NFR BLD AUTO: 0.4 % (ref 0–0.5)
LYMPHOCYTES # BLD AUTO: 1.5 K/UL (ref 1–4.8)
LYMPHOCYTES NFR BLD: 32.2 % (ref 18–48)
MAGNESIUM SERPL-MCNC: 1.8 MG/DL (ref 1.6–2.6)
MCH RBC QN AUTO: 26.6 PG (ref 27–31)
MCHC RBC AUTO-ENTMCNC: 31.1 G/DL (ref 32–36)
MCV RBC AUTO: 86 FL (ref 82–98)
MONOCYTES # BLD AUTO: 0.9 K/UL (ref 0.3–1)
MONOCYTES NFR BLD: 18.7 % (ref 4–15)
NEUTROPHILS # BLD AUTO: 2.3 K/UL (ref 1.8–7.7)
NEUTROPHILS NFR BLD: 48.3 % (ref 38–73)
NRBC BLD-RTO: 0 /100 WBC
PLATELET # BLD AUTO: 96 K/UL (ref 150–450)
PMV BLD AUTO: 9.2 FL (ref 9.2–12.9)
POTASSIUM SERPL-SCNC: 3.8 MMOL/L (ref 3.5–5.1)
PROT SERPL-MCNC: 6.8 G/DL (ref 6–8.4)
RBC # BLD AUTO: 3.83 M/UL (ref 4–5.4)
SODIUM SERPL-SCNC: 134 MMOL/L (ref 136–145)
WBC # BLD AUTO: 4.66 K/UL (ref 3.9–12.7)

## 2021-12-27 PROCEDURE — 93306 TTE W/DOPPLER COMPLETE: CPT

## 2021-12-27 PROCEDURE — 83735 ASSAY OF MAGNESIUM: CPT | Performed by: NURSE PRACTITIONER

## 2021-12-27 PROCEDURE — 3078F PR MOST RECENT DIASTOLIC BLOOD PRESSURE < 80 MM HG: ICD-10-PCS | Mod: S$GLB,,, | Performed by: NURSE PRACTITIONER

## 2021-12-27 PROCEDURE — 3008F BODY MASS INDEX DOCD: CPT | Mod: S$GLB,,, | Performed by: NURSE PRACTITIONER

## 2021-12-27 PROCEDURE — 3074F SYST BP LT 130 MM HG: CPT | Mod: S$GLB,,, | Performed by: NURSE PRACTITIONER

## 2021-12-27 PROCEDURE — 93306 ECHO (CUPID ONLY): ICD-10-PCS | Mod: 26,,, | Performed by: SPECIALIST

## 2021-12-27 PROCEDURE — 3078F DIAST BP <80 MM HG: CPT | Mod: S$GLB,,, | Performed by: NURSE PRACTITIONER

## 2021-12-27 PROCEDURE — 85025 COMPLETE CBC W/AUTO DIFF WBC: CPT | Performed by: INTERNAL MEDICINE

## 2021-12-27 PROCEDURE — 80053 COMPREHEN METABOLIC PANEL: CPT | Performed by: INTERNAL MEDICINE

## 2021-12-27 PROCEDURE — 36415 COLL VENOUS BLD VENIPUNCTURE: CPT | Performed by: NURSE PRACTITIONER

## 2021-12-27 PROCEDURE — 3008F PR BODY MASS INDEX (BMI) DOCUMENTED: ICD-10-PCS | Mod: S$GLB,,, | Performed by: NURSE PRACTITIONER

## 2021-12-27 PROCEDURE — 99214 OFFICE O/P EST MOD 30 MIN: CPT | Mod: S$GLB,,, | Performed by: NURSE PRACTITIONER

## 2021-12-27 PROCEDURE — 93306 TTE W/DOPPLER COMPLETE: CPT | Mod: 26,,, | Performed by: SPECIALIST

## 2021-12-27 PROCEDURE — 3074F PR MOST RECENT SYSTOLIC BLOOD PRESSURE < 130 MM HG: ICD-10-PCS | Mod: S$GLB,,, | Performed by: NURSE PRACTITIONER

## 2021-12-27 PROCEDURE — 99214 PR OFFICE/OUTPT VISIT, EST, LEVL IV, 30-39 MIN: ICD-10-PCS | Mod: S$GLB,,, | Performed by: NURSE PRACTITIONER

## 2021-12-28 LAB
AORTIC ROOT ANNULUS: 2.25 CM
AORTIC VALVE CUSP SEPERATION: 1.73 CM
AV INDEX (PROSTH): 0.8
AV MEAN GRADIENT: 4 MMHG
AV PEAK GRADIENT: 7 MMHG
AV VALVE AREA: 2.6 CM2
AV VELOCITY RATIO: 78.64
BSA FOR ECHO PROCEDURE: 1.6 M2
CV ECHO LV RWT: 0.4 CM
DOP CALC AO PEAK VEL: 1.29 M/S
DOP CALC AO VTI: 21.47 CM
DOP CALC LVOT AREA: 3.2 CM2
DOP CALC LVOT DIAMETER: 2.03 CM
DOP CALC LVOT PEAK VEL: 101.44 M/S
DOP CALC LVOT STROKE VOLUME: 55.83 CM3
DOP CALCLVOT PEAK VEL VTI: 17.26 CM
E WAVE DECELERATION TIME: 100.2 MSEC
E/A RATIO: 1.05
ECHO LV POSTERIOR WALL: 0.81 CM (ref 0.6–1.1)
EJECTION FRACTION: 60 %
FRACTIONAL SHORTENING: 30 % (ref 28–44)
INTERVENTRICULAR SEPTUM: 0.81 CM (ref 0.6–1.1)
IVRT: 116.03 MSEC
LEFT INTERNAL DIMENSION IN SYSTOLE: 2.82 CM (ref 2.1–4)
LEFT VENTRICLE MASS INDEX: 62 G/M2
LEFT VENTRICULAR INTERNAL DIMENSION IN DIASTOLE: 4.04 CM (ref 3.5–6)
LEFT VENTRICULAR MASS: 96.6 G
MV PEAK A VEL: 0.78 M/S
MV PEAK E VEL: 0.82 M/S
PISA TR MAX VEL: 2.52 M/S
PULM VEIN S/D RATIO: 0.75
PV PEAK D VEL: 49.38 M/S
PV PEAK S VEL: 37.16 M/S
PV PEAK VELOCITY: 70.89 CM/S
RA PRESSURE: 3 MMHG
RIGHT VENTRICULAR END-DIASTOLIC DIMENSION: 302 CM
TR MAX PG: 25 MMHG
TV REST PULMONARY ARTERY PRESSURE: 28 MMHG

## 2021-12-29 ENCOUNTER — INFUSION (OUTPATIENT)
Dept: INFUSION THERAPY | Facility: HOSPITAL | Age: 31
End: 2021-12-29
Attending: INTERNAL MEDICINE
Payer: COMMERCIAL

## 2021-12-29 VITALS
HEIGHT: 60 IN | DIASTOLIC BLOOD PRESSURE: 69 MMHG | HEART RATE: 90 BPM | RESPIRATION RATE: 18 BRPM | SYSTOLIC BLOOD PRESSURE: 103 MMHG | TEMPERATURE: 98 F | WEIGHT: 131.19 LBS | BODY MASS INDEX: 25.76 KG/M2

## 2021-12-29 DIAGNOSIS — E86.0 DEHYDRATION: Primary | ICD-10-CM

## 2021-12-29 PROCEDURE — 96361 HYDRATE IV INFUSION ADD-ON: CPT

## 2021-12-29 PROCEDURE — 25000003 PHARM REV CODE 250: Performed by: NURSE PRACTITIONER

## 2021-12-29 PROCEDURE — 96360 HYDRATION IV INFUSION INIT: CPT

## 2021-12-29 PROCEDURE — A4216 STERILE WATER/SALINE, 10 ML: HCPCS | Performed by: NURSE PRACTITIONER

## 2021-12-29 PROCEDURE — 63600175 PHARM REV CODE 636 W HCPCS: Performed by: NURSE PRACTITIONER

## 2021-12-29 RX ORDER — SODIUM CHLORIDE 0.9 % (FLUSH) 0.9 %
10 SYRINGE (ML) INJECTION
Status: CANCELLED | OUTPATIENT
Start: 2021-12-29

## 2021-12-29 RX ORDER — HEPARIN 100 UNIT/ML
500 SYRINGE INTRAVENOUS
Status: CANCELLED | OUTPATIENT
Start: 2021-12-29

## 2021-12-29 RX ORDER — SODIUM CHLORIDE 0.9 % (FLUSH) 0.9 %
10 SYRINGE (ML) INJECTION
Status: DISCONTINUED | OUTPATIENT
Start: 2021-12-29 | End: 2021-12-29 | Stop reason: HOSPADM

## 2021-12-29 RX ORDER — HEPARIN 100 UNIT/ML
500 SYRINGE INTRAVENOUS
Status: DISCONTINUED | OUTPATIENT
Start: 2021-12-29 | End: 2021-12-29 | Stop reason: HOSPADM

## 2021-12-29 RX ADMIN — SODIUM CHLORIDE 10 ML: 9 INJECTION INTRAMUSCULAR; INTRAVENOUS; SUBCUTANEOUS at 12:12

## 2021-12-29 RX ADMIN — HEPARIN 500 UNITS: 100 SYRINGE at 12:12

## 2021-12-29 RX ADMIN — SODIUM CHLORIDE 1000 ML: 0.9 INJECTION, SOLUTION INTRAVENOUS at 10:12

## 2022-01-01 ENCOUNTER — OFFICE VISIT (OUTPATIENT)
Dept: HEMATOLOGY/ONCOLOGY | Facility: CLINIC | Age: 32
End: 2022-01-01
Payer: COMMERCIAL

## 2022-01-01 VITALS
WEIGHT: 135 LBS | BODY MASS INDEX: 26.5 KG/M2 | TEMPERATURE: 98 F | HEIGHT: 60 IN | RESPIRATION RATE: 18 BRPM | SYSTOLIC BLOOD PRESSURE: 103 MMHG | HEART RATE: 72 BPM | DIASTOLIC BLOOD PRESSURE: 66 MMHG

## 2022-01-01 DIAGNOSIS — C50.812 MALIGNANT NEOPLASM OF OVERLAPPING SITES OF LEFT BREAST IN FEMALE, ESTROGEN RECEPTOR POSITIVE: Primary | ICD-10-CM

## 2022-01-01 DIAGNOSIS — C50.919 HER2-POSITIVE CARCINOMA OF BREAST: ICD-10-CM

## 2022-01-01 DIAGNOSIS — Z17.0 MALIGNANT NEOPLASM OF OVERLAPPING SITES OF LEFT BREAST IN FEMALE, ESTROGEN RECEPTOR POSITIVE: Primary | ICD-10-CM

## 2022-01-01 PROCEDURE — 3008F PR BODY MASS INDEX (BMI) DOCUMENTED: ICD-10-PCS | Mod: CPTII,S$GLB,, | Performed by: NURSE PRACTITIONER

## 2022-01-01 PROCEDURE — 3074F PR MOST RECENT SYSTOLIC BLOOD PRESSURE < 130 MM HG: ICD-10-PCS | Mod: CPTII,S$GLB,, | Performed by: NURSE PRACTITIONER

## 2022-01-01 PROCEDURE — 3074F SYST BP LT 130 MM HG: CPT | Mod: CPTII,S$GLB,, | Performed by: NURSE PRACTITIONER

## 2022-01-01 PROCEDURE — 3008F BODY MASS INDEX DOCD: CPT | Mod: CPTII,S$GLB,, | Performed by: NURSE PRACTITIONER

## 2022-01-01 PROCEDURE — 3078F PR MOST RECENT DIASTOLIC BLOOD PRESSURE < 80 MM HG: ICD-10-PCS | Mod: CPTII,S$GLB,, | Performed by: NURSE PRACTITIONER

## 2022-01-01 PROCEDURE — 1159F PR MEDICATION LIST DOCUMENTED IN MEDICAL RECORD: ICD-10-PCS | Mod: CPTII,S$GLB,, | Performed by: NURSE PRACTITIONER

## 2022-01-01 PROCEDURE — 1159F MED LIST DOCD IN RCRD: CPT | Mod: CPTII,S$GLB,, | Performed by: NURSE PRACTITIONER

## 2022-01-01 PROCEDURE — 99215 PR OFFICE/OUTPT VISIT, EST, LEVL V, 40-54 MIN: ICD-10-PCS | Mod: S$GLB,,, | Performed by: NURSE PRACTITIONER

## 2022-01-01 PROCEDURE — 3078F DIAST BP <80 MM HG: CPT | Mod: CPTII,S$GLB,, | Performed by: NURSE PRACTITIONER

## 2022-01-01 PROCEDURE — 99215 OFFICE O/P EST HI 40 MIN: CPT | Mod: S$GLB,,, | Performed by: NURSE PRACTITIONER

## 2022-01-05 ENCOUNTER — TELEPHONE (OUTPATIENT)
Dept: INFUSION THERAPY | Facility: HOSPITAL | Age: 32
End: 2022-01-05
Payer: COMMERCIAL

## 2022-01-06 NOTE — PROGRESS NOTES
[facility] Hematology/Oncology  PROGRESS NOTE -  Follow-up Visit      Subjective:       Patient ID:   NAME: Esperanza Peters : 1990     31 y.o. female    Referring Doc: Dr Cheri Smith  Other Physicians: Armaan Mukherjee           Chief Complaint: left breast cancer f/u       History of Present Illness:     Patient returns today for a regularly scheduled follow-up visit.  The patient is here today to go over the results of the recently ordered labs, tests and studies. She is here by herself.     She completed the 4th cycle of chemotherapy and had subsequent breast MRI on 2021 which showed an excellent response to the therapy; she has decided to stop the chemotherapy and proceed with surgery; she is adamant that she does not want to continue with the neoadjuvant regiment; she is aware that she will still at least need to continue the herceptin post-op and may need additional chemotherapy depending on the pathology findings from the surgery    She saw Dr Smith the other day and is seeing Dr Nguyen with plastic surgery tomorrow      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       discussed covid19 precautions      ROS:   GEN: normal without any fever, night sweats or weight loss  HEENT: normal with no HA's, sore throat, stiff neck, changes in vision  CV: normal with no CP, SOB, PND, CHASE or orthopnea  PULM: normal with no SOB, cough, hemoptysis, sputum or pleuritic pain  GI: normal with no abdominal pain, nausea, vomiting, constipation, diarrhea, melanotic stools, BRBPR, or hematemesis  : normal with no hematuria, dysuria  BREAST: no palpable masses  SKIN: normal with no rash, erythema, bruising, or swelling    Pain Scale:  0    Allergies:  Review of patient's allergies indicates:  No Known Allergies    Medications:    Current Outpatient Medications:     acetaminophen (TYLENOL) 500 MG tablet, Take 500 mg by mouth once as needed for Pain., Disp: , Rfl:      dexAMETHasone (DECADRON) 6 MG tablet, Take 6 mg by mouth daily with breakfast. On treatment days, Disp: , Rfl:     gabapentin (NEURONTIN) 100 MG capsule, Take 1 capsule (100 mg total) by mouth 3 (three) times daily., Disp: 90 capsule, Rfl: 2    magic mouthwash diphen/antac/lidoc/nysta, Take 10 MLS by mouth four times daily (Patient taking differently: Swish and spit 10 mLs 4 (four) times daily.), Disp: 120 mL, Rfl: 0    minocycline (MINOCIN,DYNACIN) 100 MG capsule, Take 1 capsule (100 mg total) by mouth every 12 (twelve) hours., Disp: 20 capsule, Rfl: 0    ondansetron (ZOFRAN) 8 MG tablet, Take 1 tablet (8 mg total) by mouth every 8 (eight) hours as needed for Nausea., Disp: 30 tablet, Rfl: 2    paroxetine (PAXIL) 10 MG tablet, Take 1 tablet by mouth once daily., Disp: 30 tablet, Rfl: 12    phenazopyridine (PYRIDIUM) 200 MG tablet, Take 1 tablet (200 mg total) by mouth 3 (three) times daily as needed for Pain., Disp: 20 tablet, Rfl: 0    sulfamethoxazole-trimethoprim 400-80mg (BACTRIM,SEPTRA) 400-80 mg per tablet, Take 1 tablet by mouth twice daily., Disp: 14 tablet, Rfl: 2    PMHx/PSHx Updates:  See patient's last visit with me on 10/26/2021.  See H&P on 9/13/2021        Pathology:  Cancer Staging  No matching staging information was found for the patient.          Objective:     Vitals:  There were no vitals taken for this visit.    Physical Examination:   GEN: no apparent distress, comfortable; AAOx3  HEAD: atraumatic and normocephalic  EYES: no pallor, no icterus, PERRLA  ENT: OMM, no pharyngeal erythema, external ears WNL; no nasal discharge; no thrush  NECK: no masses, thyroid normal, trachea midline, no LAD/LN's, supple  CV: RRR with no murmur; normal pulse; normal S1 and S2; no pedal edema; Rght CW portacath  CHEST: Normal respiratory effort; CTAB; normal breath sounds; no wheeze or crackles  ABDOM: nontender and nondistended; soft; normal bowel sounds; no rebound/guarding  MUSC/Skeletal: ROM  normal; no crepitus; joints normal; no deformities or arthropathy  EXTREM: no clubbing, cyanosis, inflammation or swelling  SKIN: no rashes, lesions, ulcers, petechiae or subcutaneous nodules  : no pacheco  NEURO: grossly intact; motor/sensory WNL; AAOx3; no tremors  PSYCH: normal mood, affect and behavior  LYMPH: normal cervical, supraclavicular, axillary and groin LN's  Breast: left breast 5-6 o'clock and 3-4 o'clock masses which may be contiguous               Labs:     Lab Results   Component Value Date    WBC 4.66 12/27/2021    HGB 10.2 (L) 12/27/2021    HCT 32.8 (L) 12/27/2021    MCV 86 12/27/2021    PLT 96 (L) 12/27/2021       CMP  Sodium   Date Value Ref Range Status   12/27/2021 134 (L) 136 - 145 mmol/L Final     Potassium   Date Value Ref Range Status   12/27/2021 3.8 3.5 - 5.1 mmol/L Final     Chloride   Date Value Ref Range Status   12/27/2021 99 95 - 110 mmol/L Final     CO2   Date Value Ref Range Status   12/27/2021 27 23 - 29 mmol/L Final     Glucose   Date Value Ref Range Status   12/27/2021 96 70 - 110 mg/dL Final     BUN   Date Value Ref Range Status   12/27/2021 8 6 - 20 mg/dL Final     Creatinine   Date Value Ref Range Status   12/27/2021 0.9 0.5 - 1.4 mg/dL Final     Calcium   Date Value Ref Range Status   12/27/2021 8.8 8.7 - 10.5 mg/dL Final     Total Protein   Date Value Ref Range Status   12/27/2021 6.8 6.0 - 8.4 g/dL Final     Albumin   Date Value Ref Range Status   12/27/2021 3.6 3.5 - 5.2 g/dL Final     Total Bilirubin   Date Value Ref Range Status   12/27/2021 0.6 0.1 - 1.0 mg/dL Final     Comment:     For infants and newborns, interpretation of results should be based  on gestational age, weight and in agreement with clinical  observations.    Premature Infant recommended reference ranges:  Up to 24 hours.............<8.0 mg/dL  Up to 48 hours............<12.0 mg/dL  3-5 days..................<15.0 mg/dL  6-29 days.................<15.0 mg/dL       Alkaline Phosphatase   Date Value Ref  Range Status   12/27/2021 32 (L) 55 - 135 U/L Final     AST   Date Value Ref Range Status   12/27/2021 39 10 - 40 U/L Final     ALT   Date Value Ref Range Status   12/27/2021 42 10 - 44 U/L Final     Anion Gap   Date Value Ref Range Status   12/27/2021 8 8 - 16 mmol/L Final     eGFR if    Date Value Ref Range Status   12/27/2021 >60.0 >60 mL/min/1.73 m^2 Final     eGFR if non    Date Value Ref Range Status   12/27/2021 >60.0 >60 mL/min/1.73 m^2 Final     Comment:     Calculation used to obtain the estimated glomerular filtration  rate (eGFR) is the CKD-EPI equation.              Radiology/Diagnostic Studies:    MRI breast  12/23/2021:  -    Impression:     1. Complete resolution of previous enhancing multicentric left breast malignancy.  2. Complete resolution of prior enlarged left axillary lymph nodes.  3. Persistent retroareolar left breast distortion near 6 o'clock position, characteristic of scarring.  Recommendation: Patient is currently under medical oncology and surgical care.        PET 10/21/2021:    Impression:     Hypermetabolic left breast mass consistent with primary breast malignancy. Please correlate with dedicated breast imaging.     No pathologically enlarged or hypermetabolic axillary or intrathoracic lymph nodes.     Hypermetabolic retroperitoneal lymph node is nonspecific and would be unusual for breast cancer metastasis. Attention on follow-up is recommended.     Low level FDG activity associated with prominent cervical lymph nodes, bilateral and symmetric, most likely reactive in nature.     Reactive red marrow.      CT scans 9/29/2021:    IMPRESSION:  1. Large irregular subareolar left breast mass and findings compatible with multifocal/multicentric disease in the left breast (better appreciated on the recent MRI).  2. Left axillary lymphadenopathy compatible with additional sites of disease.  3. Borderline enlarged right hilar node, possibly an additional site  of disease, although felt less likely this could be further assessed with PET/CT.        NM Bone Scan Whole Body    Result Date: 9/27/2021  CMS MANDATED QUALITY DATA-BONE SCAN-147 HISTORY: Staging breast carcinoma, and 63.25. COMPARISON: Multiple prior chest radiographs. FINDINGS: 22 mCi Tc 99m MDP was administered IV per protocol, with delayed whole-body images obtained in the anterior and posterior projections. Nodular focus of increased radiotracer uptake in the sternum near the level of the sternomanubrial articulation is nonspecific. There is otherwise no abnormal focal increased radiotracer uptake throughout the axial or appendicular skeleton to suggest osteoblastic metastatic disease. There is normal radiotracer uptake by the kidneys, with normal excretion into the renal collecting systems and urinary bladder. IMPRESSION: 1. Focus of increased osteoblastic activity in the sternum near the sternomanubrial junction, nonspecific. Consider further evaluation with chest CT with contrast. 2. Otherwise no evidence of osteoblastic metastatic disease. Electronically signed by:  Keron Neil MD  9/27/2021 1:41 PM CDT Workstation: [id number]    X-Ray Chest AP Portable    Result Date: 9/20/2021  Portable chest x-ray at 11:13 AM is compared to prior study dated 9/16/2021 Clinical history is Port-A-Cath placement There is a right subclavian vein Port-A-Cath with tip overlying the superior vena cava. There is no pneumothorax. The lungs are clear. The cardiomediastinal silhouette is normal in size. There are no acute osseous normality's. IMPRESSION: No pneumothorax status post right subclavian vein Port-A-Cath placement Electronically signed by:  Breana Lebron MD  9/20/2021 11:52 AM CDT Workstation: [id number]    MRI Breast w/wo Contrast, w/CAD, Bilateral    Result Date: 9/28/2021  EXAMINATION: MRI BREAST W/WO CONTRAST, W/CAD, BILATERAL CLINICAL HISTORY: Staging left breast carcinoma, C 50.812, N 63.21, N 63.24, N 63.25,  malignant neoplasm of overlapping sites of left female breast TECHNIQUE: CMS MANDATED QUALITY DATA - MAMMOGRAPHY - 225 Bilateral breast MRI was performed with a dedicated breast coil. The patient was placed prone in the breast immobilizer with light compression. The following localization sequences were obtained: Axial inversion recovery, axial 3-D non-fat-suppressed, axial 3-D fat suppressed pre-contrast, followed by axial 3-D fat-suppressed post-contrast dynamic images with 6.5 mL Gadavist IV contrast. Post-processing including subtraction imaging, reconstruction in the sagittal and coronal planes, and MIP of both breasts was performed on an independent workstation. The interpretation was assisted by Computer Aided Detection. FINDINGS: Comparison to the diagnostic mammogram and ultrasound of 08/20/2021.  The breasts are composed of mostly fibroglandular elements and interspersed fat.  Background parenchymal enhancement is mild. There is a dominant heterogeneously enhancing solid mass with irregular margins in the left breast retroareolar region measuring 37 mm AP x 3 mm transverse by 35 mm craniocaudal dimension, with suspicious enhancement kinetics.  There are several additional enhancing nodular and irregular infiltrative masses in the left breast, some which are contiguous with the dominant retroareolar mass, extending into the all quadrants of the breast predominantly the upper outer quadrant.  Total area of disease in the left breast measures roughly 38 mm AP x 60 mm transverse by 90 mm craniocaudal dimension, roughly 30-40% of the volume of the left breast.  No abnormal enhancement to suggest involvement of the left pectoralis musculature. There are several enlarged infiltrated appearing left axillary lymph nodes suspicious for metastatic disease, measuring 16 mm maximum short axis dimension.  There are no enlarged right axillary lymph nodes or enlarged internal mammary chain lymph nodes. Stippled randomly  scattered non masslike enhancement in the right breast is nonspecific, with no enhancing right breast mass. There is a 23 mm STIR hyperintense enhancing right hilar mass suspicious for hilar lymphadenopathy, with no additional abnormal enhancement in the chest wall or mediastinum evident.     1. Multifocal, multicentric left breast carcinoma as described, with several enlarged left axillary lymph nodes consistent with metastatic disease. 2. No findings of contralateral right breast carcinoma, with scattered stippled probably benign non masslike enhancement in the right breast. 3. Enhancing right hilar mass suspicious for metastatic disease.  Consider further evaluation with whole body PET CT. BI-RADS CATEGORY 6: KNOWN MALIGNANCY. [facility] utilizes a reminder system to ensure that patients receive reminder letters for appointments. This includes reminders for routine screening mammograms, diagnostic mammograms, or other breast imaging interventions as appropriate. This patient will be placed in the appropriate reminder system. Electronically signed by: Keron Neil MD Date:    09/28/2021 Time:    09:32           I have reviewed all available lab results and radiology reports.    Assessment/Plan:   (1) 31 y.o. female with diagnosis of left breast cancer who has been referred by Dr Cheri Smith for evaluation by medical hematology/oncology.   - left breast masses of overlapping sites involving Outer upper Quadrant and Lower inner Quadrant respectively  - s/p core biopsies taken on 8/27/2021  - axillary LN was positive  - stage III cancer process at least  - ER positive at 80.5 %, OK positive at 3%; Her2Nu (3+)  -Ki67 25%  - discussed the pathology and the latest NCCN guidelines (version 8.2021)  - recommend neoadjuvant chemotherapy with herceptin + perjeta based regimen which studies have shown to be beneficial to survival and endocrine therapy  - she will need echo, portacath and PEt scan  - will set up  chemotherapy school to discuss the drugs, side-effects and provide literature on the drugs    9/30/2021:  - Her case was presented at the [facility] Tumor Board yesterday and consensus was to get PEt scan and proceed with TCHP chemotherapy.   - She had chemotherapy school with Susana.   - She had portacath placed on Rght CW with Dr Smith.   - She is set to start chemotherapy on Oct 5th.   - echo on chart from 9/24/2021  - discussed the chemotherapy regimen, provided literature and discussed the side-effect profile of the drugs    10/26/2021:  - getting cycle #2 today  - getting Udenyca tomorrow and she also requested some IVF tomorrow as well  - discussed the PEt scan findings from 10/21/2021     12/7/2021:  - 4th cycle chemo this week with IVF and antiemtics as needed  - order MRI of breasts for next week to re-assess response    1/10/2022:  - She completed the 4th cycle of chemotherapy and had subsequent breast MRI on 12/23/2021 which showed an excellent response to the therapy; she has decided to stop the chemotherapy and proceed with surgery; she is adamant that she does not want to continue with the neoadjuvant regiment; she is aware that she will still at least need to continue the herceptin post-op and may need additional chemotherapy depending on the pathology findings from the surgery  - She saw Dr Smith the other day and is seeing Dr Nguyen with plastic surgery tomorrow     (2) no HTN     (3) No DM     (4) two c-sections with healthy children - she is aware that with chemotherapy she could potentially not be able to reproduce or have any more children - she is comfortable with that fact      VISIT DIAGNOSES:      Malignant neoplasm of overlapping sites of left female breast, unspecified estrogen receptor status    Microcytic anemia    HER2-positive carcinoma of breast    Mass of lower inner quadrant of left breast    Mass of upper outer quadrant of left breast    Port-A-Cath in place    ER+ (estrogen  receptor positive status)    Mass overlapping multiple quadrants of left breast    Chemotherapy-induced neutropenia          PLAN:  1. patient seeing plastic surgery tomorrow  2.  awaii surgery and plastic decision on timing of the surgery  3. Check up to date labs weekly   4. s/p chemotherapy school     RTC in  4 weeks with myself    Fax note to Brittanie Smith       Discussion:     COVID-19 Discussion:     I had long discussion with patient and any applicable family about the COVID-19 coronavirus epidemic and the recommended precautions with regard to cancer and/or hematology patients. I have re-iterated the CDC recommendations for adequate hand washing, use of hand -like products, and coughing into elbow, etc. In addition, especially for our patients who are on chemotherapy and/or our otherwise immunocompromised patients, I have recommended avoidance of crowds, including movie theaters, restaurants, churches, etc. I have recommended avoidance of any sick or symptomatic family members and/or friends. I have also recommended avoidance of any raw and unwashed food products, and general avoidance of food items that have not been prepared by themselves. The patient has been asked to call us immediately with any symptom developments, issues, questions or other general concerns.      Pathology Discussion:     I reviewed and discussed the pathology report(s) and radiograph reports (if available) in as simple to understand and/or laymen's terms to the best of my ability. I had an indepth conversation with the patient and went over the patient's individual diagnosis based on the information that was currently available. I discussed the TNM staging process with regard to the patient's particular cancer type, and the calculated stage based on the currently available TNM data and literature. I discussed the available prognostic data with regard to the current staging information and how it relates to the  "prognosis of their particular neoplastic process.          NCCN Guidelines:     I discussed the available treatment option(s) in accordance with the latest literature from the NCCN Clinical Practice Guidelines for the patient's particular type of cancer disorder. The NCCN Guidelines provide a "document evidence-based (and) consensus-driven management" of the care of oncology patients. The treatment recommendations were made not only in accordance to the NCCN guidelines, but also factored in to account the patient's overall age, condition, performance status and their medical co-morbidities. I went over the risks and benefits of the the treatment options (if any could be made) with regard to their particular cancer type, their cancer stage, their age, and their co-morbidities.      Chemotherapy Discussion:        I discussed the available treatment option(s) in accordance with the latest/current national evidence-based guidelines (NCCN, UpToDate, NCI, ASCO, etc where applicable), their overall age/condition and their co-morbidities. I also went over the risks and benefits of the chemotherapy with regard to their particular cancer type, their cancer stage, their age/condition, and their co-morbidities. I provided literature on the chemotherapy regimen and discussed the chemotherapy side-effect profiles of the drug(s). I discussed the importance of compliance with obtaining and monitoring weekly lab work, and went over the potential hematopathology issues and risks with anemia, leucopenia and thrombocytopenia that can occur with chemotherapy. I discussed the potential risks of liver and kidney damage, which could be permanent and could necessitate dialysis long-term if kidney failure developed. I discussed the emetic and/or diarrheal potential of the regimen and the potential need for use of antiemetic and anti-diarrheal medications. I discussed the risk for development of anaphylactic shock, bronchospasm, dysrhythmia, " and respiratory/cardiovascular arrest and/or failure. I discussed the potential risks for development of alopecia, cold sensory issues, ringing in ears, vertigo, cataracts, glaucoma, and neuropathy, all of which could end up being chronic and life-long. Some chemotherpyI discussed the risks of hand-foot syndrome and rashes, and development of other autoimmune mediated processes such as pneumonitis, hepatitis, and colitis which could be life threatening. I discussed the risks of the potential development of a rare but fatal viral mediated disease known as PML (Progressive Multifocal Leukoencephalopathy), and risk of future development of leukemia and/or lymphoma from use of certain chemotherapy agents. I discussed the need for neutropenic precautions, basic hygiene/sanitation behaviors and dietary restrictions.     The patient's consent has been obtained to proceed with the chemotherapy.The patient will be referred to Chemotherapy School /[facility] for training and education on chemotherapy, use of antiemetics and/or anti-diarrheals, use of NSAID's, potential chemotherapy side-effects, and any specific recommendations and precautions with the particular chemotherapy agents.       I answered all of the patient's (and family's, if applicable) questions to the best of my ability and to their complete satisfaction. The patient acknowledged full understanding of the risks, recommendations and plan(s).      I have explained and the patient understands all of  the current recommendation(s). I have answered all of their questions to the best of my ability and to their complete satisfaction.         I spent over 25 mins of time with the patient. Reviewed results of the recently ordered labs, tests and studies; made directives with regards to the results. Over half of this time was spent couseling and coordinating care.    I have explained all of the above in detail and the patient understands all of the current  recommendation(s). I have answered all of their questions to the best of my ability and to their complete satisfaction.   The patient is to continue with the current management plan.            Electronically signed by Nilesh Jackson MD

## 2022-01-10 ENCOUNTER — OFFICE VISIT (OUTPATIENT)
Dept: HEMATOLOGY/ONCOLOGY | Facility: CLINIC | Age: 32
End: 2022-01-10
Payer: COMMERCIAL

## 2022-01-10 VITALS
WEIGHT: 131 LBS | SYSTOLIC BLOOD PRESSURE: 126 MMHG | HEART RATE: 112 BPM | DIASTOLIC BLOOD PRESSURE: 82 MMHG | BODY MASS INDEX: 25.58 KG/M2 | TEMPERATURE: 98 F

## 2022-01-10 DIAGNOSIS — Z95.828 PORT-A-CATH IN PLACE: Chronic | ICD-10-CM

## 2022-01-10 DIAGNOSIS — D70.1 CHEMOTHERAPY-INDUCED NEUTROPENIA: ICD-10-CM

## 2022-01-10 DIAGNOSIS — C50.919 HER2-POSITIVE CARCINOMA OF BREAST: ICD-10-CM

## 2022-01-10 DIAGNOSIS — N63.24 MASS OF LOWER INNER QUADRANT OF LEFT BREAST: ICD-10-CM

## 2022-01-10 DIAGNOSIS — T45.1X5A CHEMOTHERAPY-INDUCED NEUTROPENIA: ICD-10-CM

## 2022-01-10 DIAGNOSIS — C50.812 MALIGNANT NEOPLASM OF OVERLAPPING SITES OF LEFT FEMALE BREAST, UNSPECIFIED ESTROGEN RECEPTOR STATUS: Primary | ICD-10-CM

## 2022-01-10 DIAGNOSIS — N63.21 MASS OF UPPER OUTER QUADRANT OF LEFT BREAST: ICD-10-CM

## 2022-01-10 DIAGNOSIS — Z17.0 ER+ (ESTROGEN RECEPTOR POSITIVE STATUS): ICD-10-CM

## 2022-01-10 DIAGNOSIS — D50.9 MICROCYTIC ANEMIA: Chronic | ICD-10-CM

## 2022-01-10 DIAGNOSIS — N63.25 MASS OVERLAPPING MULTIPLE QUADRANTS OF LEFT BREAST: ICD-10-CM

## 2022-01-10 PROCEDURE — 99214 OFFICE O/P EST MOD 30 MIN: CPT | Mod: S$GLB,,, | Performed by: INTERNAL MEDICINE

## 2022-01-10 PROCEDURE — 1160F PR REVIEW ALL MEDS BY PRESCRIBER/CLIN PHARMACIST DOCUMENTED: ICD-10-PCS | Mod: S$GLB,,, | Performed by: INTERNAL MEDICINE

## 2022-01-10 PROCEDURE — 3074F PR MOST RECENT SYSTOLIC BLOOD PRESSURE < 130 MM HG: ICD-10-PCS | Mod: S$GLB,,, | Performed by: INTERNAL MEDICINE

## 2022-01-10 PROCEDURE — 3008F PR BODY MASS INDEX (BMI) DOCUMENTED: ICD-10-PCS | Mod: S$GLB,,, | Performed by: INTERNAL MEDICINE

## 2022-01-10 PROCEDURE — 3079F DIAST BP 80-89 MM HG: CPT | Mod: S$GLB,,, | Performed by: INTERNAL MEDICINE

## 2022-01-10 PROCEDURE — 3079F PR MOST RECENT DIASTOLIC BLOOD PRESSURE 80-89 MM HG: ICD-10-PCS | Mod: S$GLB,,, | Performed by: INTERNAL MEDICINE

## 2022-01-10 PROCEDURE — 3008F BODY MASS INDEX DOCD: CPT | Mod: S$GLB,,, | Performed by: INTERNAL MEDICINE

## 2022-01-10 PROCEDURE — 99214 PR OFFICE/OUTPT VISIT, EST, LEVL IV, 30-39 MIN: ICD-10-PCS | Mod: S$GLB,,, | Performed by: INTERNAL MEDICINE

## 2022-01-10 PROCEDURE — 1160F RVW MEDS BY RX/DR IN RCRD: CPT | Mod: S$GLB,,, | Performed by: INTERNAL MEDICINE

## 2022-01-10 PROCEDURE — 3074F SYST BP LT 130 MM HG: CPT | Mod: S$GLB,,, | Performed by: INTERNAL MEDICINE

## 2022-01-14 ENCOUNTER — TELEPHONE (OUTPATIENT)
Dept: HEMATOLOGY/ONCOLOGY | Facility: CLINIC | Age: 32
End: 2022-01-14
Payer: COMMERCIAL

## 2022-01-14 NOTE — TELEPHONE ENCOUNTER
----- Message from Tiarra Ceja, Patient Care Assistant sent at 1/13/2022 10:12 AM CST -----  Patient called in stating her Surgery with Dr. Smith & Dr. Carroll  is scheduled on 1/28/2022 , she also states she turned in paperwork to be filled out by Dr. Jackson , she would like to know if its ready for her to .  # [phone]

## 2022-01-21 ENCOUNTER — TELEPHONE (OUTPATIENT)
Dept: HEMATOLOGY/ONCOLOGY | Facility: CLINIC | Age: 32
End: 2022-01-21
Payer: COMMERCIAL

## 2022-01-21 NOTE — TELEPHONE ENCOUNTER
----- Message from Janis Pearce sent at 1/21/2022  9:36 AM CST -----  Regarding: FORMS  Esperanza Peters called to find out if the forms she needed to be filled out have been filled out, she stated they were due today.  She asked that someone give her a call at [phone].  Thank you Jessica

## 2022-01-21 NOTE — TELEPHONE ENCOUNTER
Informed patient that Susana completed paperwork and faxed to number on paperwork but that we have her copy in office if she would like to pick it up, verbalized understanding and states she will come by to pickup. Paperwork scanned to chart and placed in file at .

## 2022-01-24 NOTE — PROGRESS NOTES
Subjective:    Patient ID:  Esperanza Peters is a 31 y.o. female who presents for evaluation of No chief complaint on file.      HPI:  MRI Breast w/wo Contrast, w/CAD, Bilateral     Result Date: 9/28/2021  EXAMINATION: MRI BREAST W/WO CONTRAST, W/CAD, BILATERAL CLINICAL HISTORY: Staging left breast carcinoma, C 50.812, N 63.21, N 63.24, N 63.25, malignant neoplasm of overlapping sites of left female breast TECHNIQUE: CMS MANDATED QUALITY DATA - MAMMOGRAPHY - 225 Bilateral breast MRI was performed with a dedicated breast coil. The patient was placed prone in the breast immobilizer with light compression. The following localization sequences were obtained: Axial inversion recovery, axial 3-D non-fat-suppressed, axial 3-D fat suppressed pre-contrast, followed by axial 3-D fat-suppressed post-contrast dynamic images with 6.5 mL Gadavist IV contrast. Post-processing including subtraction imaging, reconstruction in the sagittal and coronal planes, and MIP of both breasts was performed on an independent workstation. The interpretation was assisted by Computer Aided Detection. FINDINGS: Comparison to the diagnostic mammogram and ultrasound of 08/20/2021.  The breasts are composed of mostly fibroglandular elements and interspersed fat.  Background parenchymal enhancement is mild. There is a dominant heterogeneously enhancing solid mass with irregular margins in the left breast retroareolar region measuring 37 mm AP x 3 mm transverse by 35 mm craniocaudal dimension, with suspicious enhancement kinetics.  There are several additional enhancing nodular and irregular infiltrative masses in the left breast, some which are contiguous with the dominant retroareolar mass, extending into the all quadrants of the breast predominantly the upper outer quadrant.  Total area of disease in the left breast measures roughly 38 mm AP x 60 mm transverse by 90 mm craniocaudal dimension, roughly 30-40% of the volume of the left breast.  No  abnormal enhancement to suggest involvement of the left pectoralis musculature. There are several enlarged infiltrated appearing left axillary lymph nodes suspicious for metastatic disease, measuring 16 mm maximum short axis dimension.  There are no enlarged right axillary lymph nodes or enlarged internal mammary chain lymph nodes. Stippled randomly scattered non masslike enhancement in the right breast is nonspecific, with no enhancing right breast mass. There is a 23 mm STIR hyperintense enhancing right hilar mass suspicious for hilar lymphadenopathy, with no additional abnormal enhancement in the chest wall or mediastinum evident.      1. Multifocal, multicentric left breast carcinoma as described, with several enlarged left axillary lymph nodes consistent with metastatic disease. 2. No findings of contralateral right breast carcinoma, with scattered stippled probably benign non masslike enhancement in the right breast. 3. Enhancing right hilar mass suspicious for metastatic disease.  Consider further evaluation with whole body PET CT. BI-RADS CATEGORY 6: KNOWN MALIGNANCY. [facility] utilizes a reminder system to ensure that patients receive reminder letters for appointments. This includes reminders for routine screening mammograms, diagnostic mammograms, or other breast imaging interventions as appropriate. This patient will be placed in the appropriate reminder system. Electronically signed by:    Keron Neil MD Date:                                             09/28/2021 Time:                                            09:32              I have reviewed all available lab results and radiology reports.     Assessment/Plan:   (1) 31 y.o. female with diagnosis of left breast cancer who has been referred by Dr Cheri Smith for evaluation by medical hematology/oncology.   - left breast masses of overlapping sites involving Outer upper Quadrant and Lower inner Quadrant respectively  - s/p core biopsies  taken on 8/27/2021  - axillary LN was positive  - stage III cancer process at least  - ER positive at 80.5 %, NC positive at 3%; Her2Nu (3+)  -Ki67 25%  - discussed the pathology and the latest NCCN guidelines (version 8.2021)  - recommend neoadjuvant chemotherapy with herceptin + perjeta based regimen which studies have shown to be beneficial to survival and endocrine therapy  - she will need echo, portacath and PEt scan  - will set up chemotherapy school to discuss the drugs, side-effects and provide literature on the drugs     9/30/2021:  - Her case was presented at the [facility] Tumor Board yesterday and consensus was to get PEt scan and proceed with TCHP chemotherapy.   - She had chemotherapy school with [employer].   - She had portacath placed on [facility] with Dr Smith.   - She is set to start chemotherapy on Oct 5th.   - echo on chart from 9/24/2021  - discussed the chemotherapy regimen, provided literature and discussed the side-effect profile of the drugs     10/26/2021:  - getting cycle #2 today  - getting Udenyca tomorrow and she also requested some IVF tomorrow as well  - discussed the PEt scan findings from 10/21/2021      12/7/2021:  - 4th cycle chemo this week with IVF and antiemtics as needed  - order MRI of breasts for next week to re-assess response     1/10/2022:  - She completed the 4th cycle of chemotherapy and had subsequent breast MRI on 12/23/2021 which showed an excellent response to the therapy; she has decided to stop the chemotherapy and proceed with surgery; she is adamant that she does not want to continue with the neoadjuvant regiment; she is aware that she will still at least need to continue the herceptin post-op and may need additional chemotherapy depending on the pathology findings from the surgery  - She saw Dr Smith the other day and is seeing Dr Nguyen with plastic surgery tomorrow     (2) no HTN     (3) No DM     (4) two c-sections with healthy children - she is aware that  with chemotherapy she could potentially not be able to reproduce or have any more children - she is comfortable with that fact        VISIT DIAGNOSES:      Malignant neoplasm of overlapping sites of left female breast, unspecified estrogen receptor status     Microcytic anemia     HER2-positive carcinoma of breast     Mass of lower inner quadrant of left breast     Mass of upper outer quadrant of left breast     Port-A-Cath in place     ER+ (estrogen receptor positive status)     Mass overlapping multiple quadrants of left breast     Chemotherapy-induced neutropenia              PLAN:  1. patient seeing plastic surgery tomorrow  2.  awaii surgery and plastic decision on timing of the surgery  3. Check up to date labs weekly   4. s/p chemotherapy school     RTC in  4 weeks with myself     Fax note to Brittanie Smith        Discussion:      COVID-19 Discussion:     I had long discussion with patient and any applicable family about the COVID-19 coronavirus epidemic and the recommended precautions with regard to cancer and/or hematology patients. I have re-iterated the CDC recommendations for adequate hand washing, use of hand -like products, and coughing into elbow, etc. In addition, especially for our patients who are on chemotherapy and/or our otherwise immunocompromised patients, I have recommended avoidance of crowds, including movie theaters, restaurants, churches, etc. I have recommended avoidance of any sick or symptomatic family members and/or friends. I have also recommended avoidance of any raw and unwashed food products, and general avoidance of food items that have not been prepared by themselves. The patient has been asked to call us immediately with any symptom developments, issues, questions or other general concerns.      Pathology Discussion:     I reviewed and discussed the pathology report(s) and radiograph reports (if available) in as simple to understand and/or laymen's terms to the  "best of my ability. I had an indepth conversation with the patient and went over the patient's individual diagnosis based on the information that was currently available. I discussed the TNM staging process with regard to the patient's particular cancer type, and the calculated stage based on the currently available TNM data and literature. I discussed the available prognostic data with regard to the current staging information and how it relates to the prognosis of their particular neoplastic process.          NCCN Guidelines:     I discussed the available treatment option(s) in accordance with the latest literature from the NCCN Clinical Practice Guidelines for the patient's particular type of cancer disorder. The NCCN Guidelines provide a "document evidence-based (and) consensus-driven management" of the care of oncology patients. The treatment recommendations were made not only in accordance to the NCCN guidelines, but also factored in to account the patient's overall age, condition, performance status and their medical co-morbidities. I went over the risks and benefits of the the treatment options (if any could be made) with regard to their particular cancer type, their cancer stage, their age, and their co-morbidities.      Chemotherapy Discussion:        I discussed the available treatment option(s) in accordance with the latest/current national evidence-based guidelines (NCCN, UpToDate, NCI, ASCO, etc where applicable), their overall age/condition and their co-morbidities. I also went over the risks and benefits of the chemotherapy with regard to their particular cancer type, their cancer stage, their age/condition, and their co-morbidities. I provided literature on the chemotherapy regimen and discussed the chemotherapy side-effect profiles of the drug(s). I discussed the importance of compliance with obtaining and monitoring weekly lab work, and went over the potential hematopathology issues and risks with " anemia, leucopenia and thrombocytopenia that can occur with chemotherapy. I discussed the potential risks of liver and kidney damage, which could be permanent and could necessitate dialysis long-term if kidney failure developed. I discussed the emetic and/or diarrheal potential of the regimen and the potential need for use of antiemetic and anti-diarrheal medications. I discussed the risk for development of anaphylactic shock, bronchospasm, dysrhythmia, and respiratory/cardiovascular arrest and/or failure. I discussed the potential risks for development of alopecia, cold sensory issues, ringing in ears, vertigo, cataracts, glaucoma, and neuropathy, all of which could end up being chronic and life-long. Some chemotherpyI discussed the risks of hand-foot syndrome and rashes, and development of other autoimmune mediated processes such as pneumonitis, hepatitis, and colitis which could be life threatening. I discussed the risks of the potential development of a rare but fatal viral mediated disease known as PML (Progressive Multifocal Leukoencephalopathy), and risk of future development of leukemia and/or lymphoma from use of certain chemotherapy agents. I discussed the need for neutropenic precautions, basic hygiene/sanitation behaviors and dietary restrictions.     The patient's consent has been obtained to proceed with the chemotherapy.The patient will be referred to Chemotherapy School /[facility] for training and education on chemotherapy, use of antiemetics and/or anti-diarrheals, use of NSAID's, potential chemotherapy side-effects, and any specific recommendations and precautions with the particular chemotherapy agents.      1/25/22    Patient is here today for evaluation of tachycardia..  Hers history of breast cancer is outlined above.  She took 4 rounds of chemotherapy and had tachycardias after each around at home.  On December 16 she had a visit to the emergency room her she was knows to have sinus  tachycardia heart rate 111 per.  This resolved spontaneously with fluids.  There was felt to be a component of anxiety and dehydration.  She has not had any more episodes of tachycardias since she is stop the chemotherapy.  She is planning to have surgery this Friday and this will be followed by immunotherapy.    Reason for Exam  Priority: STAT  Dx: HER2-positive carcinoma of breast [C50.919 (ICD-10-CM)]; Hypotension, unspecified hypotension type [I95.9 (ICD-10-CM)]; Tachycardia [R00.0 (ICD-10-CM)]       Result Image Hyperlink     Show images for Echo    Summary    · The left ventricle is normal in size with normal systolic function.  · Normal left ventricular diastolic function.  · The estimated ejection fraction is 60%.  · Atrial fibrillation not observed.  · Normal right ventricular size with normal right ventricular systolic function.  · Normal central venous pressure (3 mmHg).  · The estimated PA systolic pressure is 28 mmHg.         Vitals    Height Weight BMI (Calculated) BSA (Calculated - sq m) BP Pulse   5' (1.524 m) 60.3 kg (133 lb) 26 1.6 sq meters 114/77 109           Review of patient's allergies indicates:  No Known Allergies    Past Medical History:   Diagnosis Date    ER+ (estrogen receptor positive status) 2021    HER2-positive carcinoma of breast 2021    Malignant neoplasm of overlapping sites of left female breast 2021    Mass of lower inner quadrant of left breast 2021    Mass of upper outer quadrant of left breast 2021    Mass overlapping multiple quadrants of left breast 2021     Past Surgical History:   Procedure Laterality Date     SECTION  2008    x2 2008 & 2019    INSERTION OF TUNNELED CENTRAL VENOUS CATHETER (CVC) WITH SUBCUTANEOUS PORT N/A 2021    Procedure: INSERTION, PORT-A-CATH;  Surgeon: Latanya Smith MD;  [facility];  Service: General;  Laterality: N/A;     Social History     Tobacco Use    Smoking status: Never  Smoker    Smokeless tobacco: Never Used   Substance Use Topics    Alcohol use: No    Drug use: No     Family History   Problem Relation Age of Onset    Hypertension Mother     Cancer Maternal Aunt         Review of Systems:   Constitution: Negative for diaphoresis and fever.   HEENT: Negative for nosebleeds.    Cardiovascular: Negative for chest pain       No dyspnea on exertion       No leg swelling        No palpitations  Respiratory: Negative for shortness of breath and wheezing.    Hematologic/Lymphatic: Negative for bleeding problem. Does not bruise/bleed easily.   Skin: Negative for color change and rash.   Musculoskeletal: Negative for falls and myalgias.   Gastrointestinal: Negative for hematemesis and hematochezia.   Genitourinary: Negative for hematuria.   Neurological: Negative for dizziness and light-headedness.   Psychiatric/Behavioral: Negative for altered mental status and memory loss.          Objective:        There were no vitals filed for this visit.    Lab Results   Component Value Date    WBC 4.66 12/27/2021    HGB 10.2 (L) 12/27/2021    HCT 32.8 (L) 12/27/2021    PLT 96 (L) 12/27/2021    ALT 42 12/27/2021    AST 39 12/27/2021     (L) 12/27/2021    K 3.8 12/27/2021    CL 99 12/27/2021    CREATININE 0.9 12/27/2021    BUN 8 12/27/2021    CO2 27 12/27/2021    INR 1.2 12/11/2021        ECHOCARDIOGRAM RESULTS  Results for orders placed in visit on 12/27/21    Echo    Interpretation Summary  · The left ventricle is normal in size with normal systolic function.  · Normal left ventricular diastolic function.  · The estimated ejection fraction is 60%.  · Atrial fibrillation not observed.  · Normal right ventricular size with normal right ventricular systolic function.  · Normal central venous pressure (3 mmHg).  · The estimated PA systolic pressure is 28 mmHg.        CURRENT/PREVIOUS VISIT EKG  Results for orders placed or performed during the hospital encounter of 12/16/21   EKG 12-LEAD     Collection Time: 12/16/21  6:09 PM    Narrative    Test Reason : R07.9,    Vent. Rate : 098 BPM     Atrial Rate : 098 BPM     P-R Int : 132 ms          QRS Dur : 072 ms      QT Int : 334 ms       P-R-T Axes : 049 067 033 degrees     QTc Int : 426 ms    Normal sinus rhythm  Normal ECG  When compared with ECG of 11-DEC-2021 16:06,  No significant change was found  Confirmed by Hernandez New MD ([id number]) on 12/21/2021 9:10:34 PM    Referred By: INGRID   SELF           Confirmed By:Hernandez New MD     No valid procedures specified.   No results found for this or any previous visit.      Physical Exam:  CONSTITUTIONAL: No fever, no chills  HEENT: Normocephalic, atraumatic,pupils reactive to light                 NECK:  No JVD no carotid bruit  CVS: S1S2+, RRR, no murmurs,   LUNGS: Clear  ABDOMEN: Soft, NT, BS+  EXTREMITIES: No cyanosis, edema  : No pacheco catheter  NEURO: AAO X 3  PSY: Normal affect      Medication List with Changes/Refills   Current Medications    ACETAMINOPHEN (TYLENOL) 500 MG TABLET    Take 500 mg by mouth once as needed for Pain.    CEPHALEXIN (KEFLEX) 500 MG CAPSULE    TAKE 1 CAPSULE  BY MOUTH THREE TIMES DAILY.    DEXAMETHASONE (DECADRON) 6 MG TABLET    Take 6 mg by mouth daily with breakfast. On treatment days    GABAPENTIN (NEURONTIN) 100 MG CAPSULE    Take 1 capsule (100 mg total) by mouth 3 (three) times daily.    MAGIC MOUTHWASH DIPHEN/ANTAC/LIDOC/NYSTA    Take 10 MLS by mouth four times daily    MINOCYCLINE (MINOCIN,DYNACIN) 100 MG CAPSULE    Take 1 capsule (100 mg total) by mouth every 12 (twelve) hours.    ONDANSETRON (ZOFRAN) 8 MG TABLET    Take 1 tablet (8 mg total) by mouth every 8 (eight) hours as needed for Nausea.    ONDANSETRON (ZOFRAN-ODT) 8 MG TBDL    DISSOLVE 1 TABLET UNDER THE TONGUE EVERY 8 HOURS.    OXYCODONE-ACETAMINOPHEN (PERCOCET) 5-325 MG PER TABLET    TAKE 1 TABLET BY MOUTH EVERY 6 HOURS AS NEEDED FOR PAIN.    PAROXETINE (PAXIL) 10 MG TABLET    Take 1 tablet by  mouth once daily.    PHENAZOPYRIDINE (PYRIDIUM) 200 MG TABLET    Take 1 tablet (200 mg total) by mouth 3 (three) times daily as needed for Pain.    SULFAMETHOXAZOLE-TRIMETHOPRIM 400-80MG (BACTRIM,SEPTRA) 400-80 MG PER TABLET    Take 1 tablet by mouth twice daily.             Assessment:       1. ER+ (estrogen receptor positive status)    2. Chemotherapy-induced neutropenia    3. Hypokalemia    4. HER2-positive carcinoma of breast    5. Tachycardia         Plan:     Problem List Items Addressed This Visit        Renal/    Hypokalemia       Oncology    HER2-positive carcinoma of breast    ER+ (estrogen receptor positive status) - Primary    Chemotherapy-induced neutropenia      Other Visit Diagnoses     Tachycardia            Inappropriate tachycardia after chemotherapy and dehydration component of stress anxiety.  Of echocardiogram is completely normal EKG currently shows sinus rhythm heart rate 92. I would place a Holter to see if there is ongoing tachycardias but otherwise would give p.r.n. of propranolol if she has some sustained tachycardia after she initiates her immuno therapy.  She feels like the episodes have completely stops that she finished her chemotherapy.      No follow-ups on file.    The patients questions were answered, they verbalized understanding, and agreed with the treatment plan.     DELFINO KENT MD  [facility]

## 2022-01-25 ENCOUNTER — OFFICE VISIT (OUTPATIENT)
Dept: CARDIOLOGY | Facility: CLINIC | Age: 32
End: 2022-01-25
Payer: COMMERCIAL

## 2022-01-25 VITALS
OXYGEN SATURATION: 99 % | HEART RATE: 98 BPM | WEIGHT: 132 LBS | DIASTOLIC BLOOD PRESSURE: 60 MMHG | SYSTOLIC BLOOD PRESSURE: 110 MMHG | HEIGHT: 60 IN | BODY MASS INDEX: 25.91 KG/M2 | RESPIRATION RATE: 16 BRPM

## 2022-01-25 DIAGNOSIS — E87.6 HYPOKALEMIA: ICD-10-CM

## 2022-01-25 DIAGNOSIS — D70.1 CHEMOTHERAPY-INDUCED NEUTROPENIA: ICD-10-CM

## 2022-01-25 DIAGNOSIS — I95.9 HYPOTENSION, UNSPECIFIED HYPOTENSION TYPE: ICD-10-CM

## 2022-01-25 DIAGNOSIS — C50.919 HER2-POSITIVE CARCINOMA OF BREAST: ICD-10-CM

## 2022-01-25 DIAGNOSIS — T45.1X5A CHEMOTHERAPY-INDUCED NEUTROPENIA: ICD-10-CM

## 2022-01-25 DIAGNOSIS — Z17.0 ER+ (ESTROGEN RECEPTOR POSITIVE STATUS): ICD-10-CM

## 2022-01-25 DIAGNOSIS — R00.0 TACHYCARDIA: Primary | ICD-10-CM

## 2022-01-25 PROCEDURE — 3008F BODY MASS INDEX DOCD: CPT | Mod: CPTII,S$GLB,, | Performed by: GENERAL PRACTICE

## 2022-01-25 PROCEDURE — 3008F PR BODY MASS INDEX (BMI) DOCUMENTED: ICD-10-PCS | Mod: CPTII,S$GLB,, | Performed by: GENERAL PRACTICE

## 2022-01-25 PROCEDURE — 93000 EKG 12-LEAD: ICD-10-PCS | Mod: S$GLB,,, | Performed by: GENERAL PRACTICE

## 2022-01-25 PROCEDURE — 3078F DIAST BP <80 MM HG: CPT | Mod: CPTII,S$GLB,, | Performed by: GENERAL PRACTICE

## 2022-01-25 PROCEDURE — 99204 OFFICE O/P NEW MOD 45 MIN: CPT | Mod: S$GLB,,, | Performed by: GENERAL PRACTICE

## 2022-01-25 PROCEDURE — 3074F SYST BP LT 130 MM HG: CPT | Mod: CPTII,S$GLB,, | Performed by: GENERAL PRACTICE

## 2022-01-25 PROCEDURE — 3074F PR MOST RECENT SYSTOLIC BLOOD PRESSURE < 130 MM HG: ICD-10-PCS | Mod: CPTII,S$GLB,, | Performed by: GENERAL PRACTICE

## 2022-01-25 PROCEDURE — 1159F MED LIST DOCD IN RCRD: CPT | Mod: CPTII,S$GLB,, | Performed by: GENERAL PRACTICE

## 2022-01-25 PROCEDURE — 3078F PR MOST RECENT DIASTOLIC BLOOD PRESSURE < 80 MM HG: ICD-10-PCS | Mod: CPTII,S$GLB,, | Performed by: GENERAL PRACTICE

## 2022-01-25 PROCEDURE — 1159F PR MEDICATION LIST DOCUMENTED IN MEDICAL RECORD: ICD-10-PCS | Mod: CPTII,S$GLB,, | Performed by: GENERAL PRACTICE

## 2022-01-25 PROCEDURE — 99204 PR OFFICE/OUTPT VISIT, NEW, LEVL IV, 45-59 MIN: ICD-10-PCS | Mod: S$GLB,,, | Performed by: GENERAL PRACTICE

## 2022-01-25 PROCEDURE — 93000 ELECTROCARDIOGRAM COMPLETE: CPT | Mod: S$GLB,,, | Performed by: GENERAL PRACTICE

## 2022-01-25 RX ORDER — PROPRANOLOL HYDROCHLORIDE 10 MG/1
20 TABLET ORAL 3 TIMES DAILY
Qty: 25 TABLET | Refills: 1 | Status: SHIPPED | OUTPATIENT
Start: 2022-01-25 | End: 2022-01-26

## 2022-01-25 NOTE — PROGRESS NOTES
[facility] Hematology/Oncology  PROGRESS NOTE -  Follow-up Visit      Subjective:       Patient ID:   NAME: Esperanza Peters : 1990     31 y.o. female    Referring Doc: Dr Cheri Smith  Other Physicians: Armaan Mukherjee           Chief Complaint: left breast cancer f/u       History of Present Illness:     Patient returns today for a regularly scheduled follow-up visit.  The patient is here today to go over the results of the recently ordered labs, tests and studies. She is here by herself.     She completed the 4th cycle of chemotherapy and had subsequent breast MRI on 2021 which showed an excellent response to the therapy; she has decided to stop the chemotherapy and proceed with surgery; she is adamant that she does not want to continue with the neoadjuvant regiment; she is aware that she will still at least need to continue the herceptin post-op and may need additional chemotherapy depending on the pathology findings from the surgery    She is going to have her bilateral mastectomy 2022 with plans of reconstruction following in the weeks after.     She had labs done at [place] for preop, we will request those for review    Breathing ok, no current CP, SOB, HA's or N/V; she has some residual neuropathy that is improving.    She did see Dr. Cabrera yesterday for tachycardia post chemotherapy. EKG was normal, echo was normal, patient in sinus rhythm.     discussed covid19 precautions      ROS:   GEN: normal without any fever, night sweats or weight loss  HEENT: normal with no HA's, sore throat, stiff neck, changes in vision  CV: normal with no CP, SOB, PND, CHASE or orthopnea  PULM: normal with no SOB, cough, hemoptysis, sputum or pleuritic pain  GI: normal with no abdominal pain, nausea, vomiting, constipation, diarrhea, melanotic stools, BRBPR, or hematemesis  : normal with no hematuria, dysuria  BREAST: no palpable masses  SKIN: normal with no rash, erythema, bruising, or swelling    Pain Scale:   0    Allergies:  Review of patient's allergies indicates:  No Known Allergies    Medications:    Current Outpatient Medications:     acetaminophen (TYLENOL) 500 MG tablet, Take 500 mg by mouth once as needed for Pain., Disp: , Rfl:     magic mouthwash diphen/antac/lidoc/nysta, Take 10 mLs by mouth 4 (four) times daily., Disp: 120 mL, Rfl: 1    PMHx/PSHx Updates:  See patient's last visit with Dr. Jackson on 1/10/2022  See H&P on 9/13/2021        Pathology:                Objective:     Vitals:  Blood pressure 112/73, pulse 96, temperature 98 °F (36.7 °C), resp. rate 18, height 5' (1.524 m), weight 59.4 kg (131 lb).    Physical Examination:   GEN: no apparent distress, comfortable; AAOx3  HEAD: atraumatic and normocephalic  EYES: no pallor, no icterus, PERRLA  ENT: OMM, no pharyngeal erythema, external ears WNL; no nasal discharge; no thrush  NECK: no masses, thyroid normal, trachea midline, no LAD/LN's, supple  CV: RRR with no murmur; normal pulse; normal S1 and S2; no pedal edema; Rght CW portacath  CHEST: Normal respiratory effort; CTAB; normal breath sounds; no wheeze or crackles  ABDOM: nontender and nondistended; soft; normal bowel sounds; no rebound/guarding  MUSC/Skeletal: ROM normal; no crepitus; joints normal; no deformities or arthropathy  EXTREM: no clubbing, cyanosis, inflammation or swelling  SKIN: no rashes, lesions, ulcers, petechiae or subcutaneous nodules  : no pacheco  NEURO: grossly intact; motor/sensory WNL; AAOx3; no tremors  PSYCH: normal mood, affect and behavior  LYMPH: normal cervical, supraclavicular, axillary and groin LN's  Breast:          Labs:     Lab Results   Component Value Date    WBC 4.66 12/27/2021    HGB 10.2 (L) 12/27/2021    HCT 32.8 (L) 12/27/2021    MCV 86 12/27/2021    PLT 96 (L) 12/27/2021       CMP  Sodium   Date Value Ref Range Status   12/27/2021 134 (L) 136 - 145 mmol/L Final     Potassium   Date Value Ref Range Status   12/27/2021 3.8 3.5 - 5.1 mmol/L Final      Chloride   Date Value Ref Range Status   12/27/2021 99 95 - 110 mmol/L Final     CO2   Date Value Ref Range Status   12/27/2021 27 23 - 29 mmol/L Final     Glucose   Date Value Ref Range Status   12/27/2021 96 70 - 110 mg/dL Final     BUN   Date Value Ref Range Status   12/27/2021 8 6 - 20 mg/dL Final     Creatinine   Date Value Ref Range Status   12/27/2021 0.9 0.5 - 1.4 mg/dL Final     Calcium   Date Value Ref Range Status   12/27/2021 8.8 8.7 - 10.5 mg/dL Final     Total Protein   Date Value Ref Range Status   12/27/2021 6.8 6.0 - 8.4 g/dL Final     Albumin   Date Value Ref Range Status   12/27/2021 3.6 3.5 - 5.2 g/dL Final     Total Bilirubin   Date Value Ref Range Status   12/27/2021 0.6 0.1 - 1.0 mg/dL Final     Comment:     For infants and newborns, interpretation of results should be based  on gestational age, weight and in agreement with clinical  observations.    Premature Infant recommended reference ranges:  Up to 24 hours.............<8.0 mg/dL  Up to 48 hours............<12.0 mg/dL  3-5 days..................<15.0 mg/dL  6-29 days.................<15.0 mg/dL       Alkaline Phosphatase   Date Value Ref Range Status   12/27/2021 32 (L) 55 - 135 U/L Final     AST   Date Value Ref Range Status   12/27/2021 39 10 - 40 U/L Final     ALT   Date Value Ref Range Status   12/27/2021 42 10 - 44 U/L Final     Anion Gap   Date Value Ref Range Status   12/27/2021 8 8 - 16 mmol/L Final     eGFR if    Date Value Ref Range Status   12/27/2021 >60.0 >60 mL/min/1.73 m^2 Final     eGFR if non    Date Value Ref Range Status   12/27/2021 >60.0 >60 mL/min/1.73 m^2 Final     Comment:     Calculation used to obtain the estimated glomerular filtration  rate (eGFR) is the CKD-EPI equation.              Radiology/Diagnostic Studies:    MRI breast  12/23/2021:  -    Impression:     1. Complete resolution of previous enhancing multicentric left breast malignancy.  2. Complete resolution of prior  enlarged left axillary lymph nodes.  3. Persistent retroareolar left breast distortion near 6 o'clock position, characteristic of scarring.  Recommendation: Patient is currently under medical oncology and surgical care.        PET 10/21/2021:    Impression:     Hypermetabolic left breast mass consistent with primary breast malignancy. Please correlate with dedicated breast imaging.     No pathologically enlarged or hypermetabolic axillary or intrathoracic lymph nodes.     Hypermetabolic retroperitoneal lymph node is nonspecific and would be unusual for breast cancer metastasis. Attention on follow-up is recommended.     Low level FDG activity associated with prominent cervical lymph nodes, bilateral and symmetric, most likely reactive in nature.     Reactive red marrow.      CT scans 9/29/2021:    IMPRESSION:  1. Large irregular subareolar left breast mass and findings compatible with multifocal/multicentric disease in the left breast (better appreciated on the recent MRI).  2. Left axillary lymphadenopathy compatible with additional sites of disease.  3. Borderline enlarged right hilar node, possibly an additional site of disease, although felt less likely this could be further assessed with PET/CT.        NM Bone Scan Whole Body    Result Date: 9/27/2021  CMS MANDATED QUALITY DATA-BONE SCAN-147 HISTORY: Staging breast carcinoma, and 63.25. COMPARISON: Multiple prior chest radiographs. FINDINGS: 22 mCi Tc 99m MDP was administered IV per protocol, with delayed whole-body images obtained in the anterior and posterior projections. Nodular focus of increased radiotracer uptake in the sternum near the level of the sternomanubrial articulation is nonspecific. There is otherwise no abnormal focal increased radiotracer uptake throughout the axial or appendicular skeleton to suggest osteoblastic metastatic disease. There is normal radiotracer uptake by the kidneys, with normal excretion into the renal collecting systems and  urinary bladder. IMPRESSION: 1. Focus of increased osteoblastic activity in the sternum near the sternomanubrial junction, nonspecific. Consider further evaluation with chest CT with contrast. 2. Otherwise no evidence of osteoblastic metastatic disease. Electronically signed by:  Keron Neil MD  9/27/2021 1:41 PM CDT Workstation: [id number]    X-Ray Chest AP Portable    Result Date: 9/20/2021  Portable chest x-ray at 11:13 AM is compared to prior study dated 9/16/2021 Clinical history is Port-A-Cath placement There is a right subclavian vein Port-A-Cath with tip overlying the superior vena cava. There is no pneumothorax. The lungs are clear. The cardiomediastinal silhouette is normal in size. There are no acute osseous normality's. IMPRESSION: No pneumothorax status post right subclavian vein Port-A-Cath placement Electronically signed by:  Breana Lebron MD  9/20/2021 11:52 AM CDT Workstation: [id number]    MRI Breast w/wo Contrast, w/CAD, Bilateral    Result Date: 9/28/2021  EXAMINATION: MRI BREAST W/WO CONTRAST, W/CAD, BILATERAL CLINICAL HISTORY: Staging left breast carcinoma, C 50.812, N 63.21, N 63.24, N 63.25, malignant neoplasm of overlapping sites of left female breast TECHNIQUE: CMS MANDATED QUALITY DATA - MAMMOGRAPHY - 225 Bilateral breast MRI was performed with a dedicated breast coil. The patient was placed prone in the breast immobilizer with light compression. The following localization sequences were obtained: Axial inversion recovery, axial 3-D non-fat-suppressed, axial 3-D fat suppressed pre-contrast, followed by axial 3-D fat-suppressed post-contrast dynamic images with 6.5 mL Gadavist IV contrast. Post-processing including subtraction imaging, reconstruction in the sagittal and coronal planes, and MIP of both breasts was performed on an independent workstation. The interpretation was assisted by Computer Aided Detection. FINDINGS: Comparison to the diagnostic mammogram and ultrasound of  08/20/2021.  The breasts are composed of mostly fibroglandular elements and interspersed fat.  Background parenchymal enhancement is mild. There is a dominant heterogeneously enhancing solid mass with irregular margins in the left breast retroareolar region measuring 37 mm AP x 3 mm transverse by 35 mm craniocaudal dimension, with suspicious enhancement kinetics.  There are several additional enhancing nodular and irregular infiltrative masses in the left breast, some which are contiguous with the dominant retroareolar mass, extending into the all quadrants of the breast predominantly the upper outer quadrant.  Total area of disease in the left breast measures roughly 38 mm AP x 60 mm transverse by 90 mm craniocaudal dimension, roughly 30-40% of the volume of the left breast.  No abnormal enhancement to suggest involvement of the left pectoralis musculature. There are several enlarged infiltrated appearing left axillary lymph nodes suspicious for metastatic disease, measuring 16 mm maximum short axis dimension.  There are no enlarged right axillary lymph nodes or enlarged internal mammary chain lymph nodes. Stippled randomly scattered non masslike enhancement in the right breast is nonspecific, with no enhancing right breast mass. There is a 23 mm STIR hyperintense enhancing right hilar mass suspicious for hilar lymphadenopathy, with no additional abnormal enhancement in the chest wall or mediastinum evident.     1. Multifocal, multicentric left breast carcinoma as described, with several enlarged left axillary lymph nodes consistent with metastatic disease. 2. No findings of contralateral right breast carcinoma, with scattered stippled probably benign non masslike enhancement in the right breast. 3. Enhancing right hilar mass suspicious for metastatic disease.  Consider further evaluation with whole body PET CT. BI-RADS CATEGORY 6: KNOWN MALIGNANCY. [facility] utilizes a reminder system to ensure  that patients receive reminder letters for appointments. This includes reminders for routine screening mammograms, diagnostic mammograms, or other breast imaging interventions as appropriate. This patient will be placed in the appropriate reminder system. Electronically signed by: Keron Neil MD Date:    09/28/2021 Time:    09:32           I have reviewed all available lab results and radiology reports.    Assessment/Plan:   (1) 31 y.o. female with diagnosis of left breast cancer who has been referred by Dr Cheri Smith for evaluation by medical hematology/oncology.   - left breast masses of overlapping sites involving Outer upper Quadrant and Lower inner Quadrant respectively  - s/p core biopsies taken on 8/27/2021  - axillary LN was positive  - stage III cancer process at least  - ER positive at 80.5 %, GA positive at 3%; Her2Nu (3+)  -Ki67 25%  - discussed the pathology and the latest NCCN guidelines (version 8.2021)  - recommend neoadjuvant chemotherapy with herceptin + perjeta based regimen which studies have shown to be beneficial to survival and endocrine therapy  - she will need echo, portacath and PEt scan  - will set up chemotherapy school to discuss the drugs, side-effects and provide literature on the drugs    9/30/2021:  - Her case was presented at the [facility] Tumor Board yesterday and consensus was to get PEt scan and proceed with TCHP chemotherapy.   - She had chemotherapy school with Susana.   - She had portacath placed on [facility] CW with Dr Smith.   - She is set to start chemotherapy on Oct 5th.   - echo on chart from 9/24/2021  - discussed the chemotherapy regimen, provided literature and discussed the side-effect profile of the drugs    10/26/2021:  - getting cycle #2 today  - getting Udenyca tomorrow and she also requested some IVF tomorrow as well  - discussed the PEt scan findings from 10/21/2021     12/7/2021:  - 4th cycle chemo this week with IVF and antiemtics as needed  - order MRI of  breasts for next week to re-assess response    1/10/2022:  - She completed the 4th cycle of chemotherapy and had subsequent breast MRI on 12/23/2021 which showed an excellent response to the therapy; she has decided to stop the chemotherapy and proceed with surgery; she is adamant that she does not want to continue with the neoadjuvant regiment; she is aware that she will still at least need to continue the herceptin post-op and may need additional chemotherapy depending on the pathology findings from the surgery  - She saw Dr Smith the other day and is seeing Dr Nguyen with plastic surgery tomorrow    1/26/22:   -She completed the 4th cycle of chemotherapy and had subsequent breast MRI on 12/23/2021 which showed an excellent response to the therapy; she has decided to stop the chemotherapy and proceed with surgery; she is adamant that she does not want to continue with the neoadjuvant regiment; she is aware that she will still at least need to continue the herceptin post-op and may need additional chemotherapy depending on the pathology findings from the surgery  -plans for bilateral mastectomy on Friday 1/28/22  -reconstruction will be at a later date  -labs need to be reviewed from [facility] preop      (2) no HTN     (3) No DM     (4) two c-sections with healthy children - she is aware that with chemotherapy she could potentially not be able to reproduce or have any more children - she is comfortable with that fact      VISIT DIAGNOSES:      Malignant neoplasm of overlapping sites of left female breast, unspecified estrogen receptor status    ER+ (estrogen receptor positive status)    HER2-positive carcinoma of breast    Mouth sores  -     magic mouthwash diphen/antac/lidoc/nysta; Take 10 mLs by mouth 4 (four) times daily.  Dispense: 120 mL; Refill: 1          PLAN:  1. bilateral mastectomy on Friday with Dr. Smith and then reconstruction few weeks after   2.  Will need at least herceptin post treatment, discussed  some AI therapy, patient would like more time to discuss before suppressing the estrogen   3. Check up to date labs weekly - check labs from [facility]  4. Magic mouthwash for mouth sores   6. Had Pap smear done with Dr. Mukherjee      [facility] in 2 weeks with Dr. Jackson to review pathology from Mastectomy       Fax note to [facility]        Discussion:     COVID-19 Discussion:     I had long discussion with patient and any applicable family about the COVID-19 coronavirus epidemic and the recommended precautions with regard to cancer and/or hematology patients. I have re-iterated the CDC recommendations for adequate hand washing, use of hand -like products, and coughing into elbow, etc. In addition, especially for our patients who are on chemotherapy and/or our otherwise immunocompromised patients, I have recommended avoidance of crowds, including movie theaters, restaurants, churches, etc. I have recommended avoidance of any sick or symptomatic family members and/or friends. I have also recommended avoidance of any raw and unwashed food products, and general avoidance of food items that have not been prepared by themselves. The patient has been asked to call us immediately with any symptom developments, issues, questions or other general concerns.      Pathology Discussion:     I reviewed and discussed the pathology report(s) and radiograph reports (if available) in as simple to understand and/or laymen's terms to the best of my ability. I had an indepth conversation with the patient and went over the patient's individual diagnosis based on the information that was currently available. I discussed the TNM staging process with regard to the patient's particular cancer type, and the calculated stage based on the currently available TNM data and literature. I discussed the available prognostic data with regard to the current staging information and how it relates to the prognosis of their particular  "neoplastic process.          NCCN Guidelines:     I discussed the available treatment option(s) in accordance with the latest literature from the NCCN Clinical Practice Guidelines for the patient's particular type of cancer disorder. The NCCN Guidelines provide a "document evidence-based (and) consensus-driven management" of the care of oncology patients. The treatment recommendations were made not only in accordance to the NCCN guidelines, but also factored in to account the patient's overall age, condition, performance status and their medical co-morbidities. I went over the risks and benefits of the the treatment options (if any could be made) with regard to their particular cancer type, their cancer stage, their age, and their co-morbidities.      Chemotherapy Discussion:        I discussed the available treatment option(s) in accordance with the latest/current national evidence-based guidelines (NCCN, UpToDate, NCI, ASCO, etc where applicable), their overall age/condition and their co-morbidities. I also went over the risks and benefits of the chemotherapy with regard to their particular cancer type, their cancer stage, their age/condition, and their co-morbidities. I provided literature on the chemotherapy regimen and discussed the chemotherapy side-effect profiles of the drug(s). I discussed the importance of compliance with obtaining and monitoring weekly lab work, and went over the potential hematopathology issues and risks with anemia, leucopenia and thrombocytopenia that can occur with chemotherapy. I discussed the potential risks of liver and kidney damage, which could be permanent and could necessitate dialysis long-term if kidney failure developed. I discussed the emetic and/or diarrheal potential of the regimen and the potential need for use of antiemetic and anti-diarrheal medications. I discussed the risk for development of anaphylactic shock, bronchospasm, dysrhythmia, and " respiratory/cardiovascular arrest and/or failure. I discussed the potential risks for development of alopecia, cold sensory issues, ringing in ears, vertigo, cataracts, glaucoma, and neuropathy, all of which could end up being chronic and life-long. Some chemotherpyI discussed the risks of hand-foot syndrome and rashes, and development of other autoimmune mediated processes such as pneumonitis, hepatitis, and colitis which could be life threatening. I discussed the risks of the potential development of a rare but fatal viral mediated disease known as PML (Progressive Multifocal Leukoencephalopathy), and risk of future development of leukemia and/or lymphoma from use of certain chemotherapy agents. I discussed the need for neutropenic precautions, basic hygiene/sanitation behaviors and dietary restrictions.     The patient's consent has been obtained to proceed with the chemotherapy.The patient will be referred to Chemotherapy School /[facility] for training and education on chemotherapy, use of antiemetics and/or anti-diarrheals, use of NSAID's, potential chemotherapy side-effects, and any specific recommendations and precautions with the particular chemotherapy agents.       I answered all of the patient's (and family's, if applicable) questions to the best of my ability and to their complete satisfaction. The patient acknowledged full understanding of the risks, recommendations and plan(s).      I have explained and the patient understands all of  the current recommendation(s). I have answered all of their questions to the best of my ability and to their complete satisfaction.              Electronically signed by Susana Bradshaw, MSN,APRN, AGNP-C

## 2022-01-26 ENCOUNTER — OFFICE VISIT (OUTPATIENT)
Dept: HEMATOLOGY/ONCOLOGY | Facility: CLINIC | Age: 32
End: 2022-01-26
Payer: COMMERCIAL

## 2022-01-26 VITALS
HEART RATE: 96 BPM | HEIGHT: 60 IN | BODY MASS INDEX: 25.72 KG/M2 | RESPIRATION RATE: 18 BRPM | SYSTOLIC BLOOD PRESSURE: 112 MMHG | TEMPERATURE: 98 F | WEIGHT: 131 LBS | DIASTOLIC BLOOD PRESSURE: 73 MMHG

## 2022-01-26 DIAGNOSIS — C50.812 MALIGNANT NEOPLASM OF OVERLAPPING SITES OF LEFT FEMALE BREAST, UNSPECIFIED ESTROGEN RECEPTOR STATUS: Primary | ICD-10-CM

## 2022-01-26 DIAGNOSIS — Z17.0 ER+ (ESTROGEN RECEPTOR POSITIVE STATUS): ICD-10-CM

## 2022-01-26 DIAGNOSIS — K13.79 MOUTH SORES: ICD-10-CM

## 2022-01-26 DIAGNOSIS — C50.919 HER2-POSITIVE CARCINOMA OF BREAST: ICD-10-CM

## 2022-01-26 PROCEDURE — 99214 OFFICE O/P EST MOD 30 MIN: CPT | Mod: S$GLB,,, | Performed by: NURSE PRACTITIONER

## 2022-01-26 PROCEDURE — 3074F PR MOST RECENT SYSTOLIC BLOOD PRESSURE < 130 MM HG: ICD-10-PCS | Mod: S$GLB,,, | Performed by: NURSE PRACTITIONER

## 2022-01-26 PROCEDURE — 3074F SYST BP LT 130 MM HG: CPT | Mod: S$GLB,,, | Performed by: NURSE PRACTITIONER

## 2022-01-26 PROCEDURE — 99214 PR OFFICE/OUTPT VISIT, EST, LEVL IV, 30-39 MIN: ICD-10-PCS | Mod: S$GLB,,, | Performed by: NURSE PRACTITIONER

## 2022-01-26 PROCEDURE — 3078F DIAST BP <80 MM HG: CPT | Mod: S$GLB,,, | Performed by: NURSE PRACTITIONER

## 2022-01-26 PROCEDURE — 3008F PR BODY MASS INDEX (BMI) DOCUMENTED: ICD-10-PCS | Mod: S$GLB,,, | Performed by: NURSE PRACTITIONER

## 2022-01-26 PROCEDURE — 3008F BODY MASS INDEX DOCD: CPT | Mod: S$GLB,,, | Performed by: NURSE PRACTITIONER

## 2022-01-26 PROCEDURE — 3078F PR MOST RECENT DIASTOLIC BLOOD PRESSURE < 80 MM HG: ICD-10-PCS | Mod: S$GLB,,, | Performed by: NURSE PRACTITIONER

## 2022-02-14 NOTE — PROGRESS NOTES
[facility] Hematology/Oncology  PROGRESS NOTE -  Follow-up Visit      Subjective:       Patient ID:   NAME: Esperanza Peters : 1990     31 y.o. female    Referring Doc: Dr Cheri Smith  Other Physicians: Armaan Mukherjee           Chief Complaint: left breast cancer f/u       History of Present Illness:     Patient returns today for a regularly scheduled follow-up visit.  The patient is here today to go over the results of the recently ordered labs, tests and studies. She is here by herself.     She previously had completed the 4th cycle of chemotherapy and had subsequent breast MRI on 2021 which showed an excellent response to the therapy; she has decided to stop the chemotherapy and proceed with surgery; she was adamant that she did not want to continue with the neoadjuvant regiment; she is aware that she will still at least need to continue the herceptin post-op and may need additional chemotherapy depending on the pathology findings from the surgery    She had surgery with Dr Smith and Dr Nguyen with plastic surgery at [place] on 2022; pathology reports that she had no residual breast cancer in the left and right breast and the six sentinel Ln's on the left were all negative. She is healing well and has some residual aches and pain under the left arm. She sees Dr Smith again 2 weeks and Dr Nguyen on 2022      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       discussed covid19 precautions      ROS:   GEN: normal without any fever, night sweats or weight loss  HEENT: normal with no HA's, sore throat, stiff neck, changes in vision  CV: normal with no CP, SOB, PND, CHASE or orthopnea  PULM: normal with no SOB, cough, hemoptysis, sputum or pleuritic pain  GI: normal with no abdominal pain, nausea, vomiting, constipation, diarrhea, melanotic stools, BRBPR, or hematemesis  : normal with no hematuria, dysuria  BREAST: no palpable masses  SKIN:  normal with no rash, erythema, bruising, or swelling    Pain Scale:  0    Allergies:  Review of patient's allergies indicates:  No Known Allergies    Medications:    Current Outpatient Medications:     acetaminophen (TYLENOL) 500 MG tablet, Take 500 mg by mouth once as needed for Pain., Disp: , Rfl:     magic mouthwash diphen/antac/lidoc/nysta, Take 10 mLs by mouth 4 (four) times daily., Disp: 120 mL, Rfl: 1    PMHx/PSHx Updates:  See patient's last visit with me on 1/10/2022  See H&P on 9/13/2021        Pathology:  Cancer Staging  No matching staging information was found for the patient.    Bilateral mastectomies : 1/128/2022 at [facility]  - pathology report on chart      Objective:     Vitals:  Blood pressure 105/74, pulse 88, temperature 98.1 °F (36.7 °C), weight 59 kg (130 lb).    Physical Examination:   GEN: no apparent distress, comfortable; AAOx3  HEAD: atraumatic and normocephalic  EYES: no pallor, no icterus, PERRLA  ENT: OMM, no pharyngeal erythema, external ears WNL; no nasal discharge; no thrush  NECK: no masses, thyroid normal, trachea midline, no LAD/LN's, supple  CV: RRR with no murmur; normal pulse; normal S1 and S2; no pedal edema; Rght CW portacath  CHEST: Normal respiratory effort; CTAB; normal breath sounds; no wheeze or crackles  ABDOM: nontender and nondistended; soft; normal bowel sounds; no rebound/guarding  MUSC/Skeletal: ROM normal; no crepitus; joints normal; no deformities or arthropathy  EXTREM: no clubbing, cyanosis, inflammation or swelling  SKIN: no rashes, lesions, ulcers, petechiae or subcutaneous nodules  : no pacheco  NEURO: grossly intact; motor/sensory WNL; AAOx3; no tremors  PSYCH: normal mood, affect and behavior  LYMPH: normal cervical, supraclavicular, axillary and groin LN's  Breast: s/p bilateral mastectomies with reconstruction (currently with expanders in place)             Labs:     Lab Results   Component Value Date    WBC 4.66 12/27/2021    HGB 10.2 (L) 12/27/2021     HCT 32.8 (L) 12/27/2021    MCV 86 12/27/2021    PLT 96 (L) 12/27/2021       CMP  Sodium   Date Value Ref Range Status   12/27/2021 134 (L) 136 - 145 mmol/L Final     Potassium   Date Value Ref Range Status   12/27/2021 3.8 3.5 - 5.1 mmol/L Final     Chloride   Date Value Ref Range Status   12/27/2021 99 95 - 110 mmol/L Final     CO2   Date Value Ref Range Status   12/27/2021 27 23 - 29 mmol/L Final     Glucose   Date Value Ref Range Status   12/27/2021 96 70 - 110 mg/dL Final     BUN   Date Value Ref Range Status   12/27/2021 8 6 - 20 mg/dL Final     Creatinine   Date Value Ref Range Status   12/27/2021 0.9 0.5 - 1.4 mg/dL Final     Calcium   Date Value Ref Range Status   12/27/2021 8.8 8.7 - 10.5 mg/dL Final     Total Protein   Date Value Ref Range Status   12/27/2021 6.8 6.0 - 8.4 g/dL Final     Albumin   Date Value Ref Range Status   12/27/2021 3.6 3.5 - 5.2 g/dL Final     Total Bilirubin   Date Value Ref Range Status   12/27/2021 0.6 0.1 - 1.0 mg/dL Final     Comment:     For infants and newborns, interpretation of results should be based  on gestational age, weight and in agreement with clinical  observations.    Premature Infant recommended reference ranges:  Up to 24 hours.............<8.0 mg/dL  Up to 48 hours............<12.0 mg/dL  3-5 days..................<15.0 mg/dL  6-29 days.................<15.0 mg/dL       Alkaline Phosphatase   Date Value Ref Range Status   12/27/2021 32 (L) 55 - 135 U/L Final     AST   Date Value Ref Range Status   12/27/2021 39 10 - 40 U/L Final     ALT   Date Value Ref Range Status   12/27/2021 42 10 - 44 U/L Final     Anion Gap   Date Value Ref Range Status   12/27/2021 8 8 - 16 mmol/L Final     eGFR if    Date Value Ref Range Status   12/27/2021 >60.0 >60 mL/min/1.73 m^2 Final     eGFR if non    Date Value Ref Range Status   12/27/2021 >60.0 >60 mL/min/1.73 m^2 Final     Comment:     Calculation used to obtain the estimated glomerular  filtration  rate (eGFR) is the CKD-EPI equation.              Radiology/Diagnostic Studies:    MRI breast  12/23/2021:  -    Impression:     1. Complete resolution of previous enhancing multicentric left breast malignancy.  2. Complete resolution of prior enlarged left axillary lymph nodes.  3. Persistent retroareolar left breast distortion near 6 o'clock position, characteristic of scarring.  Recommendation: Patient is currently under medical oncology and surgical care.        PET 10/21/2021:    Impression:     Hypermetabolic left breast mass consistent with primary breast malignancy. Please correlate with dedicated breast imaging.     No pathologically enlarged or hypermetabolic axillary or intrathoracic lymph nodes.     Hypermetabolic retroperitoneal lymph node is nonspecific and would be unusual for breast cancer metastasis. Attention on follow-up is recommended.     Low level FDG activity associated with prominent cervical lymph nodes, bilateral and symmetric, most likely reactive in nature.     Reactive red marrow.      CT scans 9/29/2021:    IMPRESSION:  1. Large irregular subareolar left breast mass and findings compatible with multifocal/multicentric disease in the left breast (better appreciated on the recent MRI).  2. Left axillary lymphadenopathy compatible with additional sites of disease.  3. Borderline enlarged right hilar node, possibly an additional site of disease, although felt less likely this could be further assessed with PET/CT.        NM Bone Scan Whole Body    Result Date: 9/27/2021  CMS MANDATED QUALITY DATA-BONE SCAN-147 HISTORY: Staging breast carcinoma, and 63.25. COMPARISON: Multiple prior chest radiographs. FINDINGS: 22 mCi Tc 99m MDP was administered IV per protocol, with delayed whole-body images obtained in the anterior and posterior projections. Nodular focus of increased radiotracer uptake in the sternum near the level of the sternomanubrial articulation is nonspecific. There is  otherwise no abnormal focal increased radiotracer uptake throughout the axial or appendicular skeleton to suggest osteoblastic metastatic disease. There is normal radiotracer uptake by the kidneys, with normal excretion into the renal collecting systems and urinary bladder. IMPRESSION: 1. Focus of increased osteoblastic activity in the sternum near the sternomanubrial junction, nonspecific. Consider further evaluation with chest CT with contrast. 2. Otherwise no evidence of osteoblastic metastatic disease. Electronically signed by:  Keron Neil MD  9/27/2021 1:41 PM CDT Workstation: [id number]    X-Ray Chest AP Portable    Result Date: 9/20/2021  Portable chest x-ray at 11:13 AM is compared to prior study dated 9/16/2021 Clinical history is Port-A-Cath placement There is a right subclavian vein Port-A-Cath with tip overlying the superior vena cava. There is no pneumothorax. The lungs are clear. The cardiomediastinal silhouette is normal in size. There are no acute osseous normality's. IMPRESSION: No pneumothorax status post right subclavian vein Port-A-Cath placement Electronically signed by:  Breana Lebron MD  9/20/2021 11:52 AM CDT Workstation: [id number]    MRI Breast w/wo Contrast, w/CAD, Bilateral    Result Date: 9/28/2021  EXAMINATION: MRI BREAST W/WO CONTRAST, W/CAD, BILATERAL CLINICAL HISTORY: Staging left breast carcinoma, C 50.812, N 63.21, N 63.24, N 63.25, malignant neoplasm of overlapping sites of left female breast TECHNIQUE: CMS MANDATED QUALITY DATA - MAMMOGRAPHY - 225 Bilateral breast MRI was performed with a dedicated breast coil. The patient was placed prone in the breast immobilizer with light compression. The following localization sequences were obtained: Axial inversion recovery, axial 3-D non-fat-suppressed, axial 3-D fat suppressed pre-contrast, followed by axial 3-D fat-suppressed post-contrast dynamic images with 6.5 mL Gadavist IV contrast. Post-processing including subtraction  imaging, reconstruction in the sagittal and coronal planes, and MIP of both breasts was performed on an independent workstation. The interpretation was assisted by Computer Aided Detection. FINDINGS: Comparison to the diagnostic mammogram and ultrasound of 08/20/2021.  The breasts are composed of mostly fibroglandular elements and interspersed fat.  Background parenchymal enhancement is mild. There is a dominant heterogeneously enhancing solid mass with irregular margins in the left breast retroareolar region measuring 37 mm AP x 3 mm transverse by 35 mm craniocaudal dimension, with suspicious enhancement kinetics.  There are several additional enhancing nodular and irregular infiltrative masses in the left breast, some which are contiguous with the dominant retroareolar mass, extending into the all quadrants of the breast predominantly the upper outer quadrant.  Total area of disease in the left breast measures roughly 38 mm AP x 60 mm transverse by 90 mm craniocaudal dimension, roughly 30-40% of the volume of the left breast.  No abnormal enhancement to suggest involvement of the left pectoralis musculature. There are several enlarged infiltrated appearing left axillary lymph nodes suspicious for metastatic disease, measuring 16 mm maximum short axis dimension.  There are no enlarged right axillary lymph nodes or enlarged internal mammary chain lymph nodes. Stippled randomly scattered non masslike enhancement in the right breast is nonspecific, with no enhancing right breast mass. There is a 23 mm STIR hyperintense enhancing right hilar mass suspicious for hilar lymphadenopathy, with no additional abnormal enhancement in the chest wall or mediastinum evident.     1. Multifocal, multicentric left breast carcinoma as described, with several enlarged left axillary lymph nodes consistent with metastatic disease. 2. No findings of contralateral right breast carcinoma, with scattered stippled probably benign non  masslike enhancement in the right breast. 3. Enhancing right hilar mass suspicious for metastatic disease.  Consider further evaluation with whole body PET CT. BI-RADS CATEGORY 6: KNOWN MALIGNANCY. [facility] utilizes a reminder system to ensure that patients receive reminder letters for appointments. This includes reminders for routine screening mammograms, diagnostic mammograms, or other breast imaging interventions as appropriate. This patient will be placed in the appropriate reminder system. Electronically signed by: Keron Neil MD Date:    09/28/2021 Time:    09:32           I have reviewed all available lab results and radiology reports.    Assessment/Plan:   (1) 31 y.o. female with diagnosis of left breast cancer who has been referred by Dr Cheri Smith for evaluation by medical hematology/oncology.   - left breast masses of overlapping sites involving Outer upper Quadrant and Lower inner Quadrant respectively  - s/p core biopsies taken on 8/27/2021  - axillary LN was positive  - stage III cancer process at least  - ER positive at 80.5 %, VA positive at 3%; Her2Nu (3+)  -Ki67 25%  - discussed the pathology and the latest NCCN guidelines (version 8.2021)  - recommend neoadjuvant chemotherapy with herceptin + perjeta based regimen which studies have shown to be beneficial to survival and endocrine therapy  - she will need echo, portacath and PEt scan  - will set up chemotherapy school to discuss the drugs, side-effects and provide literature on the drugs    9/30/2021:  - Her case was presented at the [facility] Tumor Board yesterday and consensus was to get PEt scan and proceed with TCHP chemotherapy.   - She had chemotherapy school with Susana.   - She had portacath placed on [facility] CW with Dr Smith.   - She is set to start chemotherapy on Oct 5th.   - echo on chart from 9/24/2021  - discussed the chemotherapy regimen, provided literature and discussed the side-effect profile of the  drugs    10/26/2021:  - getting cycle #2 today  - getting Udenyca tomorrow and she also requested some IVF tomorrow as well  - discussed the PEt scan findings from 10/21/2021     12/7/2021:  - 4th cycle chemo this week with IVF and antiemtics as needed  - order MRI of breasts for next week to re-assess response    1/10/2022:  - She completed the 4th cycle of chemotherapy and had subsequent breast MRI on 12/23/2021 which showed an excellent response to the therapy; she has decided to stop the chemotherapy and proceed with surgery; she is adamant that she does not want to continue with the neoadjuvant regiment; she is aware that she will still at least need to continue the herceptin post-op and may need additional chemotherapy depending on the pathology findings from the surgery  - She saw Dr Smith the other day and is seeing Dr Nguyen with plastic surgery tomorrow    2/15/2022:  - She had surgery with Dr Smith and Dr Nguyen with plastic surgery at [facility] on Jan 28th 2022; pathology reports that she had no residual breast cancer in the left and right breast and the six sentinel LN's on the left were all negative. She is healing well and has some residual aches and pain under the left arm. She sees Dr Smith again 2 weeks and Dr Nguyen on 2/21/2022  - Dr Nguyen wants to either move or remove portacath to help with the cosmetic surgery better  - she will need to continue with Herceptin and Perjeta for the next year, she can either get PICC or intermittent IV's       (2) no HTN     (3) No DM     (4) two c-sections with healthy children - she is aware that with chemotherapy she could potentially not be able to reproduce or have any more children - she is comfortable with that fact      VISIT DIAGNOSES:      Malignant neoplasm of overlapping sites of left female breast, unspecified estrogen receptor status    Microcytic anemia    HER2-positive carcinoma of breast    ER+ (estrogen receptor positive status)    Mass overlapping  multiple quadrants of left breast          PLAN:  1. patient seeing Dr Nguyen again next week and Dr Smith in 2 weeks - Dr nguyen is looking to remove her portacath to better facilitate plastic reconstruction  2. She will need to resume herceptin and perjeta  3. Check up to date labs and then monthly  4. s/p chemotherapy school     RTC in 2 weeks with Whit;  4 weeks with myself    Fax note to Brittanie Smith; Susana Ewing       Discussion:     COVID-19 Discussion:     I had long discussion with patient and any applicable family about the COVID-19 coronavirus epidemic and the recommended precautions with regard to cancer and/or hematology patients. I have re-iterated the CDC recommendations for adequate hand washing, use of hand -like products, and coughing into elbow, etc. In addition, especially for our patients who are on chemotherapy and/or our otherwise immunocompromised patients, I have recommended avoidance of crowds, including movie theaters, restaurants, churches, etc. I have recommended avoidance of any sick or symptomatic family members and/or friends. I have also recommended avoidance of any raw and unwashed food products, and general avoidance of food items that have not been prepared by themselves. The patient has been asked to call us immediately with any symptom developments, issues, questions or other general concerns.      Pathology Discussion:     I reviewed and discussed the pathology report(s) and radiograph reports (if available) in as simple to understand and/or laymen's terms to the best of my ability. I had an indepth conversation with the patient and went over the patient's individual diagnosis based on the information that was currently available. I discussed the TNM staging process with regard to the patient's particular cancer type, and the calculated stage based on the currently available TNM data and literature. I discussed the available prognostic data with regard to the  "current staging information and how it relates to the prognosis of their particular neoplastic process.          NCCN Guidelines:     I discussed the available treatment option(s) in accordance with the latest literature from the NCCN Clinical Practice Guidelines for the patient's particular type of cancer disorder. The NCCN Guidelines provide a "document evidence-based (and) consensus-driven management" of the care of oncology patients. The treatment recommendations were made not only in accordance to the NCCN guidelines, but also factored in to account the patient's overall age, condition, performance status and their medical co-morbidities. I went over the risks and benefits of the the treatment options (if any could be made) with regard to their particular cancer type, their cancer stage, their age, and their co-morbidities.      Chemotherapy Discussion:        I discussed the available treatment option(s) in accordance with the latest/current national evidence-based guidelines (NCCN, UpToDate, NCI, ASCO, etc where applicable), their overall age/condition and their co-morbidities. I also went over the risks and benefits of the chemotherapy with regard to their particular cancer type, their cancer stage, their age/condition, and their co-morbidities. I provided literature on the chemotherapy regimen and discussed the chemotherapy side-effect profiles of the drug(s). I discussed the importance of compliance with obtaining and monitoring weekly lab work, and went over the potential hematopathology issues and risks with anemia, leucopenia and thrombocytopenia that can occur with chemotherapy. I discussed the potential risks of liver and kidney damage, which could be permanent and could necessitate dialysis long-term if kidney failure developed. I discussed the emetic and/or diarrheal potential of the regimen and the potential need for use of antiemetic and anti-diarrheal medications. I discussed the risk for " development of anaphylactic shock, bronchospasm, dysrhythmia, and respiratory/cardiovascular arrest and/or failure. I discussed the potential risks for development of alopecia, cold sensory issues, ringing in ears, vertigo, cataracts, glaucoma, and neuropathy, all of which could end up being chronic and life-long. Some chemotherpyI discussed the risks of hand-foot syndrome and rashes, and development of other autoimmune mediated processes such as pneumonitis, hepatitis, and colitis which could be life threatening. I discussed the risks of the potential development of a rare but fatal viral mediated disease known as PML (Progressive Multifocal Leukoencephalopathy), and risk of future development of leukemia and/or lymphoma from use of certain chemotherapy agents. I discussed the need for neutropenic precautions, basic hygiene/sanitation behaviors and dietary restrictions.     The patient's consent has been obtained to proceed with the chemotherapy.The patient will be referred to Chemotherapy School [facility] for training and education on chemotherapy, use of antiemetics and/or anti-diarrheals, use of NSAID's, potential chemotherapy side-effects, and any specific recommendations and precautions with the particular chemotherapy agents.       I answered all of the patient's (and family's, if applicable) questions to the best of my ability and to their complete satisfaction. The patient acknowledged full understanding of the risks, recommendations and plan(s).      I have explained and the patient understands all of  the current recommendation(s). I have answered all of their questions to the best of my ability and to their complete satisfaction.         I spent over 25 mins of time with the patient. Reviewed results of the recently ordered labs, tests and studies; made directives with regards to the results. Over half of this time was spent couseling and coordinating care.    I have explained all of the above  in detail and the patient understands all of the current recommendation(s). I have answered all of their questions to the best of my ability and to their complete satisfaction.   The patient is to continue with the current management plan.            Electronically signed by Nilesh Jackson MD

## 2022-02-15 ENCOUNTER — OFFICE VISIT (OUTPATIENT)
Dept: HEMATOLOGY/ONCOLOGY | Facility: CLINIC | Age: 32
End: 2022-02-15
Payer: COMMERCIAL

## 2022-02-15 VITALS
HEART RATE: 88 BPM | WEIGHT: 130 LBS | SYSTOLIC BLOOD PRESSURE: 105 MMHG | DIASTOLIC BLOOD PRESSURE: 74 MMHG | TEMPERATURE: 98 F | BODY MASS INDEX: 25.39 KG/M2

## 2022-02-15 DIAGNOSIS — D50.9 MICROCYTIC ANEMIA: Chronic | ICD-10-CM

## 2022-02-15 DIAGNOSIS — C50.812 MALIGNANT NEOPLASM OF OVERLAPPING SITES OF LEFT FEMALE BREAST, UNSPECIFIED ESTROGEN RECEPTOR STATUS: Primary | ICD-10-CM

## 2022-02-15 DIAGNOSIS — Z17.0 ER+ (ESTROGEN RECEPTOR POSITIVE STATUS): ICD-10-CM

## 2022-02-15 DIAGNOSIS — N63.25 MASS OVERLAPPING MULTIPLE QUADRANTS OF LEFT BREAST: ICD-10-CM

## 2022-02-15 DIAGNOSIS — C50.919 HER2-POSITIVE CARCINOMA OF BREAST: ICD-10-CM

## 2022-02-15 PROCEDURE — 3008F BODY MASS INDEX DOCD: CPT | Mod: S$GLB,,, | Performed by: INTERNAL MEDICINE

## 2022-02-15 PROCEDURE — 3074F PR MOST RECENT SYSTOLIC BLOOD PRESSURE < 130 MM HG: ICD-10-PCS | Mod: S$GLB,,, | Performed by: INTERNAL MEDICINE

## 2022-02-15 PROCEDURE — 1160F PR REVIEW ALL MEDS BY PRESCRIBER/CLIN PHARMACIST DOCUMENTED: ICD-10-PCS | Mod: S$GLB,,, | Performed by: INTERNAL MEDICINE

## 2022-02-15 PROCEDURE — 99214 OFFICE O/P EST MOD 30 MIN: CPT | Mod: S$GLB,,, | Performed by: INTERNAL MEDICINE

## 2022-02-15 PROCEDURE — 1160F RVW MEDS BY RX/DR IN RCRD: CPT | Mod: S$GLB,,, | Performed by: INTERNAL MEDICINE

## 2022-02-15 PROCEDURE — 3078F DIAST BP <80 MM HG: CPT | Mod: S$GLB,,, | Performed by: INTERNAL MEDICINE

## 2022-02-15 PROCEDURE — 99214 PR OFFICE/OUTPT VISIT, EST, LEVL IV, 30-39 MIN: ICD-10-PCS | Mod: S$GLB,,, | Performed by: INTERNAL MEDICINE

## 2022-02-15 PROCEDURE — 3008F PR BODY MASS INDEX (BMI) DOCUMENTED: ICD-10-PCS | Mod: S$GLB,,, | Performed by: INTERNAL MEDICINE

## 2022-02-15 PROCEDURE — 3074F SYST BP LT 130 MM HG: CPT | Mod: S$GLB,,, | Performed by: INTERNAL MEDICINE

## 2022-02-15 PROCEDURE — 3078F PR MOST RECENT DIASTOLIC BLOOD PRESSURE < 80 MM HG: ICD-10-PCS | Mod: S$GLB,,, | Performed by: INTERNAL MEDICINE

## 2022-02-16 ENCOUNTER — TELEPHONE (OUTPATIENT)
Dept: HEMATOLOGY/ONCOLOGY | Facility: CLINIC | Age: 32
End: 2022-02-16
Payer: COMMERCIAL

## 2022-02-22 RX ORDER — HEPARIN 100 UNIT/ML
500 SYRINGE INTRAVENOUS
Status: CANCELLED | OUTPATIENT
Start: 2022-02-22

## 2022-02-22 RX ORDER — SODIUM CHLORIDE 0.9 % (FLUSH) 0.9 %
10 SYRINGE (ML) INJECTION
Status: CANCELLED | OUTPATIENT
Start: 2022-02-22

## 2022-02-22 RX ORDER — DIPHENHYDRAMINE HYDROCHLORIDE 50 MG/ML
50 INJECTION INTRAMUSCULAR; INTRAVENOUS ONCE AS NEEDED
Status: CANCELLED | OUTPATIENT
Start: 2022-02-22

## 2022-02-22 RX ORDER — EPINEPHRINE 0.3 MG/.3ML
0.3 INJECTION SUBCUTANEOUS ONCE AS NEEDED
Status: CANCELLED | OUTPATIENT
Start: 2022-02-22

## 2022-02-23 ENCOUNTER — INFUSION (OUTPATIENT)
Dept: INFUSION THERAPY | Facility: HOSPITAL | Age: 32
End: 2022-02-23
Attending: INTERNAL MEDICINE
Payer: COMMERCIAL

## 2022-02-23 ENCOUNTER — OFFICE VISIT (OUTPATIENT)
Dept: HEMATOLOGY/ONCOLOGY | Facility: CLINIC | Age: 32
End: 2022-02-23
Payer: COMMERCIAL

## 2022-02-23 ENCOUNTER — LAB VISIT (OUTPATIENT)
Dept: LAB | Facility: HOSPITAL | Age: 32
End: 2022-02-23
Attending: INTERNAL MEDICINE
Payer: COMMERCIAL

## 2022-02-23 VITALS
RESPIRATION RATE: 18 BRPM | TEMPERATURE: 98 F | HEIGHT: 60 IN | BODY MASS INDEX: 25.36 KG/M2 | DIASTOLIC BLOOD PRESSURE: 78 MMHG | SYSTOLIC BLOOD PRESSURE: 112 MMHG | HEART RATE: 85 BPM | WEIGHT: 129.19 LBS | OXYGEN SATURATION: 100 %

## 2022-02-23 VITALS
WEIGHT: 129 LBS | HEART RATE: 95 BPM | BODY MASS INDEX: 25.32 KG/M2 | SYSTOLIC BLOOD PRESSURE: 112 MMHG | TEMPERATURE: 97 F | HEIGHT: 60 IN | OXYGEN SATURATION: 100 % | RESPIRATION RATE: 18 BRPM | DIASTOLIC BLOOD PRESSURE: 64 MMHG

## 2022-02-23 DIAGNOSIS — Z17.0 ER+ (ESTROGEN RECEPTOR POSITIVE STATUS): ICD-10-CM

## 2022-02-23 DIAGNOSIS — E86.0 DEHYDRATION: ICD-10-CM

## 2022-02-23 DIAGNOSIS — C50.919 HER2-POSITIVE CARCINOMA OF BREAST: ICD-10-CM

## 2022-02-23 DIAGNOSIS — C50.812 MALIGNANT NEOPLASM OF OVERLAPPING SITES OF LEFT FEMALE BREAST, UNSPECIFIED ESTROGEN RECEPTOR STATUS: ICD-10-CM

## 2022-02-23 DIAGNOSIS — N63.25 MASS OVERLAPPING MULTIPLE QUADRANTS OF LEFT BREAST: ICD-10-CM

## 2022-02-23 DIAGNOSIS — N63.21 MASS OF UPPER OUTER QUADRANT OF LEFT BREAST: ICD-10-CM

## 2022-02-23 DIAGNOSIS — Z95.828 PORT-A-CATH IN PLACE: ICD-10-CM

## 2022-02-23 DIAGNOSIS — T45.1X5A CHEMOTHERAPY-INDUCED NEUTROPENIA: Primary | ICD-10-CM

## 2022-02-23 DIAGNOSIS — C50.812 MALIGNANT NEOPLASM OF OVERLAPPING SITES OF LEFT FEMALE BREAST, UNSPECIFIED ESTROGEN RECEPTOR STATUS: Primary | ICD-10-CM

## 2022-02-23 DIAGNOSIS — D70.1 CHEMOTHERAPY-INDUCED NEUTROPENIA: Primary | ICD-10-CM

## 2022-02-23 DIAGNOSIS — N63.24 MASS OF LOWER INNER QUADRANT OF LEFT BREAST: ICD-10-CM

## 2022-02-23 LAB
ALBUMIN SERPL BCP-MCNC: 3.9 G/DL (ref 3.5–5.2)
ALP SERPL-CCNC: 38 U/L (ref 55–135)
ALT SERPL W/O P-5'-P-CCNC: 16 U/L (ref 10–44)
ANION GAP SERPL CALC-SCNC: 7 MMOL/L (ref 8–16)
AST SERPL-CCNC: 18 U/L (ref 10–40)
BASOPHILS # BLD AUTO: 0.03 K/UL (ref 0–0.2)
BASOPHILS NFR BLD: 0.6 % (ref 0–1.9)
BILIRUB SERPL-MCNC: 0.4 MG/DL (ref 0.1–1)
BUN SERPL-MCNC: 9 MG/DL (ref 6–20)
CALCIUM SERPL-MCNC: 9.8 MG/DL (ref 8.7–10.5)
CHLORIDE SERPL-SCNC: 106 MMOL/L (ref 95–110)
CO2 SERPL-SCNC: 27 MMOL/L (ref 23–29)
CREAT SERPL-MCNC: 0.9 MG/DL (ref 0.5–1.4)
DIFFERENTIAL METHOD: ABNORMAL
EOSINOPHIL # BLD AUTO: 0.5 K/UL (ref 0–0.5)
EOSINOPHIL NFR BLD: 9.6 % (ref 0–8)
ERYTHROCYTE [DISTWIDTH] IN BLOOD BY AUTOMATED COUNT: 12 % (ref 11.5–14.5)
EST. GFR  (AFRICAN AMERICAN): >60 ML/MIN/1.73 M^2
EST. GFR  (NON AFRICAN AMERICAN): >60 ML/MIN/1.73 M^2
GLUCOSE SERPL-MCNC: 97 MG/DL (ref 70–110)
HCT VFR BLD AUTO: 36.5 % (ref 37–48.5)
HGB BLD-MCNC: 11.5 G/DL (ref 12–16)
IMM GRANULOCYTES # BLD AUTO: 0.01 K/UL (ref 0–0.04)
IMM GRANULOCYTES NFR BLD AUTO: 0.2 % (ref 0–0.5)
LYMPHOCYTES # BLD AUTO: 1.6 K/UL (ref 1–4.8)
LYMPHOCYTES NFR BLD: 30.1 % (ref 18–48)
MAGNESIUM SERPL-MCNC: 1.8 MG/DL (ref 1.6–2.6)
MCH RBC QN AUTO: 25.9 PG (ref 27–31)
MCHC RBC AUTO-ENTMCNC: 31.5 G/DL (ref 32–36)
MCV RBC AUTO: 82 FL (ref 82–98)
MONOCYTES # BLD AUTO: 0.5 K/UL (ref 0.3–1)
MONOCYTES NFR BLD: 9.6 % (ref 4–15)
NEUTROPHILS # BLD AUTO: 2.7 K/UL (ref 1.8–7.7)
NEUTROPHILS NFR BLD: 49.9 % (ref 38–73)
NRBC BLD-RTO: 0 /100 WBC
PLATELET # BLD AUTO: 158 K/UL (ref 150–450)
PMV BLD AUTO: 9.4 FL (ref 9.2–12.9)
POTASSIUM SERPL-SCNC: 3.6 MMOL/L (ref 3.5–5.1)
PROT SERPL-MCNC: 7.3 G/DL (ref 6–8.4)
RBC # BLD AUTO: 4.44 M/UL (ref 4–5.4)
SODIUM SERPL-SCNC: 140 MMOL/L (ref 136–145)
WBC # BLD AUTO: 5.42 K/UL (ref 3.9–12.7)

## 2022-02-23 PROCEDURE — 83735 ASSAY OF MAGNESIUM: CPT | Performed by: NURSE PRACTITIONER

## 2022-02-23 PROCEDURE — 25000003 PHARM REV CODE 250: Performed by: INTERNAL MEDICINE

## 2022-02-23 PROCEDURE — 3078F PR MOST RECENT DIASTOLIC BLOOD PRESSURE < 80 MM HG: ICD-10-PCS | Mod: S$GLB,,, | Performed by: NURSE PRACTITIONER

## 2022-02-23 PROCEDURE — 3008F BODY MASS INDEX DOCD: CPT | Mod: S$GLB,,, | Performed by: NURSE PRACTITIONER

## 2022-02-23 PROCEDURE — 3074F SYST BP LT 130 MM HG: CPT | Mod: S$GLB,,, | Performed by: NURSE PRACTITIONER

## 2022-02-23 PROCEDURE — 85025 COMPLETE CBC W/AUTO DIFF WBC: CPT | Performed by: INTERNAL MEDICINE

## 2022-02-23 PROCEDURE — 99214 PR OFFICE/OUTPT VISIT, EST, LEVL IV, 30-39 MIN: ICD-10-PCS | Mod: S$GLB,,, | Performed by: NURSE PRACTITIONER

## 2022-02-23 PROCEDURE — 80053 COMPREHEN METABOLIC PANEL: CPT | Performed by: INTERNAL MEDICINE

## 2022-02-23 PROCEDURE — 36415 COLL VENOUS BLD VENIPUNCTURE: CPT | Performed by: INTERNAL MEDICINE

## 2022-02-23 PROCEDURE — 96413 CHEMO IV INFUSION 1 HR: CPT

## 2022-02-23 PROCEDURE — 63600175 PHARM REV CODE 636 W HCPCS: Mod: JG | Performed by: INTERNAL MEDICINE

## 2022-02-23 PROCEDURE — 96361 HYDRATE IV INFUSION ADD-ON: CPT

## 2022-02-23 PROCEDURE — 99214 OFFICE O/P EST MOD 30 MIN: CPT | Mod: S$GLB,,, | Performed by: NURSE PRACTITIONER

## 2022-02-23 PROCEDURE — 3008F PR BODY MASS INDEX (BMI) DOCUMENTED: ICD-10-PCS | Mod: S$GLB,,, | Performed by: NURSE PRACTITIONER

## 2022-02-23 PROCEDURE — 3074F PR MOST RECENT SYSTOLIC BLOOD PRESSURE < 130 MM HG: ICD-10-PCS | Mod: S$GLB,,, | Performed by: NURSE PRACTITIONER

## 2022-02-23 PROCEDURE — 96417 CHEMO IV INFUS EACH ADDL SEQ: CPT

## 2022-02-23 PROCEDURE — 3078F DIAST BP <80 MM HG: CPT | Mod: S$GLB,,, | Performed by: NURSE PRACTITIONER

## 2022-02-23 RX ORDER — HEPARIN 100 UNIT/ML
500 SYRINGE INTRAVENOUS
Status: DISCONTINUED | OUTPATIENT
Start: 2022-02-23 | End: 2022-02-23 | Stop reason: HOSPADM

## 2022-02-23 RX ORDER — EPINEPHRINE 0.3 MG/.3ML
0.3 INJECTION SUBCUTANEOUS ONCE AS NEEDED
Status: DISCONTINUED | OUTPATIENT
Start: 2022-02-23 | End: 2022-02-23 | Stop reason: HOSPADM

## 2022-02-23 RX ORDER — SODIUM CHLORIDE 0.9 % (FLUSH) 0.9 %
10 SYRINGE (ML) INJECTION
Status: DISCONTINUED | OUTPATIENT
Start: 2022-02-23 | End: 2022-02-23 | Stop reason: HOSPADM

## 2022-02-23 RX ORDER — DIPHENHYDRAMINE HYDROCHLORIDE 50 MG/ML
50 INJECTION INTRAMUSCULAR; INTRAVENOUS ONCE AS NEEDED
Status: DISCONTINUED | OUTPATIENT
Start: 2022-02-23 | End: 2022-02-23 | Stop reason: HOSPADM

## 2022-02-23 RX ADMIN — Medication 500 UNITS: at 03:02

## 2022-02-23 RX ADMIN — SODIUM CHLORIDE 500 ML: 0.9 INJECTION, SOLUTION INTRAVENOUS at 01:02

## 2022-02-23 RX ADMIN — TRASTUZUMAB 384 MG: 420 INJECTION, POWDER, LYOPHILIZED, FOR SOLUTION INTRAVENOUS at 02:02

## 2022-02-23 RX ADMIN — PERTUZUMAB 420 MG: 30 INJECTION, SOLUTION, CONCENTRATE INTRAVENOUS at 01:02

## 2022-02-23 NOTE — PROGRESS NOTES
[facility] Hematology/Oncology  PROGRESS NOTE -  Follow-up Visit      Subjective:       Patient ID:   NAME: Esperanza Peters : 1990     31 y.o. female    Referring Doc: Dr Cheri Smith  Other Physicians: Armaan Mukherjee           Chief Complaint: left breast cancer f/u       History of Present Illness:     Patient returns today for a regularly scheduled follow-up visit.  The patient is here today to go over the results of the recently ordered labs, tests and studies. She is here by herself. She is receiving Herceptin and Perjeta today.    She previously had completed the 4th cycle of chemotherapy and had subsequent breast MRI on 2021 which showed an excellent response to the therapy; she decided to stop the chemotherapy and proceed with surgery; she was adamant that she did not want to continue with the neoadjuvant regimen.     She had surgery with Dr Smith and Dr Nguyen with plastic surgery at [place] on 2022; pathology reports that she had no residual breast cancer in the left and right breast and the six sentinel Ln's on the left were all negative. She is healing well. She sees Dr. Smith this Friday and is planning to have the PAC taken out. She is also planning to finish reconstruction in April.       Breathing ok, no current CP, SOB, HA's or N/V.    ROS:   GEN: normal without any fever, night sweats or weight loss  HEENT: normal with no HA's, sore throat, stiff neck, changes in vision  CV: normal with no CP, SOB, PND, CHASE or orthopnea  PULM: normal with no SOB, cough, hemoptysis, sputum or pleuritic pain  GI: normal with no abdominal pain, nausea, vomiting, constipation, diarrhea, melanotic stools, BRBPR, or hematemesis  : normal with no hematuria, dysuria  BREAST: Healing well from surgery  SKIN: normal with no rash, erythema, bruising, or swelling    Pain Scale:  0    Allergies:  Review of patient's allergies indicates:  No Known Allergies    Medications:    Current Outpatient Medications:      acetaminophen (TYLENOL) 500 MG tablet, Take 500 mg by mouth once as needed for Pain., Disp: , Rfl:     magic mouthwash diphen/antac/lidoc/nysta, Take 10 mLs by mouth 4 (four) times daily., Disp: 120 mL, Rfl: 1  No current facility-administered medications for this visit.    Facility-Administered Medications Ordered in Other Visits:     alteplase injection 2 mg, 2 mg, Intra-Catheter, PRN, Nilesh Jackson MD    diphenhydrAMINE injection 50 mg, 50 mg, Intravenous, Once PRN, Nilesh Jackson MD    EPINEPHrine (EPIPEN) 0.3 mg/0.3 mL pen injection 0.3 mg, 0.3 mg, Intramuscular, Once PRN, Nilesh Jackson MD    heparin, porcine (PF) 100 unit/mL injection flush 500 Units, 500 Units, Intravenous, PRN, Nilesh Jackson MD    hydrocortisone sodium succinate injection 100 mg, 100 mg, Intravenous, Once PRN, Nilesh Jackson MD    pertuzumab (PERJETA) 420 mg in sodium chloride 0.9% 264 mL infusion, 420 mg, Intravenous, 1 time in Clinic/HOD, Nilesh Jackson MD    sodium chloride 0.9% 100 mL flush bag, , Intravenous, 1 time in Clinic/HOD, Nilesh Jackson MD    sodium chloride 0.9% bolus 500 mL, 500 mL, Intravenous, 1 time in Clinic/HOD, Nilesh Jackson MD, Last Rate: 500 mL/hr at 02/23/22 1328, 500 mL at 02/23/22 1328    sodium chloride 0.9% flush 10 mL, 10 mL, Intravenous, PRN, Nilesh Jackson MD    trastuzumab-dkst (OGIVRI) 384 mg in sodium chloride 0.9% 250 mL chemo infusion, 6 mg/kg (Treatment Plan Recorded), Intravenous, 1 time in Clinic/HOD, Nilesh Jackson MD    PMHx/PSHx Updates:  See patient's last visit with me on 1/10/2022  See H&P on 9/13/2021        Pathology:  Cancer Staging  No matching staging information was found for the patient.    Bilateral mastectomies : 1/128/2022 at [facility]  - pathology report on chart      Objective:     Vitals:  Blood pressure 112/64, pulse 95, temperature 97.4 °F (36.3 °C), resp. rate 18, height 5' (1.524 m), weight 58.5 kg (129 lb), SpO2  100 %.    Physical Examination:   GEN: no apparent distress, comfortable; AAOx3  HEAD: atraumatic and normocephalic  EYES: no pallor, no icterus, PERRLA  ENT: OMM, no pharyngeal erythema, external ears WNL; no nasal discharge; no thrush  NECK: no masses, thyroid normal, trachea midline, no LAD/LN's, supple  CV: RRR with no murmur; normal pulse; normal S1 and S2; no pedal edema; Rght CW portacath  CHEST: Normal respiratory effort; CTAB; normal breath sounds; no wheeze or crackles  ABDOM: nontender and nondistended; soft; normal bowel sounds; no rebound/guarding  MUSC/Skeletal: ROM normal; no crepitus; joints normal; no deformities or arthropathy  EXTREM: no clubbing, cyanosis, inflammation or swelling  SKIN: no rashes, lesions, ulcers, petechiae or subcutaneous nodules  : no pacheco  NEURO: grossly intact; motor/sensory WNL; AAOx3; no tremors  PSYCH: normal mood, affect and behavior  LYMPH: normal cervical, supraclavicular, axillary and groin LN's  Breast: s/p bilateral mastectomies with reconstruction (currently with expanders in place)             Labs:     Lab Results   Component Value Date    WBC 5.42 02/23/2022    HGB 11.5 (L) 02/23/2022    HCT 36.5 (L) 02/23/2022    MCV 82 02/23/2022     02/23/2022       CMP  Sodium   Date Value Ref Range Status   12/27/2021 134 (L) 136 - 145 mmol/L Final     Potassium   Date Value Ref Range Status   12/27/2021 3.8 3.5 - 5.1 mmol/L Final     Chloride   Date Value Ref Range Status   12/27/2021 99 95 - 110 mmol/L Final     CO2   Date Value Ref Range Status   12/27/2021 27 23 - 29 mmol/L Final     Glucose   Date Value Ref Range Status   12/27/2021 96 70 - 110 mg/dL Final     BUN   Date Value Ref Range Status   12/27/2021 8 6 - 20 mg/dL Final     Creatinine   Date Value Ref Range Status   12/27/2021 0.9 0.5 - 1.4 mg/dL Final     Calcium   Date Value Ref Range Status   12/27/2021 8.8 8.7 - 10.5 mg/dL Final     Total Protein   Date Value Ref Range Status   12/27/2021 6.8 6.0 -  8.4 g/dL Final     Albumin   Date Value Ref Range Status   12/27/2021 3.6 3.5 - 5.2 g/dL Final     Total Bilirubin   Date Value Ref Range Status   12/27/2021 0.6 0.1 - 1.0 mg/dL Final     Comment:     For infants and newborns, interpretation of results should be based  on gestational age, weight and in agreement with clinical  observations.    Premature Infant recommended reference ranges:  Up to 24 hours.............<8.0 mg/dL  Up to 48 hours............<12.0 mg/dL  3-5 days..................<15.0 mg/dL  6-29 days.................<15.0 mg/dL       Alkaline Phosphatase   Date Value Ref Range Status   12/27/2021 32 (L) 55 - 135 U/L Final     AST   Date Value Ref Range Status   12/27/2021 39 10 - 40 U/L Final     ALT   Date Value Ref Range Status   12/27/2021 42 10 - 44 U/L Final     Anion Gap   Date Value Ref Range Status   12/27/2021 8 8 - 16 mmol/L Final     eGFR if    Date Value Ref Range Status   12/27/2021 >60.0 >60 mL/min/1.73 m^2 Final     eGFR if non    Date Value Ref Range Status   12/27/2021 >60.0 >60 mL/min/1.73 m^2 Final     Comment:     Calculation used to obtain the estimated glomerular filtration  rate (eGFR) is the CKD-EPI equation.              Radiology/Diagnostic Studies:    MRI breast  12/23/2021:  -    Impression:     1. Complete resolution of previous enhancing multicentric left breast malignancy.  2. Complete resolution of prior enlarged left axillary lymph nodes.  3. Persistent retroareolar left breast distortion near 6 o'clock position, characteristic of scarring.  Recommendation: Patient is currently under medical oncology and surgical care.        PET 10/21/2021:    Impression:     Hypermetabolic left breast mass consistent with primary breast malignancy. Please correlate with dedicated breast imaging.     No pathologically enlarged or hypermetabolic axillary or intrathoracic lymph nodes.     Hypermetabolic retroperitoneal lymph node is nonspecific and would  be unusual for breast cancer metastasis. Attention on follow-up is recommended.     Low level FDG activity associated with prominent cervical lymph nodes, bilateral and symmetric, most likely reactive in nature.     Reactive red marrow.      CT scans 9/29/2021:    IMPRESSION:  1. Large irregular subareolar left breast mass and findings compatible with multifocal/multicentric disease in the left breast (better appreciated on the recent MRI).  2. Left axillary lymphadenopathy compatible with additional sites of disease.  3. Borderline enlarged right hilar node, possibly an additional site of disease, although felt less likely this could be further assessed with PET/CT.        NM Bone Scan Whole Body    Result Date: 9/27/2021  CMS MANDATED QUALITY DATA-BONE SCAN-147 HISTORY: Staging breast carcinoma, and 63.25. COMPARISON: Multiple prior chest radiographs. FINDINGS: 22 mCi Tc 99m MDP was administered IV per protocol, with delayed whole-body images obtained in the anterior and posterior projections. Nodular focus of increased radiotracer uptake in the sternum near the level of the sternomanubrial articulation is nonspecific. There is otherwise no abnormal focal increased radiotracer uptake throughout the axial or appendicular skeleton to suggest osteoblastic metastatic disease. There is normal radiotracer uptake by the kidneys, with normal excretion into the renal collecting systems and urinary bladder. IMPRESSION: 1. Focus of increased osteoblastic activity in the sternum near the sternomanubrial junction, nonspecific. Consider further evaluation with chest CT with contrast. 2. Otherwise no evidence of osteoblastic metastatic disease. Electronically signed by:  Keron Neil MD  9/27/2021 1:41 PM CDT Workstation: [id number]    X-Ray Chest AP Portable    Result Date: 9/20/2021  Portable chest x-ray at 11:13 AM is compared to prior study dated 9/16/2021 Clinical history is Port-A-Cath placement There is a right subclavian  vein Port-A-Cath with tip overlying the superior vena cava. There is no pneumothorax. The lungs are clear. The cardiomediastinal silhouette is normal in size. There are no acute osseous normality's. IMPRESSION: No pneumothorax status post right subclavian vein Port-A-Cath placement Electronically signed by:  Breana Lebron MD  9/20/2021 11:52 AM CDT Workstation: [id number]    MRI Breast w/wo Contrast, w/CAD, Bilateral    Result Date: 9/28/2021  EXAMINATION: MRI BREAST W/WO CONTRAST, W/CAD, BILATERAL CLINICAL HISTORY: Staging left breast carcinoma, C 50.812, N 63.21, N 63.24, N 63.25, malignant neoplasm of overlapping sites of left female breast TECHNIQUE: CMS MANDATED QUALITY DATA - MAMMOGRAPHY - 225 Bilateral breast MRI was performed with a dedicated breast coil. The patient was placed prone in the breast immobilizer with light compression. The following localization sequences were obtained: Axial inversion recovery, axial 3-D non-fat-suppressed, axial 3-D fat suppressed pre-contrast, followed by axial 3-D fat-suppressed post-contrast dynamic images with 6.5 mL Gadavist IV contrast. Post-processing including subtraction imaging, reconstruction in the sagittal and coronal planes, and MIP of both breasts was performed on an independent workstation. The interpretation was assisted by Computer Aided Detection. FINDINGS: Comparison to the diagnostic mammogram and ultrasound of 08/20/2021.  The breasts are composed of mostly fibroglandular elements and interspersed fat.  Background parenchymal enhancement is mild. There is a dominant heterogeneously enhancing solid mass with irregular margins in the left breast retroareolar region measuring 37 mm AP x 3 mm transverse by 35 mm craniocaudal dimension, with suspicious enhancement kinetics.  There are several additional enhancing nodular and irregular infiltrative masses in the left breast, some which are contiguous with the dominant retroareolar mass, extending into the  all quadrants of the breast predominantly the upper outer quadrant.  Total area of disease in the left breast measures roughly 38 mm AP x 60 mm transverse by 90 mm craniocaudal dimension, roughly 30-40% of the volume of the left breast.  No abnormal enhancement to suggest involvement of the left pectoralis musculature. There are several enlarged infiltrated appearing left axillary lymph nodes suspicious for metastatic disease, measuring 16 mm maximum short axis dimension.  There are no enlarged right axillary lymph nodes or enlarged internal mammary chain lymph nodes. Stippled randomly scattered non masslike enhancement in the right breast is nonspecific, with no enhancing right breast mass. There is a 23 mm STIR hyperintense enhancing right hilar mass suspicious for hilar lymphadenopathy, with no additional abnormal enhancement in the chest wall or mediastinum evident.     1. Multifocal, multicentric left breast carcinoma as described, with several enlarged left axillary lymph nodes consistent with metastatic disease. 2. No findings of contralateral right breast carcinoma, with scattered stippled probably benign non masslike enhancement in the right breast. 3. Enhancing right hilar mass suspicious for metastatic disease.  Consider further evaluation with whole body PET CT. BI-RADS CATEGORY 6: KNOWN MALIGNANCY. This [facility] utilizes a reminder system to ensure that patients receive reminder letters for appointments. This includes reminders for routine screening mammograms, diagnostic mammograms, or other breast imaging interventions as appropriate. This patient will be placed in the appropriate reminder system. Electronically signed by: Keron Neil MD Date:    09/28/2021 Time:    09:32           I have reviewed all available lab results and radiology reports.    Assessment/Plan:   (1) 31 y.o. female with diagnosis of left breast cancer who has been referred by Dr Cheri Smith for evaluation by medical  hematology/oncology.   - left breast masses of overlapping sites involving Outer upper Quadrant and Lower inner Quadrant respectively  - s/p core biopsies taken on 8/27/2021  - axillary LN was positive  - stage III cancer process at least  - ER positive at 80.5 %, [place] positive at 3%; Her2Nu (3+)  -Ki67 25%  - discussed the pathology and the latest NCCN guidelines (version 8.2021)  - recommend neoadjuvant chemotherapy with herceptin + perjeta based regimen which studies have shown to be beneficial to survival and endocrine therapy  - she will need echo, portacath and PEt scan  - will set up chemotherapy school to discuss the drugs, side-effects and provide literature on the drugs    9/30/2021:  - Her case was presented at the [facility] Tumor Board yesterday and consensus was to get PEt scan and proceed with TCHP chemotherapy.   - She had chemotherapy school with Susana.   - She had portacath placed on [facility] CW with Dr Smith.   - She is set to start chemotherapy on Oct 5th.   - echo on chart from 9/24/2021  - discussed the chemotherapy regimen, provided literature and discussed the side-effect profile of the drugs    10/26/2021:  - getting cycle #2 today  - getting Udenyca tomorrow and she also requested some IVF tomorrow as well  - discussed the PEt scan findings from 10/21/2021     12/7/2021:  - 4th cycle chemo this week with IVF and antiemtics as needed  - order MRI of breasts for next week to re-assess response    1/10/2022:  - She completed the 4th cycle of chemotherapy and had subsequent breast MRI on 12/23/2021 which showed an excellent response to the therapy; she has decided to stop the chemotherapy and proceed with surgery; she is adamant that she does not want to continue with the neoadjuvant regiment; she is aware that she will still at least need to continue the herceptin post-op and may need additional chemotherapy depending on the pathology findings from the surgery  - She saw Dr Smith the other  day and is seeing Dr Nguyen with plastic surgery tomorrow    2/15/2022:  - She had surgery with Dr Smith and Dr Nguyen with plastic surgery at [facility] on Jan 28th 2022; pathology reports that she had no residual breast cancer in the left and right breast and the six sentinel LN's on the left were all negative. She is healing well and has some residual aches and pain under the left arm. She sees Dr Smith again 2 weeks and Dr Nguyen on 2/21/2022  - Dr Nguyen wants to either move or remove portacath to help with the cosmetic surgery better  - she will need to continue with Herceptin and Perjeta for the next year, she can either get PICC or intermittent IV's    2/23/22:   - receiving Herceptin and Perjeta today   - getting the PAC removed on Friday   - finishing reconstruction April/May time frame   - patient is feeling good, and ready to go back to work.      (2) no HTN     (3) No DM     (4) two c-sections with healthy children - she is aware that with chemotherapy she could potentially not be able to reproduce or have any more children - she is comfortable with that fact      VISIT DIAGNOSES:      Malignant neoplasm of overlapping sites of left female breast, unspecified estrogen receptor status    HER2-positive carcinoma of breast    ER+ (estrogen receptor positive status)    Port-A-Cath in place          PLAN:  1. Continue with Herceptin and Perjeta   2. Echo due 3/27/2022  3. Check labs monthly  4. Follow up with Dr. Smith and Dr. Nguyen - PA removal and completion of reconstruction     RTC 3 weeks with Dr. Jackson     Discussion:     COVID-19 Discussion:     I had long discussion with patient and any applicable family about the COVID-19 coronavirus epidemic and the recommended precautions with regard to cancer and/or hematology patients. I have re-iterated the CDC recommendations for adequate hand washing, use of hand -like products, and coughing into elbow, etc. In addition, especially for our patients who are on  "chemotherapy and/or our otherwise immunocompromised patients, I have recommended avoidance of crowds, including movie theaters, restaurants, churches, etc. I have recommended avoidance of any sick or symptomatic family members and/or friends. I have also recommended avoidance of any raw and unwashed food products, and general avoidance of food items that have not been prepared by themselves. The patient has been asked to call us immediately with any symptom developments, issues, questions or other general concerns.      Pathology Discussion:     I reviewed and discussed the pathology report(s) and radiograph reports (if available) in as simple to understand and/or laymen's terms to the best of my ability. I had an indepth conversation with the patient and went over the patient's individual diagnosis based on the information that was currently available. I discussed the TNM staging process with regard to the patient's particular cancer type, and the calculated stage based on the currently available TNM data and literature. I discussed the available prognostic data with regard to the current staging information and how it relates to the prognosis of their particular neoplastic process.          NCCN Guidelines:     I discussed the available treatment option(s) in accordance with the latest literature from the NCCN Clinical Practice Guidelines for the patient's particular type of cancer disorder. The NCCN Guidelines provide a "document evidence-based (and) consensus-driven management" of the care of oncology patients. The treatment recommendations were made not only in accordance to the NCCN guidelines, but also factored in to account the patient's overall age, condition, performance status and their medical co-morbidities. I went over the risks and benefits of the the treatment options (if any could be made) with regard to their particular cancer type, their cancer stage, their age, and their co-morbidities. "      Chemotherapy Discussion:        I discussed the available treatment option(s) in accordance with the latest/current national evidence-based guidelines (NCCN, UpToDate, NCI, ASCO, etc where applicable), their overall age/condition and their co-morbidities. I also went over the risks and benefits of the chemotherapy with regard to their particular cancer type, their cancer stage, their age/condition, and their co-morbidities. I provided literature on the chemotherapy regimen and discussed the chemotherapy side-effect profiles of the drug(s). I discussed the importance of compliance with obtaining and monitoring weekly lab work, and went over the potential hematopathology issues and risks with anemia, leucopenia and thrombocytopenia that can occur with chemotherapy. I discussed the potential risks of liver and kidney damage, which could be permanent and could necessitate dialysis long-term if kidney failure developed. I discussed the emetic and/or diarrheal potential of the regimen and the potential need for use of antiemetic and anti-diarrheal medications. I discussed the risk for development of anaphylactic shock, bronchospasm, dysrhythmia, and respiratory/cardiovascular arrest and/or failure. I discussed the potential risks for development of alopecia, cold sensory issues, ringing in ears, vertigo, cataracts, glaucoma, and neuropathy, all of which could end up being chronic and life-long. Some chemotherpyI discussed the risks of hand-foot syndrome and rashes, and development of other autoimmune mediated processes such as pneumonitis, hepatitis, and colitis which could be life threatening. I discussed the risks of the potential development of a rare but fatal viral mediated disease known as PML (Progressive Multifocal Leukoencephalopathy), and risk of future development of leukemia and/or lymphoma from use of certain chemotherapy agents. I discussed the need for neutropenic precautions, basic hygiene/sanitation  behaviors and dietary restrictions.     The patient's consent has been obtained to proceed with the chemotherapy.The patient will be referred to Chemotherapy School /[facility] for training and education on chemotherapy, use of antiemetics and/or anti-diarrheals, use of NSAID's, potential chemotherapy side-effects, and any specific recommendations and precautions with the particular chemotherapy agents.       I answered all of the patient's (and family's, if applicable) questions to the best of my ability and to their complete satisfaction. The patient acknowledged full understanding of the risks, recommendations and plan(s).      I have explained and the patient understands all of  the current recommendation(s). I have answered all of their questions to the best of my ability and to their complete satisfaction.         I spent over 25 mins of time with the patient. Reviewed results of the recently ordered labs, tests and studies; made directives with regards to the results. Over half of this time was spent couseling and coordinating care.    I have explained all of the above in detail and the patient understands all of the current recommendation(s). I have answered all of their questions to the best of my ability and to their complete satisfaction.   The patient is to continue with the current management plan.            Electronically signed by Susana Bradshaw, MSN,APRN,AGNP-C

## 2022-03-15 ENCOUNTER — TELEPHONE (OUTPATIENT)
Dept: HEMATOLOGY/ONCOLOGY | Facility: CLINIC | Age: 32
End: 2022-03-15
Payer: COMMERCIAL

## 2022-03-15 DIAGNOSIS — C50.812 MALIGNANT NEOPLASM OF OVERLAPPING SITES OF LEFT FEMALE BREAST, UNSPECIFIED ESTROGEN RECEPTOR STATUS: Primary | ICD-10-CM

## 2022-03-15 RX ORDER — DIPHENHYDRAMINE HYDROCHLORIDE 50 MG/ML
50 INJECTION INTRAMUSCULAR; INTRAVENOUS ONCE AS NEEDED
Status: CANCELLED | OUTPATIENT
Start: 2022-03-16

## 2022-03-15 RX ORDER — EPINEPHRINE 0.3 MG/.3ML
0.3 INJECTION SUBCUTANEOUS ONCE AS NEEDED
Status: CANCELLED | OUTPATIENT
Start: 2022-03-16

## 2022-03-15 RX ORDER — SODIUM CHLORIDE 0.9 % (FLUSH) 0.9 %
10 SYRINGE (ML) INJECTION
Status: CANCELLED | OUTPATIENT
Start: 2022-03-16

## 2022-03-15 RX ORDER — HEPARIN 100 UNIT/ML
500 SYRINGE INTRAVENOUS
Status: CANCELLED | OUTPATIENT
Start: 2022-03-16

## 2022-03-15 NOTE — PROGRESS NOTES
[facility] Hematology/Oncology  PROGRESS NOTE -  Follow-up Visit      Subjective:       Patient ID:   NAME: Esperanza Peters : 1990     31 y.o. female    Referring Doc: Dr Cheri Smith  Other Physicians: Armaan Mukherjee           Chief Complaint: left breast cancer f/u       History of Present Illness:     Patient returns today for a regularly scheduled follow-up visit.  The patient is here today to go over the results of the recently ordered labs, tests and studies. She is here by herself today.     She previously had surgery with Dr Smith and Dr Nguyen with plastic surgery at [place] [address] on 2022; pathology reports that she had no residual breast cancer in the left and right breast and the six sentinel Ln's on the left were all negative.    Started back on perjeta and herceptin    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       discussed covid19 precautions      ROS:   GEN: normal without any fever, night sweats or weight loss  HEENT: normal with no HA's, sore throat, stiff neck, changes in vision  CV: normal with no CP, SOB, PND, CHASE or orthopnea  PULM: normal with no SOB, cough, hemoptysis, sputum or pleuritic pain  GI: normal with no abdominal pain, nausea, vomiting, constipation, diarrhea, melanotic stools, BRBPR, or hematemesis  : normal with no hematuria, dysuria  BREAST: no palpable masses  SKIN: normal with no rash, erythema, bruising, or swelling    Pain Scale:  0    Allergies:  Review of patient's allergies indicates:  No Known Allergies    Medications:    Current Outpatient Medications:     acetaminophen (TYLENOL) 500 MG tablet, Take 500 mg by mouth once as needed for Pain., Disp: , Rfl:     magic mouthwash diphen/antac/lidoc/nysta, Take 10 mLs by mouth 4 (four) times daily., Disp: 120 mL, Rfl: 1    HYDROcodone-acetaminophen (NORCO) 5-325 mg per tablet, Take 1 tablet by mouth 3 (three) times daily as needed for pain., Disp: 8  tablet, Rfl: 0  No current facility-administered medications for this visit.    Facility-Administered Medications Ordered in Other Visits:     diphenhydrAMINE injection 50 mg, 50 mg, Intravenous, Once PRN, Nilesh Jackson MD    EPINEPHrine (EPIPEN) 0.3 mg/0.3 mL pen injection 0.3 mg, 0.3 mg, Intramuscular, Once PRN, Nilesh Jackson MD    hydrocortisone sodium succinate injection 100 mg, 100 mg, Intravenous, Once PRN, Nilesh Jackson MD    pertuzumab (PERJETA) 420 mg in sodium chloride 0.9% 264 mL infusion, 420 mg, Intravenous, 1 time in Clinic/[facility], Nilesh Jackson MD, Last Rate: 528 mL/hr at 03/16/22 1121, 420 mg at 03/16/22 1121    sodium chloride 0.9% 100 mL flush bag, , Intravenous, 1 time in Clinic/[facility], Nilesh Jackson MD    sodium chloride 0.9% bolus 500 mL, 500 mL, Intravenous, 1 time in Clinic/[facility], Nilesh Jackson MD, Last Rate: 500 mL/hr at 03/16/22 1115, 500 mL at 03/16/22 1115    sodium chloride 0.9% flush 10 mL, 10 mL, Intravenous, PRN, Nilesh Jackson MD    trastuzumab 384 mg in sodium chloride 0.9% 250 mL chemo infusion, 6 mg/kg (Treatment Plan Recorded), Intravenous, 1 time in Clinic/[facility], Nilesh Jackson MD    PMHx/PSHx Updates:  See patient's last visit with me on 2/15/2022  See H&P on 9/13/2021        Pathology:  Cancer Staging  No matching staging information was found for the patient.    Bilateral mastectomies : 1/128/2022 at [facility]  - pathology report on chart      Objective:     Vitals:  Blood pressure 110/63, pulse 82, temperature 98.1 °F (36.7 °C), resp. rate 18, height 5' (1.524 m), weight 57.2 kg (126 lb).    Physical Examination:   GEN: no apparent distress, comfortable; AAOx3  HEAD: atraumatic and normocephalic  EYES: no pallor, no icterus, PERRLA  ENT: OMM, no pharyngeal erythema, external ears WNL; no nasal discharge; no thrush  NECK: no masses, thyroid normal, trachea midline, no LAD/LN's, supple  CV: RRR with no murmur; normal pulse; normal S1 and  S2; no pedal edema; Rght CW portacath since remoived  CHEST: Normal respiratory effort; CTAB; normal breath sounds; no wheeze or crackles  ABDOM: nontender and nondistended; soft; normal bowel sounds; no rebound/guarding  MUSC/Skeletal: ROM normal; no crepitus; joints normal; no deformities or arthropathy  EXTREM: no clubbing, cyanosis, inflammation or swelling  SKIN: no rashes, lesions, ulcers, petechiae or subcutaneous nodules  : no pacheco  NEURO: grossly intact; motor/sensory WNL; AAOx3; no tremors  PSYCH: normal mood, affect and behavior  LYMPH: normal cervical, supraclavicular, axillary and groin LN's  Breast: s/p bilateral mastectomies with reconstruction             Labs:     Lab Results   Component Value Date    WBC 5.42 02/23/2022    HGB 11.5 (L) 02/23/2022    HCT 36.5 (L) 02/23/2022    MCV 82 02/23/2022     02/23/2022       CMP  Sodium   Date Value Ref Range Status   02/23/2022 140 136 - 145 mmol/L Final     Potassium   Date Value Ref Range Status   02/23/2022 3.6 3.5 - 5.1 mmol/L Final     Chloride   Date Value Ref Range Status   02/23/2022 106 95 - 110 mmol/L Final     CO2   Date Value Ref Range Status   02/23/2022 27 23 - 29 mmol/L Final     Glucose   Date Value Ref Range Status   02/23/2022 97 70 - 110 mg/dL Final     BUN   Date Value Ref Range Status   02/23/2022 9 6 - 20 mg/dL Final     Creatinine   Date Value Ref Range Status   02/23/2022 0.9 0.5 - 1.4 mg/dL Final     Calcium   Date Value Ref Range Status   02/23/2022 9.8 8.7 - 10.5 mg/dL Final     Total Protein   Date Value Ref Range Status   02/23/2022 7.3 6.0 - 8.4 g/dL Final     Albumin   Date Value Ref Range Status   02/23/2022 3.9 3.5 - 5.2 g/dL Final     Total Bilirubin   Date Value Ref Range Status   02/23/2022 0.4 0.1 - 1.0 mg/dL Final     Comment:     For infants and newborns, interpretation of results should be based  on gestational age, weight and in agreement with clinical  observations.    Premature Infant recommended reference  ranges:  Up to 24 hours.............<8.0 mg/dL  Up to 48 hours............<12.0 mg/dL  3-5 days..................<15.0 mg/dL  6-29 days.................<15.0 mg/dL       Alkaline Phosphatase   Date Value Ref Range Status   02/23/2022 38 (L) 55 - 135 U/L Final     AST   Date Value Ref Range Status   02/23/2022 18 10 - 40 U/L Final     ALT   Date Value Ref Range Status   02/23/2022 16 10 - 44 U/L Final     Anion Gap   Date Value Ref Range Status   02/23/2022 7 (L) 8 - 16 mmol/L Final     eGFR if    Date Value Ref Range Status   02/23/2022 >60.0 >60 mL/min/1.73 m^2 Final     eGFR if non    Date Value Ref Range Status   02/23/2022 >60.0 >60 mL/min/1.73 m^2 Final     Comment:     Calculation used to obtain the estimated glomerular filtration  rate (eGFR) is the CKD-EPI equation.              Radiology/Diagnostic Studies:    MRI breast  12/23/2021:  -    Impression:     1. Complete resolution of previous enhancing multicentric left breast malignancy.  2. Complete resolution of prior enlarged left axillary lymph nodes.  3. Persistent retroareolar left breast distortion near 6 o'clock position, characteristic of scarring.  Recommendation: Patient is currently under medical oncology and surgical care.        PET 10/21/2021:    Impression:     Hypermetabolic left breast mass consistent with primary breast malignancy. Please correlate with dedicated breast imaging.     No pathologically enlarged or hypermetabolic axillary or intrathoracic lymph nodes.     Hypermetabolic retroperitoneal lymph node is nonspecific and would be unusual for breast cancer metastasis. Attention on follow-up is recommended.     Low level FDG activity associated with prominent cervical lymph nodes, bilateral and symmetric, most likely reactive in nature.     Reactive red marrow.      CT scans 9/29/2021:    IMPRESSION:  1. Large irregular subareolar left breast mass and findings compatible with multifocal/multicentric  disease in the left breast (better appreciated on the recent MRI).  2. Left axillary lymphadenopathy compatible with additional sites of disease.  3. Borderline enlarged right hilar node, possibly an additional site of disease, although felt less likely this could be further assessed with PET/CT.        NM Bone Scan Whole Body    Result Date: 9/27/2021  CMS MANDATED QUALITY DATA-BONE SCAN-147 HISTORY: Staging breast carcinoma, and 63.25. COMPARISON: Multiple prior chest radiographs. FINDINGS: 22 mCi Tc 99m MDP was administered IV per protocol, with delayed whole-body images obtained in the anterior and posterior projections. Nodular focus of increased radiotracer uptake in the sternum near the level of the sternomanubrial articulation is nonspecific. There is otherwise no abnormal focal increased radiotracer uptake throughout the axial or appendicular skeleton to suggest osteoblastic metastatic disease. There is normal radiotracer uptake by the kidneys, with normal excretion into the renal collecting systems and urinary bladder. IMPRESSION: 1. Focus of increased osteoblastic activity in the sternum near the sternomanubrial junction, nonspecific. Consider further evaluation with chest CT with contrast. 2. Otherwise no evidence of osteoblastic metastatic disease. Electronically signed by:  Keron Neil MD  9/27/2021 1:41 PM CDT Workstation: [id number]    X-Ray Chest AP Portable    Result Date: 9/20/2021  Portable chest x-ray at 11:13 AM is compared to prior study dated 9/16/2021 Clinical history is Port-A-Cath placement There is a right subclavian vein Port-A-Cath with tip overlying the superior vena cava. There is no pneumothorax. The lungs are clear. The cardiomediastinal silhouette is normal in size. There are no acute osseous normality's. IMPRESSION: No pneumothorax status post right subclavian vein Port-A-Cath placement Electronically signed by:  Breana Lebron MD  9/20/2021 11:52 AM CDT Workstation:  [id number]    MRI Breast w/wo Contrast, w/CAD, Bilateral    Result Date: 9/28/2021  EXAMINATION: MRI BREAST W/WO CONTRAST, W/CAD, BILATERAL CLINICAL HISTORY: Staging left breast carcinoma, C 50.812, N 63.21, N 63.24, N 63.25, malignant neoplasm of overlapping sites of left female breast TECHNIQUE: CMS MANDATED QUALITY DATA - MAMMOGRAPHY - 225 Bilateral breast MRI was performed with a dedicated breast coil. The patient was placed prone in the breast immobilizer with light compression. The following localization sequences were obtained: Axial inversion recovery, axial 3-D non-fat-suppressed, axial 3-D fat suppressed pre-contrast, followed by axial 3-D fat-suppressed post-contrast dynamic images with 6.5 mL Gadavist IV contrast. Post-processing including subtraction imaging, reconstruction in the sagittal and coronal planes, and MIP of both breasts was performed on an independent workstation. The interpretation was assisted by Computer Aided Detection. FINDINGS: Comparison to the diagnostic mammogram and ultrasound of 08/20/2021.  The breasts are composed of mostly fibroglandular elements and interspersed fat.  Background parenchymal enhancement is mild. There is a dominant heterogeneously enhancing solid mass with irregular margins in the left breast retroareolar region measuring 37 mm AP x 3 mm transverse by 35 mm craniocaudal dimension, with suspicious enhancement kinetics.  There are several additional enhancing nodular and irregular infiltrative masses in the left breast, some which are contiguous with the dominant retroareolar mass, extending into the all quadrants of the breast predominantly the upper outer quadrant.  Total area of disease in the left breast measures roughly 38 mm AP x 60 mm transverse by 90 mm craniocaudal dimension, roughly 30-40% of the volume of the left breast.  No abnormal enhancement to suggest involvement of the left pectoralis musculature. There are several enlarged infiltrated  appearing left axillary lymph nodes suspicious for metastatic disease, measuring 16 mm maximum short axis dimension.  There are no enlarged right axillary lymph nodes or enlarged internal mammary chain lymph nodes. Stippled randomly scattered non masslike enhancement in the right breast is nonspecific, with no enhancing right breast mass. There is a 23 mm STIR hyperintense enhancing right hilar mass suspicious for hilar lymphadenopathy, with no additional abnormal enhancement in the chest wall or mediastinum evident.     1. Multifocal, multicentric left breast carcinoma as described, with several enlarged left axillary lymph nodes consistent with metastatic disease. 2. No findings of contralateral right breast carcinoma, with scattered stippled probably benign non masslike enhancement in the right breast. 3. Enhancing right hilar mass suspicious for metastatic disease.  Consider further evaluation with whole body PET CT. BI-RADS CATEGORY 6: KNOWN MALIGNANCY. [facility] utilizes a reminder system to ensure that patients receive reminder letters for appointments. This includes reminders for routine screening mammograms, diagnostic mammograms, or other breast imaging interventions as appropriate. This patient will be placed in the appropriate reminder system. Electronically signed by: Keron Neil MD Date:    09/28/2021 Time:    09:32           I have reviewed all available lab results and radiology reports.    Assessment/Plan:   (1) 31 y.o. female with diagnosis of left breast cancer who has been referred by Dr Cheri Smith for evaluation by medical hematology/oncology.   - left breast masses of overlapping sites involving Outer upper Quadrant and Lower inner Quadrant respectively  - s/p core biopsies taken on 8/27/2021  - axillary LN was positive  - stage III cancer process at least  - ER positive at 80.5 %, IL positive at 3%; Her2Nu (3+)  -Ki67 25%  - discussed the pathology and the latest NCCN  guidelines (version 8.2021)  - recommend neoadjuvant chemotherapy with herceptin + perjeta based regimen which studies have shown to be beneficial to survival and endocrine therapy  - she will need echo, portacath and PEt scan  - will set up chemotherapy school to discuss the drugs, side-effects and provide literature on the drugs    9/30/2021:  - Her case was presented at the [facility] Tumor Board yesterday and consensus was to get PEt scan and proceed with TCHP chemotherapy.   - She had chemotherapy school with Susana.   - She had portacath placed on [facility] CW with Dr Smith.   - She is set to start chemotherapy on Oct 5th.   - echo on chart from 9/24/2021  - discussed the chemotherapy regimen, provided literature and discussed the side-effect profile of the drugs    10/26/2021:  - getting cycle #2 today  - getting Udenyca tomorrow and she also requested some IVF tomorrow as well  - discussed the PEt scan findings from 10/21/2021     12/7/2021:  - 4th cycle chemo this week with IVF and antiemtics as needed  - order MRI of breasts for next week to re-assess response    1/10/2022:  - She completed the 4th cycle of chemotherapy and had subsequent breast MRI on 12/23/2021 which showed an excellent response to the therapy; she has decided to stop the chemotherapy and proceed with surgery; she is adamant that she does not want to continue with the neoadjuvant regiment; she is aware that she will still at least need to continue the herceptin post-op and may need additional chemotherapy depending on the pathology findings from the surgery  - She saw Dr Smith the other day and is seeing Dr Nguyen with plastic surgery tomorrow    2/15/2022:  - She had surgery with Dr Smith and Dr Nguyen with plastic surgery at [place] on Jan 28th 2022; pathology reports that she had no residual breast cancer in the left and right breast and the six sentinel LN's on the left were all negative. She is healing well and has some residual aches  and pain under the left arm. She sees Dr Smith again 2 weeks and Dr Nguyen on 2/21/2022  - Dr Nguyen wants to either move or remove portacath to help with the cosmetic surgery better  - she will need to continue with Herceptin and Perjeta for the next year, she can either get PICC or intermittent IV's    3/16/2022:  - she has since resumed therapy post-op with perjeta and herceptin  - will need periodic echocardiograms  - monitor labs  - she no long has portacath (moved to facilitate reconstruction)       (2) no HTN     (3) No DM     (4) two c-sections with healthy children - she is aware that with chemotherapy she could potentially not be able to reproduce or have any more children - she is comfortable with that fact      VISIT DIAGNOSES:      Malignant neoplasm of overlapping sites of left female breast, unspecified estrogen receptor status    Microcytic anemia    HER2-positive carcinoma of breast    ER+ (estrogen receptor positive status)    Chemotherapy-induced neutropenia    Mass of lower inner quadrant of left breast    Mass of upper outer quadrant of left breast    Mass overlapping multiple quadrants of left breast          PLAN:  1. resumed Perjeta and herceptin  2. Continue with peridoic echo's as scheduled and directed  3. Check up to date labs and then monthly  4. s/p chemotherapy school     RTC in 4 weeks with Whit;  8 weeks with myself    Fax note to Brittanie Smith; Susana Underwood Cara       Discussion:     COVID-19 Discussion:     I had long discussion with patient and any applicable family about the COVID-19 coronavirus epidemic and the recommended precautions with regard to cancer and/or hematology patients. I have re-iterated the CDC recommendations for adequate hand washing, use of hand -like products, and coughing into elbow, etc. In addition, especially for our patients who are on chemotherapy and/or our otherwise immunocompromised patients, I have recommended avoidance of crowds, including  "movie theaters, restaurants, churches, etc. I have recommended avoidance of any sick or symptomatic family members and/or friends. I have also recommended avoidance of any raw and unwashed food products, and general avoidance of food items that have not been prepared by themselves. The patient has been asked to call us immediately with any symptom developments, issues, questions or other general concerns.      Pathology Discussion:     I reviewed and discussed the pathology report(s) and radiograph reports (if available) in as simple to understand and/or laymen's terms to the best of my ability. I had an indepth conversation with the patient and went over the patient's individual diagnosis based on the information that was currently available. I discussed the TNM staging process with regard to the patient's particular cancer type, and the calculated stage based on the currently available TNM data and literature. I discussed the available prognostic data with regard to the current staging information and how it relates to the prognosis of their particular neoplastic process.          NCCN Guidelines:     I discussed the available treatment option(s) in accordance with the latest literature from the NCCN Clinical Practice Guidelines for the patient's particular type of cancer disorder. The NCCN Guidelines provide a "document evidence-based (and) consensus-driven management" of the care of oncology patients. The treatment recommendations were made not only in accordance to the NCCN guidelines, but also factored in to account the patient's overall age, condition, performance status and their medical co-morbidities. I went over the risks and benefits of the the treatment options (if any could be made) with regard to their particular cancer type, their cancer stage, their age, and their co-morbidities.      Chemotherapy Discussion:        I discussed the available treatment option(s) in accordance with the latest/current " national evidence-based guidelines (NCCN, UpToDate, NCI, ASCO, etc where applicable), their overall age/condition and their co-morbidities. I also went over the risks and benefits of the chemotherapy with regard to their particular cancer type, their cancer stage, their age/condition, and their co-morbidities. I provided literature on the chemotherapy regimen and discussed the chemotherapy side-effect profiles of the drug(s). I discussed the importance of compliance with obtaining and monitoring weekly lab work, and went over the potential hematopathology issues and risks with anemia, leucopenia and thrombocytopenia that can occur with chemotherapy. I discussed the potential risks of liver and kidney damage, which could be permanent and could necessitate dialysis long-term if kidney failure developed. I discussed the emetic and/or diarrheal potential of the regimen and the potential need for use of antiemetic and anti-diarrheal medications. I discussed the risk for development of anaphylactic shock, bronchospasm, dysrhythmia, and respiratory/cardiovascular arrest and/or failure. I discussed the potential risks for development of alopecia, cold sensory issues, ringing in ears, vertigo, cataracts, glaucoma, and neuropathy, all of which could end up being chronic and life-long. Some chemotherpyI discussed the risks of hand-foot syndrome and rashes, and development of other autoimmune mediated processes such as pneumonitis, hepatitis, and colitis which could be life threatening. I discussed the risks of the potential development of a rare but fatal viral mediated disease known as PML (Progressive Multifocal Leukoencephalopathy), and risk of future development of leukemia and/or lymphoma from use of certain chemotherapy agents. I discussed the need for neutropenic precautions, basic hygiene/sanitation behaviors and dietary restrictions.     The patient's consent has been obtained to proceed with the chemotherapy.The  patient will be referred to Chemotherapy School /[facility] for training and education on chemotherapy, use of antiemetics and/or anti-diarrheals, use of NSAID's, potential chemotherapy side-effects, and any specific recommendations and precautions with the particular chemotherapy agents.       I answered all of the patient's (and family's, if applicable) questions to the best of my ability and to their complete satisfaction. The patient acknowledged full understanding of the risks, recommendations and plan(s).      I have explained and the patient understands all of  the current recommendation(s). I have answered all of their questions to the best of my ability and to their complete satisfaction.         I spent over 25 mins of time with the patient. Reviewed results of the recently ordered labs, tests and studies; made directives with regards to the results. Over half of this time was spent couseling and coordinating care.    I have explained all of the above in detail and the patient understands all of the current recommendation(s). I have answered all of their questions to the best of my ability and to their complete satisfaction.   The patient is to continue with the current management plan.            Electronically signed by Nilesh Jackson MD

## 2022-03-16 ENCOUNTER — INFUSION (OUTPATIENT)
Dept: INFUSION THERAPY | Facility: HOSPITAL | Age: 32
End: 2022-03-16
Attending: INTERNAL MEDICINE
Payer: COMMERCIAL

## 2022-03-16 ENCOUNTER — OFFICE VISIT (OUTPATIENT)
Dept: HEMATOLOGY/ONCOLOGY | Facility: CLINIC | Age: 32
End: 2022-03-16
Payer: COMMERCIAL

## 2022-03-16 VITALS
SYSTOLIC BLOOD PRESSURE: 110 MMHG | RESPIRATION RATE: 18 BRPM | HEART RATE: 82 BPM | HEIGHT: 60 IN | BODY MASS INDEX: 24.74 KG/M2 | DIASTOLIC BLOOD PRESSURE: 63 MMHG | TEMPERATURE: 98 F | WEIGHT: 126 LBS

## 2022-03-16 VITALS
WEIGHT: 126.75 LBS | HEIGHT: 60 IN | HEART RATE: 79 BPM | BODY MASS INDEX: 24.88 KG/M2 | TEMPERATURE: 98 F | DIASTOLIC BLOOD PRESSURE: 76 MMHG | RESPIRATION RATE: 18 BRPM | SYSTOLIC BLOOD PRESSURE: 113 MMHG

## 2022-03-16 DIAGNOSIS — Z17.0 ER+ (ESTROGEN RECEPTOR POSITIVE STATUS): ICD-10-CM

## 2022-03-16 DIAGNOSIS — T45.1X5A CHEMOTHERAPY-INDUCED NEUTROPENIA: ICD-10-CM

## 2022-03-16 DIAGNOSIS — C50.812 MALIGNANT NEOPLASM OF OVERLAPPING SITES OF LEFT FEMALE BREAST, UNSPECIFIED ESTROGEN RECEPTOR STATUS: Primary | ICD-10-CM

## 2022-03-16 DIAGNOSIS — C50.812 MALIGNANT NEOPLASM OF OVERLAPPING SITES OF LEFT FEMALE BREAST, UNSPECIFIED ESTROGEN RECEPTOR STATUS: ICD-10-CM

## 2022-03-16 DIAGNOSIS — N63.24 MASS OF LOWER INNER QUADRANT OF LEFT BREAST: ICD-10-CM

## 2022-03-16 DIAGNOSIS — D70.1 CHEMOTHERAPY-INDUCED NEUTROPENIA: ICD-10-CM

## 2022-03-16 DIAGNOSIS — N63.25 MASS OVERLAPPING MULTIPLE QUADRANTS OF LEFT BREAST: ICD-10-CM

## 2022-03-16 DIAGNOSIS — D50.9 MICROCYTIC ANEMIA: Chronic | ICD-10-CM

## 2022-03-16 DIAGNOSIS — C50.919 HER2-POSITIVE CARCINOMA OF BREAST: ICD-10-CM

## 2022-03-16 DIAGNOSIS — N63.21 MASS OF UPPER OUTER QUADRANT OF LEFT BREAST: ICD-10-CM

## 2022-03-16 DIAGNOSIS — E86.0 DEHYDRATION: ICD-10-CM

## 2022-03-16 DIAGNOSIS — T45.1X5A CHEMOTHERAPY-INDUCED NEUTROPENIA: Primary | ICD-10-CM

## 2022-03-16 DIAGNOSIS — D70.1 CHEMOTHERAPY-INDUCED NEUTROPENIA: Primary | ICD-10-CM

## 2022-03-16 PROCEDURE — 3078F PR MOST RECENT DIASTOLIC BLOOD PRESSURE < 80 MM HG: ICD-10-PCS | Mod: S$GLB,,, | Performed by: INTERNAL MEDICINE

## 2022-03-16 PROCEDURE — 63600175 PHARM REV CODE 636 W HCPCS: Mod: JG | Performed by: INTERNAL MEDICINE

## 2022-03-16 PROCEDURE — 96417 CHEMO IV INFUS EACH ADDL SEQ: CPT

## 2022-03-16 PROCEDURE — 3074F SYST BP LT 130 MM HG: CPT | Mod: S$GLB,,, | Performed by: INTERNAL MEDICINE

## 2022-03-16 PROCEDURE — 96413 CHEMO IV INFUSION 1 HR: CPT

## 2022-03-16 PROCEDURE — 3074F PR MOST RECENT SYSTOLIC BLOOD PRESSURE < 130 MM HG: ICD-10-PCS | Mod: S$GLB,,, | Performed by: INTERNAL MEDICINE

## 2022-03-16 PROCEDURE — 99214 OFFICE O/P EST MOD 30 MIN: CPT | Mod: S$GLB,,, | Performed by: INTERNAL MEDICINE

## 2022-03-16 PROCEDURE — A4216 STERILE WATER/SALINE, 10 ML: HCPCS | Performed by: INTERNAL MEDICINE

## 2022-03-16 PROCEDURE — 25000003 PHARM REV CODE 250: Performed by: INTERNAL MEDICINE

## 2022-03-16 PROCEDURE — 3008F BODY MASS INDEX DOCD: CPT | Mod: S$GLB,,, | Performed by: INTERNAL MEDICINE

## 2022-03-16 PROCEDURE — 1160F PR REVIEW ALL MEDS BY PRESCRIBER/CLIN PHARMACIST DOCUMENTED: ICD-10-PCS | Mod: S$GLB,,, | Performed by: INTERNAL MEDICINE

## 2022-03-16 PROCEDURE — 3078F DIAST BP <80 MM HG: CPT | Mod: S$GLB,,, | Performed by: INTERNAL MEDICINE

## 2022-03-16 PROCEDURE — 96361 HYDRATE IV INFUSION ADD-ON: CPT

## 2022-03-16 PROCEDURE — 3008F PR BODY MASS INDEX (BMI) DOCUMENTED: ICD-10-PCS | Mod: S$GLB,,, | Performed by: INTERNAL MEDICINE

## 2022-03-16 PROCEDURE — 1160F RVW MEDS BY RX/DR IN RCRD: CPT | Mod: S$GLB,,, | Performed by: INTERNAL MEDICINE

## 2022-03-16 PROCEDURE — 99214 PR OFFICE/OUTPT VISIT, EST, LEVL IV, 30-39 MIN: ICD-10-PCS | Mod: S$GLB,,, | Performed by: INTERNAL MEDICINE

## 2022-03-16 RX ORDER — SODIUM CHLORIDE 0.9 % (FLUSH) 0.9 %
10 SYRINGE (ML) INJECTION
Status: DISCONTINUED | OUTPATIENT
Start: 2022-03-16 | End: 2022-03-16 | Stop reason: HOSPADM

## 2022-03-16 RX ORDER — EPINEPHRINE 0.3 MG/.3ML
0.3 INJECTION SUBCUTANEOUS ONCE AS NEEDED
Status: DISCONTINUED | OUTPATIENT
Start: 2022-03-16 | End: 2022-03-16 | Stop reason: HOSPADM

## 2022-03-16 RX ORDER — DIPHENHYDRAMINE HYDROCHLORIDE 50 MG/ML
50 INJECTION INTRAMUSCULAR; INTRAVENOUS ONCE AS NEEDED
Status: DISCONTINUED | OUTPATIENT
Start: 2022-03-16 | End: 2022-03-16 | Stop reason: HOSPADM

## 2022-03-16 RX ADMIN — PERTUZUMAB 420 MG: 30 INJECTION, SOLUTION, CONCENTRATE INTRAVENOUS at 11:03

## 2022-03-16 RX ADMIN — TRASTUZUMAB 384 MG: 150 INJECTION, POWDER, LYOPHILIZED, FOR SOLUTION INTRAVENOUS at 11:03

## 2022-03-16 RX ADMIN — SODIUM CHLORIDE 500 ML: 9 INJECTION, SOLUTION INTRAVENOUS at 11:03

## 2022-03-16 RX ADMIN — SODIUM CHLORIDE 10 ML: 9 INJECTION INTRAMUSCULAR; INTRAVENOUS; SUBCUTANEOUS at 12:03

## 2022-03-16 NOTE — PLAN OF CARE
Problem: Fatigue  Goal: Improved Activity Tolerance  3/16/2022 1218 by Nena Charlton RN  Outcome: Met  3/16/2022 1218 by Nena Charlton RN  Outcome: Ongoing, Progressing

## 2022-03-31 ENCOUNTER — LAB VISIT (OUTPATIENT)
Dept: LAB | Facility: HOSPITAL | Age: 32
End: 2022-03-31
Attending: INTERNAL MEDICINE
Payer: COMMERCIAL

## 2022-03-31 DIAGNOSIS — C50.812 MALIGNANT NEOPLASM OF OVERLAPPING SITES OF LEFT FEMALE BREAST, UNSPECIFIED ESTROGEN RECEPTOR STATUS: ICD-10-CM

## 2022-03-31 DIAGNOSIS — N63.21 MASS OF UPPER OUTER QUADRANT OF LEFT BREAST: ICD-10-CM

## 2022-03-31 DIAGNOSIS — N63.24 MASS OF LOWER INNER QUADRANT OF LEFT BREAST: ICD-10-CM

## 2022-03-31 DIAGNOSIS — N63.25 MASS OVERLAPPING MULTIPLE QUADRANTS OF LEFT BREAST: ICD-10-CM

## 2022-03-31 LAB
ALBUMIN SERPL BCP-MCNC: 3.9 G/DL (ref 3.5–5.2)
ALP SERPL-CCNC: 40 U/L (ref 55–135)
ALT SERPL W/O P-5'-P-CCNC: 15 U/L (ref 10–44)
ANION GAP SERPL CALC-SCNC: 9 MMOL/L (ref 8–16)
AST SERPL-CCNC: 17 U/L (ref 10–40)
BASOPHILS # BLD AUTO: 0.03 K/UL (ref 0–0.2)
BASOPHILS NFR BLD: 0.6 % (ref 0–1.9)
BILIRUB SERPL-MCNC: 0.4 MG/DL (ref 0.1–1)
BUN SERPL-MCNC: 15 MG/DL (ref 6–20)
CALCIUM SERPL-MCNC: 9.5 MG/DL (ref 8.7–10.5)
CHLORIDE SERPL-SCNC: 104 MMOL/L (ref 95–110)
CO2 SERPL-SCNC: 28 MMOL/L (ref 23–29)
CREAT SERPL-MCNC: 0.8 MG/DL (ref 0.5–1.4)
DIFFERENTIAL METHOD: ABNORMAL
EOSINOPHIL # BLD AUTO: 0.1 K/UL (ref 0–0.5)
EOSINOPHIL NFR BLD: 2.6 % (ref 0–8)
ERYTHROCYTE [DISTWIDTH] IN BLOOD BY AUTOMATED COUNT: 12.6 % (ref 11.5–14.5)
EST. GFR  (AFRICAN AMERICAN): >60 ML/MIN/1.73 M^2
EST. GFR  (NON AFRICAN AMERICAN): >60 ML/MIN/1.73 M^2
GLUCOSE SERPL-MCNC: 97 MG/DL (ref 70–110)
HCT VFR BLD AUTO: 37.6 % (ref 37–48.5)
HGB BLD-MCNC: 11.2 G/DL (ref 12–16)
IMM GRANULOCYTES # BLD AUTO: 0 K/UL (ref 0–0.04)
IMM GRANULOCYTES NFR BLD AUTO: 0 % (ref 0–0.5)
LYMPHOCYTES # BLD AUTO: 1.7 K/UL (ref 1–4.8)
LYMPHOCYTES NFR BLD: 36.5 % (ref 18–48)
MAGNESIUM SERPL-MCNC: 1.7 MG/DL (ref 1.6–2.6)
MCH RBC QN AUTO: 24.2 PG (ref 27–31)
MCHC RBC AUTO-ENTMCNC: 29.8 G/DL (ref 32–36)
MCV RBC AUTO: 81 FL (ref 82–98)
MONOCYTES # BLD AUTO: 0.5 K/UL (ref 0.3–1)
MONOCYTES NFR BLD: 10.7 % (ref 4–15)
NEUTROPHILS # BLD AUTO: 2.3 K/UL (ref 1.8–7.7)
NEUTROPHILS NFR BLD: 49.6 % (ref 38–73)
NRBC BLD-RTO: 0 /100 WBC
PLATELET # BLD AUTO: 167 K/UL (ref 150–450)
PMV BLD AUTO: 9.4 FL (ref 9.2–12.9)
POTASSIUM SERPL-SCNC: 3.9 MMOL/L (ref 3.5–5.1)
PROT SERPL-MCNC: 7.5 G/DL (ref 6–8.4)
RBC # BLD AUTO: 4.62 M/UL (ref 4–5.4)
SODIUM SERPL-SCNC: 141 MMOL/L (ref 136–145)
WBC # BLD AUTO: 4.66 K/UL (ref 3.9–12.7)

## 2022-03-31 PROCEDURE — 85025 COMPLETE CBC W/AUTO DIFF WBC: CPT | Performed by: INTERNAL MEDICINE

## 2022-03-31 PROCEDURE — 80053 COMPREHEN METABOLIC PANEL: CPT | Performed by: INTERNAL MEDICINE

## 2022-03-31 PROCEDURE — 83735 ASSAY OF MAGNESIUM: CPT | Performed by: NURSE PRACTITIONER

## 2022-03-31 PROCEDURE — 36415 COLL VENOUS BLD VENIPUNCTURE: CPT | Performed by: INTERNAL MEDICINE

## 2022-04-05 RX ORDER — EPINEPHRINE 0.3 MG/.3ML
0.3 INJECTION SUBCUTANEOUS ONCE AS NEEDED
Status: CANCELLED | OUTPATIENT
Start: 2022-04-13

## 2022-04-05 RX ORDER — DIPHENHYDRAMINE HYDROCHLORIDE 50 MG/ML
50 INJECTION INTRAMUSCULAR; INTRAVENOUS ONCE AS NEEDED
Status: CANCELLED | OUTPATIENT
Start: 2022-04-13

## 2022-04-05 RX ORDER — SODIUM CHLORIDE 0.9 % (FLUSH) 0.9 %
10 SYRINGE (ML) INJECTION
Status: CANCELLED | OUTPATIENT
Start: 2022-04-13

## 2022-04-05 RX ORDER — HEPARIN 100 UNIT/ML
500 SYRINGE INTRAVENOUS
Status: CANCELLED | OUTPATIENT
Start: 2022-04-13

## 2022-04-13 ENCOUNTER — OFFICE VISIT (OUTPATIENT)
Dept: HEMATOLOGY/ONCOLOGY | Facility: CLINIC | Age: 32
End: 2022-04-13
Payer: COMMERCIAL

## 2022-04-13 ENCOUNTER — INFUSION (OUTPATIENT)
Dept: INFUSION THERAPY | Facility: HOSPITAL | Age: 32
End: 2022-04-13
Attending: INTERNAL MEDICINE
Payer: COMMERCIAL

## 2022-04-13 VITALS
WEIGHT: 125.69 LBS | BODY MASS INDEX: 24.68 KG/M2 | TEMPERATURE: 98 F | HEART RATE: 81 BPM | SYSTOLIC BLOOD PRESSURE: 104 MMHG | RESPIRATION RATE: 18 BRPM | DIASTOLIC BLOOD PRESSURE: 71 MMHG | OXYGEN SATURATION: 100 % | HEIGHT: 60 IN

## 2022-04-13 VITALS
SYSTOLIC BLOOD PRESSURE: 109 MMHG | WEIGHT: 125 LBS | RESPIRATION RATE: 18 BRPM | DIASTOLIC BLOOD PRESSURE: 72 MMHG | HEIGHT: 60 IN | OXYGEN SATURATION: 100 % | TEMPERATURE: 98 F | BODY MASS INDEX: 24.54 KG/M2 | HEART RATE: 83 BPM

## 2022-04-13 DIAGNOSIS — D70.1 CHEMOTHERAPY-INDUCED NEUTROPENIA: Primary | ICD-10-CM

## 2022-04-13 DIAGNOSIS — E86.0 DEHYDRATION: ICD-10-CM

## 2022-04-13 DIAGNOSIS — C50.812 MALIGNANT NEOPLASM OF OVERLAPPING SITES OF LEFT FEMALE BREAST, UNSPECIFIED ESTROGEN RECEPTOR STATUS: ICD-10-CM

## 2022-04-13 DIAGNOSIS — C50.919 HER2-POSITIVE CARCINOMA OF BREAST: ICD-10-CM

## 2022-04-13 DIAGNOSIS — C50.812 MALIGNANT NEOPLASM OF OVERLAPPING SITES OF LEFT FEMALE BREAST, UNSPECIFIED ESTROGEN RECEPTOR STATUS: Primary | ICD-10-CM

## 2022-04-13 DIAGNOSIS — Z17.0 ER+ (ESTROGEN RECEPTOR POSITIVE STATUS): ICD-10-CM

## 2022-04-13 DIAGNOSIS — T45.1X5A CHEMOTHERAPY-INDUCED NEUTROPENIA: Primary | ICD-10-CM

## 2022-04-13 PROCEDURE — 3078F DIAST BP <80 MM HG: CPT | Mod: S$GLB,,, | Performed by: NURSE PRACTITIONER

## 2022-04-13 PROCEDURE — 96361 HYDRATE IV INFUSION ADD-ON: CPT

## 2022-04-13 PROCEDURE — 99214 PR OFFICE/OUTPT VISIT, EST, LEVL IV, 30-39 MIN: ICD-10-PCS | Mod: S$GLB,,, | Performed by: NURSE PRACTITIONER

## 2022-04-13 PROCEDURE — 3074F SYST BP LT 130 MM HG: CPT | Mod: S$GLB,,, | Performed by: NURSE PRACTITIONER

## 2022-04-13 PROCEDURE — 3008F PR BODY MASS INDEX (BMI) DOCUMENTED: ICD-10-PCS | Mod: S$GLB,,, | Performed by: NURSE PRACTITIONER

## 2022-04-13 PROCEDURE — 96417 CHEMO IV INFUS EACH ADDL SEQ: CPT

## 2022-04-13 PROCEDURE — 3078F PR MOST RECENT DIASTOLIC BLOOD PRESSURE < 80 MM HG: ICD-10-PCS | Mod: S$GLB,,, | Performed by: NURSE PRACTITIONER

## 2022-04-13 PROCEDURE — 99214 OFFICE O/P EST MOD 30 MIN: CPT | Mod: S$GLB,,, | Performed by: NURSE PRACTITIONER

## 2022-04-13 PROCEDURE — 25000003 PHARM REV CODE 250: Performed by: INTERNAL MEDICINE

## 2022-04-13 PROCEDURE — 96413 CHEMO IV INFUSION 1 HR: CPT

## 2022-04-13 PROCEDURE — 63600175 PHARM REV CODE 636 W HCPCS: Mod: JG | Performed by: INTERNAL MEDICINE

## 2022-04-13 PROCEDURE — 3008F BODY MASS INDEX DOCD: CPT | Mod: S$GLB,,, | Performed by: NURSE PRACTITIONER

## 2022-04-13 PROCEDURE — 3074F PR MOST RECENT SYSTOLIC BLOOD PRESSURE < 130 MM HG: ICD-10-PCS | Mod: S$GLB,,, | Performed by: NURSE PRACTITIONER

## 2022-04-13 RX ORDER — EPINEPHRINE 0.3 MG/.3ML
0.3 INJECTION SUBCUTANEOUS ONCE AS NEEDED
Status: DISCONTINUED | OUTPATIENT
Start: 2022-04-13 | End: 2022-04-13 | Stop reason: HOSPADM

## 2022-04-13 RX ORDER — SODIUM CHLORIDE 0.9 % (FLUSH) 0.9 %
10 SYRINGE (ML) INJECTION
Status: DISCONTINUED | OUTPATIENT
Start: 2022-04-13 | End: 2022-04-13 | Stop reason: HOSPADM

## 2022-04-13 RX ADMIN — TRASTUZUMAB 384 MG: 150 INJECTION, POWDER, LYOPHILIZED, FOR SOLUTION INTRAVENOUS at 02:04

## 2022-04-13 RX ADMIN — SODIUM CHLORIDE 500 ML: 0.9 INJECTION, SOLUTION INTRAVENOUS at 01:04

## 2022-04-13 RX ADMIN — PERTUZUMAB 420 MG: 30 INJECTION, SOLUTION, CONCENTRATE INTRAVENOUS at 01:04

## 2022-04-13 NOTE — PROGRESS NOTES
[facility] Hematology/Oncology  PROGRESS NOTE -  Follow-up Visit      Subjective:       Patient ID:   NAME: Esperanza Peters : 1990     31 y.o. female    Referring Doc: Dr Cheri Smith  Other Physicians: Armaan Mukherjee           Chief Complaint: left breast cancer f/u       History of Present Illness:     Patient returns today for a regularly scheduled follow-up visit.  The patient is here today to go over the results of the recently ordered labs, tests and studies. She is here by herself today.     She previously had surgery with Dr Smith and Dr Nguyen with plastic surgery at [place] on 2022; pathology reports that she had no residual breast cancer in the left and right breast and the six sentinel Ln's on the left were all negative.    Started back on perjeta and herceptin    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, but states that is improving       discussed covid19 precautions      ROS:   GEN: normal without any fever, night sweats or weight loss  HEENT: normal with no HA's, sore throat, stiff neck, changes in vision  CV: normal with no CP, SOB, PND, CHASE or orthopnea  PULM: normal with no SOB, cough, hemoptysis, sputum or pleuritic pain  GI: normal with no abdominal pain, nausea, vomiting, constipation, diarrhea, melanotic stools, BRBPR, or hematemesis  : normal with no hematuria, dysuria  BREAST: no palpable masses/ post surgical changes  SKIN: normal with no rash, erythema, bruising, or swelling    Pain Scale:  0    Allergies:  Review of patient's allergies indicates:  No Known Allergies    Medications:    Current Outpatient Medications:     acetaminophen (TYLENOL) 500 MG tablet, Take 500 mg by mouth once as needed for Pain., Disp: , Rfl:     magic mouthwash diphen/antac/lidoc/nysta, Take 10 mLs by mouth 4 (four) times daily., Disp: 120 mL, Rfl: 1    HYDROcodone-acetaminophen (NORCO) 5-325 mg per tablet, Take 1 tablet by mouth 3  (three) times daily as needed for pain., Disp: 8 tablet, Rfl: 0  No current facility-administered medications for this visit.    Facility-Administered Medications Ordered in Other Visits:     diphenhydramine (BENADRYL) 50 mg in NS 50 mL IVPB, 50 mg, Intravenous, Once PRN, Nilesh Jackson MD    EPINEPHrine (EPIPEN) 0.3 mg/0.3 mL pen injection 0.3 mg, 0.3 mg, Intramuscular, Once PRN, Nilesh Jackson MD    hydrocortisone sodium succinate injection 100 mg, 100 mg, Intravenous, Once PRN, Nilesh Jackson MD    pertuzumab (PERJETA) 420 mg in sodium chloride 0.9% 264 mL infusion, 420 mg, Intravenous, 1 time in Clinic/HOD, Nilesh Jackson MD, Last Rate: 528 mL/hr at 04/13/22 1328, 420 mg at 04/13/22 1328    sodium chloride 0.9% bolus 500 mL, 500 mL, Intravenous, 1 time in Clinic/HOD, Nilesh Jackson MD, Last Rate: 500 mL/hr at 04/13/22 1318, 500 mL at 04/13/22 1318    sodium chloride 0.9% flush 10 mL, 10 mL, Intravenous, PRN, Nilesh Jackson MD    trastuzumab 384 mg in sodium chloride 0.9% 250 mL chemo infusion, 6 mg/kg (Treatment Plan Recorded), Intravenous, 1 time in Clinic/HOD, Nilesh Jackson MD    PMHx/PSHx Updates:  See patient's last visit with me on 2/15/2022  See H&P on 9/13/2021        Pathology:  Cancer Staging  No matching staging information was found for the patient.    Bilateral mastectomies : [facility]/28/[facility] at [facility]  - pathology report on chart      Objective:     Vitals:  Blood pressure 109/72, pulse 83, temperature 98.2 °F (36.8 °C), resp. rate 18, height 5' (1.524 m), weight 56.7 kg (125 lb), SpO2 100 %.    Physical Examination:   GEN: no apparent distress, comfortable; AAOx3  HEAD: atraumatic and normocephalic  EYES: no pallor, no icterus, PERRLA  ENT: OMM, no pharyngeal erythema, external ears WNL; no nasal discharge; no thrush  NECK: no masses, thyroid normal, trachea midline, no LAD/LN's, supple  CV: RRR with no murmur; normal pulse; normal S1 and S2; no pedal edema; Rght  CW portacath since removed  CHEST: Normal respiratory effort; CTAB; normal breath sounds; no wheeze or crackles  ABDOM: nontender and nondistended; soft; normal bowel sounds; no rebound/guarding  MUSC/Skeletal: ROM normal; no crepitus; joints normal; no deformities or arthropathy  EXTREM: no clubbing, cyanosis, inflammation or swelling  SKIN: no rashes, lesions, ulcers, petechiae or subcutaneous nodules  : no pacheco  NEURO: grossly intact; motor/sensory WNL; AAOx3; no tremors  PSYCH: normal mood, affect and behavior  LYMPH: normal cervical, supraclavicular, axillary and groin LN's  Breast: s/p bilateral mastectomies with reconstruction             Labs:     Lab Results   Component Value Date    WBC 4.66 03/31/2022    HGB 11.2 (L) 03/31/2022    HCT 37.6 03/31/2022    MCV 81 (L) 03/31/2022     03/31/2022       CMP  Sodium   Date Value Ref Range Status   03/31/2022 141 136 - 145 mmol/L Final     Potassium   Date Value Ref Range Status   03/31/2022 3.9 3.5 - 5.1 mmol/L Final     Chloride   Date Value Ref Range Status   03/31/2022 104 95 - 110 mmol/L Final     CO2   Date Value Ref Range Status   03/31/2022 28 23 - 29 mmol/L Final     Glucose   Date Value Ref Range Status   03/31/2022 97 70 - 110 mg/dL Final     BUN   Date Value Ref Range Status   03/31/2022 15 6 - 20 mg/dL Final     Creatinine   Date Value Ref Range Status   03/31/2022 0.8 0.5 - 1.4 mg/dL Final     Calcium   Date Value Ref Range Status   03/31/2022 9.5 8.7 - 10.5 mg/dL Final     Total Protein   Date Value Ref Range Status   03/31/2022 7.5 6.0 - 8.4 g/dL Final     Albumin   Date Value Ref Range Status   03/31/2022 3.9 3.5 - 5.2 g/dL Final     Total Bilirubin   Date Value Ref Range Status   03/31/2022 0.4 0.1 - 1.0 mg/dL Final     Comment:     For infants and newborns, interpretation of results should be based  on gestational age, weight and in agreement with clinical  observations.    Premature Infant recommended reference ranges:  Up to 24  hours.............<8.0 mg/dL  Up to 48 hours............<12.0 mg/dL  3-5 days..................<15.0 mg/dL  6-29 days.................<15.0 mg/dL       Alkaline Phosphatase   Date Value Ref Range Status   03/31/2022 40 (L) 55 - 135 U/L Final     AST   Date Value Ref Range Status   03/31/2022 17 10 - 40 U/L Final     ALT   Date Value Ref Range Status   03/31/2022 15 10 - 44 U/L Final     Anion Gap   Date Value Ref Range Status   03/31/2022 9 8 - 16 mmol/L Final     eGFR if    Date Value Ref Range Status   03/31/2022 >60.0 >60 mL/min/1.73 m^2 Final     eGFR if non    Date Value Ref Range Status   03/31/2022 >60.0 >60 mL/min/1.73 m^2 Final     Comment:     Calculation used to obtain the estimated glomerular filtration  rate (eGFR) is the CKD-EPI equation.              Radiology/Diagnostic Studies:    MRI breast  12/23/2021:    Impression:     1. Complete resolution of previous enhancing multicentric left breast malignancy.  2. Complete resolution of prior enlarged left axillary lymph nodes.  3. Persistent retroareolar left breast distortion near 6 o'clock position, characteristic of scarring.  Recommendation: Patient is currently under medical oncology and surgical care.        PET 10/21/2021:    Impression:     Hypermetabolic left breast mass consistent with primary breast malignancy. Please correlate with dedicated breast imaging.     No pathologically enlarged or hypermetabolic axillary or intrathoracic lymph nodes.     Hypermetabolic retroperitoneal lymph node is nonspecific and would be unusual for breast cancer metastasis. Attention on follow-up is recommended.     Low level FDG activity associated with prominent cervical lymph nodes, bilateral and symmetric, most likely reactive in nature.     Reactive red marrow.      CT scans 9/29/2021:    IMPRESSION:  1. Large irregular subareolar left breast mass and findings compatible with multifocal/multicentric disease in the left breast  (better appreciated on the recent MRI).  2. Left axillary lymphadenopathy compatible with additional sites of disease.  3. Borderline enlarged right hilar node, possibly an additional site of disease, although felt less likely this could be further assessed with PET/CT.        NM Bone Scan Whole Body    Result Date: 9/27/2021  CMS MANDATED QUALITY DATA-BONE SCAN-147 HISTORY: Staging breast carcinoma, and 63.25. COMPARISON: Multiple prior chest radiographs. FINDINGS: 22 mCi Tc 99m MDP was administered IV per protocol, with delayed whole-body images obtained in the anterior and posterior projections. Nodular focus of increased radiotracer uptake in the sternum near the level of the sternomanubrial articulation is nonspecific. There is otherwise no abnormal focal increased radiotracer uptake throughout the axial or appendicular skeleton to suggest osteoblastic metastatic disease. There is normal radiotracer uptake by the kidneys, with normal excretion into the renal collecting systems and urinary bladder. IMPRESSION: 1. Focus of increased osteoblastic activity in the sternum near the sternomanubrial junction, nonspecific. Consider further evaluation with chest CT with contrast. 2. Otherwise no evidence of osteoblastic metastatic disease. Electronically signed by:  Keron Neil MD  9/27/2021 1:41 PM CDT Workstation: [id number]    X-Ray Chest AP Portable    Result Date: 9/20/2021  Portable chest x-ray at 11:13 AM is compared to prior study dated 9/16/2021 Clinical history is Port-A-Cath placement There is a right subclavian vein Port-A-Cath with tip overlying the superior vena cava. There is no pneumothorax. The lungs are clear. The cardiomediastinal silhouette is normal in size. There are no acute osseous normality's. IMPRESSION: No pneumothorax status post right subclavian vein Port-A-Cath placement Electronically signed by:  Breana Lebron MD  9/20/2021 11:52 AM CDT Workstation: [id number]    MRI Breast w/wo  Contrast, w/CAD, Bilateral    Result Date: 9/28/2021  EXAMINATION: MRI BREAST W/WO CONTRAST, W/CAD, BILATERAL CLINICAL HISTORY: Staging left breast carcinoma, C 50.812, N 63.21, N 63.24, N 63.25, malignant neoplasm of overlapping sites of left female breast TECHNIQUE: CMS MANDATED QUALITY DATA - MAMMOGRAPHY - 225 Bilateral breast MRI was performed with a dedicated breast coil. The patient was placed prone in the breast immobilizer with light compression. The following localization sequences were obtained: Axial inversion recovery, axial 3-D non-fat-suppressed, axial 3-D fat suppressed pre-contrast, followed by axial 3-D fat-suppressed post-contrast dynamic images with 6.5 mL Gadavist IV contrast. Post-processing including subtraction imaging, reconstruction in the sagittal and coronal planes, and MIP of both breasts was performed on an independent workstation. The interpretation was assisted by Computer Aided Detection. FINDINGS: Comparison to the diagnostic mammogram and ultrasound of 08/20/2021.  The breasts are composed of mostly fibroglandular elements and interspersed fat.  Background parenchymal enhancement is mild. There is a dominant heterogeneously enhancing solid mass with irregular margins in the left breast retroareolar region measuring 37 mm AP x 3 mm transverse by 35 mm craniocaudal dimension, with suspicious enhancement kinetics.  There are several additional enhancing nodular and irregular infiltrative masses in the left breast, some which are contiguous with the dominant retroareolar mass, extending into the all quadrants of the breast predominantly the upper outer quadrant.  Total area of disease in the left breast measures roughly 38 mm AP x 60 mm transverse by 90 mm craniocaudal dimension, roughly 30-40% of the volume of the left breast.  No abnormal enhancement to suggest involvement of the left pectoralis musculature. There are several enlarged infiltrated appearing left axillary lymph nodes  suspicious for metastatic disease, measuring 16 mm maximum short axis dimension.  There are no enlarged right axillary lymph nodes or enlarged internal mammary chain lymph nodes. Stippled randomly scattered non masslike enhancement in the right breast is nonspecific, with no enhancing right breast mass. There is a 23 mm STIR hyperintense enhancing right hilar mass suspicious for hilar lymphadenopathy, with no additional abnormal enhancement in the chest wall or mediastinum evident.     1. Multifocal, multicentric left breast carcinoma as described, with several enlarged left axillary lymph nodes consistent with metastatic disease. 2. No findings of contralateral right breast carcinoma, with scattered stippled probably benign non masslike enhancement in the right breast. 3. Enhancing right hilar mass suspicious for metastatic disease.  Consider further evaluation with whole body PET CT. BI-RADS CATEGORY 6: KNOWN MALIGNANCY. [facility] utilizes a reminder system to ensure that patients receive reminder letters for appointments. This includes reminders for routine screening mammograms, diagnostic mammograms, or other breast imaging interventions as appropriate. This patient will be placed in the appropriate reminder system. Electronically signed by: Keron Neil MD Date:    09/28/2021 Time:    09:32           I have reviewed all available lab results and radiology reports.    Assessment/Plan:   (1) 31 y.o. female with diagnosis of left breast cancer who has been referred by Dr Cheri Smith for evaluation by medical hematology/oncology.   - left breast masses of overlapping sites involving Outer upper Quadrant and Lower inner Quadrant respectively  - s/p core biopsies taken on 8/27/2021  - axillary LN was positive  - stage III cancer process at least  - ER positive at 80.5 %, NM positive at 3%; Her2Nu (3+)  -Ki67 25%  - discussed the pathology and the latest NCCN guidelines (version 8.2021)  - recommend  neoadjuvant chemotherapy with herceptin + perjeta based regimen which studies have shown to be beneficial to survival and endocrine therapy  - she will need echo, portacath and PEt scan  - will set up chemotherapy school to discuss the drugs, side-effects and provide literature on the drugs    9/30/2021:  - Her case was presented at the [facility] Tumor Board yesterday and consensus was to get PEt scan and proceed with TCHP chemotherapy.   - She had chemotherapy school with Susana.   - She had portacath placed on [facility] CW with Dr Smith.   - She is set to start chemotherapy on Oct 5th.   - echo on chart from 9/24/2021  - discussed the chemotherapy regimen, provided literature and discussed the side-effect profile of the drugs    10/26/2021:  - getting cycle #2 today  - getting Udenyca tomorrow and she also requested some IVF tomorrow as well  - discussed the PEt scan findings from 10/21/2021     12/7/2021:  - 4th cycle chemo this week with IVF and antiemtics as needed  - order MRI of breasts for next week to re-assess response    1/10/2022:  - She completed the 4th cycle of chemotherapy and had subsequent breast MRI on 12/23/2021 which showed an excellent response to the therapy; she has decided to stop the chemotherapy and proceed with surgery; she is adamant that she does not want to continue with the neoadjuvant regiment; she is aware that she will still at least need to continue the herceptin post-op and may need additional chemotherapy depending on the pathology findings from the surgery  - She saw Dr Smith the other day and is seeing Dr Nguyen with plastic surgery tomorrow    2/15/2022:  - She had surgery with Dr Smith and Dr Nguyen with plastic surgery at [place] on Jan 28th 2022; pathology reports that she had no residual breast cancer in the left and right breast and the six sentinel LN's on the left were all negative. She is healing well and has some residual aches and pain under the left arm. She sees   Luis again 2 weeks and Dr Nguyen on 2/21/2022  - Dr Nguyen wants to either move or remove portacath to help with the cosmetic surgery better  - she will need to continue with Herceptin and Perjeta for the next year, she can either get PICC or intermittent IV's    3/16/2022:  - she has since resumed therapy post-op with perjeta and herceptin  - will need periodic echocardiograms  - monitor labs  - she no long has portacath (moved to facilitate reconstruction)    4/13/22:   - resume therapy with Herceptin and Perjeta   - echo scheduled for 4/20/22  - reconstruction scheduled for June 2022  - labs monthly   - neuropathy improved        (2) no HTN     (3) No DM     (4) two c-sections with healthy children - she is aware that with chemotherapy she could potentially not be able to reproduce or have any more children - she is comfortable with that fact      VISIT DIAGNOSES:      Malignant neoplasm of overlapping sites of left female breast, unspecified estrogen receptor status    HER2-positive carcinoma of breast    ER+ (estrogen receptor positive status)      PLAN:  1. resumed Perjeta and herceptin  2. Continue with peridoic echo's as scheduled and directed  3. Check up to date labs and then monthly  4. s/p chemotherapy school  5. Reconstruction planned for June 2022  6. Need to discuss AI therapy and future effects of no estrogen supprression     RTC in 3 weeks with Dr. Jackson and 6 weeks with me     Fax note to Renetta        Discussion:     COVID-19 Discussion:     I had long discussion with patient and any applicable family about the COVID-19 coronavirus epidemic and the recommended precautions with regard to cancer and/or hematology patients. I have re-iterated the CDC recommendations for adequate hand washing, use of hand -like products, and coughing into elbow, etc. In addition, especially for our patients who are on chemotherapy and/or our otherwise immunocompromised patients, I have recommended  "avoidance of crowds, including movie theaters, restaurants, churches, etc. I have recommended avoidance of any sick or symptomatic family members and/or friends. I have also recommended avoidance of any raw and unwashed food products, and general avoidance of food items that have not been prepared by themselves. The patient has been asked to call us immediately with any symptom developments, issues, questions or other general concerns.      Pathology Discussion:     I reviewed and discussed the pathology report(s) and radiograph reports (if available) in as simple to understand and/or laymen's terms to the best of my ability. I had an indepth conversation with the patient and went over the patient's individual diagnosis based on the information that was currently available. I discussed the TNM staging process with regard to the patient's particular cancer type, and the calculated stage based on the currently available TNM data and literature. I discussed the available prognostic data with regard to the current staging information and how it relates to the prognosis of their particular neoplastic process.          NCCN Guidelines:     I discussed the available treatment option(s) in accordance with the latest literature from the NCCN Clinical Practice Guidelines for the patient's particular type of cancer disorder. The NCCN Guidelines provide a "document evidence-based (and) consensus-driven management" of the care of oncology patients. The treatment recommendations were made not only in accordance to the NCCN guidelines, but also factored in to account the patient's overall age, condition, performance status and their medical co-morbidities. I went over the risks and benefits of the the treatment options (if any could be made) with regard to their particular cancer type, their cancer stage, their age, and their co-morbidities.      Chemotherapy Discussion:        I discussed the available treatment option(s) in " accordance with the latest/current national evidence-based guidelines (NCCN, UpToDate, NCI, ASCO, etc where applicable), their overall age/condition and their co-morbidities. I also went over the risks and benefits of the chemotherapy with regard to their particular cancer type, their cancer stage, their age/condition, and their co-morbidities. I provided literature on the chemotherapy regimen and discussed the chemotherapy side-effect profiles of the drug(s). I discussed the importance of compliance with obtaining and monitoring weekly lab work, and went over the potential hematopathology issues and risks with anemia, leucopenia and thrombocytopenia that can occur with chemotherapy. I discussed the potential risks of liver and kidney damage, which could be permanent and could necessitate dialysis long-term if kidney failure developed. I discussed the emetic and/or diarrheal potential of the regimen and the potential need for use of antiemetic and anti-diarrheal medications. I discussed the risk for development of anaphylactic shock, bronchospasm, dysrhythmia, and respiratory/cardiovascular arrest and/or failure. I discussed the potential risks for development of alopecia, cold sensory issues, ringing in ears, vertigo, cataracts, glaucoma, and neuropathy, all of which could end up being chronic and life-long. Some chemotherpyI discussed the risks of hand-foot syndrome and rashes, and development of other autoimmune mediated processes such as pneumonitis, hepatitis, and colitis which could be life threatening. I discussed the risks of the potential development of a rare but fatal viral mediated disease known as PML (Progressive Multifocal Leukoencephalopathy), and risk of future development of leukemia and/or lymphoma from use of certain chemotherapy agents. I discussed the need for neutropenic precautions, basic hygiene/sanitation behaviors and dietary restrictions.     The patient's consent has been obtained to  proceed with the chemotherapy.The patient will be referred to Chemotherapy School /[facility] for training and education on chemotherapy, use of antiemetics and/or anti-diarrheals, use of NSAID's, potential chemotherapy side-effects, and any specific recommendations and precautions with the particular chemotherapy agents.       I answered all of the patient's (and family's, if applicable) questions to the best of my ability and to their complete satisfaction. The patient acknowledged full understanding of the risks, recommendations and plan(s).      I have explained and the patient understands all of  the current recommendation(s). I have answered all of their questions to the best of my ability and to their complete satisfaction.         I spent over 25 mins of time with the patient. Reviewed results of the recently ordered labs, tests and studies; made directives with regards to the results. Over half of this time was spent couseling and coordinating care.    I have explained all of the above in detail and the patient understands all of the current recommendation(s). I have answered all of their questions to the best of my ability and to their complete satisfaction.   The patient is to continue with the current management plan.            Electronically signed by Susana Bradshaw, MSN,APRN,AGNP-C

## 2022-04-13 NOTE — PLAN OF CARE
Problem: Infection  Goal: Absence of Infection Signs and Symptoms  Outcome: Ongoing, Progressing  Intervention: Prevent or Manage Infection  Flowsheets (Taken 4/13/2022 1323)  Infection Management: aseptic technique maintained

## 2022-04-20 ENCOUNTER — CLINICAL SUPPORT (OUTPATIENT)
Dept: CARDIOLOGY | Facility: HOSPITAL | Age: 32
End: 2022-04-20
Attending: INTERNAL MEDICINE
Payer: COMMERCIAL

## 2022-04-20 DIAGNOSIS — C50.812 MALIGNANT NEOPLASM OF OVERLAPPING SITES OF LEFT FEMALE BREAST, UNSPECIFIED ESTROGEN RECEPTOR STATUS: ICD-10-CM

## 2022-04-20 LAB
AV INDEX (PROSTH): 0.74
AV MEAN GRADIENT: 4 MMHG
AV PEAK GRADIENT: 7 MMHG
AV VALVE AREA: 2.37 CM2
AV VELOCITY RATIO: 71.45
CV ECHO LV RWT: 0.45 CM
DOP CALC AO PEAK VEL: 1.3 M/S
DOP CALC AO VTI: 24.9 CM
DOP CALC LVOT AREA: 3.2 CM2
DOP CALC LVOT DIAMETER: 2.02 CM
DOP CALC LVOT PEAK VEL: 92.89 M/S
DOP CALC LVOT STROKE VOLUME: 58.97 CM3
DOP CALCLVOT PEAK VEL VTI: 18.41 CM
E WAVE DECELERATION TIME: 132.6 MSEC
E/A RATIO: 1.35
E/E' RATIO: 6.29 M/S
ECHO LV POSTERIOR WALL: 0.88 CM (ref 0.6–1.1)
EJECTION FRACTION: 60 %
FRACTIONAL SHORTENING: 24 % (ref 28–44)
INTERVENTRICULAR SEPTUM: 0.91 CM (ref 0.6–1.1)
IVC DIAMETER: 1.3 CM
LEFT ATRIUM SIZE: 2.24 CM
LEFT INTERNAL DIMENSION IN SYSTOLE: 3.01 CM (ref 2.1–4)
LEFT VENTRICLE DIASTOLIC VOLUME: 55.99 ML
LEFT VENTRICLE SYSTOLIC VOLUME: 19.9 ML
LEFT VENTRICULAR INTERNAL DIMENSION IN DIASTOLE: 3.95 CM (ref 3.5–6)
LEFT VENTRICULAR MASS: 106.67 G
LV LATERAL E/E' RATIO: 5.5 M/S
LV SEPTAL E/E' RATIO: 7.33 M/S
MV PEAK A VEL: 0.65 M/S
MV PEAK E VEL: 0.88 M/S
PISA TR MAX VEL: 2.08 M/S
RA PRESSURE: 3 MMHG
RIGHT VENTRICULAR END-DIASTOLIC DIMENSION: 636 CM
TDI LATERAL: 0.16 M/S
TDI SEPTAL: 0.12 M/S
TDI: 0.14 M/S
TR MAX PG: 17 MMHG
TV REST PULMONARY ARTERY PRESSURE: 20 MMHG

## 2022-04-20 PROCEDURE — 93306 TTE W/DOPPLER COMPLETE: CPT

## 2022-04-20 PROCEDURE — 93306 ECHO (CUPID ONLY): ICD-10-PCS | Mod: 26,,, | Performed by: INTERNAL MEDICINE

## 2022-04-20 PROCEDURE — 93306 TTE W/DOPPLER COMPLETE: CPT | Mod: 26,,, | Performed by: INTERNAL MEDICINE

## 2022-05-02 ENCOUNTER — TELEPHONE (OUTPATIENT)
Dept: HEMATOLOGY/ONCOLOGY | Facility: CLINIC | Age: 32
End: 2022-05-02

## 2022-05-03 RX ORDER — HEPARIN 100 UNIT/ML
500 SYRINGE INTRAVENOUS
Status: CANCELLED | OUTPATIENT
Start: 2022-05-04

## 2022-05-03 RX ORDER — EPINEPHRINE 0.3 MG/.3ML
0.3 INJECTION SUBCUTANEOUS ONCE AS NEEDED
Status: CANCELLED | OUTPATIENT
Start: 2022-05-04

## 2022-05-03 RX ORDER — DIPHENHYDRAMINE HYDROCHLORIDE 50 MG/ML
50 INJECTION INTRAMUSCULAR; INTRAVENOUS ONCE AS NEEDED
Status: CANCELLED | OUTPATIENT
Start: 2022-05-04

## 2022-05-03 RX ORDER — SODIUM CHLORIDE 0.9 % (FLUSH) 0.9 %
10 SYRINGE (ML) INJECTION
Status: CANCELLED | OUTPATIENT
Start: 2022-05-04

## 2022-05-03 NOTE — PROGRESS NOTES
[facility] Hematology/Oncology  PROGRESS NOTE -  Follow-up Visit      Subjective:       Patient ID:   NAME: Esperanza Peters : 1990     31 y.o. female    Referring Doc: Dr Cheri Smith  Other Physicians: Armaan Mukherjee           Chief Complaint: left breast cancer f/u       History of Present Illness:     Patient returns today for a regularly scheduled follow-up visit.  The patient is here today to go over the results of the recently ordered labs, tests and studies. She is here by herself today.     She previously had surgery with Dr Smith and Dr Nguyen with plastic surgery at [place] on 2022; pathology reports that she had no residual breast cancer in the left and right breast and the six sentinel Ln's on the left were all negative.    She has since started back on perjeta and herceptin and is tolerating it well. Discussed the recommendation for consideration for oopherectomy, and patient is unsure if she wants to proceed in that direction. She has not had a period since starting chemotherapy.     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       discussed covid19 precautions      ROS:   GEN: normal without any fever, night sweats or weight loss  HEENT: normal with no HA's, sore throat, stiff neck, changes in vision  CV: normal with no CP, SOB, PND, CHASE or orthopnea  PULM: normal with no SOB, cough, hemoptysis, sputum or pleuritic pain  GI: normal with no abdominal pain, nausea, vomiting, constipation, diarrhea, melanotic stools, BRBPR, or hematemesis  : normal with no hematuria, dysuria  BREAST: no palpable masses  SKIN: normal with no rash, erythema, bruising, or swelling    Pain Scale:  0    Allergies:  Review of patient's allergies indicates:  No Known Allergies    Medications:    Current Outpatient Medications:     acetaminophen (TYLENOL) 500 MG tablet, Take 500 mg by mouth once as needed for Pain., Disp: , Rfl:     magic mouthwash  diphen/antac/lidoc/nysta, Take 10 mLs by mouth 4 (four) times daily., Disp: 120 mL, Rfl: 1    HYDROcodone-acetaminophen (NORCO) 5-325 mg per tablet, Take 1 tablet by mouth 3 (three) times daily as needed for pain., Disp: 8 tablet, Rfl: 0  No current facility-administered medications for this visit.    Facility-Administered Medications Ordered in Other Visits:     diphenhydrAMINE injection 50 mg, 50 mg, Intravenous, Once PRN, Nilesh Jackson MD    EPINEPHrine (EPIPEN) 0.3 mg/0.3 mL pen injection 0.3 mg, 0.3 mg, Intramuscular, Once PRN, Nilesh Jackson MD    hydrocortisone sodium succinate injection 100 mg, 100 mg, Intravenous, Once PRN, Nilesh Jackson MD    pertuzumab (PERJETA) 420 mg in sodium chloride 0.9% 264 mL infusion, 420 mg, Intravenous, 1 time in Clinic/HOD, Nilesh Jackson MD    sodium chloride 0.9% bolus 500 mL, 500 mL, Intravenous, 1 time in Clinic/HOD, Nilesh Jackson MD    trastuzumab-dkst (OGIVRI) 384 mg in sodium chloride 0.9% 250 mL chemo infusion, 6 mg/kg (Treatment Plan Recorded), Intravenous, 1 time in Clinic/HOD, Nilesh Jackson MD    PMHx/PSHx Updates:  See patient's last visit with me on 3/16/2022  See H&P on 9/13/2021        Pathology:  Cancer Staging  No matching staging information was found for the patient.    Bilateral mastectomies : 1/128/2022 at [facility]  - pathology report on chart      Objective:     Vitals:  Blood pressure 107/71, pulse 78, temperature 97.7 °F (36.5 °C), resp. rate 17, height 5' (1.524 m), weight 57.2 kg (126 lb), SpO2 99 %.    Physical Examination:   GEN: no apparent distress, comfortable; AAOx3  HEAD: atraumatic and normocephalic  EYES: no pallor, no icterus, PERRLA  ENT: OMM, no pharyngeal erythema, external ears WNL; no nasal discharge; no thrush  NECK: no masses, thyroid normal, trachea midline, no LAD/LN's, supple  CV: RRR with no murmur; normal pulse; normal S1 and S2; no pedal edema; Rght CW portacath since remoived  CHEST: Normal  respiratory effort; CTAB; normal breath sounds; no wheeze or crackles  ABDOM: nontender and nondistended; soft; normal bowel sounds; no rebound/guarding  MUSC/Skeletal: ROM normal; no crepitus; joints normal; no deformities or arthropathy  EXTREM: no clubbing, cyanosis, inflammation or swelling  SKIN: no rashes, lesions, ulcers, petechiae or subcutaneous nodules  : no pacheco  NEURO: grossly intact; motor/sensory WNL; AAOx3; no tremors  PSYCH: normal mood, affect and behavior  LYMPH: normal cervical, supraclavicular, axillary and groin LN's  Breast: s/p bilateral mastectomies with reconstruction             Labs:     Lab Results   Component Value Date    WBC 5.46 05/04/2022    HGB 11.0 (L) 05/04/2022    HCT 34.9 (L) 05/04/2022    MCV 77 (L) 05/04/2022     05/04/2022       CMP  Sodium   Date Value Ref Range Status   03/31/2022 141 136 - 145 mmol/L Final     Potassium   Date Value Ref Range Status   03/31/2022 3.9 3.5 - 5.1 mmol/L Final     Chloride   Date Value Ref Range Status   03/31/2022 104 95 - 110 mmol/L Final     CO2   Date Value Ref Range Status   03/31/2022 28 23 - 29 mmol/L Final     Glucose   Date Value Ref Range Status   03/31/2022 97 70 - 110 mg/dL Final     BUN   Date Value Ref Range Status   03/31/2022 15 6 - 20 mg/dL Final     Creatinine   Date Value Ref Range Status   03/31/2022 0.8 0.5 - 1.4 mg/dL Final     Calcium   Date Value Ref Range Status   03/31/2022 9.5 8.7 - 10.5 mg/dL Final     Total Protein   Date Value Ref Range Status   03/31/2022 7.5 6.0 - 8.4 g/dL Final     Albumin   Date Value Ref Range Status   03/31/2022 3.9 3.5 - 5.2 g/dL Final     Total Bilirubin   Date Value Ref Range Status   03/31/2022 0.4 0.1 - 1.0 mg/dL Final     Comment:     For infants and newborns, interpretation of results should be based  on gestational age, weight and in agreement with clinical  observations.    Premature Infant recommended reference ranges:  Up to 24 hours.............<8.0 mg/dL  Up to 48  hours............<12.0 mg/dL  3-5 days..................<15.0 mg/dL  6-29 days.................<15.0 mg/dL       Alkaline Phosphatase   Date Value Ref Range Status   03/31/2022 40 (L) 55 - 135 U/L Final     AST   Date Value Ref Range Status   03/31/2022 17 10 - 40 U/L Final     ALT   Date Value Ref Range Status   03/31/2022 15 10 - 44 U/L Final     Anion Gap   Date Value Ref Range Status   03/31/2022 9 8 - 16 mmol/L Final     eGFR if    Date Value Ref Range Status   03/31/2022 >60.0 >60 mL/min/1.73 m^2 Final     eGFR if non    Date Value Ref Range Status   03/31/2022 >60.0 >60 mL/min/1.73 m^2 Final     Comment:     Calculation used to obtain the estimated glomerular filtration  rate (eGFR) is the CKD-EPI equation.              Radiology/Diagnostic Studies:    MRI breast  12/23/2021:  -    Impression:     1. Complete resolution of previous enhancing multicentric left breast malignancy.  2. Complete resolution of prior enlarged left axillary lymph nodes.  3. Persistent retroareolar left breast distortion near 6 o'clock position, characteristic of scarring.  Recommendation: Patient is currently under medical oncology and surgical care.        PET 10/21/2021:    Impression:     Hypermetabolic left breast mass consistent with primary breast malignancy. Please correlate with dedicated breast imaging.     No pathologically enlarged or hypermetabolic axillary or intrathoracic lymph nodes.     Hypermetabolic retroperitoneal lymph node is nonspecific and would be unusual for breast cancer metastasis. Attention on follow-up is recommended.     Low level FDG activity associated with prominent cervical lymph nodes, bilateral and symmetric, most likely reactive in nature.     Reactive red marrow.      CT scans 9/29/2021:    IMPRESSION:  1. Large irregular subareolar left breast mass and findings compatible with multifocal/multicentric disease in the left breast (better appreciated on the recent  MRI).  2. Left axillary lymphadenopathy compatible with additional sites of disease.  3. Borderline enlarged right hilar node, possibly an additional site of disease, although felt less likely this could be further assessed with PET/CT.        NM Bone Scan Whole Body    Result Date: 9/27/2021  CMS MANDATED QUALITY DATA-BONE SCAN-147 HISTORY: Staging breast carcinoma, and 63.25. COMPARISON: Multiple prior chest radiographs. FINDINGS: 22 mCi Tc 99m MDP was administered IV per protocol, with delayed whole-body images obtained in the anterior and posterior projections. Nodular focus of increased radiotracer uptake in the sternum near the level of the sternomanubrial articulation is nonspecific. There is otherwise no abnormal focal increased radiotracer uptake throughout the axial or appendicular skeleton to suggest osteoblastic metastatic disease. There is normal radiotracer uptake by the kidneys, with normal excretion into the renal collecting systems and urinary bladder. IMPRESSION: 1. Focus of increased osteoblastic activity in the sternum near the sternomanubrial junction, nonspecific. Consider further evaluation with chest CT with contrast. 2. Otherwise no evidence of osteoblastic metastatic disease. Electronically signed by:  Keron Neil MD  9/27/2021 1:41 PM CDT Workstation: [id number]    X-Ray Chest AP Portable    Result Date: 9/20/2021  Portable chest x-ray at 11:13 AM is compared to prior study dated 9/16/2021 Clinical history is Port-A-Cath placement There is a right subclavian vein Port-A-Cath with tip overlying the superior vena cava. There is no pneumothorax. The lungs are clear. The cardiomediastinal silhouette is normal in size. There are no acute osseous normality's. IMPRESSION: No pneumothorax status post right subclavian vein Port-A-Cath placement Electronically signed by:  Breana Lebron MD  9/20/2021 11:52 AM CDT Workstation: [id number]    MRI Breast w/wo Contrast, w/CAD, Bilateral    Result  Date: 9/28/2021  EXAMINATION: MRI BREAST W/WO CONTRAST, W/CAD, BILATERAL CLINICAL HISTORY: Staging left breast carcinoma, C 50.812, N 63.21, N 63.24, N 63.25, malignant neoplasm of overlapping sites of left female breast TECHNIQUE: CMS MANDATED QUALITY DATA - MAMMOGRAPHY - 225 Bilateral breast MRI was performed with a dedicated breast coil. The patient was placed prone in the breast immobilizer with light compression. The following localization sequences were obtained: Axial inversion recovery, axial 3-D non-fat-suppressed, axial 3-D fat suppressed pre-contrast, followed by axial 3-D fat-suppressed post-contrast dynamic images with 6.5 mL Gadavist IV contrast. Post-processing including subtraction imaging, reconstruction in the sagittal and coronal planes, and MIP of both breasts was performed on an independent workstation. The interpretation was assisted by Computer Aided Detection. FINDINGS: Comparison to the diagnostic mammogram and ultrasound of 08/20/2021.  The breasts are composed of mostly fibroglandular elements and interspersed fat.  Background parenchymal enhancement is mild. There is a dominant heterogeneously enhancing solid mass with irregular margins in the left breast retroareolar region measuring 37 mm AP x 3 mm transverse by 35 mm craniocaudal dimension, with suspicious enhancement kinetics.  There are several additional enhancing nodular and irregular infiltrative masses in the left breast, some which are contiguous with the dominant retroareolar mass, extending into the all quadrants of the breast predominantly the upper outer quadrant.  Total area of disease in the left breast measures roughly 38 mm AP x 60 mm transverse by 90 mm craniocaudal dimension, roughly 30-40% of the volume of the left breast.  No abnormal enhancement to suggest involvement of the left pectoralis musculature. There are several enlarged infiltrated appearing left axillary lymph nodes suspicious for metastatic disease,  measuring 16 mm maximum short axis dimension.  There are no enlarged right axillary lymph nodes or enlarged internal mammary chain lymph nodes. Stippled randomly scattered non masslike enhancement in the right breast is nonspecific, with no enhancing right breast mass. There is a 23 mm STIR hyperintense enhancing right hilar mass suspicious for hilar lymphadenopathy, with no additional abnormal enhancement in the chest wall or mediastinum evident.     1. Multifocal, multicentric left breast carcinoma as described, with several enlarged left axillary lymph nodes consistent with metastatic disease. 2. No findings of contralateral right breast carcinoma, with scattered stippled probably benign non masslike enhancement in the right breast. 3. Enhancing right hilar mass suspicious for metastatic disease.  Consider further evaluation with whole body PET CT. BI-RADS CATEGORY 6: KNOWN MALIGNANCY. [facility] utilizes a reminder system to ensure that patients receive reminder letters for appointments. This includes reminders for routine screening mammograms, diagnostic mammograms, or other breast imaging interventions as appropriate. This patient will be placed in the appropriate reminder system. Electronically signed by: Keron Neil MD Date:    09/28/2021 Time:    09:32           I have reviewed all available lab results and radiology reports.    Assessment/Plan:   (1) 31 y.o. female with diagnosis of left breast cancer who has been referred by Dr Cheri Smith for evaluation by medical hematology/oncology.   - left breast masses of overlapping sites involving Outer upper Quadrant and Lower inner Quadrant respectively  - s/p core biopsies taken on 8/27/2021  - axillary LN was positive  - stage III cancer process at least  - ER positive at 80.5 %, PA positive at 3%; Her2Nu (3+)  -Ki67 25%  - discussed the pathology and the latest NCCN guidelines (version 8.2021)  - recommend neoadjuvant chemotherapy with  herceptin + perjeta based regimen which studies have shown to be beneficial to survival and endocrine therapy  - she will need echo, portacath and PEt scan  - will set up chemotherapy school to discuss the drugs, side-effects and provide literature on the drugs    9/30/2021:  - Her case was presented at the [facility] Tumor Board yesterday and consensus was to get PEt scan and proceed with TCHP chemotherapy.   - She had chemotherapy school with Susana.   - She had portacath placed on [facility] CW with Dr Smith.   - She is set to start chemotherapy on Oct 5th.   - echo on chart from 9/24/2021  - discussed the chemotherapy regimen, provided literature and discussed the side-effect profile of the drugs    10/26/2021:  - getting cycle #2 today  - getting Udenyca tomorrow and she also requested some IVF tomorrow as well  - discussed the PEt scan findings from 10/21/2021     12/7/2021:  - 4th cycle chemo this week with IVF and antiemtics as needed  - order MRI of breasts for next week to re-assess response    1/10/2022:  - She completed the 4th cycle of chemotherapy and had subsequent breast MRI on 12/23/2021 which showed an excellent response to the therapy; she has decided to stop the chemotherapy and proceed with surgery; she is adamant that she does not want to continue with the neoadjuvant regiment; she is aware that she will still at least need to continue the herceptin post-op and may need additional chemotherapy depending on the pathology findings from the surgery  - She saw Dr Smith the other day and is seeing Dr Nguyen with plastic surgery tomorrow    2/15/2022:  - She had surgery with Dr Smith and Dr Nguyen with plastic surgery at [place] on Jan 28th 2022; pathology reports that she had no residual breast cancer in the left and right breast and the six sentinel LN's on the left were all negative. She is healing well and has some residual aches and pain under the left arm. She sees Dr Smith again 2 weeks and Dr Nguyen  on 2/21/2022  - Dr Nguyen wants to either move or remove portacath to help with the cosmetic surgery better  - she will need to continue with Herceptin and Perjeta for the next year, she can either get PICC or intermittent IV's    3/16/2022:  - she has since resumed therapy post-op with perjeta and herceptin  - will need periodic echocardiograms  - monitor labs  - she no long has portacath (moved to facilitate reconstruction)     5/4/2022:  - She has since started back on perjeta and herceptin and is tolerating it well. Discussed the recommendation for consideration for oopherectomy, and patient is unsure if she wants to proceed in that direction. She has not had a period since starting chemotherapy.   - discussed and encouraged her to at least consider oral antihormone therapy with tamoxifen and is is willing to proceed  - she needs to follow-up with rad/onc as well for XRT evaluation       (2) no HTN     (3) No DM     (4) two c-sections with healthy children - she is aware that with chemotherapy she could potentially not be able to reproduce or have any more children - she is comfortable with that fact      VISIT DIAGNOSES:      Malignant neoplasm of overlapping sites of left female breast, unspecified estrogen receptor status    Microcytic anemia    HER2-positive carcinoma of breast    ER+ (estrogen receptor positive status)    Chemotherapy-induced neutropenia    Mass of lower inner quadrant of left breast    Mass of upper outer quadrant of left breast    Mass overlapping multiple quadrants of left breast          Addendum:  - communicated with Dr Owen today about patient       PLAN:  1. resumed and continued on Perjeta and herceptin  2. Continue with peridoic echo's as scheduled and directed  3. Check up to date labs at least monthly  4. s/p chemotherapy school, discussed the medications, side-effect profiles and obtained consents  5. Tamoxifen oral antihormone at least with proposed consideration for  oophorectomy in future  6. Refer to rad/onc for evaluation for XRT     RTC in 4 weeks with Whit;  8 weeks with myself    Fax note to Brittanie Smith; Liz Catherine       Discussion:     COVID-19 Discussion:     I had long discussion with patient and any applicable family about the COVID-19 coronavirus epidemic and the recommended precautions with regard to cancer and/or hematology patients. I have re-iterated the CDC recommendations for adequate hand washing, use of hand -like products, and coughing into elbow, etc. In addition, especially for our patients who are on chemotherapy and/or our otherwise immunocompromised patients, I have recommended avoidance of crowds, including movie theaters, restaurants, churches, etc. I have recommended avoidance of any sick or symptomatic family members and/or friends. I have also recommended avoidance of any raw and unwashed food products, and general avoidance of food items that have not been prepared by themselves. The patient has been asked to call us immediately with any symptom developments, issues, questions or other general concerns.      Pathology Discussion:     I reviewed and discussed the pathology report(s) and radiograph reports (if available) in as simple to understand and/or laymen's terms to the best of my ability. I had an indepth conversation with the patient and went over the patient's individual diagnosis based on the information that was currently available. I discussed the TNM staging process with regard to the patient's particular cancer type, and the calculated stage based on the currently available TNM data and literature. I discussed the available prognostic data with regard to the current staging information and how it relates to the prognosis of their particular neoplastic process.          NCCN Guidelines:     I discussed the available treatment option(s) in accordance with the latest literature from the NCCN Clinical Practice  "Guidelines for the patient's particular type of cancer disorder. The NCCN Guidelines provide a "document evidence-based (and) consensus-driven management" of the care of oncology patients. The treatment recommendations were made not only in accordance to the NCCN guidelines, but also factored in to account the patient's overall age, condition, performance status and their medical co-morbidities. I went over the risks and benefits of the the treatment options (if any could be made) with regard to their particular cancer type, their cancer stage, their age, and their co-morbidities.      Chemotherapy Discussion:        I discussed the available treatment option(s) in accordance with the latest/current national evidence-based guidelines (NCCN, UpToDate, NCI, ASCO, etc where applicable), their overall age/condition and their co-morbidities. I also went over the risks and benefits of the chemotherapy with regard to their particular cancer type, their cancer stage, their age/condition, and their co-morbidities. I provided literature on the chemotherapy regimen and discussed the chemotherapy side-effect profiles of the drug(s). I discussed the importance of compliance with obtaining and monitoring weekly lab work, and went over the potential hematopathology issues and risks with anemia, leucopenia and thrombocytopenia that can occur with chemotherapy. I discussed the potential risks of liver and kidney damage, which could be permanent and could necessitate dialysis long-term if kidney failure developed. I discussed the emetic and/or diarrheal potential of the regimen and the potential need for use of antiemetic and anti-diarrheal medications. I discussed the risk for development of anaphylactic shock, bronchospasm, dysrhythmia, and respiratory/cardiovascular arrest and/or failure. I discussed the potential risks for development of alopecia, cold sensory issues, ringing in ears, vertigo, cataracts, glaucoma, and neuropathy, " all of which could end up being chronic and life-long. Some chemotherpyI discussed the risks of hand-foot syndrome and rashes, and development of other autoimmune mediated processes such as pneumonitis, hepatitis, and colitis which could be life threatening. I discussed the risks of the potential development of a rare but fatal viral mediated disease known as PML (Progressive Multifocal Leukoencephalopathy), and risk of future development of leukemia and/or lymphoma from use of certain chemotherapy agents. I discussed the need for neutropenic precautions, basic hygiene/sanitation behaviors and dietary restrictions.     The patient's consent has been obtained to proceed with the chemotherapy.The patient will be referred to Chemotherapy School [facility] for training and education on chemotherapy, use of antiemetics and/or anti-diarrheals, use of NSAID's, potential chemotherapy side-effects, and any specific recommendations and precautions with the particular chemotherapy agents.       I answered all of the patient's (and family's, if applicable) questions to the best of my ability and to their complete satisfaction. The patient acknowledged full understanding of the risks, recommendations and plan(s).      I have explained and the patient understands all of  the current recommendation(s). I have answered all of their questions to the best of my ability and to their complete satisfaction.      Antihormone Therapy Discussion:    I discussed the advantages of antihormone therapy with the patient with regard to their particular neoplastic or carcinoma in situ condition. I went over the side-effect profile of the medication including risk for potential development of endometrial cancer and/or hyperplasia in women who still have a uterus and the need for yearly GYN evaluation and follow-up. I discussed the risks for thromboembolic events such as DVT's, pulmonary emboli, CVA's, retinal vascular clots, phlebitis,  and TIA's. I discussed the potential risks for development of ocular disturbances, retinopathy, cataracts, corneal changes, flushing, hot flashes, amenorrhea, altered menses, fluid retention, weight changes, elevations in LFT's, liver damage, and mood disturbances. I discussed the potential risk of arthropathy and joint pains/aches which could be chronic and debilitating. I discussed potential adverse effects on bone mineralization and the risk of osteopenia and/or osteoporosis which could led to increase risk of fractures.   A consent form was obtained and a copy was provided to the patient.       I spent over 25 mins of time with the patient. Reviewed results of the recently ordered labs, tests and studies; made directives with regards to the results. Over half of this time was spent couseling and coordinating care.    I have explained all of the above in detail and the patient understands all of the current recommendation(s). I have answered all of their questions to the best of my ability and to their complete satisfaction.   The patient is to continue with the current management plan.            Electronically signed by Nilesh Jackson MD

## 2022-05-04 ENCOUNTER — OFFICE VISIT (OUTPATIENT)
Dept: HEMATOLOGY/ONCOLOGY | Facility: CLINIC | Age: 32
End: 2022-05-04
Payer: COMMERCIAL

## 2022-05-04 ENCOUNTER — INFUSION (OUTPATIENT)
Dept: INFUSION THERAPY | Facility: HOSPITAL | Age: 32
End: 2022-05-04
Attending: INTERNAL MEDICINE
Payer: COMMERCIAL

## 2022-05-04 ENCOUNTER — LAB VISIT (OUTPATIENT)
Dept: LAB | Facility: HOSPITAL | Age: 32
End: 2022-05-04
Attending: INTERNAL MEDICINE
Payer: COMMERCIAL

## 2022-05-04 VITALS
HEART RATE: 78 BPM | SYSTOLIC BLOOD PRESSURE: 107 MMHG | RESPIRATION RATE: 17 BRPM | DIASTOLIC BLOOD PRESSURE: 71 MMHG | TEMPERATURE: 98 F | BODY MASS INDEX: 24.84 KG/M2 | RESPIRATION RATE: 17 BRPM | OXYGEN SATURATION: 99 % | TEMPERATURE: 98 F | HEIGHT: 60 IN | SYSTOLIC BLOOD PRESSURE: 107 MMHG | OXYGEN SATURATION: 99 % | WEIGHT: 126.5 LBS | BODY MASS INDEX: 24.74 KG/M2 | HEART RATE: 78 BPM | WEIGHT: 126 LBS | HEIGHT: 60 IN | DIASTOLIC BLOOD PRESSURE: 71 MMHG

## 2022-05-04 DIAGNOSIS — D70.1 CHEMOTHERAPY-INDUCED NEUTROPENIA: ICD-10-CM

## 2022-05-04 DIAGNOSIS — N63.24 MASS OF LOWER INNER QUADRANT OF LEFT BREAST: ICD-10-CM

## 2022-05-04 DIAGNOSIS — C50.919 HER2-POSITIVE CARCINOMA OF BREAST: ICD-10-CM

## 2022-05-04 DIAGNOSIS — N63.21 MASS OF UPPER OUTER QUADRANT OF LEFT BREAST: ICD-10-CM

## 2022-05-04 DIAGNOSIS — T45.1X5A CHEMOTHERAPY-INDUCED NEUTROPENIA: Primary | ICD-10-CM

## 2022-05-04 DIAGNOSIS — C50.812 MALIGNANT NEOPLASM OF OVERLAPPING SITES OF LEFT FEMALE BREAST, UNSPECIFIED ESTROGEN RECEPTOR STATUS: Primary | ICD-10-CM

## 2022-05-04 DIAGNOSIS — N63.25 MASS OVERLAPPING MULTIPLE QUADRANTS OF LEFT BREAST: ICD-10-CM

## 2022-05-04 DIAGNOSIS — D70.1 CHEMOTHERAPY-INDUCED NEUTROPENIA: Primary | ICD-10-CM

## 2022-05-04 DIAGNOSIS — C50.812 MALIGNANT NEOPLASM OF OVERLAPPING SITES OF LEFT FEMALE BREAST, UNSPECIFIED ESTROGEN RECEPTOR STATUS: ICD-10-CM

## 2022-05-04 DIAGNOSIS — E86.0 DEHYDRATION: ICD-10-CM

## 2022-05-04 DIAGNOSIS — D50.9 MICROCYTIC ANEMIA: Chronic | ICD-10-CM

## 2022-05-04 DIAGNOSIS — T45.1X5A CHEMOTHERAPY-INDUCED NEUTROPENIA: ICD-10-CM

## 2022-05-04 DIAGNOSIS — Z17.0 ER+ (ESTROGEN RECEPTOR POSITIVE STATUS): ICD-10-CM

## 2022-05-04 LAB
ALBUMIN SERPL BCP-MCNC: 3.9 G/DL (ref 3.5–5.2)
ALP SERPL-CCNC: 41 U/L (ref 55–135)
ALT SERPL W/O P-5'-P-CCNC: 13 U/L (ref 10–44)
ANION GAP SERPL CALC-SCNC: 5 MMOL/L (ref 8–16)
AST SERPL-CCNC: 16 U/L (ref 10–40)
BASOPHILS # BLD AUTO: 0.02 K/UL (ref 0–0.2)
BASOPHILS NFR BLD: 0.4 % (ref 0–1.9)
BILIRUB SERPL-MCNC: 0.6 MG/DL (ref 0.1–1)
BUN SERPL-MCNC: 13 MG/DL (ref 6–20)
CALCIUM SERPL-MCNC: 9 MG/DL (ref 8.7–10.5)
CHLORIDE SERPL-SCNC: 106 MMOL/L (ref 95–110)
CO2 SERPL-SCNC: 27 MMOL/L (ref 23–29)
CREAT SERPL-MCNC: 0.8 MG/DL (ref 0.5–1.4)
DIFFERENTIAL METHOD: ABNORMAL
EOSINOPHIL # BLD AUTO: 0.1 K/UL (ref 0–0.5)
EOSINOPHIL NFR BLD: 2.4 % (ref 0–8)
ERYTHROCYTE [DISTWIDTH] IN BLOOD BY AUTOMATED COUNT: 14.2 % (ref 11.5–14.5)
EST. GFR  (AFRICAN AMERICAN): >60 ML/MIN/1.73 M^2
EST. GFR  (NON AFRICAN AMERICAN): >60 ML/MIN/1.73 M^2
GLUCOSE SERPL-MCNC: 76 MG/DL (ref 70–110)
HCT VFR BLD AUTO: 34.9 % (ref 37–48.5)
HGB BLD-MCNC: 11 G/DL (ref 12–16)
IMM GRANULOCYTES # BLD AUTO: 0.01 K/UL (ref 0–0.04)
IMM GRANULOCYTES NFR BLD AUTO: 0.2 % (ref 0–0.5)
LYMPHOCYTES # BLD AUTO: 2.2 K/UL (ref 1–4.8)
LYMPHOCYTES NFR BLD: 39.4 % (ref 18–48)
MAGNESIUM SERPL-MCNC: 1.8 MG/DL (ref 1.6–2.6)
MCH RBC QN AUTO: 24.4 PG (ref 27–31)
MCHC RBC AUTO-ENTMCNC: 31.5 G/DL (ref 32–36)
MCV RBC AUTO: 77 FL (ref 82–98)
MONOCYTES # BLD AUTO: 0.5 K/UL (ref 0.3–1)
MONOCYTES NFR BLD: 9.3 % (ref 4–15)
NEUTROPHILS # BLD AUTO: 2.6 K/UL (ref 1.8–7.7)
NEUTROPHILS NFR BLD: 48.3 % (ref 38–73)
NRBC BLD-RTO: 0 /100 WBC
PLATELET # BLD AUTO: 168 K/UL (ref 150–450)
PMV BLD AUTO: 8.2 FL (ref 9.2–12.9)
POTASSIUM SERPL-SCNC: 3.7 MMOL/L (ref 3.5–5.1)
PROT SERPL-MCNC: 7.3 G/DL (ref 6–8.4)
RBC # BLD AUTO: 4.51 M/UL (ref 4–5.4)
SODIUM SERPL-SCNC: 138 MMOL/L (ref 136–145)
WBC # BLD AUTO: 5.46 K/UL (ref 3.9–12.7)

## 2022-05-04 PROCEDURE — 1159F MED LIST DOCD IN RCRD: CPT | Mod: CPTII,S$GLB,, | Performed by: INTERNAL MEDICINE

## 2022-05-04 PROCEDURE — 3074F PR MOST RECENT SYSTOLIC BLOOD PRESSURE < 130 MM HG: ICD-10-PCS | Mod: CPTII,S$GLB,, | Performed by: INTERNAL MEDICINE

## 2022-05-04 PROCEDURE — 3078F DIAST BP <80 MM HG: CPT | Mod: CPTII,S$GLB,, | Performed by: INTERNAL MEDICINE

## 2022-05-04 PROCEDURE — 3074F SYST BP LT 130 MM HG: CPT | Mod: CPTII,S$GLB,, | Performed by: INTERNAL MEDICINE

## 2022-05-04 PROCEDURE — 25000003 PHARM REV CODE 250: Performed by: INTERNAL MEDICINE

## 2022-05-04 PROCEDURE — 1159F PR MEDICATION LIST DOCUMENTED IN MEDICAL RECORD: ICD-10-PCS | Mod: CPTII,S$GLB,, | Performed by: INTERNAL MEDICINE

## 2022-05-04 PROCEDURE — 3008F PR BODY MASS INDEX (BMI) DOCUMENTED: ICD-10-PCS | Mod: CPTII,S$GLB,, | Performed by: INTERNAL MEDICINE

## 2022-05-04 PROCEDURE — 83735 ASSAY OF MAGNESIUM: CPT | Performed by: NURSE PRACTITIONER

## 2022-05-04 PROCEDURE — 3008F BODY MASS INDEX DOCD: CPT | Mod: CPTII,S$GLB,, | Performed by: INTERNAL MEDICINE

## 2022-05-04 PROCEDURE — 1160F RVW MEDS BY RX/DR IN RCRD: CPT | Mod: CPTII,S$GLB,, | Performed by: INTERNAL MEDICINE

## 2022-05-04 PROCEDURE — 96413 CHEMO IV INFUSION 1 HR: CPT

## 2022-05-04 PROCEDURE — 1160F PR REVIEW ALL MEDS BY PRESCRIBER/CLIN PHARMACIST DOCUMENTED: ICD-10-PCS | Mod: CPTII,S$GLB,, | Performed by: INTERNAL MEDICINE

## 2022-05-04 PROCEDURE — 96417 CHEMO IV INFUS EACH ADDL SEQ: CPT

## 2022-05-04 PROCEDURE — 63600175 PHARM REV CODE 636 W HCPCS: Mod: JG | Performed by: INTERNAL MEDICINE

## 2022-05-04 PROCEDURE — 36415 COLL VENOUS BLD VENIPUNCTURE: CPT | Performed by: INTERNAL MEDICINE

## 2022-05-04 PROCEDURE — 99214 PR OFFICE/OUTPT VISIT, EST, LEVL IV, 30-39 MIN: ICD-10-PCS | Mod: S$GLB,,, | Performed by: INTERNAL MEDICINE

## 2022-05-04 PROCEDURE — 99214 OFFICE O/P EST MOD 30 MIN: CPT | Mod: S$GLB,,, | Performed by: INTERNAL MEDICINE

## 2022-05-04 PROCEDURE — 96361 HYDRATE IV INFUSION ADD-ON: CPT

## 2022-05-04 PROCEDURE — 85025 COMPLETE CBC W/AUTO DIFF WBC: CPT | Performed by: INTERNAL MEDICINE

## 2022-05-04 PROCEDURE — 80053 COMPREHEN METABOLIC PANEL: CPT | Performed by: INTERNAL MEDICINE

## 2022-05-04 PROCEDURE — 3078F PR MOST RECENT DIASTOLIC BLOOD PRESSURE < 80 MM HG: ICD-10-PCS | Mod: CPTII,S$GLB,, | Performed by: INTERNAL MEDICINE

## 2022-05-04 RX ORDER — TAMOXIFEN CITRATE 20 MG/1
20 TABLET ORAL DAILY
Qty: 30 TABLET | Refills: 11 | Status: SHIPPED | OUTPATIENT
Start: 2022-05-04 | End: 2022-08-11 | Stop reason: SDUPTHER

## 2022-05-04 RX ORDER — EPINEPHRINE 0.3 MG/.3ML
0.3 INJECTION SUBCUTANEOUS ONCE AS NEEDED
Status: DISCONTINUED | OUTPATIENT
Start: 2022-05-04 | End: 2022-05-04 | Stop reason: HOSPADM

## 2022-05-04 RX ORDER — DIPHENHYDRAMINE HYDROCHLORIDE 50 MG/ML
50 INJECTION INTRAMUSCULAR; INTRAVENOUS ONCE AS NEEDED
Status: DISCONTINUED | OUTPATIENT
Start: 2022-05-04 | End: 2022-05-04 | Stop reason: HOSPADM

## 2022-05-04 RX ADMIN — PERTUZUMAB 420 MG: 30 INJECTION, SOLUTION, CONCENTRATE INTRAVENOUS at 02:05

## 2022-05-04 RX ADMIN — SODIUM CHLORIDE 500 ML: 9 INJECTION, SOLUTION INTRAVENOUS at 02:05

## 2022-05-04 RX ADMIN — TRASTUZUMAB 384 MG: 420 INJECTION, POWDER, LYOPHILIZED, FOR SOLUTION INTRAVENOUS at 03:05

## 2022-05-18 ENCOUNTER — DOCUMENTATION ONLY (OUTPATIENT)
Dept: HEMATOLOGY/ONCOLOGY | Facility: CLINIC | Age: 32
End: 2022-05-18

## 2022-05-18 ENCOUNTER — OFFICE VISIT (OUTPATIENT)
Dept: RADIATION ONCOLOGY | Facility: CLINIC | Age: 32
End: 2022-05-18
Payer: COMMERCIAL

## 2022-05-18 VITALS
HEIGHT: 60 IN | RESPIRATION RATE: 18 BRPM | SYSTOLIC BLOOD PRESSURE: 112 MMHG | OXYGEN SATURATION: 99 % | HEART RATE: 85 BPM | DIASTOLIC BLOOD PRESSURE: 76 MMHG | TEMPERATURE: 98 F | BODY MASS INDEX: 24.61 KG/M2

## 2022-05-18 DIAGNOSIS — C50.812 MALIGNANT NEOPLASM OF OVERLAPPING SITES OF LEFT FEMALE BREAST, UNSPECIFIED ESTROGEN RECEPTOR STATUS: ICD-10-CM

## 2022-05-18 PROCEDURE — 99205 PR OFFICE/OUTPT VISIT, NEW, LEVL V, 60-74 MIN: ICD-10-PCS | Mod: S$GLB,,, | Performed by: RADIOLOGY

## 2022-05-18 PROCEDURE — 1160F PR REVIEW ALL MEDS BY PRESCRIBER/CLIN PHARMACIST DOCUMENTED: ICD-10-PCS | Mod: CPTII,S$GLB,, | Performed by: RADIOLOGY

## 2022-05-18 PROCEDURE — 1159F PR MEDICATION LIST DOCUMENTED IN MEDICAL RECORD: ICD-10-PCS | Mod: CPTII,S$GLB,, | Performed by: RADIOLOGY

## 2022-05-18 PROCEDURE — 3074F PR MOST RECENT SYSTOLIC BLOOD PRESSURE < 130 MM HG: ICD-10-PCS | Mod: CPTII,S$GLB,, | Performed by: RADIOLOGY

## 2022-05-18 PROCEDURE — 3078F PR MOST RECENT DIASTOLIC BLOOD PRESSURE < 80 MM HG: ICD-10-PCS | Mod: CPTII,S$GLB,, | Performed by: RADIOLOGY

## 2022-05-18 PROCEDURE — 3008F PR BODY MASS INDEX (BMI) DOCUMENTED: ICD-10-PCS | Mod: CPTII,S$GLB,, | Performed by: RADIOLOGY

## 2022-05-18 PROCEDURE — 3008F BODY MASS INDEX DOCD: CPT | Mod: CPTII,S$GLB,, | Performed by: RADIOLOGY

## 2022-05-18 PROCEDURE — 3074F SYST BP LT 130 MM HG: CPT | Mod: CPTII,S$GLB,, | Performed by: RADIOLOGY

## 2022-05-18 PROCEDURE — 99205 OFFICE O/P NEW HI 60 MIN: CPT | Mod: S$GLB,,, | Performed by: RADIOLOGY

## 2022-05-18 PROCEDURE — 1160F RVW MEDS BY RX/DR IN RCRD: CPT | Mod: CPTII,S$GLB,, | Performed by: RADIOLOGY

## 2022-05-18 PROCEDURE — 3078F DIAST BP <80 MM HG: CPT | Mod: CPTII,S$GLB,, | Performed by: RADIOLOGY

## 2022-05-18 PROCEDURE — 1159F MED LIST DOCD IN RCRD: CPT | Mod: CPTII,S$GLB,, | Performed by: RADIOLOGY

## 2022-05-18 NOTE — Clinical Note
AC-- She def needs adjuvant RT but wishes to think about it. Will keep you posted. If so, will advise hold perjeta and BOYER during RT--can continue herceptin.

## 2022-05-18 NOTE — PROGRESS NOTES
Esperanza Peters  [id number]  1990 5/18/2022  Nilesh Jackson Md  [address]  [address]  [place] [address]    REASON FOR CONSULTATION: IIB, cT3(m)N1M0 g3 multicentric IDC L breast, ER+/AR+/HER2+ converted to [id number]    TREATMENT GOAL: adjuvant    HISTORY OF PRESENT ILLNESS:   31 y.o. female who presented with painful left breast x 2yrs after completion of breast feeding with diagnostic mammogram and ultrasound demonstrating 4.8 cm left breast mass at 7:00 extending to the nipple causing left nipple inversion with a 2nd 3.3 cm mass at 2:00 3CFN.  Also appreciated was level 1-2 axillary adenopathy, index lesion 1.8 cm at level 1.  Patient was seen by Dr. Smith and underwent core needle biopsy from the LIQ and UOQ of left breast that returned grade 3 (8/9) IDC, LVSI(+) in the setting of high-grade DCIS with comedo necrosis; ER+ 81%, AR+ 3%, HER2 3+; KI-67 25%.  Biopsy from the left axilla returned IDC.     She was then referred to Dr. Jackson who ordered MRI of the breast confirming multifocal, multicentric left breast carcinoma encompassing the majority of the left breast with several left axillary lymph nodes.  CT chest abdomen pelvis demonstrated the large left breast mass, left axillary lymphadenopathy and a borderline enlarged right hilar lymph node with nonspecific uptake at sternum on bone scan without CT correlate.      Her case was presented at multidisciplinary tumor Conference with recommendation for neoadjuvant TCHP.  PET-CT confirmed SUV of 6.0 at the left breast with no FDG uptake in the axilla.  An incidental 8 mm retroperitoneal lymph node with SUV of 4.4 was noted with no other signs of distant disease.    BRCA (-).    She completed 4 cycles of TCHP.  Follow-up MRI of the breast demonstrated CR within the breast tissue and axilla.  Patient refused further systemic therapy (NCCN 6c recommended).    She proceeded to bilateral simple mastectomy with left SLNBx with Tiburcio Smith and Wendy,  22:   - R breast: sanya   - L breast: no residual tumor   - 0/6 SLN; 1 LN shows treatment effect   - saline expanders; ROBERT's planned    She has resumed HP planned x 1yr and begun BOYER with Dr. Jackson who also discussed oophorectomy.  She is referred to discuss radiotherapeutic options.    Denies fever, chills, chest pain, shortness of breath, cough or hemoptysis.  Denies swelling or decreased range of motion of the left upper extremity.    Review of systems otherwise negative unless indicated in HPI.    Breast History  Menarche                             12  Menopause                          LMP 10/21                  Hysterectomy no   ; 1st at 18   Birth control use                  no  Hormone therapy use          no  Family History Breast Ca    no  Prior breast biopsies           no    Past Medical History:   Diagnosis Date    ER+ (estrogen receptor positive status) 2021    HER2-positive carcinoma of breast 2021    Malignant neoplasm of overlapping sites of left female breast 2021    Mass of lower inner quadrant of left breast 2021    Mass of upper outer quadrant of left breast 2021    Mass overlapping multiple quadrants of left breast 2021     Past Surgical History:   Procedure Laterality Date     SECTION  2008    x2 2008 & 2019    INSERTION OF TUNNELED CENTRAL VENOUS CATHETER (CVC) WITH SUBCUTANEOUS PORT N/A 2021    Procedure: INSERTION, PORT-A-CATH;  Surgeon: Latanya Smith MD;  Location: [facility];  Service: General;  Laterality: N/A;     Social History     Socioeconomic History    Marital status: Single   Tobacco Use    Smoking status: Never Smoker    Smokeless tobacco: Never Used   Substance and Sexual Activity    Alcohol use: No    Drug use: No     Family History   Problem Relation Age of Onset    Hypertension Mother     Breast cancer Maternal Aunt        PRIOR HISTORY OF CHEMOTHERAPY OR RADIOTHERAPY: Please see HPI for  patients prior oncologic history.    Medication List with Changes/Refills   Current Medications    ACETAMINOPHEN (TYLENOL) 500 MG TABLET    Take 500 mg by mouth once as needed for Pain.    HYDROCODONE-ACETAMINOPHEN (NORCO) 5-325 MG PER TABLET    Take 1 tablet by mouth 3 (three) times daily as needed for pain.    MAGIC MOUTHWASH DIPHEN/ANTAC/LIDOC/NYSTA    Take 10 mLs by mouth 4 (four) times daily.    TAMOXIFEN (NOLVADEX) 20 MG TAB    Take 1 tablet (20 mg total) by mouth once daily.     Review of patient's allergies indicates:  No Known Allergies    QUALITY OF LIFE: 100%- Normal, No Complaints, No Evidence of Disease    Vitals:    05/18/22 1106   BP: 112/76   Pulse: 85   Resp: 18   Temp: 98.1 °F (36.7 °C)   SpO2: 99%   Height: 5' (1.524 m)   PainSc: 0-No pain     Body mass index is 24.61 kg/m².    PHYSICAL EXAM:   GENERAL: alert; in no apparent distress.   HEAD: normocephalic, atraumatic.  EYES: pupils are equal, round, reactive to light and accommodation. Sclera anicteric. Conjunctiva not injected.   NOSE/THROAT: no nasal erythema or rhinorrhea.   NECK: no cervical motion rigidity; supple with no masses.    CHEST:  Patient is speaking comfortably on room air with normal work of breathing without using accessory muscles of respiration.  ABDOMEN: soft, nontender, nondistended.   MUSCULOSKELETAL: no tenderness to palpation along the spine or scapulae. Normal range of motion.  NEUROLOGIC: cranial nerves II-XII intact bilaterally. Strength 5/5 in bilateral upper and lower extremities. No sensory deficits appreciated.  Normal gait.   EXTREMITIES: no clubbing, cyanosis, edema.  SKIN: no erythema, rashes, ulcerations noted.   CW: deferred per pt    REVIEW OF IMAGING/PATHOLOGY/LABS: Please see HPI. All images reviewed personally by dictating physician.     ASSESSMENT: 31 y.o. female with stage IIB, cT3(m)N1M0 g3 multicentric IDC L breast, ER+/[place]+/HER2+ s/p TCHP x 4c converted to [id number] at B mastectomy; on HP +  BOYER  PLAN:  Esperanza Peters presented with neglected left breast mass with imaging demonstrating multicentric bulky left breast malignancy resulting in nipple inversion with biopsy confirming grade 3 triple positive invasive ductal carcinoma and at least 4 level 1-2 axillary lymph nodes involved.  She completed TCHP x4 of 6 recommended cycles with post treatment MRI demonstrating CR, confirmed at bilateral simple mastectomy.  Saline expanders are in place with plans for ROBERT flaps.  Presently she is taking Herceptin, Perjeta and has begun tamoxifen.    I reviewed her clinical presentation and pathologic response in detail, relaying my concern for local regional recurrence due to several factors including bulky multicentric disease, 4+ axillary LNs, grade 3, HER2 positive, young age, 4/6 planned chemotherapy cycles and explained that randomized trials have demonstrated a clear survival benefit for patients in her circumstance with the addition of adjuvant radiotherapy.  My recommendation is treatment of the left chest wall, axilla 1-3, supraclavicular fossa, internal mammary lymph node chain to 5040 cGy followed by 1000 cGy boost to the mastectomy incision.  I recommend treatment with 3D conformal techniques and provided diagrams illustrating the benefit of a deep inspiratory breath protocol to spare the underlying lung and heart.  I recommend tamoxifen and Perjeta held during radiotherapy, Herceptin continuing concurrently.    She offered several questions today which I did my best to address and she would like to take some time to consider radiotherapy.     I carefully explained the process of simulation and treatment delivery with weekly physician visits.      We discussed the risks and benefits of the above treatment and have gone over in detail the acute and late toxicities of radiation therapy to the left chestwall + regional lymphatics.     Consent deferred.     The patient has our contact information and  understands that they are free to contact us at any time with questions or concerns regarding radiation therapy.     DISPOSITION: RTC FOR CT SIM pending decision     I have personally seen and evaluated this patient with a high complexity diagnosis.      Greater than 60 minutes were dedicated to reviewing/interpreting pertinent laboratory/imaging/pathology as well as prior consultations; reviewing and performing history and physical; counseling patient on oncologic recommendations; documentation in the electronic medical record including ordering of additional tests and/or radiation treatment protocol; and coordination of care with physicians with referrals placed as appropriate.     COVID-19 precautions discussed. Cancer Center policy for COVID-19 testing described.  Patient will be required to wear a mask when in the Cancer Center.    PHYSICIAN: Obed Hill Jr, MD    Thank you for the opportunity to meet and consult with Esperanza Peters.   Please feel free to contact me to discuss the above recommendation further.

## 2022-05-19 RX ORDER — EPINEPHRINE 0.3 MG/.3ML
0.3 INJECTION SUBCUTANEOUS ONCE AS NEEDED
Status: CANCELLED | OUTPATIENT
Start: 2022-05-25

## 2022-05-19 RX ORDER — SODIUM CHLORIDE 0.9 % (FLUSH) 0.9 %
10 SYRINGE (ML) INJECTION
Status: CANCELLED | OUTPATIENT
Start: 2022-05-25

## 2022-05-19 RX ORDER — HEPARIN 100 UNIT/ML
500 SYRINGE INTRAVENOUS
Status: CANCELLED | OUTPATIENT
Start: 2022-05-25

## 2022-05-19 RX ORDER — DIPHENHYDRAMINE HYDROCHLORIDE 50 MG/ML
50 INJECTION INTRAMUSCULAR; INTRAVENOUS ONCE AS NEEDED
Status: CANCELLED | OUTPATIENT
Start: 2022-05-25

## 2022-05-25 ENCOUNTER — LAB VISIT (OUTPATIENT)
Dept: LAB | Facility: HOSPITAL | Age: 32
End: 2022-05-25
Attending: INTERNAL MEDICINE
Payer: COMMERCIAL

## 2022-05-25 ENCOUNTER — INFUSION (OUTPATIENT)
Dept: INFUSION THERAPY | Facility: HOSPITAL | Age: 32
End: 2022-05-25
Attending: INTERNAL MEDICINE
Payer: COMMERCIAL

## 2022-05-25 ENCOUNTER — OFFICE VISIT (OUTPATIENT)
Dept: HEMATOLOGY/ONCOLOGY | Facility: CLINIC | Age: 32
End: 2022-05-25
Payer: COMMERCIAL

## 2022-05-25 VITALS
HEIGHT: 60 IN | SYSTOLIC BLOOD PRESSURE: 106 MMHG | TEMPERATURE: 98 F | RESPIRATION RATE: 16 BRPM | OXYGEN SATURATION: 100 % | BODY MASS INDEX: 24.94 KG/M2 | DIASTOLIC BLOOD PRESSURE: 67 MMHG | HEART RATE: 84 BPM | WEIGHT: 127 LBS

## 2022-05-25 VITALS
WEIGHT: 127 LBS | SYSTOLIC BLOOD PRESSURE: 97 MMHG | BODY MASS INDEX: 24.94 KG/M2 | RESPIRATION RATE: 16 BRPM | HEART RATE: 79 BPM | HEIGHT: 60 IN | TEMPERATURE: 98 F | OXYGEN SATURATION: 100 % | DIASTOLIC BLOOD PRESSURE: 62 MMHG

## 2022-05-25 DIAGNOSIS — E86.0 DEHYDRATION: ICD-10-CM

## 2022-05-25 DIAGNOSIS — C50.812 MALIGNANT NEOPLASM OF OVERLAPPING SITES OF LEFT BREAST IN FEMALE, ESTROGEN RECEPTOR POSITIVE: Primary | ICD-10-CM

## 2022-05-25 DIAGNOSIS — N63.24 MASS OF LOWER INNER QUADRANT OF LEFT BREAST: ICD-10-CM

## 2022-05-25 DIAGNOSIS — Z17.0 MALIGNANT NEOPLASM OF OVERLAPPING SITES OF LEFT BREAST IN FEMALE, ESTROGEN RECEPTOR POSITIVE: Primary | ICD-10-CM

## 2022-05-25 DIAGNOSIS — N63.25 MASS OVERLAPPING MULTIPLE QUADRANTS OF LEFT BREAST: ICD-10-CM

## 2022-05-25 DIAGNOSIS — C50.919 HER2-POSITIVE CARCINOMA OF BREAST: ICD-10-CM

## 2022-05-25 DIAGNOSIS — T45.1X5A CHEMOTHERAPY-INDUCED NEUTROPENIA: Primary | ICD-10-CM

## 2022-05-25 DIAGNOSIS — C50.812 MALIGNANT NEOPLASM OF OVERLAPPING SITES OF LEFT FEMALE BREAST, UNSPECIFIED ESTROGEN RECEPTOR STATUS: ICD-10-CM

## 2022-05-25 DIAGNOSIS — N63.21 MASS OF UPPER OUTER QUADRANT OF LEFT BREAST: ICD-10-CM

## 2022-05-25 DIAGNOSIS — D70.1 CHEMOTHERAPY-INDUCED NEUTROPENIA: Primary | ICD-10-CM

## 2022-05-25 LAB
ALBUMIN SERPL BCP-MCNC: 3.9 G/DL (ref 3.5–5.2)
ALP SERPL-CCNC: 44 U/L (ref 55–135)
ALT SERPL W/O P-5'-P-CCNC: 13 U/L (ref 10–44)
ANION GAP SERPL CALC-SCNC: 6 MMOL/L (ref 8–16)
AST SERPL-CCNC: 17 U/L (ref 10–40)
BASOPHILS # BLD AUTO: 0.02 K/UL (ref 0–0.2)
BASOPHILS NFR BLD: 0.4 % (ref 0–1.9)
BILIRUB SERPL-MCNC: 0.4 MG/DL (ref 0.1–1)
BUN SERPL-MCNC: 14 MG/DL (ref 6–20)
CALCIUM SERPL-MCNC: 9.3 MG/DL (ref 8.7–10.5)
CHLORIDE SERPL-SCNC: 105 MMOL/L (ref 95–110)
CO2 SERPL-SCNC: 28 MMOL/L (ref 23–29)
CREAT SERPL-MCNC: 0.7 MG/DL (ref 0.5–1.4)
DIFFERENTIAL METHOD: ABNORMAL
EOSINOPHIL # BLD AUTO: 0.1 K/UL (ref 0–0.5)
EOSINOPHIL NFR BLD: 2.5 % (ref 0–8)
ERYTHROCYTE [DISTWIDTH] IN BLOOD BY AUTOMATED COUNT: 15 % (ref 11.5–14.5)
EST. GFR  (AFRICAN AMERICAN): >60 ML/MIN/1.73 M^2
EST. GFR  (NON AFRICAN AMERICAN): >60 ML/MIN/1.73 M^2
GLUCOSE SERPL-MCNC: 87 MG/DL (ref 70–110)
HCT VFR BLD AUTO: 35.8 % (ref 37–48.5)
HGB BLD-MCNC: 11 G/DL (ref 12–16)
IMM GRANULOCYTES # BLD AUTO: 0.01 K/UL (ref 0–0.04)
IMM GRANULOCYTES NFR BLD AUTO: 0.2 % (ref 0–0.5)
LYMPHOCYTES # BLD AUTO: 1.7 K/UL (ref 1–4.8)
LYMPHOCYTES NFR BLD: 35.2 % (ref 18–48)
MAGNESIUM SERPL-MCNC: 1.9 MG/DL (ref 1.6–2.6)
MCH RBC QN AUTO: 24 PG (ref 27–31)
MCHC RBC AUTO-ENTMCNC: 30.7 G/DL (ref 32–36)
MCV RBC AUTO: 78 FL (ref 82–98)
MONOCYTES # BLD AUTO: 0.4 K/UL (ref 0.3–1)
MONOCYTES NFR BLD: 8.6 % (ref 4–15)
NEUTROPHILS # BLD AUTO: 2.5 K/UL (ref 1.8–7.7)
NEUTROPHILS NFR BLD: 53.1 % (ref 38–73)
NRBC BLD-RTO: 0 /100 WBC
PLATELET # BLD AUTO: 189 K/UL (ref 150–450)
PMV BLD AUTO: 9.5 FL (ref 9.2–12.9)
POTASSIUM SERPL-SCNC: 3.8 MMOL/L (ref 3.5–5.1)
PROT SERPL-MCNC: 7 G/DL (ref 6–8.4)
RBC # BLD AUTO: 4.59 M/UL (ref 4–5.4)
SODIUM SERPL-SCNC: 139 MMOL/L (ref 136–145)
WBC # BLD AUTO: 4.75 K/UL (ref 3.9–12.7)

## 2022-05-25 PROCEDURE — 25000003 PHARM REV CODE 250: Performed by: INTERNAL MEDICINE

## 2022-05-25 PROCEDURE — 3008F PR BODY MASS INDEX (BMI) DOCUMENTED: ICD-10-PCS | Mod: CPTII,S$GLB,, | Performed by: NURSE PRACTITIONER

## 2022-05-25 PROCEDURE — 96417 CHEMO IV INFUS EACH ADDL SEQ: CPT

## 2022-05-25 PROCEDURE — 99214 OFFICE O/P EST MOD 30 MIN: CPT | Mod: S$GLB,,, | Performed by: NURSE PRACTITIONER

## 2022-05-25 PROCEDURE — 99214 PR OFFICE/OUTPT VISIT, EST, LEVL IV, 30-39 MIN: ICD-10-PCS | Mod: S$GLB,,, | Performed by: NURSE PRACTITIONER

## 2022-05-25 PROCEDURE — 80053 COMPREHEN METABOLIC PANEL: CPT | Performed by: INTERNAL MEDICINE

## 2022-05-25 PROCEDURE — 3078F DIAST BP <80 MM HG: CPT | Mod: CPTII,S$GLB,, | Performed by: NURSE PRACTITIONER

## 2022-05-25 PROCEDURE — 85025 COMPLETE CBC W/AUTO DIFF WBC: CPT | Performed by: INTERNAL MEDICINE

## 2022-05-25 PROCEDURE — 3078F PR MOST RECENT DIASTOLIC BLOOD PRESSURE < 80 MM HG: ICD-10-PCS | Mod: CPTII,S$GLB,, | Performed by: NURSE PRACTITIONER

## 2022-05-25 PROCEDURE — 96413 CHEMO IV INFUSION 1 HR: CPT

## 2022-05-25 PROCEDURE — 3074F SYST BP LT 130 MM HG: CPT | Mod: CPTII,S$GLB,, | Performed by: NURSE PRACTITIONER

## 2022-05-25 PROCEDURE — 36415 COLL VENOUS BLD VENIPUNCTURE: CPT | Performed by: INTERNAL MEDICINE

## 2022-05-25 PROCEDURE — 3008F BODY MASS INDEX DOCD: CPT | Mod: CPTII,S$GLB,, | Performed by: NURSE PRACTITIONER

## 2022-05-25 PROCEDURE — 63600175 PHARM REV CODE 636 W HCPCS: Mod: JG | Performed by: INTERNAL MEDICINE

## 2022-05-25 PROCEDURE — 3074F PR MOST RECENT SYSTOLIC BLOOD PRESSURE < 130 MM HG: ICD-10-PCS | Mod: CPTII,S$GLB,, | Performed by: NURSE PRACTITIONER

## 2022-05-25 PROCEDURE — 83735 ASSAY OF MAGNESIUM: CPT | Performed by: NURSE PRACTITIONER

## 2022-05-25 RX ORDER — EPINEPHRINE 0.3 MG/.3ML
0.3 INJECTION SUBCUTANEOUS ONCE AS NEEDED
Status: DISCONTINUED | OUTPATIENT
Start: 2022-05-25 | End: 2022-05-25 | Stop reason: HOSPADM

## 2022-05-25 RX ORDER — DIPHENHYDRAMINE HYDROCHLORIDE 50 MG/ML
50 INJECTION INTRAMUSCULAR; INTRAVENOUS ONCE AS NEEDED
Status: DISCONTINUED | OUTPATIENT
Start: 2022-05-25 | End: 2022-05-25 | Stop reason: HOSPADM

## 2022-05-25 RX ADMIN — PERTUZUMAB 420 MG: 30 INJECTION, SOLUTION, CONCENTRATE INTRAVENOUS at 02:05

## 2022-05-25 RX ADMIN — TRASTUZUMAB 384 MG: 150 INJECTION, POWDER, LYOPHILIZED, FOR SOLUTION INTRAVENOUS at 03:05

## 2022-05-25 RX ADMIN — SODIUM CHLORIDE: 9 INJECTION, SOLUTION INTRAVENOUS at 02:05

## 2022-05-25 NOTE — PROGRESS NOTES
[facility] Hematology/Oncology  PROGRESS NOTE -  Follow-up Visit      Subjective:       Patient ID:   NAME: Esperanza Peters : 1990     31 y.o. female    Referring Doc: Dr Chrei Smith  Other Physicians: Armaan Mukherjee           Chief Complaint: left breast cancer f/u       History of Present Illness:     Patient returns today for a regularly scheduled follow-up visit.  The patient is here today to go over the results of the recently ordered labs, tests and studies. She is here by herself today.     She previously had surgery with Dr Smith and Dr Nguyen with plastic surgery at [place] on 2022; pathology reports that she had no residual breast cancer in the left and right breast and the six sentinel Ln's on the left were all negative.    She has since started back on perjeta and herceptin and is tolerating it well. She previously discussed the recommendation for consideration for oopherectomy with Dr. Mallory, and patient is unsure if she wants to proceed in that direction.     She also discussed radiation extensively with Dr. Hill and she states she is still unsure, but feels that she does not want radiation. Explanations were given about the importance and need for radiation however, the patient is extremely reluctant.     Breathing ok, no current CP, SOB, HA's or N/V.     Recent echo on 22 - EF 60%      discussed covid19 precautions      ROS:   GEN: normal without any fever, night sweats or weight loss  HEENT: normal with no HA's, sore throat, stiff neck, changes in vision  CV: normal with no CP, SOB, PND, CHASE or orthopnea  PULM: normal with no SOB, cough, hemoptysis, sputum or pleuritic pain  GI: normal with no abdominal pain, nausea, vomiting, constipation, diarrhea, melanotic stools, BRBPR, or hematemesis  : normal with no hematuria, dysuria  BREAST: no palpable masses  SKIN: normal with no rash, erythema, bruising, or swelling    Pain Scale:  0    Allergies:  Review of patient's  allergies indicates:  No Known Allergies    Medications:    Current Outpatient Medications:     acetaminophen (TYLENOL) 500 MG tablet, Take 500 mg by mouth once as needed for Pain., Disp: , Rfl:     magic mouthwash diphen/antac/lidoc/nysta, Take 10 mLs by mouth 4 (four) times daily. (Patient not taking: Reported on 5/18/2022), Disp: 120 mL, Rfl: 1    HYDROcodone-acetaminophen (NORCO) 5-325 mg per tablet, Take 1 tablet by mouth 3 (three) times daily as needed for pain. (Patient not taking: Reported on 5/18/2022), Disp: 8 tablet, Rfl: 0    tamoxifen (NOLVADEX) 20 MG Tab, Take 1 tablet (20 mg total) by mouth once daily., Disp: 30 tablet, Rfl: 11  No current facility-administered medications for this visit.    PMHx/PSHx Updates:  See patient's last visit with Renetta 5/4/22  See H&P on 9/13/2021         Pathology:  Cancer Staging  Malignant neoplasm of overlapping sites of left female breast  Staging form: Breast, AJCC 8th Edition  - Clinical: Stage IIB (cT3, cN1(f), cM0, G3, ER+, MT+, HER2+) - Unsigned  - Pathologic: No Stage Recommended (ypT0, pN0(sn), cM0) - Signed by Obed Hill Jr., MD on 5/18/2022      Bilateral mastectomies : 1/128/2022 at [facility]  - pathology report on chart      Objective:     Vitals:  Blood pressure 106/67, pulse 84, temperature 97.9 °F (36.6 °C), resp. rate 16, height 5' (1.524 m), weight 57.6 kg (127 lb), SpO2 100 %.    Physical Examination:   GEN: no apparent distress, comfortable; AAOx3  HEAD: atraumatic and normocephalic  EYES: no pallor, no icterus, PERRLA  ENT: OMM, no pharyngeal erythema, external ears WNL; no nasal discharge; no thrush  NECK: no masses, thyroid normal, trachea midline, no LAD/LN's, supple  CV: RRR with no murmur; normal pulse; normal S1 and S2; no pedal edema; Rght CW portacath since remoived  CHEST: Normal respiratory effort; CTAB; normal breath sounds; no wheeze or crackles  ABDOM: nontender and nondistended; soft; normal bowel sounds; no  rebound/guarding  MUSC/Skeletal: ROM normal; no crepitus; joints normal; no deformities or arthropathy  EXTREM: no clubbing, cyanosis, inflammation or swelling  SKIN: no rashes, lesions, ulcers, petechiae or subcutaneous nodules  : no pacheco  NEURO: grossly intact; motor/sensory WNL; AAOx3; no tremors  PSYCH: normal mood, affect and behavior  LYMPH: normal cervical, supraclavicular, axillary and groin LN's  Breast: s/p bilateral mastectomies with reconstruction             Labs:     Lab Results   Component Value Date    WBC 4.75 05/25/2022    HGB 11.0 (L) 05/25/2022    HCT 35.8 (L) 05/25/2022    MCV 78 (L) 05/25/2022     05/25/2022       CMP  Sodium   Date Value Ref Range Status   05/25/2022 139 136 - 145 mmol/L Final     Potassium   Date Value Ref Range Status   05/25/2022 3.8 3.5 - 5.1 mmol/L Final     Chloride   Date Value Ref Range Status   05/25/2022 105 95 - 110 mmol/L Final     CO2   Date Value Ref Range Status   05/25/2022 28 23 - 29 mmol/L Final     Glucose   Date Value Ref Range Status   05/25/2022 87 70 - 110 mg/dL Final     BUN   Date Value Ref Range Status   05/25/2022 14 6 - 20 mg/dL Final     Creatinine   Date Value Ref Range Status   05/25/2022 0.7 0.5 - 1.4 mg/dL Final     Calcium   Date Value Ref Range Status   05/25/2022 9.3 8.7 - 10.5 mg/dL Final     Total Protein   Date Value Ref Range Status   05/25/2022 7.0 6.0 - 8.4 g/dL Final     Albumin   Date Value Ref Range Status   05/25/2022 3.9 3.5 - 5.2 g/dL Final     Total Bilirubin   Date Value Ref Range Status   05/25/2022 0.4 0.1 - 1.0 mg/dL Final     Comment:     For infants and newborns, interpretation of results should be based  on gestational age, weight and in agreement with clinical  observations.    Premature Infant recommended reference ranges:  Up to 24 hours.............<8.0 mg/dL  Up to 48 hours............<12.0 mg/dL  3-5 days..................<15.0 mg/dL  6-29 days.................<15.0 mg/dL       Alkaline Phosphatase   Date  Value Ref Range Status   05/25/2022 44 (L) 55 - 135 U/L Final     AST   Date Value Ref Range Status   05/25/2022 17 10 - 40 U/L Final     ALT   Date Value Ref Range Status   05/25/2022 13 10 - 44 U/L Final     Anion Gap   Date Value Ref Range Status   05/25/2022 6 (L) 8 - 16 mmol/L Final     eGFR if    Date Value Ref Range Status   05/25/2022 >60.0 >60 mL/min/1.73 m^2 Final     eGFR if non    Date Value Ref Range Status   05/25/2022 >60.0 >60 mL/min/1.73 m^2 Final     Comment:     Calculation used to obtain the estimated glomerular filtration  rate (eGFR) is the CKD-EPI equation.              Radiology/Diagnostic Studies:    ECHO 4/20/22:    Summary    · The left ventricle is normal in size with normal systolic function.  · The estimated ejection fraction is 60%.  · Normal left ventricular diastolic function.  · Normal right ventricular size with normal right ventricular systolic function.  · Normal central venous pressure (3 mmHg).  · The estimated PA systolic pressure is 20 mmHg.              MRI breast  12/23/2021:  -    Impression:     1. Complete resolution of previous enhancing multicentric left breast malignancy.  2. Complete resolution of prior enlarged left axillary lymph nodes.  3. Persistent retroareolar left breast distortion near 6 o'clock position, characteristic of scarring.  Recommendation: Patient is currently under medical oncology and surgical care.        PET 10/21/2021:    Impression:     Hypermetabolic left breast mass consistent with primary breast malignancy. Please correlate with dedicated breast imaging.     No pathologically enlarged or hypermetabolic axillary or intrathoracic lymph nodes.     Hypermetabolic retroperitoneal lymph node is nonspecific and would be unusual for breast cancer metastasis. Attention on follow-up is recommended.     Low level FDG activity associated with prominent cervical lymph nodes, bilateral and symmetric, most likely reactive  in nature.     Reactive red marrow.      CT scans 9/29/2021:    IMPRESSION:  1. Large irregular subareolar left breast mass and findings compatible with multifocal/multicentric disease in the left breast (better appreciated on the recent MRI).  2. Left axillary lymphadenopathy compatible with additional sites of disease.  3. Borderline enlarged right hilar node, possibly an additional site of disease, although felt less likely this could be further assessed with PET/CT.        NM Bone Scan Whole Body    Result Date: 9/27/2021  CMS MANDATED QUALITY DATA-BONE SCAN-147 HISTORY: Staging breast carcinoma, and 63.25. COMPARISON: Multiple prior chest radiographs. FINDINGS: 22 mCi Tc 99m MDP was administered IV per protocol, with delayed whole-body images obtained in the anterior and posterior projections. Nodular focus of increased radiotracer uptake in the sternum near the level of the sternomanubrial articulation is nonspecific. There is otherwise no abnormal focal increased radiotracer uptake throughout the axial or appendicular skeleton to suggest osteoblastic metastatic disease. There is normal radiotracer uptake by the kidneys, with normal excretion into the renal collecting systems and urinary bladder. IMPRESSION: 1. Focus of increased osteoblastic activity in the sternum near the sternomanubrial junction, nonspecific. Consider further evaluation with chest CT with contrast. 2. Otherwise no evidence of osteoblastic metastatic disease. Electronically signed by:  Keron Neil MD  9/27/2021 1:41 PM CDT Workstation: [id number]    X-Ray Chest AP Portable    Result Date: 9/20/2021  Portable chest x-ray at 11:13 AM is compared to prior study dated 9/16/2021 Clinical history is Port-A-Cath placement There is a right subclavian vein Port-A-Cath with tip overlying the superior vena cava. There is no pneumothorax. The lungs are clear. The cardiomediastinal silhouette is normal in size. There are no acute osseous normality's.  IMPRESSION: No pneumothorax status post right subclavian vein Port-A-Cath placement Electronically signed by:  Breana Lebron MD  9/20/2021 11:52 AM CDT Workstation: [id number]    MRI Breast w/wo Contrast, w/CAD, Bilateral    Result Date: 9/28/2021  EXAMINATION: MRI BREAST W/WO CONTRAST, W/CAD, BILATERAL CLINICAL HISTORY: Staging left breast carcinoma, C 50.812, N 63.21, N 63.24, N 63.25, malignant neoplasm of overlapping sites of left female breast TECHNIQUE: CMS MANDATED QUALITY DATA - MAMMOGRAPHY - 225 Bilateral breast MRI was performed with a dedicated breast coil. The patient was placed prone in the breast immobilizer with light compression. The following localization sequences were obtained: Axial inversion recovery, axial 3-D non-fat-suppressed, axial 3-D fat suppressed pre-contrast, followed by axial 3-D fat-suppressed post-contrast dynamic images with 6.5 mL Gadavist IV contrast. Post-processing including subtraction imaging, reconstruction in the sagittal and coronal planes, and MIP of both breasts was performed on an independent workstation. The interpretation was assisted by Computer Aided Detection. FINDINGS: Comparison to the diagnostic mammogram and ultrasound of 08/20/2021.  The breasts are composed of mostly fibroglandular elements and interspersed fat.  Background parenchymal enhancement is mild. There is a dominant heterogeneously enhancing solid mass with irregular margins in the left breast retroareolar region measuring 37 mm AP x 3 mm transverse by 35 mm craniocaudal dimension, with suspicious enhancement kinetics.  There are several additional enhancing nodular and irregular infiltrative masses in the left breast, some which are contiguous with the dominant retroareolar mass, extending into the all quadrants of the breast predominantly the upper outer quadrant.  Total area of disease in the left breast measures roughly 38 mm AP x 60 mm transverse by 90 mm craniocaudal dimension, roughly  30-40% of the volume of the left breast.  No abnormal enhancement to suggest involvement of the left pectoralis musculature. There are several enlarged infiltrated appearing left axillary lymph nodes suspicious for metastatic disease, measuring 16 mm maximum short axis dimension.  There are no enlarged right axillary lymph nodes or enlarged internal mammary chain lymph nodes. Stippled randomly scattered non masslike enhancement in the right breast is nonspecific, with no enhancing right breast mass. There is a 23 mm STIR hyperintense enhancing right hilar mass suspicious for hilar lymphadenopathy, with no additional abnormal enhancement in the chest wall or mediastinum evident.     1. Multifocal, multicentric left breast carcinoma as described, with several enlarged left axillary lymph nodes consistent with metastatic disease. 2. No findings of contralateral right breast carcinoma, with scattered stippled probably benign non masslike enhancement in the right breast. 3. Enhancing right hilar mass suspicious for metastatic disease.  Consider further evaluation with whole body PET CT. BI-RADS CATEGORY 6: KNOWN MALIGNANCY. [facility] utilizes a reminder system to ensure that patients receive reminder letters for appointments. This includes reminders for routine screening mammograms, diagnostic mammograms, or other breast imaging interventions as appropriate. This patient will be placed in the appropriate reminder system. Electronically signed by: Keron Neil MD Date:    09/28/2021 Time:    09:32           I have reviewed all available lab results and radiology reports.    Assessment/Plan:   (1) 31 y.o. female with diagnosis of left breast cancer who has been referred by Dr Cheri Smith for evaluation by medical hematology/oncology.   - left breast masses of overlapping sites involving Outer upper Quadrant and Lower inner Quadrant respectively  - s/p core biopsies taken on 8/27/2021  - axillary LN was  positive  - stage III cancer process at least  - ER positive at 80.5 %, NM positive at 3%; Her2Nu (3+)  -Ki67 25%  - discussed the pathology and the latest NCCN guidelines (version 8.2021)  - recommend neoadjuvant chemotherapy with herceptin + perjeta based regimen which studies have shown to be beneficial to survival and endocrine therapy  - she will need echo, portacath and PEt scan  - will set up chemotherapy school to discuss the drugs, side-effects and provide literature on the drugs    9/30/2021:  - Her case was presented at the [facility] Tumor Board yesterday and consensus was to get PEt scan and proceed with TCHP chemotherapy.   - She had chemotherapy school with [facility].   - She had portacath placed on [facility] CW with Dr Smith.   - She is set to start chemotherapy on Oct 5th.   - echo on chart from 9/24/2021  - discussed the chemotherapy regimen, provided literature and discussed the side-effect profile of the drugs    10/26/2021:  - getting cycle #2 today  - getting Udenyca tomorrow and she also requested some IVF tomorrow as well  - discussed the PEt scan findings from 10/21/2021     12/7/2021:  - 4th cycle chemo this week with IVF and antiemtics as needed  - order MRI of breasts for next week to re-assess response    1/10/2022:  - She completed the 4th cycle of chemotherapy and had subsequent breast MRI on 12/23/2021 which showed an excellent response to the therapy; she has decided to stop the chemotherapy and proceed with surgery; she is adamant that she does not want to continue with the neoadjuvant regiment; she is aware that she will still at least need to continue the herceptin post-op and may need additional chemotherapy depending on the pathology findings from the surgery  - She saw Dr Smith the other day and is seeing Dr Nguyen with plastic surgery tomorrow    2/15/2022:  - She had surgery with Dr Smith and Dr Nguyen with plastic surgery at [place] on Jan 28th 2022; pathology reports that she had  "no residual breast cancer in the left and right breast and the six sentinel LN's on the left were all negative. She is healing well and has some residual aches and pain under the left arm. She sees Dr Smith again 2 weeks and Dr Nguyen on 2/21/2022  - Dr Nguyen wants to either move or remove portacath to help with the cosmetic surgery better  - she will need to continue with Herceptin and Perjeta for the next year, she can either get PICC or intermittent IV's    3/16/2022:  - she has since resumed therapy post-op with perjeta and herceptin  - will need periodic echocardiograms  - monitor labs  - she no long has portacath (moved to facilitate reconstruction)     5/4/2022:  - She has since started back on perjeta and herceptin and is tolerating it well. Discussed the recommendation for consideration for oopherectomy, and patient is unsure if she wants to proceed in that direction. She has not had a period since starting chemotherapy.   - discussed and encouraged her to at least consider oral antihormone therapy with tamoxifen and is is willing to proceed  - she needs to follow-up with rad/onc as well for XRT evaluation    5/25/22:   - She continues on Perjeta and Herceptin and is tolerating well   - she will "consider" radiation however she is reluctant despite continuous education on the need   - echo normal - will repeat in 2 months   - on Tamoxifen      (2) no HTN     (3) No DM     (4) two c-sections with healthy children - she is aware that with chemotherapy she could potentially not be able to reproduce or have any more children - she is comfortable with that fact      VISIT DIAGNOSES:      Malignant neoplasm of overlapping sites of left breast in female, estrogen receptor positive    HER2-positive carcinoma of breast      PLAN:  1. resumed and continued on Perjeta and herceptin  2. Continue with peridoic echo's as scheduled and directed  3. Check up to date labs at least monthly  4. s/p chemotherapy school, discussed " "the medications, side-effect profiles and obtained consents  5. Tamoxifen oral antihormone at least with proposed consideration for oophorectomy in future  6. Patient declines radiation at this time, but states she will "think about it."      RTC in 3 weeks with Dr. Jackson and 6 weeks with me     Fax note to Liz Smith Calabresi        Discussion:     COVID-19 Discussion:     I had long discussion with patient and any applicable family about the COVID-19 coronavirus epidemic and the recommended precautions with regard to cancer and/or hematology patients. I have re-iterated the CDC recommendations for adequate hand washing, use of hand -like products, and coughing into elbow, etc. In addition, especially for our patients who are on chemotherapy and/or our otherwise immunocompromised patients, I have recommended avoidance of crowds, including movie theaters, restaurants, churches, etc. I have recommended avoidance of any sick or symptomatic family members and/or friends. I have also recommended avoidance of any raw and unwashed food products, and general avoidance of food items that have not been prepared by themselves. The patient has been asked to call us immediately with any symptom developments, issues, questions or other general concerns.      Pathology Discussion:     I reviewed and discussed the pathology report(s) and radiograph reports (if available) in as simple to understand and/or laymen's terms to the best of my ability. I had an indepth conversation with the patient and went over the patient's individual diagnosis based on the information that was currently available. I discussed the TNM staging process with regard to the patient's particular cancer type, and the calculated stage based on the currently available TNM data and literature. I discussed the available prognostic data with regard to the current staging information and how it relates to the prognosis of their particular " "neoplastic process.          NCCN Guidelines:     I discussed the available treatment option(s) in accordance with the latest literature from the NCCN Clinical Practice Guidelines for the patient's particular type of cancer disorder. The NCCN Guidelines provide a "document evidence-based (and) consensus-driven management" of the care of oncology patients. The treatment recommendations were made not only in accordance to the NCCN guidelines, but also factored in to account the patient's overall age, condition, performance status and their medical co-morbidities. I went over the risks and benefits of the the treatment options (if any could be made) with regard to their particular cancer type, their cancer stage, their age, and their co-morbidities.      Chemotherapy Discussion:        I discussed the available treatment option(s) in accordance with the latest/current national evidence-based guidelines (NCCN, UpToDate, NCI, ASCO, etc where applicable), their overall age/condition and their co-morbidities. I also went over the risks and benefits of the chemotherapy with regard to their particular cancer type, their cancer stage, their age/condition, and their co-morbidities. I provided literature on the chemotherapy regimen and discussed the chemotherapy side-effect profiles of the drug(s). I discussed the importance of compliance with obtaining and monitoring weekly lab work, and went over the potential hematopathology issues and risks with anemia, leucopenia and thrombocytopenia that can occur with chemotherapy. I discussed the potential risks of liver and kidney damage, which could be permanent and could necessitate dialysis long-term if kidney failure developed. I discussed the emetic and/or diarrheal potential of the regimen and the potential need for use of antiemetic and anti-diarrheal medications. I discussed the risk for development of anaphylactic shock, bronchospasm, dysrhythmia, and " respiratory/cardiovascular arrest and/or failure. I discussed the potential risks for development of alopecia, cold sensory issues, ringing in ears, vertigo, cataracts, glaucoma, and neuropathy, all of which could end up being chronic and life-long. Some chemotherpyI discussed the risks of hand-foot syndrome and rashes, and development of other autoimmune mediated processes such as pneumonitis, hepatitis, and colitis which could be life threatening. I discussed the risks of the potential development of a rare but fatal viral mediated disease known as PML (Progressive Multifocal Leukoencephalopathy), and risk of future development of leukemia and/or lymphoma from use of certain chemotherapy agents. I discussed the need for neutropenic precautions, basic hygiene/sanitation behaviors and dietary restrictions.     The patient's consent has been obtained to proceed with the chemotherapy.The patient will be referred to Chemotherapy School [facility] for training and education on chemotherapy, use of antiemetics and/or anti-diarrheals, use of NSAID's, potential chemotherapy side-effects, and any specific recommendations and precautions with the particular chemotherapy agents.       I answered all of the patient's (and family's, if applicable) questions to the best of my ability and to their complete satisfaction. The patient acknowledged full understanding of the risks, recommendations and plan(s).      I have explained and the patient understands all of  the current recommendation(s). I have answered all of their questions to the best of my ability and to their complete satisfaction.      Antihormone Therapy Discussion:    I discussed the advantages of antihormone therapy with the patient with regard to their particular neoplastic or carcinoma in situ condition. I went over the side-effect profile of the medication including risk for potential development of endometrial cancer and/or hyperplasia in women who  still have a uterus and the need for yearly GYN evaluation and follow-up. I discussed the risks for thromboembolic events such as DVT's, pulmonary emboli, CVA's, retinal vascular clots, phlebitis, and TIA's. I discussed the potential risks for development of ocular disturbances, retinopathy, cataracts, corneal changes, flushing, hot flashes, amenorrhea, altered menses, fluid retention, weight changes, elevations in LFT's, liver damage, and mood disturbances. I discussed the potential risk of arthropathy and joint pains/aches which could be chronic and debilitating. I discussed potential adverse effects on bone mineralization and the risk of osteopenia and/or osteoporosis which could led to increase risk of fractures.   A consent form was obtained and a copy was provided to the patient.       I spent over 25 mins of time with the patient. Reviewed results of the recently ordered labs, tests and studies; made directives with regards to the results. Over half of this time was spent couseling and coordinating care.    I have explained all of the above in detail and the patient understands all of the current recommendation(s). I have answered all of their questions to the best of my ability and to their complete satisfaction.   The patient is to continue with the current management plan.            Electronically signed by Susana Bradshaw, MSN,APRN,AGNP-C

## 2022-06-14 RX ORDER — EPINEPHRINE 0.3 MG/.3ML
0.3 INJECTION SUBCUTANEOUS ONCE AS NEEDED
Status: CANCELLED | OUTPATIENT
Start: 2022-06-15

## 2022-06-14 RX ORDER — HEPARIN 100 UNIT/ML
500 SYRINGE INTRAVENOUS
Status: CANCELLED | OUTPATIENT
Start: 2022-06-15

## 2022-06-14 RX ORDER — DIPHENHYDRAMINE HYDROCHLORIDE 50 MG/ML
50 INJECTION INTRAMUSCULAR; INTRAVENOUS ONCE AS NEEDED
Status: CANCELLED | OUTPATIENT
Start: 2022-06-15

## 2022-06-14 RX ORDER — SODIUM CHLORIDE 0.9 % (FLUSH) 0.9 %
10 SYRINGE (ML) INJECTION
Status: CANCELLED | OUTPATIENT
Start: 2022-06-15

## 2022-06-15 ENCOUNTER — OFFICE VISIT (OUTPATIENT)
Dept: HEMATOLOGY/ONCOLOGY | Facility: CLINIC | Age: 32
End: 2022-06-15
Payer: COMMERCIAL

## 2022-06-15 ENCOUNTER — TELEPHONE (OUTPATIENT)
Dept: HEMATOLOGY/ONCOLOGY | Facility: CLINIC | Age: 32
End: 2022-06-15

## 2022-06-15 ENCOUNTER — INFUSION (OUTPATIENT)
Dept: INFUSION THERAPY | Facility: HOSPITAL | Age: 32
End: 2022-06-15
Attending: INTERNAL MEDICINE
Payer: COMMERCIAL

## 2022-06-15 ENCOUNTER — LAB VISIT (OUTPATIENT)
Dept: LAB | Facility: HOSPITAL | Age: 32
End: 2022-06-15
Attending: INTERNAL MEDICINE
Payer: COMMERCIAL

## 2022-06-15 VITALS
SYSTOLIC BLOOD PRESSURE: 109 MMHG | TEMPERATURE: 98 F | TEMPERATURE: 98 F | BODY MASS INDEX: 25.32 KG/M2 | WEIGHT: 129 LBS | RESPIRATION RATE: 18 BRPM | HEIGHT: 60 IN | RESPIRATION RATE: 18 BRPM | DIASTOLIC BLOOD PRESSURE: 65 MMHG | SYSTOLIC BLOOD PRESSURE: 109 MMHG | BODY MASS INDEX: 25.32 KG/M2 | HEIGHT: 60 IN | DIASTOLIC BLOOD PRESSURE: 65 MMHG | WEIGHT: 129 LBS | OXYGEN SATURATION: 100 % | HEART RATE: 84 BPM | HEART RATE: 84 BPM

## 2022-06-15 DIAGNOSIS — E86.0 DEHYDRATION: ICD-10-CM

## 2022-06-15 DIAGNOSIS — C50.812 MALIGNANT NEOPLASM OF OVERLAPPING SITES OF LEFT BREAST IN FEMALE, ESTROGEN RECEPTOR POSITIVE: Primary | ICD-10-CM

## 2022-06-15 DIAGNOSIS — C50.812 MALIGNANT NEOPLASM OF OVERLAPPING SITES OF LEFT BREAST IN FEMALE, ESTROGEN RECEPTOR POSITIVE: ICD-10-CM

## 2022-06-15 DIAGNOSIS — C50.812 MALIGNANT NEOPLASM OF OVERLAPPING SITES OF LEFT FEMALE BREAST, UNSPECIFIED ESTROGEN RECEPTOR STATUS: ICD-10-CM

## 2022-06-15 DIAGNOSIS — C50.919 HER2-POSITIVE CARCINOMA OF BREAST: ICD-10-CM

## 2022-06-15 DIAGNOSIS — Z17.0 MALIGNANT NEOPLASM OF OVERLAPPING SITES OF LEFT BREAST IN FEMALE, ESTROGEN RECEPTOR POSITIVE: Primary | ICD-10-CM

## 2022-06-15 DIAGNOSIS — D50.9 MICROCYTIC ANEMIA: Chronic | ICD-10-CM

## 2022-06-15 DIAGNOSIS — D70.1 CHEMOTHERAPY-INDUCED NEUTROPENIA: Primary | ICD-10-CM

## 2022-06-15 DIAGNOSIS — Z17.0 MALIGNANT NEOPLASM OF OVERLAPPING SITES OF LEFT BREAST IN FEMALE, ESTROGEN RECEPTOR POSITIVE: ICD-10-CM

## 2022-06-15 DIAGNOSIS — C50.812 MALIGNANT NEOPLASM OF OVERLAPPING SITES OF LEFT FEMALE BREAST, UNSPECIFIED ESTROGEN RECEPTOR STATUS: Primary | ICD-10-CM

## 2022-06-15 DIAGNOSIS — T45.1X5A CHEMOTHERAPY-INDUCED NEUTROPENIA: Primary | ICD-10-CM

## 2022-06-15 DIAGNOSIS — Z17.0 ER+ (ESTROGEN RECEPTOR POSITIVE STATUS): ICD-10-CM

## 2022-06-15 LAB
ALBUMIN SERPL BCP-MCNC: 3.9 G/DL (ref 3.5–5.2)
ALP SERPL-CCNC: 48 U/L (ref 55–135)
ALT SERPL W/O P-5'-P-CCNC: 13 U/L (ref 10–44)
ANION GAP SERPL CALC-SCNC: 7 MMOL/L (ref 8–16)
AST SERPL-CCNC: 20 U/L (ref 10–40)
BASOPHILS # BLD AUTO: 0.03 K/UL (ref 0–0.2)
BASOPHILS NFR BLD: 0.6 % (ref 0–1.9)
BILIRUB SERPL-MCNC: 0.7 MG/DL (ref 0.1–1)
BUN SERPL-MCNC: 13 MG/DL (ref 6–20)
CALCIUM SERPL-MCNC: 9.5 MG/DL (ref 8.7–10.5)
CHLORIDE SERPL-SCNC: 102 MMOL/L (ref 95–110)
CO2 SERPL-SCNC: 28 MMOL/L (ref 23–29)
CREAT SERPL-MCNC: 0.8 MG/DL (ref 0.5–1.4)
DIFFERENTIAL METHOD: ABNORMAL
EOSINOPHIL # BLD AUTO: 0.1 K/UL (ref 0–0.5)
EOSINOPHIL NFR BLD: 2.6 % (ref 0–8)
ERYTHROCYTE [DISTWIDTH] IN BLOOD BY AUTOMATED COUNT: 14.9 % (ref 11.5–14.5)
EST. GFR  (AFRICAN AMERICAN): >60 ML/MIN/1.73 M^2
EST. GFR  (NON AFRICAN AMERICAN): >60 ML/MIN/1.73 M^2
GLUCOSE SERPL-MCNC: 105 MG/DL (ref 70–110)
HCT VFR BLD AUTO: 37.6 % (ref 37–48.5)
HGB BLD-MCNC: 11.5 G/DL (ref 12–16)
IMM GRANULOCYTES # BLD AUTO: 0.02 K/UL (ref 0–0.04)
IMM GRANULOCYTES NFR BLD AUTO: 0.4 % (ref 0–0.5)
LYMPHOCYTES # BLD AUTO: 1.6 K/UL (ref 1–4.8)
LYMPHOCYTES NFR BLD: 32.2 % (ref 18–48)
MAGNESIUM SERPL-MCNC: 1.9 MG/DL (ref 1.6–2.6)
MCH RBC QN AUTO: 24.3 PG (ref 27–31)
MCHC RBC AUTO-ENTMCNC: 30.6 G/DL (ref 32–36)
MCV RBC AUTO: 80 FL (ref 82–98)
MONOCYTES # BLD AUTO: 0.3 K/UL (ref 0.3–1)
MONOCYTES NFR BLD: 6.4 % (ref 4–15)
NEUTROPHILS # BLD AUTO: 2.9 K/UL (ref 1.8–7.7)
NEUTROPHILS NFR BLD: 57.8 % (ref 38–73)
NRBC BLD-RTO: 0 /100 WBC
PLATELET # BLD AUTO: 191 K/UL (ref 150–450)
PMV BLD AUTO: 9.5 FL (ref 9.2–12.9)
POTASSIUM SERPL-SCNC: 3.7 MMOL/L (ref 3.5–5.1)
PROT SERPL-MCNC: 7.5 G/DL (ref 6–8.4)
RBC # BLD AUTO: 4.73 M/UL (ref 4–5.4)
SODIUM SERPL-SCNC: 137 MMOL/L (ref 136–145)
WBC # BLD AUTO: 5.03 K/UL (ref 3.9–12.7)

## 2022-06-15 PROCEDURE — 99214 OFFICE O/P EST MOD 30 MIN: CPT | Mod: S$GLB,,, | Performed by: INTERNAL MEDICINE

## 2022-06-15 PROCEDURE — 3078F DIAST BP <80 MM HG: CPT | Mod: CPTII,S$GLB,, | Performed by: INTERNAL MEDICINE

## 2022-06-15 PROCEDURE — 25000003 PHARM REV CODE 250: Performed by: INTERNAL MEDICINE

## 2022-06-15 PROCEDURE — 83735 ASSAY OF MAGNESIUM: CPT | Performed by: NURSE PRACTITIONER

## 2022-06-15 PROCEDURE — 1160F RVW MEDS BY RX/DR IN RCRD: CPT | Mod: CPTII,S$GLB,, | Performed by: INTERNAL MEDICINE

## 2022-06-15 PROCEDURE — 1160F PR REVIEW ALL MEDS BY PRESCRIBER/CLIN PHARMACIST DOCUMENTED: ICD-10-PCS | Mod: CPTII,S$GLB,, | Performed by: INTERNAL MEDICINE

## 2022-06-15 PROCEDURE — 3008F BODY MASS INDEX DOCD: CPT | Mod: CPTII,S$GLB,, | Performed by: INTERNAL MEDICINE

## 2022-06-15 PROCEDURE — 96413 CHEMO IV INFUSION 1 HR: CPT

## 2022-06-15 PROCEDURE — 80053 COMPREHEN METABOLIC PANEL: CPT | Performed by: INTERNAL MEDICINE

## 2022-06-15 PROCEDURE — 36415 COLL VENOUS BLD VENIPUNCTURE: CPT | Performed by: INTERNAL MEDICINE

## 2022-06-15 PROCEDURE — A4216 STERILE WATER/SALINE, 10 ML: HCPCS | Performed by: INTERNAL MEDICINE

## 2022-06-15 PROCEDURE — 36415 COLL VENOUS BLD VENIPUNCTURE: CPT | Performed by: NURSE PRACTITIONER

## 2022-06-15 PROCEDURE — 63600175 PHARM REV CODE 636 W HCPCS: Mod: TB | Performed by: INTERNAL MEDICINE

## 2022-06-15 PROCEDURE — 3074F SYST BP LT 130 MM HG: CPT | Mod: CPTII,S$GLB,, | Performed by: INTERNAL MEDICINE

## 2022-06-15 PROCEDURE — 3074F PR MOST RECENT SYSTOLIC BLOOD PRESSURE < 130 MM HG: ICD-10-PCS | Mod: CPTII,S$GLB,, | Performed by: INTERNAL MEDICINE

## 2022-06-15 PROCEDURE — 3078F PR MOST RECENT DIASTOLIC BLOOD PRESSURE < 80 MM HG: ICD-10-PCS | Mod: CPTII,S$GLB,, | Performed by: INTERNAL MEDICINE

## 2022-06-15 PROCEDURE — 3008F PR BODY MASS INDEX (BMI) DOCUMENTED: ICD-10-PCS | Mod: CPTII,S$GLB,, | Performed by: INTERNAL MEDICINE

## 2022-06-15 PROCEDURE — 1159F MED LIST DOCD IN RCRD: CPT | Mod: CPTII,S$GLB,, | Performed by: INTERNAL MEDICINE

## 2022-06-15 PROCEDURE — 99214 PR OFFICE/OUTPT VISIT, EST, LEVL IV, 30-39 MIN: ICD-10-PCS | Mod: S$GLB,,, | Performed by: INTERNAL MEDICINE

## 2022-06-15 PROCEDURE — 1159F PR MEDICATION LIST DOCUMENTED IN MEDICAL RECORD: ICD-10-PCS | Mod: CPTII,S$GLB,, | Performed by: INTERNAL MEDICINE

## 2022-06-15 PROCEDURE — 85025 COMPLETE CBC W/AUTO DIFF WBC: CPT | Performed by: INTERNAL MEDICINE

## 2022-06-15 PROCEDURE — 96417 CHEMO IV INFUS EACH ADDL SEQ: CPT

## 2022-06-15 RX ORDER — EPINEPHRINE 0.3 MG/.3ML
0.3 INJECTION SUBCUTANEOUS ONCE AS NEEDED
Status: DISCONTINUED | OUTPATIENT
Start: 2022-06-15 | End: 2022-06-15 | Stop reason: HOSPADM

## 2022-06-15 RX ORDER — DIPHENHYDRAMINE HYDROCHLORIDE 50 MG/ML
50 INJECTION INTRAMUSCULAR; INTRAVENOUS ONCE AS NEEDED
Status: DISCONTINUED | OUTPATIENT
Start: 2022-06-15 | End: 2022-06-15 | Stop reason: HOSPADM

## 2022-06-15 RX ORDER — SODIUM CHLORIDE 0.9 % (FLUSH) 0.9 %
10 SYRINGE (ML) INJECTION
Status: DISCONTINUED | OUTPATIENT
Start: 2022-06-15 | End: 2022-06-15 | Stop reason: HOSPADM

## 2022-06-15 RX ADMIN — SODIUM CHLORIDE, PRESERVATIVE FREE 10 ML: 5 INJECTION INTRAVENOUS at 03:06

## 2022-06-15 RX ADMIN — TRASTUZUMAB 384 MG: 420 INJECTION, POWDER, LYOPHILIZED, FOR SOLUTION INTRAVENOUS at 03:06

## 2022-06-15 RX ADMIN — SODIUM CHLORIDE: 9 INJECTION, SOLUTION INTRAVENOUS at 02:06

## 2022-06-15 RX ADMIN — PERTUZUMAB 420 MG: 30 INJECTION, SOLUTION, CONCENTRATE INTRAVENOUS at 02:06

## 2022-06-15 NOTE — PLAN OF CARE
Problem: Activity Intolerance  Goal: Enhanced Capacity and Energy  Outcome: Ongoing, Progressing  Intervention: Optimize Activity Tolerance  Flowsheets (Taken 6/15/2022 6356)  Self-Care Promotion: independence encouraged  Activity Management:   Ambulated -L4   Up in chair - L3  Environmental Support:   calm environment promoted   rest periods encouraged

## 2022-07-05 RX ORDER — SODIUM CHLORIDE 0.9 % (FLUSH) 0.9 %
10 SYRINGE (ML) INJECTION
Status: CANCELLED | OUTPATIENT
Start: 2022-07-06

## 2022-07-05 RX ORDER — DIPHENHYDRAMINE HYDROCHLORIDE 50 MG/ML
50 INJECTION INTRAMUSCULAR; INTRAVENOUS ONCE AS NEEDED
Status: CANCELLED | OUTPATIENT
Start: 2022-07-06

## 2022-07-05 RX ORDER — HEPARIN 100 UNIT/ML
500 SYRINGE INTRAVENOUS
Status: CANCELLED | OUTPATIENT
Start: 2022-07-06

## 2022-07-05 RX ORDER — EPINEPHRINE 0.3 MG/.3ML
0.3 INJECTION SUBCUTANEOUS ONCE AS NEEDED
Status: CANCELLED | OUTPATIENT
Start: 2022-07-06

## 2022-07-06 ENCOUNTER — INFUSION (OUTPATIENT)
Dept: INFUSION THERAPY | Facility: HOSPITAL | Age: 32
End: 2022-07-06
Attending: INTERNAL MEDICINE
Payer: MEDICAID

## 2022-07-06 ENCOUNTER — TELEPHONE (OUTPATIENT)
Dept: HEMATOLOGY/ONCOLOGY | Facility: CLINIC | Age: 32
End: 2022-07-06

## 2022-07-06 ENCOUNTER — LAB VISIT (OUTPATIENT)
Dept: LAB | Facility: HOSPITAL | Age: 32
End: 2022-07-06
Attending: INTERNAL MEDICINE
Payer: COMMERCIAL

## 2022-07-06 VITALS
RESPIRATION RATE: 16 BRPM | HEIGHT: 60 IN | TEMPERATURE: 98 F | WEIGHT: 129.88 LBS | DIASTOLIC BLOOD PRESSURE: 62 MMHG | SYSTOLIC BLOOD PRESSURE: 102 MMHG | HEART RATE: 99 BPM | BODY MASS INDEX: 25.5 KG/M2 | OXYGEN SATURATION: 100 %

## 2022-07-06 DIAGNOSIS — Z17.0 MALIGNANT NEOPLASM OF OVERLAPPING SITES OF LEFT BREAST IN FEMALE, ESTROGEN RECEPTOR POSITIVE: ICD-10-CM

## 2022-07-06 DIAGNOSIS — C50.812 MALIGNANT NEOPLASM OF OVERLAPPING SITES OF LEFT BREAST IN FEMALE, ESTROGEN RECEPTOR POSITIVE: ICD-10-CM

## 2022-07-06 DIAGNOSIS — T45.1X5A CHEMOTHERAPY-INDUCED NEUTROPENIA: Primary | ICD-10-CM

## 2022-07-06 DIAGNOSIS — C50.919 HER2-POSITIVE CARCINOMA OF BREAST: Primary | ICD-10-CM

## 2022-07-06 DIAGNOSIS — D70.1 CHEMOTHERAPY-INDUCED NEUTROPENIA: Primary | ICD-10-CM

## 2022-07-06 DIAGNOSIS — E86.0 DEHYDRATION: ICD-10-CM

## 2022-07-06 DIAGNOSIS — C50.812 MALIGNANT NEOPLASM OF OVERLAPPING SITES OF LEFT FEMALE BREAST, UNSPECIFIED ESTROGEN RECEPTOR STATUS: ICD-10-CM

## 2022-07-06 LAB
ALBUMIN SERPL BCP-MCNC: 4.3 G/DL (ref 3.5–5.2)
ALP SERPL-CCNC: 53 U/L (ref 55–135)
ALT SERPL W/O P-5'-P-CCNC: 14 U/L (ref 10–44)
ANION GAP SERPL CALC-SCNC: 8 MMOL/L (ref 8–16)
AST SERPL-CCNC: 20 U/L (ref 10–40)
BASOPHILS # BLD AUTO: 0.01 K/UL (ref 0–0.2)
BASOPHILS NFR BLD: 0.2 % (ref 0–1.9)
BILIRUB SERPL-MCNC: 0.6 MG/DL (ref 0.1–1)
BUN SERPL-MCNC: 15 MG/DL (ref 6–20)
CALCIUM SERPL-MCNC: 9.7 MG/DL (ref 8.7–10.5)
CHLORIDE SERPL-SCNC: 103 MMOL/L (ref 95–110)
CO2 SERPL-SCNC: 27 MMOL/L (ref 23–29)
CREAT SERPL-MCNC: 0.9 MG/DL (ref 0.5–1.4)
DIFFERENTIAL METHOD: ABNORMAL
EOSINOPHIL # BLD AUTO: 0.1 K/UL (ref 0–0.5)
EOSINOPHIL NFR BLD: 2.1 % (ref 0–8)
ERYTHROCYTE [DISTWIDTH] IN BLOOD BY AUTOMATED COUNT: 14.2 % (ref 11.5–14.5)
EST. GFR  (AFRICAN AMERICAN): >60 ML/MIN/1.73 M^2
EST. GFR  (NON AFRICAN AMERICAN): >60 ML/MIN/1.73 M^2
GLUCOSE SERPL-MCNC: 118 MG/DL (ref 70–110)
HCT VFR BLD AUTO: 36.3 % (ref 37–48.5)
HGB BLD-MCNC: 11.5 G/DL (ref 12–16)
IMM GRANULOCYTES # BLD AUTO: 0.01 K/UL (ref 0–0.04)
IMM GRANULOCYTES NFR BLD AUTO: 0.2 % (ref 0–0.5)
LYMPHOCYTES # BLD AUTO: 1.6 K/UL (ref 1–4.8)
LYMPHOCYTES NFR BLD: 30.2 % (ref 18–48)
MCH RBC QN AUTO: 24.7 PG (ref 27–31)
MCHC RBC AUTO-ENTMCNC: 31.7 G/DL (ref 32–36)
MCV RBC AUTO: 78 FL (ref 82–98)
MONOCYTES # BLD AUTO: 0.4 K/UL (ref 0.3–1)
MONOCYTES NFR BLD: 8.3 % (ref 4–15)
NEUTROPHILS # BLD AUTO: 3.1 K/UL (ref 1.8–7.7)
NEUTROPHILS NFR BLD: 59 % (ref 38–73)
NRBC BLD-RTO: 0 /100 WBC
PLATELET # BLD AUTO: 179 K/UL (ref 150–450)
PMV BLD AUTO: 8.9 FL (ref 9.2–12.9)
POTASSIUM SERPL-SCNC: 3.6 MMOL/L (ref 3.5–5.1)
PROT SERPL-MCNC: 7.9 G/DL (ref 6–8.4)
RBC # BLD AUTO: 4.65 M/UL (ref 4–5.4)
SODIUM SERPL-SCNC: 138 MMOL/L (ref 136–145)
WBC # BLD AUTO: 5.17 K/UL (ref 3.9–12.7)

## 2022-07-06 PROCEDURE — 80053 COMPREHEN METABOLIC PANEL: CPT | Performed by: INTERNAL MEDICINE

## 2022-07-06 PROCEDURE — A4216 STERILE WATER/SALINE, 10 ML: HCPCS | Performed by: INTERNAL MEDICINE

## 2022-07-06 PROCEDURE — 96417 CHEMO IV INFUS EACH ADDL SEQ: CPT

## 2022-07-06 PROCEDURE — 25000003 PHARM REV CODE 250: Performed by: INTERNAL MEDICINE

## 2022-07-06 PROCEDURE — 63600175 PHARM REV CODE 636 W HCPCS: Mod: JG | Performed by: INTERNAL MEDICINE

## 2022-07-06 PROCEDURE — 96413 CHEMO IV INFUSION 1 HR: CPT

## 2022-07-06 PROCEDURE — 85025 COMPLETE CBC W/AUTO DIFF WBC: CPT | Performed by: INTERNAL MEDICINE

## 2022-07-06 PROCEDURE — 36415 COLL VENOUS BLD VENIPUNCTURE: CPT | Performed by: INTERNAL MEDICINE

## 2022-07-06 RX ORDER — SODIUM CHLORIDE 0.9 % (FLUSH) 0.9 %
10 SYRINGE (ML) INJECTION
Status: DISCONTINUED | OUTPATIENT
Start: 2022-07-06 | End: 2022-07-06 | Stop reason: HOSPADM

## 2022-07-06 RX ORDER — EPINEPHRINE 0.3 MG/.3ML
0.3 INJECTION SUBCUTANEOUS ONCE AS NEEDED
Status: DISCONTINUED | OUTPATIENT
Start: 2022-07-06 | End: 2022-07-06 | Stop reason: HOSPADM

## 2022-07-06 RX ORDER — DIPHENHYDRAMINE HYDROCHLORIDE 50 MG/ML
50 INJECTION INTRAMUSCULAR; INTRAVENOUS ONCE AS NEEDED
Status: DISCONTINUED | OUTPATIENT
Start: 2022-07-06 | End: 2022-07-06 | Stop reason: HOSPADM

## 2022-07-06 RX ADMIN — SODIUM CHLORIDE, PRESERVATIVE FREE 10 ML: 5 INJECTION INTRAVENOUS at 03:07

## 2022-07-06 RX ADMIN — PERTUZUMAB 420 MG: 30 INJECTION, SOLUTION, CONCENTRATE INTRAVENOUS at 02:07

## 2022-07-06 RX ADMIN — TRASTUZUMAB 384 MG: KIT at 02:07

## 2022-07-06 RX ADMIN — SODIUM CHLORIDE: 0.9 INJECTION, SOLUTION INTRAVENOUS at 02:07

## 2022-07-06 NOTE — PLAN OF CARE
Problem: Fatigue  Goal: Improved Activity Tolerance  Outcome: Ongoing, Progressing  Intervention: Promote Improved Energy  Flowsheets (Taken 7/6/2022 2678)  Fatigue Management: frequent rest breaks encouraged  Sleep/Rest Enhancement:   regular sleep/rest pattern promoted   relaxation techniques promoted  Activity Management: Ambulated -L4

## 2022-07-22 ENCOUNTER — PATIENT MESSAGE (OUTPATIENT)
Dept: HEMATOLOGY/ONCOLOGY | Facility: CLINIC | Age: 32
End: 2022-07-22

## 2022-07-22 ENCOUNTER — TELEPHONE (OUTPATIENT)
Dept: HEMATOLOGY/ONCOLOGY | Facility: CLINIC | Age: 32
End: 2022-07-22

## 2022-07-22 DIAGNOSIS — R30.0 DYSURIA: Primary | ICD-10-CM

## 2022-07-23 ENCOUNTER — LAB VISIT (OUTPATIENT)
Dept: LAB | Facility: HOSPITAL | Age: 32
End: 2022-07-23
Attending: INTERNAL MEDICINE
Payer: COMMERCIAL

## 2022-07-23 DIAGNOSIS — R30.0 DYSURIA: Primary | ICD-10-CM

## 2022-07-23 DIAGNOSIS — Z17.0 MALIGNANT NEOPLASM OF OVERLAPPING SITES OF LEFT BREAST IN FEMALE, ESTROGEN RECEPTOR POSITIVE: ICD-10-CM

## 2022-07-23 DIAGNOSIS — C50.812 MALIGNANT NEOPLASM OF OVERLAPPING SITES OF LEFT BREAST IN FEMALE, ESTROGEN RECEPTOR POSITIVE: ICD-10-CM

## 2022-07-23 LAB
ALBUMIN SERPL BCP-MCNC: 4 G/DL (ref 3.5–5.2)
ALP SERPL-CCNC: 49 U/L (ref 55–135)
ALT SERPL W/O P-5'-P-CCNC: 15 U/L (ref 10–44)
ANION GAP SERPL CALC-SCNC: 6 MMOL/L (ref 8–16)
AST SERPL-CCNC: 20 U/L (ref 10–40)
BASOPHILS # BLD AUTO: 0.02 K/UL (ref 0–0.2)
BASOPHILS NFR BLD: 0.3 % (ref 0–1.9)
BILIRUB SERPL-MCNC: 0.7 MG/DL (ref 0.1–1)
BILIRUB UR QL STRIP: NEGATIVE
BUN SERPL-MCNC: 14 MG/DL (ref 6–20)
CALCIUM SERPL-MCNC: 9.2 MG/DL (ref 8.7–10.5)
CHLORIDE SERPL-SCNC: 102 MMOL/L (ref 95–110)
CLARITY UR: ABNORMAL
CO2 SERPL-SCNC: 28 MMOL/L (ref 23–29)
COLOR UR: YELLOW
CREAT SERPL-MCNC: 0.9 MG/DL (ref 0.5–1.4)
DIFFERENTIAL METHOD: ABNORMAL
EOSINOPHIL # BLD AUTO: 0.1 K/UL (ref 0–0.5)
EOSINOPHIL NFR BLD: 1.2 % (ref 0–8)
ERYTHROCYTE [DISTWIDTH] IN BLOOD BY AUTOMATED COUNT: 13.9 % (ref 11.5–14.5)
EST. GFR  (AFRICAN AMERICAN): >60 ML/MIN/1.73 M^2
EST. GFR  (NON AFRICAN AMERICAN): >60 ML/MIN/1.73 M^2
GLUCOSE SERPL-MCNC: 88 MG/DL (ref 70–110)
GLUCOSE UR QL STRIP: NEGATIVE
HCT VFR BLD AUTO: 35.6 % (ref 37–48.5)
HGB BLD-MCNC: 11 G/DL (ref 12–16)
HGB UR QL STRIP: ABNORMAL
IMM GRANULOCYTES # BLD AUTO: 0.01 K/UL (ref 0–0.04)
IMM GRANULOCYTES NFR BLD AUTO: 0.2 % (ref 0–0.5)
KETONES UR QL STRIP: NEGATIVE
LEUKOCYTE ESTERASE UR QL STRIP: NEGATIVE
LYMPHOCYTES # BLD AUTO: 1.9 K/UL (ref 1–4.8)
LYMPHOCYTES NFR BLD: 31.9 % (ref 18–48)
MCH RBC QN AUTO: 24.7 PG (ref 27–31)
MCHC RBC AUTO-ENTMCNC: 30.9 G/DL (ref 32–36)
MCV RBC AUTO: 80 FL (ref 82–98)
MONOCYTES # BLD AUTO: 0.4 K/UL (ref 0.3–1)
MONOCYTES NFR BLD: 6.4 % (ref 4–15)
NEUTROPHILS # BLD AUTO: 3.7 K/UL (ref 1.8–7.7)
NEUTROPHILS NFR BLD: 60 % (ref 38–73)
NITRITE UR QL STRIP: NEGATIVE
NRBC BLD-RTO: 0 /100 WBC
PH UR STRIP: 6 [PH] (ref 5–8)
PLATELET # BLD AUTO: 199 K/UL (ref 150–450)
PMV BLD AUTO: 8.6 FL (ref 9.2–12.9)
POTASSIUM SERPL-SCNC: 3.3 MMOL/L (ref 3.5–5.1)
PROT SERPL-MCNC: 7.5 G/DL (ref 6–8.4)
PROT UR QL STRIP: NEGATIVE
RBC # BLD AUTO: 4.46 M/UL (ref 4–5.4)
SODIUM SERPL-SCNC: 136 MMOL/L (ref 136–145)
SP GR UR STRIP: >=1.03 (ref 1–1.03)
URN SPEC COLLECT METH UR: ABNORMAL
UROBILINOGEN UR STRIP-ACNC: 1 EU/DL
WBC # BLD AUTO: 6.08 K/UL (ref 3.9–12.7)

## 2022-07-23 PROCEDURE — 81003 URINALYSIS AUTO W/O SCOPE: CPT | Performed by: INTERNAL MEDICINE

## 2022-07-23 PROCEDURE — 36415 COLL VENOUS BLD VENIPUNCTURE: CPT | Performed by: INTERNAL MEDICINE

## 2022-07-23 PROCEDURE — 85025 COMPLETE CBC W/AUTO DIFF WBC: CPT | Performed by: INTERNAL MEDICINE

## 2022-07-23 PROCEDURE — 80053 COMPREHEN METABOLIC PANEL: CPT | Performed by: INTERNAL MEDICINE

## 2022-07-26 RX ORDER — DIPHENHYDRAMINE HYDROCHLORIDE 50 MG/ML
50 INJECTION INTRAMUSCULAR; INTRAVENOUS ONCE AS NEEDED
Status: CANCELLED | OUTPATIENT
Start: 2022-07-27

## 2022-07-26 RX ORDER — EPINEPHRINE 0.3 MG/.3ML
0.3 INJECTION SUBCUTANEOUS ONCE AS NEEDED
Status: CANCELLED | OUTPATIENT
Start: 2022-07-27

## 2022-07-26 RX ORDER — SODIUM CHLORIDE 0.9 % (FLUSH) 0.9 %
10 SYRINGE (ML) INJECTION
Status: CANCELLED | OUTPATIENT
Start: 2022-07-27

## 2022-07-26 RX ORDER — HEPARIN 100 UNIT/ML
500 SYRINGE INTRAVENOUS
Status: CANCELLED | OUTPATIENT
Start: 2022-07-27

## 2022-07-27 ENCOUNTER — INFUSION (OUTPATIENT)
Dept: INFUSION THERAPY | Facility: HOSPITAL | Age: 32
End: 2022-07-27
Attending: INTERNAL MEDICINE
Payer: MEDICAID

## 2022-07-27 ENCOUNTER — OFFICE VISIT (OUTPATIENT)
Dept: HEMATOLOGY/ONCOLOGY | Facility: CLINIC | Age: 32
End: 2022-07-27
Payer: MEDICAID

## 2022-07-27 VITALS
SYSTOLIC BLOOD PRESSURE: 105 MMHG | RESPIRATION RATE: 18 BRPM | OXYGEN SATURATION: 100 % | TEMPERATURE: 98 F | HEART RATE: 82 BPM | WEIGHT: 128.81 LBS | BODY MASS INDEX: 25.29 KG/M2 | HEIGHT: 60 IN | DIASTOLIC BLOOD PRESSURE: 66 MMHG

## 2022-07-27 DIAGNOSIS — E86.0 DEHYDRATION: ICD-10-CM

## 2022-07-27 DIAGNOSIS — D70.1 CHEMOTHERAPY-INDUCED NEUTROPENIA: Primary | ICD-10-CM

## 2022-07-27 DIAGNOSIS — Z17.0 MALIGNANT NEOPLASM OF OVERLAPPING SITES OF LEFT BREAST IN FEMALE, ESTROGEN RECEPTOR POSITIVE: Primary | ICD-10-CM

## 2022-07-27 DIAGNOSIS — T45.1X5A CHEMOTHERAPY-INDUCED NEUTROPENIA: Primary | ICD-10-CM

## 2022-07-27 DIAGNOSIS — C50.812 MALIGNANT NEOPLASM OF OVERLAPPING SITES OF LEFT BREAST IN FEMALE, ESTROGEN RECEPTOR POSITIVE: Primary | ICD-10-CM

## 2022-07-27 DIAGNOSIS — C50.812 MALIGNANT NEOPLASM OF OVERLAPPING SITES OF LEFT FEMALE BREAST, UNSPECIFIED ESTROGEN RECEPTOR STATUS: ICD-10-CM

## 2022-07-27 DIAGNOSIS — C50.919 HER2-POSITIVE CARCINOMA OF BREAST: ICD-10-CM

## 2022-07-27 PROCEDURE — 96417 CHEMO IV INFUS EACH ADDL SEQ: CPT

## 2022-07-27 PROCEDURE — 63600175 PHARM REV CODE 636 W HCPCS: Mod: TB | Performed by: INTERNAL MEDICINE

## 2022-07-27 PROCEDURE — 99214 OFFICE O/P EST MOD 30 MIN: CPT | Mod: S$GLB,,, | Performed by: NURSE PRACTITIONER

## 2022-07-27 PROCEDURE — 25000003 PHARM REV CODE 250: Performed by: INTERNAL MEDICINE

## 2022-07-27 PROCEDURE — 99214 PR OFFICE/OUTPT VISIT, EST, LEVL IV, 30-39 MIN: ICD-10-PCS | Mod: S$GLB,,, | Performed by: NURSE PRACTITIONER

## 2022-07-27 PROCEDURE — 96413 CHEMO IV INFUSION 1 HR: CPT

## 2022-07-27 RX ORDER — EPINEPHRINE 0.3 MG/.3ML
0.3 INJECTION SUBCUTANEOUS ONCE AS NEEDED
Status: DISCONTINUED | OUTPATIENT
Start: 2022-07-27 | End: 2022-07-27 | Stop reason: HOSPADM

## 2022-07-27 RX ORDER — DIPHENHYDRAMINE HYDROCHLORIDE 50 MG/ML
50 INJECTION INTRAMUSCULAR; INTRAVENOUS ONCE AS NEEDED
Status: DISCONTINUED | OUTPATIENT
Start: 2022-07-27 | End: 2022-07-27 | Stop reason: HOSPADM

## 2022-07-27 RX ADMIN — PERTUZUMAB 420 MG: 30 INJECTION, SOLUTION, CONCENTRATE INTRAVENOUS at 02:07

## 2022-07-27 RX ADMIN — TRASTUZUMAB 384 MG: 420 INJECTION, POWDER, LYOPHILIZED, FOR SOLUTION INTRAVENOUS at 03:07

## 2022-07-27 NOTE — PLAN OF CARE
Problem: Fatigue  Goal: Improved Activity Tolerance  Outcome: Ongoing, Progressing  Intervention: Promote Improved Energy  Flowsheets (Taken 7/27/2022 1353)  Fatigue Management:   frequent rest breaks encouraged   fatigue-related activity identified   paced activity encouraged  Sleep/Rest Enhancement:   regular sleep/rest pattern promoted   relaxation techniques promoted

## 2022-07-27 NOTE — PROGRESS NOTES
[facility] Hematology/Oncology  PROGRESS NOTE -  Follow-up Visit      Subjective:       Patient ID:   NAME: Esperanza Peters : 1990     31 y.o. female    Referring Doc: Dr Cheri Smith  Other Physicians: Armaan Mukherjee           Chief Complaint: left breast cancer f/u       History of Present Illness:     Patient returns today for a regularly scheduled follow-up visit.  The patient is here today to go over the results of the recently ordered labs, tests and studies. She is here by herself today.     She previously had surgery with Dr Smith and Dr Nguyen with plastic surgery at [facility] on 2022; pathology reports that she had no residual breast cancer in the left and right breast and the six sentinel Ln's on the left were all negative.    She has since started back on perjeta and herceptin and is tolerating it well. Discussed the recommendation for consideration for oopherectomy, and patient is unsure if she wants to proceed in that direction. She has not had a period since starting chemotherapy.     She is doing well with the current regimen and has started taking tamoxifen.   She declined radiation.     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    She is back working   discussed covid19 precautions      ROS:   GEN: normal without any fever, night sweats or weight loss  HEENT: normal with no HA's, sore throat, stiff neck, changes in vision  CV: normal with no CP, SOB, PND, CHASE or orthopnea  PULM: normal with no SOB, cough, hemoptysis, sputum or pleuritic pain  GI: normal with no abdominal pain, nausea, vomiting, constipation, diarrhea, melanotic stools, BRBPR, or hematemesis  : normal with no hematuria, dysuria  BREAST: no palpable masses, post mastectomy  SKIN: normal with no rash, erythema, bruising, or swelling    Pain Scale:  0    Allergies:  Review of patient's allergies indicates:  No Known Allergies    Medications:    Current Outpatient  Medications:     acetaminophen (TYLENOL) 500 MG tablet, Take 500 mg by mouth once as needed for Pain., Disp: , Rfl:     magic mouthwash diphen/antac/lidoc/nysta, Take 10 mLs by mouth 4 (four) times daily. (Patient not taking: Reported on 5/18/2022), Disp: 120 mL, Rfl: 1    HYDROcodone-acetaminophen (NORCO) 5-325 mg per tablet, Take 1 tablet by mouth 3 (three) times daily as needed for pain. (Patient not taking: Reported on 5/18/2022), Disp: 8 tablet, Rfl: 0    tamoxifen (NOLVADEX) 20 MG Tab, Take 1 tablet (20 mg total) by mouth once daily., Disp: 30 tablet, Rfl: 11  No current facility-administered medications for this visit.    Facility-Administered Medications Ordered in Other Visits:     diphenhydrAMINE injection 50 mg, 50 mg, Intravenous, Once PRN, Nilesh Jackson MD    EPINEPHrine (EPIPEN) 0.3 mg/0.3 mL pen injection 0.3 mg, 0.3 mg, Intramuscular, Once PRN, Nilesh Jackson MD    hydrocortisone sodium succinate injection 100 mg, 100 mg, Intravenous, Once PRN, Nilesh Jackson MD    pertuzumab (PERJETA) 420 mg in sodium chloride 0.9% 264 mL infusion, 420 mg, Intravenous, 1 time in Clinic/HOD, Nilesh Jackson MD, Last Rate: 528 mL/hr at 07/27/22 1415, 420 mg at 07/27/22 1415    trastuzumab-dkst (OGIVRI) 384 mg in sodium chloride 0.9% 250 mL chemo infusion, 6 mg/kg (Treatment Plan Recorded), Intravenous, 1 time in Clinic/HOD, Nilesh Jackson MD    PMHx/PSHx Updates:  See patient's last visit with me on 5/4/2022  See H&P on 9/13/2021        Pathology:  Cancer Staging  Malignant neoplasm of overlapping sites of left female breast  Staging form: Breast, AJCC 8th Edition  - Clinical: Stage IIB (cT3, cN1(f), cM0, G3, ER+, GA+, HER2+) - Unsigned  - Pathologic: No Stage Recommended (ypT0, pN0(sn), cM0) - Signed by Obed Hill Jr., MD on 5/18/2022      Bilateral mastectomies : 1/128/2022 at [facility]  - pathology report on chart      Objective:     Vitals:  There were no vitals taken for this  visit.  113/73 (Patient Position: Sitting)       Pulse   83       Temp   98 °F (36.7 °C)       Resp   18       Ht   5' (1.524 m)         Wt   58.4 kg (128 lb 12.8 oz)    SpO2   100%    BMI   25.15 kg/m²          Physical Examination:   GEN: no apparent distress, comfortable; AAOx3  HEAD: atraumatic and normocephalic  EYES: no pallor, no icterus, PERRLA  ENT: OMM, no pharyngeal erythema, external ears WNL; no nasal discharge; no thrush  NECK: no masses, thyroid normal, trachea midline, no LAD/LN's, supple  CV: RRR with no murmur; normal pulse; normal S1 and S2; no pedal edema; Rght CW portacath since remoived  CHEST: Normal respiratory effort; CTAB; normal breath sounds; no wheeze or crackles  ABDOM: nontender and nondistended; soft; normal bowel sounds; no rebound/guarding  MUSC/Skeletal: ROM normal; no crepitus; joints normal; no deformities or arthropathy  EXTREM: no clubbing, cyanosis, inflammation or swelling  SKIN: no rashes, lesions, ulcers, petechiae or subcutaneous nodules  : no pacheco  NEURO: grossly intact; motor/sensory WNL; AAOx3; no tremors  PSYCH: normal mood, affect and behavior  LYMPH: normal cervical, supraclavicular, axillary and groin LN's  Breast: s/p bilateral mastectomies with reconstruction             Labs:     Lab Results   Component Value Date    WBC 6.08 07/23/2022    HGB 11.0 (L) 07/23/2022    HCT 35.6 (L) 07/23/2022    MCV 80 (L) 07/23/2022     07/23/2022       CMP  Sodium   Date Value Ref Range Status   07/23/2022 136 136 - 145 mmol/L Final     Potassium   Date Value Ref Range Status   07/23/2022 3.3 (L) 3.5 - 5.1 mmol/L Final     Chloride   Date Value Ref Range Status   07/23/2022 102 95 - 110 mmol/L Final     CO2   Date Value Ref Range Status   07/23/2022 28 23 - 29 mmol/L Final     Glucose   Date Value Ref Range Status   07/23/2022 88 70 - 110 mg/dL Final     BUN   Date Value Ref Range Status   07/23/2022 14 6 - 20 mg/dL Final     Creatinine   Date Value Ref Range Status    07/23/2022 0.9 0.5 - 1.4 mg/dL Final     Calcium   Date Value Ref Range Status   07/23/2022 9.2 8.7 - 10.5 mg/dL Final     Total Protein   Date Value Ref Range Status   07/23/2022 7.5 6.0 - 8.4 g/dL Final     Albumin   Date Value Ref Range Status   07/23/2022 4.0 3.5 - 5.2 g/dL Final     Total Bilirubin   Date Value Ref Range Status   07/23/2022 0.7 0.1 - 1.0 mg/dL Final     Comment:     For infants and newborns, interpretation of results should be based  on gestational age, weight and in agreement with clinical  observations.    Premature Infant recommended reference ranges:  Up to 24 hours.............<8.0 mg/dL  Up to 48 hours............<12.0 mg/dL  3-5 days..................<15.0 mg/dL  6-29 days.................<15.0 mg/dL       Alkaline Phosphatase   Date Value Ref Range Status   07/23/2022 49 (L) 55 - 135 U/L Final     AST   Date Value Ref Range Status   07/23/2022 20 10 - 40 U/L Final     ALT   Date Value Ref Range Status   07/23/2022 15 10 - 44 U/L Final     Anion Gap   Date Value Ref Range Status   07/23/2022 6 (L) 8 - 16 mmol/L Final     eGFR if    Date Value Ref Range Status   07/23/2022 >60.0 >60 mL/min/1.73 m^2 Final     eGFR if non    Date Value Ref Range Status   07/23/2022 >60.0 >60 mL/min/1.73 m^2 Final     Comment:     Calculation used to obtain the estimated glomerular filtration  rate (eGFR) is the CKD-EPI equation.              Radiology/Diagnostic Studies:    MRI breast  12/23/2021:  -    Impression:     1. Complete resolution of previous enhancing multicentric left breast malignancy.  2. Complete resolution of prior enlarged left axillary lymph nodes.  3. Persistent retroareolar left breast distortion near 6 o'clock position, characteristic of scarring.  Recommendation: Patient is currently under medical oncology and surgical care.        PET 10/21/2021:    Impression:     Hypermetabolic left breast mass consistent with primary breast malignancy. Please  correlate with dedicated breast imaging.     No pathologically enlarged or hypermetabolic axillary or intrathoracic lymph nodes.     Hypermetabolic retroperitoneal lymph node is nonspecific and would be unusual for breast cancer metastasis. Attention on follow-up is recommended.     Low level FDG activity associated with prominent cervical lymph nodes, bilateral and symmetric, most likely reactive in nature.     Reactive red marrow.      CT scans 9/29/2021:    IMPRESSION:  1. Large irregular subareolar left breast mass and findings compatible with multifocal/multicentric disease in the left breast (better appreciated on the recent MRI).  2. Left axillary lymphadenopathy compatible with additional sites of disease.  3. Borderline enlarged right hilar node, possibly an additional site of disease, although felt less likely this could be further assessed with PET/CT.        NM Bone Scan Whole Body    Result Date: 9/27/2021  CMS MANDATED QUALITY DATA-BONE SCAN-147 HISTORY: Staging breast carcinoma, and 63.25. COMPARISON: Multiple prior chest radiographs. FINDINGS: 22 mCi Tc 99m MDP was administered IV per protocol, with delayed whole-body images obtained in the anterior and posterior projections. Nodular focus of increased radiotracer uptake in the sternum near the level of the sternomanubrial articulation is nonspecific. There is otherwise no abnormal focal increased radiotracer uptake throughout the axial or appendicular skeleton to suggest osteoblastic metastatic disease. There is normal radiotracer uptake by the kidneys, with normal excretion into the renal collecting systems and urinary bladder. IMPRESSION: 1. Focus of increased osteoblastic activity in the sternum near the sternomanubrial junction, nonspecific. Consider further evaluation with chest CT with contrast. 2. Otherwise no evidence of osteoblastic metastatic disease. Electronically signed by:  Keron Neil MD  9/27/2021 1:41 PM CDT Workstation:  [id number]    X-Ray Chest AP Portable    Result Date: 9/20/2021  Portable chest x-ray at 11:13 AM is compared to prior study dated 9/16/2021 Clinical history is Port-A-Cath placement There is a right subclavian vein Port-A-Cath with tip overlying the superior vena cava. There is no pneumothorax. The lungs are clear. The cardiomediastinal silhouette is normal in size. There are no acute osseous normality's. IMPRESSION: No pneumothorax status post right subclavian vein Port-A-Cath placement Electronically signed by:  Breana Lebron MD  9/20/2021 11:52 AM CDT Workstation: [id number]    MRI Breast w/wo Contrast, w/CAD, Bilateral    Result Date: 9/28/2021  EXAMINATION: MRI BREAST W/WO CONTRAST, W/CAD, BILATERAL CLINICAL HISTORY: Staging left breast carcinoma, C 50.812, N 63.21, N 63.24, N 63.25, malignant neoplasm of overlapping sites of left female breast TECHNIQUE: CMS MANDATED QUALITY DATA - MAMMOGRAPHY - 225 Bilateral breast MRI was performed with a dedicated breast coil. The patient was placed prone in the breast immobilizer with light compression. The following localization sequences were obtained: Axial inversion recovery, axial 3-D non-fat-suppressed, axial 3-D fat suppressed pre-contrast, followed by axial 3-D fat-suppressed post-contrast dynamic images with 6.5 mL Gadavist IV contrast. Post-processing including subtraction imaging, reconstruction in the sagittal and coronal planes, and MIP of both breasts was performed on an independent workstation. The interpretation was assisted by Computer Aided Detection. FINDINGS: Comparison to the diagnostic mammogram and ultrasound of 08/20/2021.  The breasts are composed of mostly fibroglandular elements and interspersed fat.  Background parenchymal enhancement is mild. There is a dominant heterogeneously enhancing solid mass with irregular margins in the left breast retroareolar region measuring 37 mm AP x 3 mm transverse by 35 mm craniocaudal dimension, with  suspicious enhancement kinetics.  There are several additional enhancing nodular and irregular infiltrative masses in the left breast, some which are contiguous with the dominant retroareolar mass, extending into the all quadrants of the breast predominantly the upper outer quadrant.  Total area of disease in the left breast measures roughly 38 mm AP x 60 mm transverse by 90 mm craniocaudal dimension, roughly 30-40% of the volume of the left breast.  No abnormal enhancement to suggest involvement of the left pectoralis musculature. There are several enlarged infiltrated appearing left axillary lymph nodes suspicious for metastatic disease, measuring 16 mm maximum short axis dimension.  There are no enlarged right axillary lymph nodes or enlarged internal mammary chain lymph nodes. Stippled randomly scattered non masslike enhancement in the right breast is nonspecific, with no enhancing right breast mass. There is a 23 mm STIR hyperintense enhancing right hilar mass suspicious for hilar lymphadenopathy, with no additional abnormal enhancement in the chest wall or mediastinum evident.     1. Multifocal, multicentric left breast carcinoma as described, with several enlarged left axillary lymph nodes consistent with metastatic disease. 2. No findings of contralateral right breast carcinoma, with scattered stippled probably benign non masslike enhancement in the right breast. 3. Enhancing right hilar mass suspicious for metastatic disease.  Consider further evaluation with whole body PET CT. BI-RADS CATEGORY 6: KNOWN MALIGNANCY. [facility] utilizes a reminder system to ensure that patients receive reminder letters for appointments. This includes reminders for routine screening mammograms, diagnostic mammograms, or other breast imaging interventions as appropriate. This patient will be placed in the appropriate reminder system. Electronically signed by: Keron Neil MD Date:    09/28/2021 Time:    09:32            I have reviewed all available lab results and radiology reports.    Assessment/Plan:   (1) 31 y.o. female with diagnosis of left breast cancer who has been referred by Dr Cheri Smith for evaluation by medical hematology/oncology.   - left breast masses of overlapping sites involving Outer upper Quadrant and Lower inner Quadrant respectively  - s/p core biopsies taken on 8/27/2021  - axillary LN was positive  - stage III cancer process at least  - ER positive at 80.5 %, DC positive at 3%; Her2Nu (3+)  -Ki67 25%  - discussed the pathology and the latest NCCN guidelines (version 8.2021)  - recommend neoadjuvant chemotherapy with herceptin + perjeta based regimen which studies have shown to be beneficial to survival and endocrine therapy  - she will need echo, portacath and PEt scan  - will set up chemotherapy school to discuss the drugs, side-effects and provide literature on the drugs    9/30/2021:  - Her case was presented at the [facility] Tumor Board yesterday and consensus was to get PEt scan and proceed with TCHP chemotherapy.   - She had chemotherapy school with Sus[facility].   - She had portacath placed on [facility] CW with Dr Smith.   - She is set to start chemotherapy on Oct 5th.   - echo on chart from 9/24/2021  - discussed the chemotherapy regimen, provided literature and discussed the side-effect profile of the drugs    10/26/2021:  - getting cycle #2 today  - getting Udenyca tomorrow and she also requested some IVF tomorrow as well  - discussed the PEt scan findings from 10/21/2021     12/7/2021:  - 4th cycle chemo this week with IVF and antiemtics as needed  - order MRI of breasts for next week to re-assess response    1/10/2022:  - She completed the 4th cycle of chemotherapy and had subsequent breast MRI on 12/23/2021 which showed an excellent response to the therapy; she has decided to stop the chemotherapy and proceed with surgery; she is adamant that she does not want to continue with the  neoadjuvant regiment; she is aware that she will still at least need to continue the herceptin post-op and may need additional chemotherapy depending on the pathology findings from the surgery  - She saw Dr Smith the other day and is seeing Dr Nguyen with plastic surgery tomorrow    2/15/2022:  - She had surgery with Dr Smith and Dr Nguyen with plastic surgery at [facility] on Jan 28th 2022; pathology reports that she had no residual breast cancer in the left and right breast and the six sentinel LN's on the left were all negative. She is healing well and has some residual aches and pain under the left arm. She sees Dr Smith again 2 weeks and Dr Nguyen on 2/21/2022  - Dr Nguyen wants to either move or remove portacath to help with the cosmetic surgery better  - she will need to continue with Herceptin and Perjeta for the next year, she can either get PICC or intermittent IV's    3/16/2022:  - she has since resumed therapy post-op with perjeta and herceptin  - will need periodic echocardiograms  - monitor labs  - she no long has portacath (moved to facilitate reconstruction)     5/4/2022:  - She has since started back on perjeta and herceptin and is tolerating it well. Discussed the recommendation for consideration for oopherectomy, and patient is unsure if she wants to proceed in that direction. She has not had a period since starting chemotherapy.   - discussed and encouraged her to at least consider oral antihormone therapy with tamoxifen and is is willing to proceed  - she needs to follow-up with rad/onc as well for XRT evaluation      6/15/2022:  - she is doing ok with the current regimen     7/27/22:   - continue with herceptin and perjeta   - doing well  - muga scheduled for tomorrow   - she has started taking tamoxifen     (2) no HTN     (3) No DM     (4) two c-sections with healthy children - she is aware that with chemotherapy she could potentially not be able to reproduce or have any more children - she is comfortable  with that fact      VISIT DIAGNOSES:      Malignant neoplasm of overlapping sites of left breast in female, estrogen receptor positive    HER2-positive carcinoma of breast            PLAN:  1. resumed and continued on Perjeta and herceptin  2. Continue with peridoic echo's as scheduled and directed  3. Check up to date labs at least monthly  4. s/p chemotherapy school, discussed the medications, side-effect profiles and obtained consents  5. Tamoxifen oral antihormone and previously proposed consideration for oophorectomy in future  6. F/u with rad/onc        RTC in 3 weeks with Renetta and 6 weeks with me              Discussion:     COVID-19 Discussion:     I had long discussion with patient and any applicable family about the COVID-19 coronavirus epidemic and the recommended precautions with regard to cancer and/or hematology patients. I have re-iterated the CDC recommendations for adequate hand washing, use of hand -like products, and coughing into elbow, etc. In addition, especially for our patients who are on chemotherapy and/or our otherwise immunocompromised patients, I have recommended avoidance of crowds, including movie theaters, restaurants, churches, etc. I have recommended avoidance of any sick or symptomatic family members and/or friends. I have also recommended avoidance of any raw and unwashed food products, and general avoidance of food items that have not been prepared by themselves. The patient has been asked to call us immediately with any symptom developments, issues, questions or other general concerns.      Pathology Discussion:     I reviewed and discussed the pathology report(s) and radiograph reports (if available) in as simple to understand and/or laymen's terms to the best of my ability. I had an indepth conversation with the patient and went over the patient's individual diagnosis based on the information that was currently available. I discussed the TNM staging process with  "regard to the patient's particular cancer type, and the calculated stage based on the currently available TNM data and literature. I discussed the available prognostic data with regard to the current staging information and how it relates to the prognosis of their particular neoplastic process.          NCCN Guidelines:     I discussed the available treatment option(s) in accordance with the latest literature from the NCCN Clinical Practice Guidelines for the patient's particular type of cancer disorder. The NCCN Guidelines provide a "document evidence-based (and) consensus-driven management" of the care of oncology patients. The treatment recommendations were made not only in accordance to the NCCN guidelines, but also factored in to account the patient's overall age, condition, performance status and their medical co-morbidities. I went over the risks and benefits of the the treatment options (if any could be made) with regard to their particular cancer type, their cancer stage, their age, and their co-morbidities.      Chemotherapy Discussion:        I discussed the available treatment option(s) in accordance with the latest/current national evidence-based guidelines (NCCN, UpToDate, NCI, ASCO, etc where applicable), their overall age/condition and their co-morbidities. I also went over the risks and benefits of the chemotherapy with regard to their particular cancer type, their cancer stage, their age/condition, and their co-morbidities. I provided literature on the chemotherapy regimen and discussed the chemotherapy side-effect profiles of the drug(s). I discussed the importance of compliance with obtaining and monitoring weekly lab work, and went over the potential hematopathology issues and risks with anemia, leucopenia and thrombocytopenia that can occur with chemotherapy. I discussed the potential risks of liver and kidney damage, which could be permanent and could necessitate dialysis long-term if kidney " failure developed. I discussed the emetic and/or diarrheal potential of the regimen and the potential need for use of antiemetic and anti-diarrheal medications. I discussed the risk for development of anaphylactic shock, bronchospasm, dysrhythmia, and respiratory/cardiovascular arrest and/or failure. I discussed the potential risks for development of alopecia, cold sensory issues, ringing in ears, vertigo, cataracts, glaucoma, and neuropathy, all of which could end up being chronic and life-long. Some chemotherpyI discussed the risks of hand-foot syndrome and rashes, and development of other autoimmune mediated processes such as pneumonitis, hepatitis, and colitis which could be life threatening. I discussed the risks of the potential development of a rare but fatal viral mediated disease known as PML (Progressive Multifocal Leukoencephalopathy), and risk of future development of leukemia and/or lymphoma from use of certain chemotherapy agents. I discussed the need for neutropenic precautions, basic hygiene/sanitation behaviors and dietary restrictions.     The patient's consent has been obtained to proceed with the chemotherapy.The patient will be referred to Chemotherapy School /[facility] for training and education on chemotherapy, use of antiemetics and/or anti-diarrheals, use of NSAID's, potential chemotherapy side-effects, and any specific recommendations and precautions with the particular chemotherapy agents.       I answered all of the patient's (and family's, if applicable) questions to the best of my ability and to their complete satisfaction. The patient acknowledged full understanding of the risks, recommendations and plan(s).      I have explained and the patient understands all of  the current recommendation(s). I have answered all of their questions to the best of my ability and to their complete satisfaction.      Antihormone Therapy Discussion:    I discussed the advantages of antihormone  therapy with the patient with regard to their particular neoplastic or carcinoma in situ condition. I went over the side-effect profile of the medication including risk for potential development of endometrial cancer and/or hyperplasia in women who still have a uterus and the need for yearly GYN evaluation and follow-up. I discussed the risks for thromboembolic events such as DVT's, pulmonary emboli, CVA's, retinal vascular clots, phlebitis, and TIA's. I discussed the potential risks for development of ocular disturbances, retinopathy, cataracts, corneal changes, flushing, hot flashes, amenorrhea, altered menses, fluid retention, weight changes, elevations in LFT's, liver damage, and mood disturbances. I discussed the potential risk of arthropathy and joint pains/aches which could be chronic and debilitating. I discussed potential adverse effects on bone mineralization and the risk of osteopenia and/or osteoporosis which could led to increase risk of fractures.   A consent form was obtained and a copy was provided to the patient.       I spent over 25 mins of time with the patient. Reviewed results of the recently ordered labs, tests and studies; made directives with regards to the results. Over half of this time was spent couseling and coordinating care.    I have explained all of the above in detail and the patient understands all of the current recommendation(s). I have answered all of their questions to the best of my ability and to their complete satisfaction.   The patient is to continue with the current management plan.            Electronically signed by Susana Bradshaw, MSN,APRN,AGNP-C

## 2022-07-28 ENCOUNTER — HOSPITAL ENCOUNTER (OUTPATIENT)
Dept: RADIOLOGY | Facility: HOSPITAL | Age: 32
Discharge: HOME | End: 2022-07-28
Attending: NURSE PRACTITIONER
Payer: MEDICAID

## 2022-07-28 DIAGNOSIS — C50.919 HER2-POSITIVE CARCINOMA OF BREAST: ICD-10-CM

## 2022-07-28 PROCEDURE — A9560 TC99M LABELED RBC: HCPCS

## 2022-08-10 NOTE — PROGRESS NOTES
"  [facility] Hematology/Oncology  PROGRESS NOTE -  Follow-up Visit      Subjective:       Patient ID:   NAME: Esperanza Peters : 1990     31 y.o. female    Referring Doc: Dr Cheri Smith  Other Physicians: Armaan Mukherjee           Chief Complaint: left breast cancer f/u       History of Present Illness:     Patient returns today for a regularly scheduled follow-up visit.  The patient is here today to go over the results of the recently ordered labs, tests and studies. She is here by herself today.     She previously had surgery with Dr Smith and Dr Nguyen with plastic surgery at [place] on 2022; pathology reports that she had no residual breast cancer in the left and right breast and the six sentinel Ln's on the left were all negative.    She has been on perjeta and herceptin and is tolerating it well. Discussed the recommendation for consideration for oopherectomy, and patient is unsure if she wants to proceed in that direction. She has not had a period since starting chemotherapy.     She is doing well with the current regimen; she is on tamoxifen    Breathing ok, no current CP, SOB, HA's or N/V; neuropathy resolved and she reports that she feels "great"     discussed covid19 precautions      ROS:   GEN: normal without any fever, night sweats or weight loss  HEENT: normal with no HA's, sore throat, stiff neck, changes in vision  CV: normal with no CP, SOB, PND, CHSAE or orthopnea  PULM: normal with no SOB, cough, hemoptysis, sputum or pleuritic pain  GI: normal with no abdominal pain, nausea, vomiting, constipation, diarrhea, melanotic stools, BRBPR, or hematemesis  : normal with no hematuria, dysuria  BREAST: no palpable masses  SKIN: normal with no rash, erythema, bruising, or swelling    Pain Scale:  0    Allergies:  Review of patient's allergies indicates:  No Known Allergies    Medications:    Current Outpatient Medications:     acetaminophen (TYLENOL) 500 MG tablet, Take 500 mg by mouth once as " needed for Pain., Disp: , Rfl:     tamoxifen (NOLVADEX) 20 MG Tab, Take 1 tablet (20 mg total) by mouth once daily., Disp: 30 tablet, Rfl: 11    magic mouthwash diphen/antac/lidoc/nysta, Take 10 mLs by mouth 4 (four) times daily. (Patient not taking: No sig reported), Disp: 120 mL, Rfl: 1    HYDROcodone-acetaminophen (NORCO) 5-325 mg per tablet, Take 1 tablet by mouth 3 (three) times daily as needed for pain. (Patient not taking: No sig reported), Disp: 8 tablet, Rfl: 0    PMHx/PSHx Updates:  See patient's last visit with me on 6/15/2022  See H&P on 9/13/2021        Pathology:  Cancer Staging  Malignant neoplasm of overlapping sites of left female breast  Staging form: Breast, AJCC 8th Edition  - Clinical: Stage IIB (cT3, cN1(f), cM0, G3, ER+, NV+, HER2+) - Unsigned  - Pathologic: No Stage Recommended (ypT0, pN0(sn), cM0) - Signed by Obed Hill Jr., MD on 5/18/2022      Bilateral mastectomies : 1/128/2022 at [facility]  - pathology report on chart      Objective:     Vitals:  Blood pressure 116/75, pulse 78, temperature 98.2 °F (36.8 °C), resp. rate 18, height 5' (1.524 m), weight 58.9 kg (129 lb 14.4 oz).    Physical Examination:   GEN: no apparent distress, comfortable; AAOx3  HEAD: atraumatic and normocephalic  EYES: no pallor, no icterus, PERRLA  ENT: OMM, no pharyngeal erythema, external ears WNL; no nasal discharge; no thrush  NECK: no masses, thyroid normal, trachea midline, no LAD/LN's, supple  CV: RRR with no murmur; normal pulse; normal S1 and S2; no pedal edema; Rght CW portacath since remoived  CHEST: Normal respiratory effort; CTAB; normal breath sounds; no wheeze or crackles  ABDOM: nontender and nondistended; soft; normal bowel sounds; no rebound/guarding  MUSC/Skeletal: ROM normal; no crepitus; joints normal; no deformities or arthropathy  EXTREM: no clubbing, cyanosis, inflammation or swelling  SKIN: no rashes, lesions, ulcers, petechiae or subcutaneous nodules  : no pacheco  NEURO: grossly intact;  motor/sensory WNL; AAOx3; no tremors  PSYCH: normal mood, affect and behavior  LYMPH: normal cervical, supraclavicular, axillary and groin LN's  Breast: s/p bilateral mastectomies with reconstruction             Labs:     Lab Results   Component Value Date    WBC 6.08 07/23/2022    HGB 11.0 (L) 07/23/2022    HCT 35.6 (L) 07/23/2022    MCV 80 (L) 07/23/2022     07/23/2022       CMP  Sodium   Date Value Ref Range Status   07/23/2022 136 136 - 145 mmol/L Final     Potassium   Date Value Ref Range Status   07/23/2022 3.3 (L) 3.5 - 5.1 mmol/L Final     Chloride   Date Value Ref Range Status   07/23/2022 102 95 - 110 mmol/L Final     CO2   Date Value Ref Range Status   07/23/2022 28 23 - 29 mmol/L Final     Glucose   Date Value Ref Range Status   07/23/2022 88 70 - 110 mg/dL Final     BUN   Date Value Ref Range Status   07/23/2022 14 6 - 20 mg/dL Final     Creatinine   Date Value Ref Range Status   07/23/2022 0.9 0.5 - 1.4 mg/dL Final     Calcium   Date Value Ref Range Status   07/23/2022 9.2 8.7 - 10.5 mg/dL Final     Total Protein   Date Value Ref Range Status   07/23/2022 7.5 6.0 - 8.4 g/dL Final     Albumin   Date Value Ref Range Status   07/23/2022 4.0 3.5 - 5.2 g/dL Final     Total Bilirubin   Date Value Ref Range Status   07/23/2022 0.7 0.1 - 1.0 mg/dL Final     Comment:     For infants and newborns, interpretation of results should be based  on gestational age, weight and in agreement with clinical  observations.    Premature Infant recommended reference ranges:  Up to 24 hours.............<8.0 mg/dL  Up to 48 hours............<12.0 mg/dL  3-5 days..................<15.0 mg/dL  6-29 days.................<15.0 mg/dL       Alkaline Phosphatase   Date Value Ref Range Status   07/23/2022 49 (L) 55 - 135 U/L Final     AST   Date Value Ref Range Status   07/23/2022 20 10 - 40 U/L Final     ALT   Date Value Ref Range Status   07/23/2022 15 10 - 44 U/L Final     Anion Gap   Date Value Ref Range Status   07/23/2022  6 (L) 8 - 16 mmol/L Final     eGFR if    Date Value Ref Range Status   07/23/2022 >60.0 >60 mL/min/1.73 m^2 Final     eGFR if non    Date Value Ref Range Status   07/23/2022 >60.0 >60 mL/min/1.73 m^2 Final     Comment:     Calculation used to obtain the estimated glomerular filtration  rate (eGFR) is the CKD-EPI equation.              Radiology/Diagnostic Studies:    MUGA  7/28/2022:  IMPRESSION:  1. Left ventricular ejection fraction of 53%.  2. Normal left ventricular wall motion        MRI breast  12/23/2021:  -    Impression:     1. Complete resolution of previous enhancing multicentric left breast malignancy.  2. Complete resolution of prior enlarged left axillary lymph nodes.  3. Persistent retroareolar left breast distortion near 6 o'clock position, characteristic of scarring.  Recommendation: Patient is currently under medical oncology and surgical care.        PET 10/21/2021:    Impression:     Hypermetabolic left breast mass consistent with primary breast malignancy. Please correlate with dedicated breast imaging.     No pathologically enlarged or hypermetabolic axillary or intrathoracic lymph nodes.     Hypermetabolic retroperitoneal lymph node is nonspecific and would be unusual for breast cancer metastasis. Attention on follow-up is recommended.     Low level FDG activity associated with prominent cervical lymph nodes, bilateral and symmetric, most likely reactive in nature.     Reactive red marrow.      CT scans 9/29/2021:    IMPRESSION:  1. Large irregular subareolar left breast mass and findings compatible with multifocal/multicentric disease in the left breast (better appreciated on the recent MRI).  2. Left axillary lymphadenopathy compatible with additional sites of disease.  3. Borderline enlarged right hilar node, possibly an additional site of disease, although felt less likely this could be further assessed with PET/CT.        NM Bone Scan Whole Body    Result  Date: 9/27/2021  CMS MANDATED QUALITY DATA-BONE SCAN-147 HISTORY: Staging breast carcinoma, and 63.25. COMPARISON: Multiple prior chest radiographs. FINDINGS: 22 mCi Tc 99m MDP was administered IV per protocol, with delayed whole-body images obtained in the anterior and posterior projections. Nodular focus of increased radiotracer uptake in the sternum near the level of the sternomanubrial articulation is nonspecific. There is otherwise no abnormal focal increased radiotracer uptake throughout the axial or appendicular skeleton to suggest osteoblastic metastatic disease. There is normal radiotracer uptake by the kidneys, with normal excretion into the renal collecting systems and urinary bladder. IMPRESSION: 1. Focus of increased osteoblastic activity in the sternum near the sternomanubrial junction, nonspecific. Consider further evaluation with chest CT with contrast. 2. Otherwise no evidence of osteoblastic metastatic disease. Electronically signed by:  Keron Neil MD  9/27/2021 1:41 PM CDT Workstation: [id number]    X-Ray Chest AP Portable    Result Date: 9/20/2021  Portable chest x-ray at 11:13 AM is compared to prior study dated 9/16/2021 Clinical history is Port-A-Cath placement There is a right subclavian vein Port-A-Cath with tip overlying the superior vena cava. There is no pneumothorax. The lungs are clear. The cardiomediastinal silhouette is normal in size. There are no acute osseous normality's. IMPRESSION: No pneumothorax status post right subclavian vein Port-A-Cath placement Electronically signed by:  Breana Lebron MD  9/20/2021 11:52 AM CDT Workstation: [id number]    MRI Breast w/wo Contrast, w/CAD, Bilateral    Result Date: 9/28/2021  EXAMINATION: MRI BREAST W/WO CONTRAST, W/CAD, BILATERAL CLINICAL HISTORY: Staging left breast carcinoma, C 50.812, N 63.21, N 63.24, N 63.25, malignant neoplasm of overlapping sites of left female breast TECHNIQUE: CMS MANDATED QUALITY DATA - MAMMOGRAPHY - 225  Bilateral breast MRI was performed with a dedicated breast coil. The patient was placed prone in the breast immobilizer with light compression. The following localization sequences were obtained: Axial inversion recovery, axial 3-D non-fat-suppressed, axial 3-D fat suppressed pre-contrast, followed by axial 3-D fat-suppressed post-contrast dynamic images with 6.5 mL Gadavist IV contrast. Post-processing including subtraction imaging, reconstruction in the sagittal and coronal planes, and MIP of both breasts was performed on an independent workstation. The interpretation was assisted by Computer Aided Detection. FINDINGS: Comparison to the diagnostic mammogram and ultrasound of 08/20/2021.  The breasts are composed of mostly fibroglandular elements and interspersed fat.  Background parenchymal enhancement is mild. There is a dominant heterogeneously enhancing solid mass with irregular margins in the left breast retroareolar region measuring 37 mm AP x 3 mm transverse by 35 mm craniocaudal dimension, with suspicious enhancement kinetics.  There are several additional enhancing nodular and irregular infiltrative masses in the left breast, some which are contiguous with the dominant retroareolar mass, extending into the all quadrants of the breast predominantly the upper outer quadrant.  Total area of disease in the left breast measures roughly 38 mm AP x 60 mm transverse by 90 mm craniocaudal dimension, roughly 30-40% of the volume of the left breast.  No abnormal enhancement to suggest involvement of the left pectoralis musculature. There are several enlarged infiltrated appearing left axillary lymph nodes suspicious for metastatic disease, measuring 16 mm maximum short axis dimension.  There are no enlarged right axillary lymph nodes or enlarged internal mammary chain lymph nodes. Stippled randomly scattered non masslike enhancement in the right breast is nonspecific, with no enhancing right breast mass. There is a  23 mm STIR hyperintense enhancing right hilar mass suspicious for hilar lymphadenopathy, with no additional abnormal enhancement in the chest wall or mediastinum evident.     1. Multifocal, multicentric left breast carcinoma as described, with several enlarged left axillary lymph nodes consistent with metastatic disease. 2. No findings of contralateral right breast carcinoma, with scattered stippled probably benign non masslike enhancement in the right breast. 3. Enhancing right hilar mass suspicious for metastatic disease.  Consider further evaluation with whole body PET CT. BI-RADS CATEGORY 6: KNOWN MALIGNANCY. [facility] utilizes a reminder system to ensure that patients receive reminder letters for appointments. This includes reminders for routine screening mammograms, diagnostic mammograms, or other breast imaging interventions as appropriate. This patient will be placed in the appropriate reminder system. Electronically signed by: Keron Neil MD Date:    09/28/2021 Time:    09:32           I have reviewed all available lab results and radiology reports.    Assessment/Plan:   (1) 31 y.o. female with diagnosis of left breast cancer who has been referred by Dr Cheri Smith for evaluation by medical hematology/oncology.   - left breast masses of overlapping sites involving Outer upper Quadrant and Lower inner Quadrant respectively  - s/p core biopsies taken on 8/27/2021  - axillary LN was positive  - stage III cancer process at least  - ER positive at 80.5 %, DC positive at 3%; Her2Nu (3+)  -Ki67 25%  - discussed the pathology and the latest NCCN guidelines (version 8.2021)  - recommend neoadjuvant chemotherapy with herceptin + perjeta based regimen which studies have shown to be beneficial to survival and endocrine therapy  - she will need echo, portacath and PEt scan  - will set up chemotherapy school to discuss the drugs, side-effects and provide literature on the drugs    9/30/2021:  - Her case  was presented at the [facility] Tumor Board yesterday and consensus was to get PEt scan and proceed with TC chemotherapy.   - She had chemotherapy school with Susana.   - She had portacath placed on [facility] CW with Dr Smith.   - She is set to start chemotherapy on Oct 5th.   - echo on chart from 9/24/2021  - discussed the chemotherapy regimen, provided literature and discussed the side-effect profile of the drugs    10/26/2021:  - getting cycle #2 today  - getting Udenyca tomorrow and she also requested some IVF tomorrow as well  - discussed the PEt scan findings from 10/21/2021     12/7/2021:  - 4th cycle chemo this week with IVF and antiemtics as needed  - order MRI of breasts for next week to re-assess response    1/10/2022:  - She completed the 4th cycle of chemotherapy and had subsequent breast MRI on 12/23/2021 which showed an excellent response to the therapy; she has decided to stop the chemotherapy and proceed with surgery; she is adamant that she does not want to continue with the neoadjuvant regiment; she is aware that she will still at least need to continue the herceptin post-op and may need additional chemotherapy depending on the pathology findings from the surgery  - She saw Dr Smith the other day and is seeing Dr Nguyen with plastic surgery tomorrow    2/15/2022:  - She had surgery with Dr Smith and Dr Nguyen with plastic surgery at [place] on Jan 28th 2022; pathology reports that she had no residual breast cancer in the left and right breast and the six sentinel LN's on the left were all negative. She is healing well and has some residual aches and pain under the left arm. She sees Dr Smith again 2 weeks and Dr Nguyen on 2/21/2022  - Dr Nguyen wants to either move or remove portacath to help with the cosmetic surgery better  - she will need to continue with Herceptin and Perjeta for the next year, she can either get PICC or intermittent IV's    3/16/2022:  - she has since resumed therapy post-op with  perjeta and herceptin  - will need periodic echocardiograms  - monitor labs  - she no long has portacath (moved to facilitate reconstruction)     5/4/2022:  - She has since started back on perjeta and herceptin and is tolerating it well. Discussed the recommendation for consideration for oopherectomy, and patient is unsure if she wants to proceed in that direction. She has not had a period since starting chemotherapy.   - discussed and encouraged her to at least consider oral antihormone therapy with tamoxifen and is is willing to proceed  - she needs to follow-up with rad/onc as well for XRT evaluation      6/15/2022:  - she is doing ok with the current regimen    8/11/2022:  - she is doing well with the current regimen  - she is also on tamoxifen  - latest MUGA was on 7/28/2022     (2) no HTN     (3) No DM     (4) two c-sections with healthy children - she is aware that with chemotherapy she could potentially not be able to reproduce or have any more children - she is comfortable with that fact      VISIT DIAGNOSES:      Malignant neoplasm of overlapping sites of left female breast, unspecified estrogen receptor status    Microcytic anemia    HER2-positive carcinoma of breast    ER+ (estrogen receptor positive status)    Chemotherapy-induced neutropenia            PLAN:  1. continued on Perjeta and herceptin regimen and tamoxifen  2. Continue with periodic echo's or MUGA's  as scheduled and directed  3. Check up to date labs at least monthly  4. s/p chemotherapy school, discussed the medications, side-effect profiles and obtained consents  5. Previously discussed and proposed consideration for oophorectomy in future but she is still undecided  6. F/u with rad/onc        RTC in 4 weeks with Bibi;  8 weeks with myself    Fax note to Brittanie Smith; Liz Catherine/Brayden       Discussion:     COVID-19 Discussion:     I had long discussion with patient and any applicable family about the COVID-19  "coronavirus epidemic and the recommended precautions with regard to cancer and/or hematology patients. I have re-iterated the CDC recommendations for adequate hand washing, use of hand -like products, and coughing into elbow, etc. In addition, especially for our patients who are on chemotherapy and/or our otherwise immunocompromised patients, I have recommended avoidance of crowds, including movie theaters, restaurants, churches, etc. I have recommended avoidance of any sick or symptomatic family members and/or friends. I have also recommended avoidance of any raw and unwashed food products, and general avoidance of food items that have not been prepared by themselves. The patient has been asked to call us immediately with any symptom developments, issues, questions or other general concerns.      Pathology Discussion:     I reviewed and discussed the pathology report(s) and radiograph reports (if available) in as simple to understand and/or laymen's terms to the best of my ability. I had an indepth conversation with the patient and went over the patient's individual diagnosis based on the information that was currently available. I discussed the TNM staging process with regard to the patient's particular cancer type, and the calculated stage based on the currently available TNM data and literature. I discussed the available prognostic data with regard to the current staging information and how it relates to the prognosis of their particular neoplastic process.          NCCN Guidelines:     I discussed the available treatment option(s) in accordance with the latest literature from the NCCN Clinical Practice Guidelines for the patient's particular type of cancer disorder. The NCCN Guidelines provide a "document evidence-based (and) consensus-driven management" of the care of oncology patients. The treatment recommendations were made not only in accordance to the NCCN guidelines, but also factored in to " account the patient's overall age, condition, performance status and their medical co-morbidities. I went over the risks and benefits of the the treatment options (if any could be made) with regard to their particular cancer type, their cancer stage, their age, and their co-morbidities.      Chemotherapy Discussion:        I discussed the available treatment option(s) in accordance with the latest/current national evidence-based guidelines (NCCN, UpToDate, NCI, ASCO, etc where applicable), their overall age/condition and their co-morbidities. I also went over the risks and benefits of the chemotherapy with regard to their particular cancer type, their cancer stage, their age/condition, and their co-morbidities. I provided literature on the chemotherapy regimen and discussed the chemotherapy side-effect profiles of the drug(s). I discussed the importance of compliance with obtaining and monitoring weekly lab work, and went over the potential hematopathology issues and risks with anemia, leucopenia and thrombocytopenia that can occur with chemotherapy. I discussed the potential risks of liver and kidney damage, which could be permanent and could necessitate dialysis long-term if kidney failure developed. I discussed the emetic and/or diarrheal potential of the regimen and the potential need for use of antiemetic and anti-diarrheal medications. I discussed the risk for development of anaphylactic shock, bronchospasm, dysrhythmia, and respiratory/cardiovascular arrest and/or failure. I discussed the potential risks for development of alopecia, cold sensory issues, ringing in ears, vertigo, cataracts, glaucoma, and neuropathy, all of which could end up being chronic and life-long. Some chemotherpyI discussed the risks of hand-foot syndrome and rashes, and development of other autoimmune mediated processes such as pneumonitis, hepatitis, and colitis which could be life threatening. I discussed the risks of the potential  development of a rare but fatal viral mediated disease known as PML (Progressive Multifocal Leukoencephalopathy), and risk of future development of leukemia and/or lymphoma from use of certain chemotherapy agents. I discussed the need for neutropenic precautions, basic hygiene/sanitation behaviors and dietary restrictions.     The patient's consent has been obtained to proceed with the chemotherapy.The patient will be referred to Chemotherapy School [facility] for training and education on chemotherapy, use of antiemetics and/or anti-diarrheals, use of NSAID's, potential chemotherapy side-effects, and any specific recommendations and precautions with the particular chemotherapy agents.       I answered all of the patient's (and family's, if applicable) questions to the best of my ability and to their complete satisfaction. The patient acknowledged full understanding of the risks, recommendations and plan(s).      I have explained and the patient understands all of  the current recommendation(s). I have answered all of their questions to the best of my ability and to their complete satisfaction.      Antihormone Therapy Discussion:    I discussed the advantages of antihormone therapy with the patient with regard to their particular neoplastic or carcinoma in situ condition. I went over the side-effect profile of the medication including risk for potential development of endometrial cancer and/or hyperplasia in women who still have a uterus and the need for yearly GYN evaluation and follow-up. I discussed the risks for thromboembolic events such as DVT's, pulmonary emboli, CVA's, retinal vascular clots, phlebitis, and TIA's. I discussed the potential risks for development of ocular disturbances, retinopathy, cataracts, corneal changes, flushing, hot flashes, amenorrhea, altered menses, fluid retention, weight changes, elevations in LFT's, liver damage, and mood disturbances. I discussed the potential risk of  arthropathy and joint pains/aches which could be chronic and debilitating. I discussed potential adverse effects on bone mineralization and the risk of osteopenia and/or osteoporosis which could led to increase risk of fractures.   A consent form was obtained and a copy was provided to the patient.       I spent over 25 mins of time with the patient. Reviewed results of the recently ordered labs, tests and studies; made directives with regards to the results. Over half of this time was spent couseling and coordinating care.    I have explained all of the above in detail and the patient understands all of the current recommendation(s). I have answered all of their questions to the best of my ability and to their complete satisfaction.   The patient is to continue with the current management plan.            Electronically signed by Nilesh Jackson MD

## 2022-08-11 ENCOUNTER — OFFICE VISIT (OUTPATIENT)
Dept: HEMATOLOGY/ONCOLOGY | Facility: CLINIC | Age: 32
End: 2022-08-11
Payer: MEDICAID

## 2022-08-11 VITALS
BODY MASS INDEX: 25.5 KG/M2 | HEART RATE: 78 BPM | RESPIRATION RATE: 18 BRPM | TEMPERATURE: 98 F | WEIGHT: 129.88 LBS | SYSTOLIC BLOOD PRESSURE: 116 MMHG | HEIGHT: 60 IN | DIASTOLIC BLOOD PRESSURE: 75 MMHG

## 2022-08-11 DIAGNOSIS — D50.9 MICROCYTIC ANEMIA: Chronic | ICD-10-CM

## 2022-08-11 DIAGNOSIS — C50.919 HER2-POSITIVE CARCINOMA OF BREAST: ICD-10-CM

## 2022-08-11 DIAGNOSIS — Z17.0 ER+ (ESTROGEN RECEPTOR POSITIVE STATUS): ICD-10-CM

## 2022-08-11 DIAGNOSIS — T45.1X5A CHEMOTHERAPY-INDUCED NEUTROPENIA: ICD-10-CM

## 2022-08-11 DIAGNOSIS — D70.1 CHEMOTHERAPY-INDUCED NEUTROPENIA: ICD-10-CM

## 2022-08-11 DIAGNOSIS — C50.812 MALIGNANT NEOPLASM OF OVERLAPPING SITES OF LEFT FEMALE BREAST, UNSPECIFIED ESTROGEN RECEPTOR STATUS: Primary | ICD-10-CM

## 2022-08-11 PROCEDURE — 1160F RVW MEDS BY RX/DR IN RCRD: CPT | Mod: CPTII,S$GLB,, | Performed by: INTERNAL MEDICINE

## 2022-08-11 PROCEDURE — 3074F PR MOST RECENT SYSTOLIC BLOOD PRESSURE < 130 MM HG: ICD-10-PCS | Mod: CPTII,S$GLB,, | Performed by: INTERNAL MEDICINE

## 2022-08-11 PROCEDURE — 1159F MED LIST DOCD IN RCRD: CPT | Mod: CPTII,S$GLB,, | Performed by: INTERNAL MEDICINE

## 2022-08-11 PROCEDURE — 3008F BODY MASS INDEX DOCD: CPT | Mod: CPTII,S$GLB,, | Performed by: INTERNAL MEDICINE

## 2022-08-11 PROCEDURE — 99214 OFFICE O/P EST MOD 30 MIN: CPT | Mod: S$GLB,,, | Performed by: INTERNAL MEDICINE

## 2022-08-11 PROCEDURE — 3078F DIAST BP <80 MM HG: CPT | Mod: CPTII,S$GLB,, | Performed by: INTERNAL MEDICINE

## 2022-08-11 PROCEDURE — 99214 PR OFFICE/OUTPT VISIT, EST, LEVL IV, 30-39 MIN: ICD-10-PCS | Mod: S$GLB,,, | Performed by: INTERNAL MEDICINE

## 2022-08-11 PROCEDURE — 1160F PR REVIEW ALL MEDS BY PRESCRIBER/CLIN PHARMACIST DOCUMENTED: ICD-10-PCS | Mod: CPTII,S$GLB,, | Performed by: INTERNAL MEDICINE

## 2022-08-11 PROCEDURE — 3078F PR MOST RECENT DIASTOLIC BLOOD PRESSURE < 80 MM HG: ICD-10-PCS | Mod: CPTII,S$GLB,, | Performed by: INTERNAL MEDICINE

## 2022-08-11 PROCEDURE — 3008F PR BODY MASS INDEX (BMI) DOCUMENTED: ICD-10-PCS | Mod: CPTII,S$GLB,, | Performed by: INTERNAL MEDICINE

## 2022-08-11 PROCEDURE — 3074F SYST BP LT 130 MM HG: CPT | Mod: CPTII,S$GLB,, | Performed by: INTERNAL MEDICINE

## 2022-08-11 PROCEDURE — 1159F PR MEDICATION LIST DOCUMENTED IN MEDICAL RECORD: ICD-10-PCS | Mod: CPTII,S$GLB,, | Performed by: INTERNAL MEDICINE

## 2022-08-11 RX ORDER — TAMOXIFEN CITRATE 20 MG/1
20 TABLET ORAL DAILY
Qty: 30 TABLET | Refills: 11 | Status: SHIPPED | OUTPATIENT
Start: 2022-08-11 | End: 2023-01-01

## 2022-08-22 RX ORDER — HEPARIN 100 UNIT/ML
500 SYRINGE INTRAVENOUS
Status: CANCELLED | OUTPATIENT
Start: 2022-08-22

## 2022-08-22 RX ORDER — SODIUM CHLORIDE 0.9 % (FLUSH) 0.9 %
10 SYRINGE (ML) INJECTION
Status: CANCELLED | OUTPATIENT
Start: 2022-08-22

## 2022-08-22 RX ORDER — EPINEPHRINE 0.3 MG/.3ML
0.3 INJECTION SUBCUTANEOUS ONCE AS NEEDED
Status: CANCELLED | OUTPATIENT
Start: 2022-08-22

## 2022-08-22 RX ORDER — DIPHENHYDRAMINE HYDROCHLORIDE 50 MG/ML
50 INJECTION INTRAMUSCULAR; INTRAVENOUS ONCE AS NEEDED
Status: CANCELLED | OUTPATIENT
Start: 2022-08-22

## 2022-08-23 ENCOUNTER — TELEPHONE (OUTPATIENT)
Dept: HEMATOLOGY/ONCOLOGY | Facility: CLINIC | Age: 32
End: 2022-08-23

## 2022-08-23 ENCOUNTER — INFUSION (OUTPATIENT)
Dept: INFUSION THERAPY | Facility: HOSPITAL | Age: 32
End: 2022-08-23
Attending: INTERNAL MEDICINE
Payer: MEDICAID

## 2022-08-23 VITALS
TEMPERATURE: 98 F | HEIGHT: 60 IN | RESPIRATION RATE: 18 BRPM | WEIGHT: 131.31 LBS | SYSTOLIC BLOOD PRESSURE: 98 MMHG | HEART RATE: 85 BPM | BODY MASS INDEX: 25.78 KG/M2 | DIASTOLIC BLOOD PRESSURE: 61 MMHG

## 2022-08-23 DIAGNOSIS — C50.812 MALIGNANT NEOPLASM OF OVERLAPPING SITES OF LEFT FEMALE BREAST, UNSPECIFIED ESTROGEN RECEPTOR STATUS: Primary | ICD-10-CM

## 2022-08-23 DIAGNOSIS — T45.1X5A CHEMOTHERAPY-INDUCED NEUTROPENIA: Primary | ICD-10-CM

## 2022-08-23 DIAGNOSIS — D70.1 CHEMOTHERAPY-INDUCED NEUTROPENIA: Primary | ICD-10-CM

## 2022-08-23 DIAGNOSIS — E86.0 DEHYDRATION: ICD-10-CM

## 2022-08-23 DIAGNOSIS — C50.812 MALIGNANT NEOPLASM OF OVERLAPPING SITES OF LEFT FEMALE BREAST, UNSPECIFIED ESTROGEN RECEPTOR STATUS: ICD-10-CM

## 2022-08-23 PROCEDURE — 25000003 PHARM REV CODE 250: Performed by: INTERNAL MEDICINE

## 2022-08-23 PROCEDURE — A4216 STERILE WATER/SALINE, 10 ML: HCPCS | Performed by: INTERNAL MEDICINE

## 2022-08-23 PROCEDURE — 63600175 PHARM REV CODE 636 W HCPCS: Mod: TB | Performed by: INTERNAL MEDICINE

## 2022-08-23 PROCEDURE — 96417 CHEMO IV INFUS EACH ADDL SEQ: CPT

## 2022-08-23 PROCEDURE — 96413 CHEMO IV INFUSION 1 HR: CPT

## 2022-08-23 RX ORDER — SODIUM CHLORIDE 0.9 % (FLUSH) 0.9 %
10 SYRINGE (ML) INJECTION
Status: DISCONTINUED | OUTPATIENT
Start: 2022-08-23 | End: 2022-08-23 | Stop reason: HOSPADM

## 2022-08-23 RX ORDER — EPINEPHRINE 0.3 MG/.3ML
0.3 INJECTION SUBCUTANEOUS ONCE AS NEEDED
Status: DISCONTINUED | OUTPATIENT
Start: 2022-08-23 | End: 2022-08-23 | Stop reason: HOSPADM

## 2022-08-23 RX ORDER — DIPHENHYDRAMINE HYDROCHLORIDE 50 MG/ML
50 INJECTION INTRAMUSCULAR; INTRAVENOUS ONCE AS NEEDED
Status: DISCONTINUED | OUTPATIENT
Start: 2022-08-23 | End: 2022-08-23 | Stop reason: HOSPADM

## 2022-08-23 RX ADMIN — PERTUZUMAB 420 MG: 30 INJECTION, SOLUTION, CONCENTRATE INTRAVENOUS at 02:08

## 2022-08-23 RX ADMIN — TRASTUZUMAB 384 MG: KIT at 03:08

## 2022-08-23 RX ADMIN — SODIUM CHLORIDE, PRESERVATIVE FREE 10 ML: 5 INJECTION INTRAVENOUS at 03:08

## 2022-08-23 NOTE — PLAN OF CARE
Problem: Fatigue  Goal: Improved Activity Tolerance  8/23/2022 1438 by Nena Charlton RN  Outcome: Met  8/23/2022 1438 by Nena Charlton RN  Outcome: Ongoing, Progressing

## 2022-09-12 RX ORDER — DIPHENHYDRAMINE HYDROCHLORIDE 50 MG/ML
50 INJECTION INTRAMUSCULAR; INTRAVENOUS ONCE AS NEEDED
Status: CANCELLED | OUTPATIENT
Start: 2022-09-12

## 2022-09-12 RX ORDER — EPINEPHRINE 0.3 MG/.3ML
0.3 INJECTION SUBCUTANEOUS ONCE AS NEEDED
Status: CANCELLED | OUTPATIENT
Start: 2022-09-12

## 2022-09-12 RX ORDER — SODIUM CHLORIDE 0.9 % (FLUSH) 0.9 %
10 SYRINGE (ML) INJECTION
Status: CANCELLED | OUTPATIENT
Start: 2022-09-12

## 2022-09-12 RX ORDER — HEPARIN 100 UNIT/ML
500 SYRINGE INTRAVENOUS
Status: CANCELLED | OUTPATIENT
Start: 2022-09-12

## 2022-10-03 RX ORDER — SODIUM CHLORIDE 0.9 % (FLUSH) 0.9 %
10 SYRINGE (ML) INJECTION
Status: CANCELLED | OUTPATIENT
Start: 2022-10-04

## 2022-10-03 RX ORDER — DIPHENHYDRAMINE HYDROCHLORIDE 50 MG/ML
50 INJECTION INTRAMUSCULAR; INTRAVENOUS ONCE AS NEEDED
Status: CANCELLED | OUTPATIENT
Start: 2022-10-04

## 2022-10-03 RX ORDER — EPINEPHRINE 0.3 MG/.3ML
0.3 INJECTION SUBCUTANEOUS ONCE AS NEEDED
Status: CANCELLED | OUTPATIENT
Start: 2022-10-04

## 2022-10-03 RX ORDER — HEPARIN 100 UNIT/ML
500 SYRINGE INTRAVENOUS
Status: CANCELLED | OUTPATIENT
Start: 2022-10-04

## 2022-10-04 ENCOUNTER — INFUSION (OUTPATIENT)
Dept: INFUSION THERAPY | Facility: HOSPITAL | Age: 32
End: 2022-10-04
Attending: INTERNAL MEDICINE
Payer: MEDICAID

## 2022-10-04 ENCOUNTER — OFFICE VISIT (OUTPATIENT)
Dept: HEMATOLOGY/ONCOLOGY | Facility: CLINIC | Age: 32
End: 2022-10-04
Payer: MEDICAID

## 2022-10-04 VITALS
TEMPERATURE: 98 F | WEIGHT: 132.31 LBS | DIASTOLIC BLOOD PRESSURE: 70 MMHG | HEART RATE: 83 BPM | SYSTOLIC BLOOD PRESSURE: 115 MMHG | RESPIRATION RATE: 18 BRPM | HEIGHT: 60 IN | OXYGEN SATURATION: 100 % | BODY MASS INDEX: 25.97 KG/M2

## 2022-10-04 VITALS
SYSTOLIC BLOOD PRESSURE: 115 MMHG | TEMPERATURE: 98 F | DIASTOLIC BLOOD PRESSURE: 70 MMHG | HEIGHT: 60 IN | RESPIRATION RATE: 18 BRPM | HEART RATE: 86 BPM | BODY MASS INDEX: 25.97 KG/M2 | WEIGHT: 132.31 LBS | OXYGEN SATURATION: 100 %

## 2022-10-04 DIAGNOSIS — Z17.0 ER+ (ESTROGEN RECEPTOR POSITIVE STATUS): ICD-10-CM

## 2022-10-04 DIAGNOSIS — C50.812 MALIGNANT NEOPLASM OF OVERLAPPING SITES OF LEFT FEMALE BREAST, UNSPECIFIED ESTROGEN RECEPTOR STATUS: Primary | ICD-10-CM

## 2022-10-04 DIAGNOSIS — T45.1X5A CHEMOTHERAPY-INDUCED NEUTROPENIA: Primary | ICD-10-CM

## 2022-10-04 DIAGNOSIS — C50.919 HER2-POSITIVE CARCINOMA OF BREAST: ICD-10-CM

## 2022-10-04 DIAGNOSIS — D70.1 CHEMOTHERAPY-INDUCED NEUTROPENIA: Primary | ICD-10-CM

## 2022-10-04 DIAGNOSIS — C50.812 MALIGNANT NEOPLASM OF OVERLAPPING SITES OF LEFT FEMALE BREAST, UNSPECIFIED ESTROGEN RECEPTOR STATUS: ICD-10-CM

## 2022-10-04 DIAGNOSIS — E86.0 DEHYDRATION: ICD-10-CM

## 2022-10-04 PROCEDURE — 63600175 PHARM REV CODE 636 W HCPCS: Mod: TB | Performed by: INTERNAL MEDICINE

## 2022-10-04 PROCEDURE — 96417 CHEMO IV INFUS EACH ADDL SEQ: CPT

## 2022-10-04 PROCEDURE — 3074F SYST BP LT 130 MM HG: CPT | Mod: CPTII,S$GLB,, | Performed by: NURSE PRACTITIONER

## 2022-10-04 PROCEDURE — 96413 CHEMO IV INFUSION 1 HR: CPT

## 2022-10-04 PROCEDURE — 99214 PR OFFICE/OUTPT VISIT, EST, LEVL IV, 30-39 MIN: ICD-10-PCS | Mod: S$GLB,,, | Performed by: NURSE PRACTITIONER

## 2022-10-04 PROCEDURE — 3008F BODY MASS INDEX DOCD: CPT | Mod: CPTII,S$GLB,, | Performed by: NURSE PRACTITIONER

## 2022-10-04 PROCEDURE — 3078F PR MOST RECENT DIASTOLIC BLOOD PRESSURE < 80 MM HG: ICD-10-PCS | Mod: CPTII,S$GLB,, | Performed by: NURSE PRACTITIONER

## 2022-10-04 PROCEDURE — 3008F PR BODY MASS INDEX (BMI) DOCUMENTED: ICD-10-PCS | Mod: CPTII,S$GLB,, | Performed by: NURSE PRACTITIONER

## 2022-10-04 PROCEDURE — 99214 OFFICE O/P EST MOD 30 MIN: CPT | Mod: S$GLB,,, | Performed by: NURSE PRACTITIONER

## 2022-10-04 PROCEDURE — 3078F DIAST BP <80 MM HG: CPT | Mod: CPTII,S$GLB,, | Performed by: NURSE PRACTITIONER

## 2022-10-04 PROCEDURE — 25000003 PHARM REV CODE 250: Performed by: INTERNAL MEDICINE

## 2022-10-04 PROCEDURE — 3074F PR MOST RECENT SYSTOLIC BLOOD PRESSURE < 130 MM HG: ICD-10-PCS | Mod: CPTII,S$GLB,, | Performed by: NURSE PRACTITIONER

## 2022-10-04 RX ORDER — SODIUM CHLORIDE 0.9 % (FLUSH) 0.9 %
10 SYRINGE (ML) INJECTION
Status: DISCONTINUED | OUTPATIENT
Start: 2022-10-04 | End: 2022-10-04 | Stop reason: HOSPADM

## 2022-10-04 RX ORDER — EPINEPHRINE 0.3 MG/.3ML
0.3 INJECTION SUBCUTANEOUS ONCE AS NEEDED
Status: DISCONTINUED | OUTPATIENT
Start: 2022-10-04 | End: 2022-10-04 | Stop reason: HOSPADM

## 2022-10-04 RX ADMIN — TRASTUZUMAB 384 MG: 420 INJECTION, POWDER, LYOPHILIZED, FOR SOLUTION INTRAVENOUS at 03:10

## 2022-10-04 RX ADMIN — PERTUZUMAB 420 MG: 30 INJECTION, SOLUTION, CONCENTRATE INTRAVENOUS at 02:10

## 2022-10-04 NOTE — PROGRESS NOTES
"    [facility] Hematology/Oncology  PROGRESS NOTE -  Follow-up Visit      Subjective:       Patient ID:   NAME: Esperanza Peters : 1990     31 y.o. female    Referring Doc: Dr Cheri Smith  Other Physicians: Armaan Mukherjee        Chief Complaint: left breast cancer f/u       History of Present Illness:     Patient returns today for a regularly scheduled follow-up visit.  The patient is here today to go over the results of the recently ordered labs, tests and studies. She is here by herself today.     She previously had surgery with Dr Smith and Dr gNuyen with plastic surgery at [place] on 2022; pathology reports that she had no residual breast cancer in the left and right breast and the six sentinel Ln's on the left were all negative.    She has been on perjeta and herceptin and is tolerating it well. Discussed the recommendation for consideration for oopherectomy, and patient is unsure if she wants to proceed in that direction. She has not had a period since starting chemotherapy.     She is doing well with the current regimen; she is on tamoxifen.     Breathing ok, no current CP, SOB, HA's or N/V; neuropathy resolved and she reports that she feels "good" and is back working again.     She has her reconstruction surgery scheduled in the next few months.     discussed covid19 precautions      ROS:   GEN: normal without any fever, night sweats or weight loss  HEENT: normal with no HA's, sore throat, stiff neck, changes in vision  CV: normal with no CP, SOB, PND, CHASE or orthopnea  PULM: normal with no SOB, cough, hemoptysis, sputum or pleuritic pain  GI: normal with no abdominal pain, nausea, vomiting, constipation, diarrhea, melanotic stools, BRBPR, or hematemesis  : normal with no hematuria, dysuria  BREAST: no palpable masses  SKIN: normal with no rash, erythema, bruising, or swelling    Pain Scale:  0    Allergies:  Review of patient's allergies indicates:  No Known " Allergies    Medications:    Current Outpatient Medications:     acetaminophen (TYLENOL) 500 MG tablet, Take 500 mg by mouth once as needed for Pain., Disp: , Rfl:     magic mouthwash diphen/antac/lidoc/nysta, Take 10 mLs by mouth 4 (four) times daily. (Patient not taking: No sig reported), Disp: 120 mL, Rfl: 1    HYDROcodone-acetaminophen (NORCO) 5-325 mg per tablet, Take 1 tablet by mouth 3 (three) times daily as needed for pain. (Patient not taking: No sig reported), Disp: 8 tablet, Rfl: 0    tamoxifen (NOLVADEX) 20 MG Tab, Take 1 tablet (20 mg total) by mouth once daily., Disp: 30 tablet, Rfl: 11  No current facility-administered medications for this visit.    Facility-Administered Medications Ordered in Other Visits:     diphenhydramine (BENADRYL) 50 mg in NS 50 mL IVPB, 50 mg, Intravenous, Once PRN, Nilesh Jackson MD    EPINEPHrine (EPIPEN) 0.3 mg/0.3 mL pen injection 0.3 mg, 0.3 mg, Intramuscular, Once PRN, Nilesh Jackson MD    hydrocortisone sodium succinate injection 100 mg, 100 mg, Intravenous, Once PRN, Nilesh Jackson MD    pertuzumab (PERJETA) 420 mg in sodium chloride 0.9% 264 mL infusion, 420 mg, Intravenous, 1 time in Clinic/HOD, Nilesh Jackson MD, Last Rate: 528 mL/hr at 10/04/22 1456, 420 mg at 10/04/22 1456    sodium chloride 0.9% 100 mL flush bag, , Intravenous, 1 time in Clinic/HOD, Nilesh Jackson MD    sodium chloride 0.9% flush 10 mL, 10 mL, Intravenous, PRN, Nilesh Jackson MD    trastuzumab-dkst (OGIVRI) 384 mg in sodium chloride 0.9% 250 mL chemo infusion, 6 mg/kg (Treatment Plan Recorded), Intravenous, 1 time in Clinic/HOD, Nilesh Jackson MD    PMHx/PSHx Updates:  See patient's last visit with Dr. Jackson on 8/11/22  See H&P on 9/13/2021        Pathology:   Cancer Staging   Malignant neoplasm of overlapping sites of left female breast  Staging form: Breast, AJCC 8th Edition  - Clinical: Stage IIB (cT3, cN1(f), cM0, G3, ER+, WV+, HER2+) - Unsigned  -  Pathologic: No Stage Recommended (ypT0, pN0(sn), cM0) - Signed by Obed Hill Jr., MD on 5/18/2022      Bilateral mastectomies : 1/128/2022 at [facility]  - pathology report on chart      Objective:     Vitals:  Blood pressure 115/70, pulse 83, temperature 98 °F (36.7 °C), resp. rate 18, height 5' (1.524 m), weight 60 kg (132 lb 4.8 oz), SpO2 100 %.    Physical Examination:   GEN: no apparent distress, comfortable; AAOx3  HEAD: atraumatic and normocephalic  EYES: no pallor, no icterus, PERRLA  ENT: OMM, no pharyngeal erythema, external ears WNL; no nasal discharge; no thrush  NECK: no masses, thyroid normal, trachea midline, no LAD/LN's, supple  CV: RRR with no murmur; normal pulse; normal S1 and S2; no pedal edema; Rght CW portacath since removed  CHEST: Normal respiratory effort; CTAB; normal breath sounds; no wheeze or crackles  ABDOM: nontender and nondistended; soft; normal bowel sounds; no rebound/guarding  MUSC/Skeletal: ROM normal; no crepitus; joints normal; no deformities or arthropathy  EXTREM: no clubbing, cyanosis, inflammation or swelling  SKIN: no rashes, lesions, ulcers, petechiae or subcutaneous nodules  : no pacheco  NEURO: grossly intact; motor/sensory WNL; AAOx3; no tremors  PSYCH: normal mood, affect and behavior  LYMPH: normal cervical, supraclavicular, axillary and groin LN's  Breast: s/p bilateral mastectomies with reconstruction             Labs:     Lab Results   Component Value Date    WBC 6.35 10/04/2022    HGB 11.7 (L) 10/04/2022    HCT 37.9 10/04/2022    MCV 79 (L) 10/04/2022     10/04/2022       CMP  Sodium   Date Value Ref Range Status   10/04/2022 138 136 - 145 mmol/L Final     Potassium   Date Value Ref Range Status   10/04/2022 3.7 3.5 - 5.1 mmol/L Final     Chloride   Date Value Ref Range Status   10/04/2022 104 95 - 110 mmol/L Final     CO2   Date Value Ref Range Status   10/04/2022 28 23 - 29 mmol/L Final     Glucose   Date Value Ref Range Status   10/04/2022 84 70 - 110  mg/dL Final     BUN   Date Value Ref Range Status   10/04/2022 15 6 - 20 mg/dL Final     Creatinine   Date Value Ref Range Status   10/04/2022 0.9 0.5 - 1.4 mg/dL Final     Calcium   Date Value Ref Range Status   10/04/2022 9.5 8.7 - 10.5 mg/dL Final     Total Protein   Date Value Ref Range Status   10/04/2022 8.3 6.0 - 8.4 g/dL Final     Albumin   Date Value Ref Range Status   10/04/2022 4.3 3.5 - 5.2 g/dL Final     Total Bilirubin   Date Value Ref Range Status   10/04/2022 0.7 0.1 - 1.0 mg/dL Final     Comment:     For infants and newborns, interpretation of results should be based  on gestational age, weight and in agreement with clinical  observations.    Premature Infant recommended reference ranges:  Up to 24 hours.............<8.0 mg/dL  Up to 48 hours............<12.0 mg/dL  3-5 days..................<15.0 mg/dL  6-29 days.................<15.0 mg/dL       Alkaline Phosphatase   Date Value Ref Range Status   10/04/2022 61 55 - 135 U/L Final     AST   Date Value Ref Range Status   10/04/2022 23 10 - 40 U/L Final     ALT   Date Value Ref Range Status   10/04/2022 14 10 - 44 U/L Final     Anion Gap   Date Value Ref Range Status   10/04/2022 6 (L) 8 - 16 mmol/L Final     eGFR if    Date Value Ref Range Status   07/23/2022 >60.0 >60 mL/min/1.73 m^2 Final     eGFR if non    Date Value Ref Range Status   07/23/2022 >60.0 >60 mL/min/1.73 m^2 Final     Comment:     Calculation used to obtain the estimated glomerular filtration  rate (eGFR) is the CKD-EPI equation.              Radiology/Diagnostic Studies:    MUGA  7/28/2022:  IMPRESSION:  1. Left ventricular ejection fraction of 53%.  2. Normal left ventricular wall motion        MRI breast  12/23/2021:  -    Impression:     1. Complete resolution of previous enhancing multicentric left breast malignancy.  2. Complete resolution of prior enlarged left axillary lymph nodes.  3. Persistent retroareolar left breast distortion near 6  o'clock position, characteristic of scarring.  Recommendation: Patient is currently under medical oncology and surgical care.        PET 10/21/2021:    Impression:     Hypermetabolic left breast mass consistent with primary breast malignancy. Please correlate with dedicated breast imaging.     No pathologically enlarged or hypermetabolic axillary or intrathoracic lymph nodes.     Hypermetabolic retroperitoneal lymph node is nonspecific and would be unusual for breast cancer metastasis. Attention on follow-up is recommended.     Low level FDG activity associated with prominent cervical lymph nodes, bilateral and symmetric, most likely reactive in nature.     Reactive red marrow.      CT scans 9/29/2021:    IMPRESSION:  1. Large irregular subareolar left breast mass and findings compatible with multifocal/multicentric disease in the left breast (better appreciated on the recent MRI).  2. Left axillary lymphadenopathy compatible with additional sites of disease.  3. Borderline enlarged right hilar node, possibly an additional site of disease, although felt less likely this could be further assessed with PET/CT.        NM Bone Scan Whole Body    Result Date: 9/27/2021  CMS MANDATED QUALITY DATA-BONE SCAN-147 HISTORY: Staging breast carcinoma, and 63.25. COMPARISON: Multiple prior chest radiographs. FINDINGS: 22 mCi Tc 99m MDP was administered IV per protocol, with delayed whole-body images obtained in the anterior and posterior projections. Nodular focus of increased radiotracer uptake in the sternum near the level of the sternomanubrial articulation is nonspecific. There is otherwise no abnormal focal increased radiotracer uptake throughout the axial or appendicular skeleton to suggest osteoblastic metastatic disease. There is normal radiotracer uptake by the kidneys, with normal excretion into the renal collecting systems and urinary bladder. IMPRESSION: 1. Focus of increased osteoblastic activity in the sternum near  the sternomanubrial junction, nonspecific. Consider further evaluation with chest CT with contrast. 2. Otherwise no evidence of osteoblastic metastatic disease. Electronically signed by:  Keron Neil MD  9/27/2021 1:41 PM CDT Workstation: [id number]    X-Ray Chest AP Portable    Result Date: 9/20/2021  Portable chest x-ray at 11:13 AM is compared to prior study dated 9/16/2021 Clinical history is Port-A-Cath placement There is a right subclavian vein Port-A-Cath with tip overlying the superior vena cava. There is no pneumothorax. The lungs are clear. The cardiomediastinal silhouette is normal in size. There are no acute osseous normality's. IMPRESSION: No pneumothorax status post right subclavian vein Port-A-Cath placement Electronically signed by:  Breana Lebron MD  9/20/2021 11:52 AM CDT Workstation: [id number]    MRI Breast w/wo Contrast, w/CAD, Bilateral    Result Date: 9/28/2021  EXAMINATION: MRI BREAST W/WO CONTRAST, W/CAD, BILATERAL CLINICAL HISTORY: Staging left breast carcinoma, C 50.812, N 63.21, N 63.24, N 63.25, malignant neoplasm of overlapping sites of left female breast TECHNIQUE: CMS MANDATED QUALITY DATA - MAMMOGRAPHY - 225 Bilateral breast MRI was performed with a dedicated breast coil. The patient was placed prone in the breast immobilizer with light compression. The following localization sequences were obtained: Axial inversion recovery, axial 3-D non-fat-suppressed, axial 3-D fat suppressed pre-contrast, followed by axial 3-D fat-suppressed post-contrast dynamic images with 6.5 mL Gadavist IV contrast. Post-processing including subtraction imaging, reconstruction in the sagittal and coronal planes, and MIP of both breasts was performed on an independent workstation. The interpretation was assisted by Computer Aided Detection. FINDINGS: Comparison to the diagnostic mammogram and ultrasound of 08/20/2021.  The breasts are composed of mostly fibroglandular elements and interspersed fat.   Background parenchymal enhancement is mild. There is a dominant heterogeneously enhancing solid mass with irregular margins in the left breast retroareolar region measuring 37 mm AP x 3 mm transverse by 35 mm craniocaudal dimension, with suspicious enhancement kinetics.  There are several additional enhancing nodular and irregular infiltrative masses in the left breast, some which are contiguous with the dominant retroareolar mass, extending into the all quadrants of the breast predominantly the upper outer quadrant.  Total area of disease in the left breast measures roughly 38 mm AP x 60 mm transverse by 90 mm craniocaudal dimension, roughly 30-40% of the volume of the left breast.  No abnormal enhancement to suggest involvement of the left pectoralis musculature. There are several enlarged infiltrated appearing left axillary lymph nodes suspicious for metastatic disease, measuring 16 mm maximum short axis dimension.  There are no enlarged right axillary lymph nodes or enlarged internal mammary chain lymph nodes. Stippled randomly scattered non masslike enhancement in the right breast is nonspecific, with no enhancing right breast mass. There is a 23 mm STIR hyperintense enhancing right hilar mass suspicious for hilar lymphadenopathy, with no additional abnormal enhancement in the chest wall or mediastinum evident.     1. Multifocal, multicentric left breast carcinoma as described, with several enlarged left axillary lymph nodes consistent with metastatic disease. 2. No findings of contralateral right breast carcinoma, with scattered stippled probably benign non masslike enhancement in the right breast. 3. Enhancing right hilar mass suspicious for metastatic disease.  Consider further evaluation with whole body PET CT. BI-RADS CATEGORY 6: KNOWN MALIGNANCY. [facility] utilizes a reminder system to ensure that patients receive reminder letters for appointments. This includes reminders for routine  screening mammograms, diagnostic mammograms, or other breast imaging interventions as appropriate. This patient will be placed in the appropriate reminder system. Electronically signed by: Keron Neil MD Date:    09/28/2021 Time:    09:32           I have reviewed all available lab results and radiology reports.    Assessment/Plan:   (1) 31 y.o. female with diagnosis of left breast cancer who has been referred by Dr Cheri Smith for evaluation by medical hematology/oncology.   - left breast masses of overlapping sites involving Outer upper Quadrant and Lower inner Quadrant respectively  - s/p core biopsies taken on 8/27/2021  - axillary LN was positive  - stage III cancer process at least  - ER positive at 80.5 %, FL positive at 3%; Her2Nu (3+)  -Ki67 25%  - discussed the pathology and the latest NCCN guidelines (version 8.2021)  - recommend neoadjuvant chemotherapy with herceptin + perjeta based regimen which studies have shown to be beneficial to survival and endocrine therapy  - she will need echo, portacath and PEt scan  - will set up chemotherapy school to discuss the drugs, side-effects and provide literature on the drugs    9/30/2021:  - Her case was presented at the [facility] Tumor Board yesterday and consensus was to get PEt scan and proceed with TCHP chemotherapy.   - She had chemotherapy school with [facility].   - She had portacath placed on [facility] CW with Dr Smith.   - She is set to start chemotherapy on Oct 5th.   - echo on chart from 9/24/2021  - discussed the chemotherapy regimen, provided literature and discussed the side-effect profile of the drugs    10/26/2021:  - getting cycle #2 today  - getting Udenyca tomorrow and she also requested some IVF tomorrow as well  - discussed the PEt scan findings from 10/21/2021     12/7/2021:  - 4th cycle chemo this week with IVF and antiemtics as needed  - order MRI of breasts for next week to re-assess response    1/10/2022:  - She completed the 4th cycle of  chemotherapy and had subsequent breast MRI on 12/23/2021 which showed an excellent response to the therapy; she has decided to stop the chemotherapy and proceed with surgery; she is adamant that she does not want to continue with the neoadjuvant regiment; she is aware that she will still at least need to continue the herceptin post-op and may need additional chemotherapy depending on the pathology findings from the surgery  - She saw Dr Smith the other day and is seeing Dr Nguyen with plastic surgery tomorrow    2/15/2022:  - She had surgery with Dr Smith and Dr Nguyen with plastic surgery at [place] on Jan 28th 2022; pathology reports that she had no residual breast cancer in the left and right breast and the six sentinel LN's on the left were all negative. She is healing well and has some residual aches and pain under the left arm. She sees Dr Smith again 2 weeks and Dr Nguyen on 2/21/2022  - Dr Nguyen wants to either move or remove portacath to help with the cosmetic surgery better  - she will need to continue with Herceptin and Perjeta for the next year, she can either get PICC or intermittent IV's    3/16/2022:  - she has since resumed therapy post-op with perjeta and herceptin  - will need periodic echocardiograms  - monitor labs  - she no long has portacath (moved to facilitate reconstruction)     5/4/2022:  - She has since started back on perjeta and herceptin and is tolerating it well. Discussed the recommendation for consideration for oopherectomy, and patient is unsure if she wants to proceed in that direction. She has not had a period since starting chemotherapy.   - discussed and encouraged her to at least consider oral antihormone therapy with tamoxifen and is is willing to proceed  - she needs to follow-up with rad/onc as well for XRT evaluation      6/15/2022:  - she is doing ok with the current regimen    8/11/2022:  - she is doing well with the current regimen  - she is also on tamoxifen  - latest MUGA was  on 7/28/2022    10/4/22:   - continue with tamoxifen   - 2 more cycles of Herceptin and Perjecta left   - discussed ct DNA usage, patient would like to get her insurance situated and then agrees to testing       (2) no HTN     (3) No DM     (4) two c-sections with healthy children - she is aware that with chemotherapy she could potentially not be able to reproduce or have any more children - she is comfortable with that fact      VISIT DIAGNOSES:      Malignant neoplasm of overlapping sites of left female breast, unspecified estrogen receptor status    HER2-positive carcinoma of breast  -     Echo; Future; Expected date: 10/18/2022    ER+ (estrogen receptor positive status)            PLAN:  1. continued on Perjeta and herceptin regimen and tamoxifen  2. Continue with periodic echo's or MUGA's  as scheduled and directed  3. Check up to date labs at least monthly  4. s/p chemotherapy school, discussed the medications, side-effect profiles and obtained consents  5. Previously discussed and proposed consideration for oophorectomy in future but she is still undecided  6. F/u with rad/onc  7. Discussed ct DNA with patient she agrees after new insurance kicks in         RTC in 4 weeks with Dr. Jackson and 8 weeks with me for survivorship         Discussion:     COVID-19 Discussion:     I had long discussion with patient and any applicable family about the COVID-19 coronavirus epidemic and the recommended precautions with regard to cancer and/or hematology patients. I have re-iterated the CDC recommendations for adequate hand washing, use of hand -like products, and coughing into elbow, etc. In addition, especially for our patients who are on chemotherapy and/or our otherwise immunocompromised patients, I have recommended avoidance of crowds, including movie theaters, restaurants, churches, etc. I have recommended avoidance of any sick or symptomatic family members and/or friends. I have also recommended  "avoidance of any raw and unwashed food products, and general avoidance of food items that have not been prepared by themselves. The patient has been asked to call us immediately with any symptom developments, issues, questions or other general concerns.      Pathology Discussion:     I reviewed and discussed the pathology report(s) and radiograph reports (if available) in as simple to understand and/or laymen's terms to the best of my ability. I had an indepth conversation with the patient and went over the patient's individual diagnosis based on the information that was currently available. I discussed the TNM staging process with regard to the patient's particular cancer type, and the calculated stage based on the currently available TNM data and literature. I discussed the available prognostic data with regard to the current staging information and how it relates to the prognosis of their particular neoplastic process.          NCCN Guidelines:     I discussed the available treatment option(s) in accordance with the latest literature from the NCCN Clinical Practice Guidelines for the patient's particular type of cancer disorder. The NCCN Guidelines provide a "document evidence-based (and) consensus-driven management" of the care of oncology patients. The treatment recommendations were made not only in accordance to the NCCN guidelines, but also factored in to account the patient's overall age, condition, performance status and their medical co-morbidities. I went over the risks and benefits of the the treatment options (if any could be made) with regard to their particular cancer type, their cancer stage, their age, and their co-morbidities.      Chemotherapy Discussion:        I discussed the available treatment option(s) in accordance with the latest/current national evidence-based guidelines (NCCN, UpToDate, NCI, ASCO, etc where applicable), their overall age/condition and their co-morbidities. I also went " over the risks and benefits of the chemotherapy with regard to their particular cancer type, their cancer stage, their age/condition, and their co-morbidities. I provided literature on the chemotherapy regimen and discussed the chemotherapy side-effect profiles of the drug(s). I discussed the importance of compliance with obtaining and monitoring weekly lab work, and went over the potential hematopathology issues and risks with anemia, leucopenia and thrombocytopenia that can occur with chemotherapy. I discussed the potential risks of liver and kidney damage, which could be permanent and could necessitate dialysis long-term if kidney failure developed. I discussed the emetic and/or diarrheal potential of the regimen and the potential need for use of antiemetic and anti-diarrheal medications. I discussed the risk for development of anaphylactic shock, bronchospasm, dysrhythmia, and respiratory/cardiovascular arrest and/or failure. I discussed the potential risks for development of alopecia, cold sensory issues, ringing in ears, vertigo, cataracts, glaucoma, and neuropathy, all of which could end up being chronic and life-long. Some chemotherpyI discussed the risks of hand-foot syndrome and rashes, and development of other autoimmune mediated processes such as pneumonitis, hepatitis, and colitis which could be life threatening. I discussed the risks of the potential development of a rare but fatal viral mediated disease known as PML (Progressive Multifocal Leukoencephalopathy), and risk of future development of leukemia and/or lymphoma from use of certain chemotherapy agents. I discussed the need for neutropenic precautions, basic hygiene/sanitation behaviors and dietary restrictions.     The patient's consent has been obtained to proceed with the chemotherapy.The patient will be referred to Chemotherapy School /[facility] for training and education on chemotherapy, use of antiemetics and/or anti-diarrheals,  use of NSAID's, potential chemotherapy side-effects, and any specific recommendations and precautions with the particular chemotherapy agents.       I answered all of the patient's (and family's, if applicable) questions to the best of my ability and to their complete satisfaction. The patient acknowledged full understanding of the risks, recommendations and plan(s).      I have explained and the patient understands all of  the current recommendation(s). I have answered all of their questions to the best of my ability and to their complete satisfaction.      Antihormone Therapy Discussion:    I discussed the advantages of antihormone therapy with the patient with regard to their particular neoplastic or carcinoma in situ condition. I went over the side-effect profile of the medication including risk for potential development of endometrial cancer and/or hyperplasia in women who still have a uterus and the need for yearly GYN evaluation and follow-up. I discussed the risks for thromboembolic events such as DVT's, pulmonary emboli, CVA's, retinal vascular clots, phlebitis, and TIA's. I discussed the potential risks for development of ocular disturbances, retinopathy, cataracts, corneal changes, flushing, hot flashes, amenorrhea, altered menses, fluid retention, weight changes, elevations in LFT's, liver damage, and mood disturbances. I discussed the potential risk of arthropathy and joint pains/aches which could be chronic and debilitating. I discussed potential adverse effects on bone mineralization and the risk of osteopenia and/or osteoporosis which could led to increase risk of fractures.   A consent form was obtained and a copy was provided to the patient.        I have explained all of the above in detail and the patient understands all of the current recommendation(s). I have answered all of their questions to the best of my ability and to their complete satisfaction.   The patient is to continue with the  current management plan.            Electronically signed by Susana Bradshaw, MSN,APRN,AGNP-C

## 2022-10-04 NOTE — PLAN OF CARE
Problem: Infection  Goal: Absence of Infection Signs and Symptoms  Outcome: Ongoing, Progressing  Intervention: Prevent or Manage Infection  Flowsheets (Taken 10/4/2022 6257)  Infection Management: aseptic technique maintained

## 2022-10-14 ENCOUNTER — TELEPHONE (OUTPATIENT)
Dept: RADIATION ONCOLOGY | Facility: CLINIC | Age: 32
End: 2022-10-14

## 2022-10-14 NOTE — TELEPHONE ENCOUNTER
Attempted to reach patient for update regarding decision for radiation therapy.  Left voice mail message to contact our office.

## 2022-10-18 ENCOUNTER — CLINICAL SUPPORT (OUTPATIENT)
Dept: CARDIOLOGY | Facility: HOSPITAL | Age: 32
End: 2022-10-18
Attending: NURSE PRACTITIONER
Payer: MEDICAID

## 2022-10-18 VITALS — WEIGHT: 132 LBS | HEIGHT: 60 IN | BODY MASS INDEX: 25.91 KG/M2

## 2022-10-18 DIAGNOSIS — C50.919 HER2-POSITIVE CARCINOMA OF BREAST: ICD-10-CM

## 2022-10-18 PROCEDURE — 93306 TTE W/DOPPLER COMPLETE: CPT | Mod: 26,,, | Performed by: GENERAL PRACTICE

## 2022-10-18 PROCEDURE — 93306 TTE W/DOPPLER COMPLETE: CPT

## 2022-10-18 PROCEDURE — 93306 ECHO (CUPID ONLY): ICD-10-PCS | Mod: 26,,, | Performed by: GENERAL PRACTICE

## 2022-10-19 LAB
AV INDEX (PROSTH): 0.77
AV MEAN GRADIENT: 3 MMHG
AV VALVE AREA: 2.22 CM2
BSA FOR ECHO PROCEDURE: 1.59 M2
CV ECHO LV RWT: 0.25 CM
DOP CALC AO VTI: 22.71 CM
DOP CALC LVOT AREA: 2.9 CM2
DOP CALC LVOT DIAMETER: 1.92 CM
DOP CALC LVOT PEAK VEL: 85.07 M/S
DOP CALC LVOT STROKE VOLUME: 50.35 CM3
DOP CALCLVOT PEAK VEL VTI: 17.4 CM
E WAVE DECELERATION TIME: 287.75 MSEC
E/A RATIO: 1.44
E/E' RATIO: 6.29 M/S
ECHO LV POSTERIOR WALL: 0.58 CM (ref 0.6–1.1)
EJECTION FRACTION: 60 %
FRACTIONAL SHORTENING: 32 % (ref 28–44)
INTERVENTRICULAR SEPTUM: 0.62 CM (ref 0.6–1.1)
LEFT ATRIUM SIZE: 2.4 CM
LEFT ATRIUM VOLUME INDEX MOD: 13.3 ML/M2
LEFT ATRIUM VOLUME MOD: 20.8 CM3
LEFT INTERNAL DIMENSION IN SYSTOLE: 3.11 CM (ref 2.1–4)
LEFT VENTRICLE DIASTOLIC VOLUME INDEX: 62 ML/M2
LEFT VENTRICLE DIASTOLIC VOLUME: 96.72 ML
LEFT VENTRICLE MASS INDEX: 52 G/M2
LEFT VENTRICLE SYSTOLIC VOLUME INDEX: 19.3 ML/M2
LEFT VENTRICLE SYSTOLIC VOLUME: 30.08 ML
LEFT VENTRICULAR INTERNAL DIMENSION IN DIASTOLE: 4.59 CM (ref 3.5–6)
LEFT VENTRICULAR MASS: 81.64 G
LV LATERAL E/E' RATIO: 5.18 M/S
LV SEPTAL E/E' RATIO: 8 M/S
MV PEAK A VEL: 0.61 M/S
MV PEAK E VEL: 0.88 M/S
PISA TR MAX VEL: 2.07 M/S
RA PRESSURE: 3 MMHG
TDI LATERAL: 0.17 M/S
TDI SEPTAL: 0.11 M/S
TDI: 0.14 M/S
TR MAX PG: 17 MMHG
TRICUSPID ANNULAR PLANE SYSTOLIC EXCURSION: 1.74 CM
TV REST PULMONARY ARTERY PRESSURE: 20 MMHG

## 2022-10-21 RX ORDER — HEPARIN 100 UNIT/ML
500 SYRINGE INTRAVENOUS
Status: CANCELLED | OUTPATIENT
Start: 2022-10-25

## 2022-10-21 RX ORDER — SODIUM CHLORIDE 0.9 % (FLUSH) 0.9 %
10 SYRINGE (ML) INJECTION
Status: CANCELLED | OUTPATIENT
Start: 2022-10-25

## 2022-10-21 RX ORDER — DIPHENHYDRAMINE HYDROCHLORIDE 50 MG/ML
50 INJECTION INTRAMUSCULAR; INTRAVENOUS ONCE AS NEEDED
Status: CANCELLED | OUTPATIENT
Start: 2022-10-25

## 2022-10-21 RX ORDER — EPINEPHRINE 0.3 MG/.3ML
0.3 INJECTION SUBCUTANEOUS ONCE AS NEEDED
Status: CANCELLED | OUTPATIENT
Start: 2022-10-25

## 2022-10-24 NOTE — PROGRESS NOTES
"  [facility]  PROGRESS NOTE -  Follow-up Visit      Subjective:       Patient ID:   NAME: Esperanza Peters : 1990     31 y.o. female    Referring Doc: Dr Cheri Smith  Other Physicians: Armaan Mukherjee           Chief Complaint: left breast cancer f/u       History of Present Illness:     Patient returns today for a regularly scheduled follow-up visit.  The patient is here today to go over the results of the recently ordered labs, tests and studies. She is here by herself today.     She previously had surgery with Dr Smith and Dr Nguyen with plastic surgery at [place] on 2022; pathology reports that she had no residual breast cancer in the left and right breast and the six sentinel Ln's on the left were all negative.    She has been on perjeta and herceptin and is getting her last dose today.       she is also continued on tamoxifen    Breathing ok, no current CP, SOB, HA's or N/V; neuropathy resolved and she reports that she feels "good"     discussed covid19 precautions      ROS:   GEN: normal without any fever, night sweats or weight loss  HEENT: normal with no HA's, sore throat, stiff neck, changes in vision  CV: normal with no CP, SOB, PND, CHASE or orthopnea  PULM: normal with no SOB, cough, hemoptysis, sputum or pleuritic pain  GI: normal with no abdominal pain, nausea, vomiting, constipation, diarrhea, melanotic stools, BRBPR, or hematemesis  : normal with no hematuria, dysuria  BREAST: no palpable masses  SKIN: normal with no rash, erythema, bruising, or swelling    Pain Scale:  0    Allergies:  Review of patient's allergies indicates:  No Known Allergies    Medications:    Current Outpatient Medications:     acetaminophen (TYLENOL) 500 MG tablet, Take 500 mg by mouth once as needed for Pain., Disp: , Rfl:     magic mouthwash diphen/antac/lidoc/nysta, Take 10 mLs by mouth 4 (four) times daily. (Patient not taking: No sig reported), Disp: 120 mL, Rfl: 1    " HYDROcodone-acetaminophen (NORCO) 5-325 mg per tablet, Take 1 tablet by mouth 3 (three) times daily as needed for pain. (Patient not taking: No sig reported), Disp: 8 tablet, Rfl: 0    tamoxifen (NOLVADEX) 20 MG Tab, Take 1 tablet (20 mg total) by mouth once daily., Disp: 30 tablet, Rfl: 11  No current facility-administered medications for this visit.    Facility-Administered Medications Ordered in Other Visits:     pertuzumab (PERJETA) 420 mg in sodium chloride 0.9% 264 mL infusion, 420 mg, Intravenous, 1 time in Clinic/HOD, Nilesh Jackson MD    sodium chloride 0.9% 100 mL flush bag, , Intravenous, 1 time in Clinic/HOD, Nilesh Jackson MD    trastuzumab-dkst (OGIVRI) 384 mg in sodium chloride 0.9% 250 mL chemo infusion, 6 mg/kg (Treatment Plan Recorded), Intravenous, 1 time in Clinic/HOD, Nilesh Jackson MD    PMHx/PSHx Updates:  See patient's last visit with me on 8/11/2022  See H&P on 9/13/2021        Pathology:   Cancer Staging   Malignant neoplasm of overlapping sites of left female breast  Staging form: Breast, AJCC 8th Edition  - Clinical: Stage IIB (cT3, cN1(f), cM0, G3, ER+, MT+, HER2+) - Unsigned  - Pathologic: No Stage Recommended (ypT0, pN0(sn), cM0) - Signed by Obed Hill Jr., MD on 5/18/2022      Bilateral mastectomies : 1/128/2022 at [facility]  - pathology report on chart      Objective:     Vitals:  Blood pressure 110/74, pulse 82, temperature 97.2 °F (36.2 °C), resp. rate 18, height 5' (1.524 m), weight 61 kg (134 lb 6.4 oz), SpO2 100 %.    Physical Examination:   GEN: no apparent distress, comfortable; AAOx3  HEAD: atraumatic and normocephalic  EYES: no pallor, no icterus, PERRLA  ENT: OMM, no pharyngeal erythema, external ears WNL; no nasal discharge; no thrush  NECK: no masses, thyroid normal, trachea midline, no LAD/LN's, supple  CV: RRR with no murmur; normal pulse; normal S1 and S2; no pedal edema; Rght CW portacath since removed  CHEST: Normal respiratory effort; CTAB; normal  breath sounds; no wheeze or crackles  ABDOM: nontender and nondistended; soft; normal bowel sounds; no rebound/guarding  MUSC/Skeletal: ROM normal; no crepitus; joints normal; no deformities or arthropathy  EXTREM: no clubbing, cyanosis, inflammation or swelling  SKIN: no rashes, lesions, ulcers, petechiae or subcutaneous nodules  : no pacheco  NEURO: grossly intact; motor/sensory WNL; AAOx3; no tremors  PSYCH: normal mood, affect and behavior  LYMPH: normal cervical, supraclavicular, axillary and groin LN's  Breast: s/p bilateral mastectomies with reconstruction             Labs:     Lab Results   Component Value Date    WBC 4.73 10/25/2022    HGB 10.6 (L) 10/25/2022    HCT 34.4 (L) 10/25/2022    MCV 80 (L) 10/25/2022     10/25/2022       CMP  Sodium   Date Value Ref Range Status   10/25/2022 140 136 - 145 mmol/L Final     Potassium   Date Value Ref Range Status   10/25/2022 3.9 3.5 - 5.1 mmol/L Final     Chloride   Date Value Ref Range Status   10/25/2022 107 95 - 110 mmol/L Final     CO2   Date Value Ref Range Status   10/25/2022 27 23 - 29 mmol/L Final     Glucose   Date Value Ref Range Status   10/25/2022 93 70 - 110 mg/dL Final     BUN   Date Value Ref Range Status   10/25/2022 13 6 - 20 mg/dL Final     Creatinine   Date Value Ref Range Status   10/25/2022 0.9 0.5 - 1.4 mg/dL Final     Calcium   Date Value Ref Range Status   10/25/2022 9.1 8.7 - 10.5 mg/dL Final     Total Protein   Date Value Ref Range Status   10/25/2022 6.9 6.0 - 8.4 g/dL Final     Albumin   Date Value Ref Range Status   10/25/2022 3.7 3.5 - 5.2 g/dL Final     Total Bilirubin   Date Value Ref Range Status   10/25/2022 0.4 0.1 - 1.0 mg/dL Final     Comment:     For infants and newborns, interpretation of results should be based  on gestational age, weight and in agreement with clinical  observations.    Premature Infant recommended reference ranges:  Up to 24 hours.............<8.0 mg/dL  Up to 48 hours............<12.0 mg/dL  3-5  days..................<15.0 mg/dL  6-29 days.................<15.0 mg/dL       Alkaline Phosphatase   Date Value Ref Range Status   10/25/2022 51 (L) 55 - 135 U/L Final     AST   Date Value Ref Range Status   10/25/2022 20 10 - 40 U/L Final     ALT   Date Value Ref Range Status   10/25/2022 14 10 - 44 U/L Final     Anion Gap   Date Value Ref Range Status   10/25/2022 6 (L) 8 - 16 mmol/L Final     eGFR if    Date Value Ref Range Status   07/23/2022 >60.0 >60 mL/min/1.73 m^2 Final     eGFR if non    Date Value Ref Range Status   07/23/2022 >60.0 >60 mL/min/1.73 m^2 Final     Comment:     Calculation used to obtain the estimated glomerular filtration  rate (eGFR) is the CKD-EPI equation.              Radiology/Diagnostic Studies:    MUGA  7/28/2022:  IMPRESSION:  1. Left ventricular ejection fraction of 53%.  2. Normal left ventricular wall motion        MRI breast  12/23/2021:  -    Impression:     1. Complete resolution of previous enhancing multicentric left breast malignancy.  2. Complete resolution of prior enlarged left axillary lymph nodes.  3. Persistent retroareolar left breast distortion near 6 o'clock position, characteristic of scarring.  Recommendation: Patient is currently under medical oncology and surgical care.        PET 10/21/2021:    Impression:     Hypermetabolic left breast mass consistent with primary breast malignancy. Please correlate with dedicated breast imaging.     No pathologically enlarged or hypermetabolic axillary or intrathoracic lymph nodes.     Hypermetabolic retroperitoneal lymph node is nonspecific and would be unusual for breast cancer metastasis. Attention on follow-up is recommended.     Low level FDG activity associated with prominent cervical lymph nodes, bilateral and symmetric, most likely reactive in nature.     Reactive red marrow.      CT scans 9/29/2021:    IMPRESSION:  1. Large irregular subareolar left breast mass and findings compatible  with multifocal/multicentric disease in the left breast (better appreciated on the recent MRI).  2. Left axillary lymphadenopathy compatible with additional sites of disease.  3. Borderline enlarged right hilar node, possibly an additional site of disease, although felt less likely this could be further assessed with PET/CT.        NM Bone Scan Whole Body    Result Date: 9/27/2021  CMS MANDATED QUALITY DATA-BONE SCAN-147 HISTORY: Staging breast carcinoma, and 63.25. COMPARISON: Multiple prior chest radiographs. FINDINGS: 22 mCi Tc 99m MDP was administered IV per protocol, with delayed whole-body images obtained in the anterior and posterior projections. Nodular focus of increased radiotracer uptake in the sternum near the level of the sternomanubrial articulation is nonspecific. There is otherwise no abnormal focal increased radiotracer uptake throughout the axial or appendicular skeleton to suggest osteoblastic metastatic disease. There is normal radiotracer uptake by the kidneys, with normal excretion into the renal collecting systems and urinary bladder. IMPRESSION: 1. Focus of increased osteoblastic activity in the sternum near the sternomanubrial junction, nonspecific. Consider further evaluation with chest CT with contrast. 2. Otherwise no evidence of osteoblastic metastatic disease. Electronically signed by:  Keron Neil MD  9/27/2021 1:41 PM CDT Workstation: [id number]    X-Ray Chest AP Portable    Result Date: 9/20/2021  Portable chest x-ray at 11:13 AM is compared to prior study dated 9/16/2021 Clinical history is Port-A-Cath placement There is a right subclavian vein Port-A-Cath with tip overlying the superior vena cava. There is no pneumothorax. The lungs are clear. The cardiomediastinal silhouette is normal in size. There are no acute osseous normality's. IMPRESSION: No pneumothorax status post right subclavian vein Port-A-Cath placement Electronically signed by:  Breana Lebron MD  9/20/2021 11:52 AM  CDT Workstation: [id number]    MRI Breast w/wo Contrast, w/CAD, Bilateral    Result Date: 9/28/2021  EXAMINATION: MRI BREAST W/WO CONTRAST, W/CAD, BILATERAL CLINICAL HISTORY: Staging left breast carcinoma, C 50.812, N 63.21, N 63.24, N 63.25, malignant neoplasm of overlapping sites of left female breast TECHNIQUE: CMS MANDATED QUALITY DATA - MAMMOGRAPHY - 225 Bilateral breast MRI was performed with a dedicated breast coil. The patient was placed prone in the breast immobilizer with light compression. The following localization sequences were obtained: Axial inversion recovery, axial 3-D non-fat-suppressed, axial 3-D fat suppressed pre-contrast, followed by axial 3-D fat-suppressed post-contrast dynamic images with 6.5 mL Gadavist IV contrast. Post-processing including subtraction imaging, reconstruction in the sagittal and coronal planes, and MIP of both breasts was performed on an independent workstation. The interpretation was assisted by Computer Aided Detection. FINDINGS: Comparison to the diagnostic mammogram and ultrasound of 08/20/2021.  The breasts are composed of mostly fibroglandular elements and interspersed fat.  Background parenchymal enhancement is mild. There is a dominant heterogeneously enhancing solid mass with irregular margins in the left breast retroareolar region measuring 37 mm AP x 3 mm transverse by 35 mm craniocaudal dimension, with suspicious enhancement kinetics.  There are several additional enhancing nodular and irregular infiltrative masses in the left breast, some which are contiguous with the dominant retroareolar mass, extending into the all quadrants of the breast predominantly the upper outer quadrant.  Total area of disease in the left breast measures roughly 38 mm AP x 60 mm transverse by 90 mm craniocaudal dimension, roughly 30-40% of the volume of the left breast.  No abnormal enhancement to suggest involvement of the left pectoralis musculature. There are several enlarged  infiltrated appearing left axillary lymph nodes suspicious for metastatic disease, measuring 16 mm maximum short axis dimension.  There are no enlarged right axillary lymph nodes or enlarged internal mammary chain lymph nodes. Stippled randomly scattered non masslike enhancement in the right breast is nonspecific, with no enhancing right breast mass. There is a 23 mm STIR hyperintense enhancing right hilar mass suspicious for hilar lymphadenopathy, with no additional abnormal enhancement in the chest wall or mediastinum evident.     1. Multifocal, multicentric left breast carcinoma as described, with several enlarged left axillary lymph nodes consistent with metastatic disease. 2. No findings of contralateral right breast carcinoma, with scattered stippled probably benign non masslike enhancement in the right breast. 3. Enhancing right hilar mass suspicious for metastatic disease.  Consider further evaluation with whole body PET CT. BI-RADS CATEGORY 6: KNOWN MALIGNANCY. [facility] utilizes a reminder system to ensure that patients receive reminder letters for appointments. This includes reminders for routine screening mammograms, diagnostic mammograms, or other breast imaging interventions as appropriate. This patient will be placed in the appropriate reminder system. Electronically signed by: eKron Neil MD Date:    09/28/2021 Time:    09:32           I have reviewed all available lab results and radiology reports.    Assessment/Plan:   (1) 31 y.o. female with diagnosis of left breast cancer who has been referred by Dr Cheri Smith for evaluation by medical hematology/oncology.   - left breast masses of overlapping sites involving Outer upper Quadrant and Lower inner Quadrant respectively  - s/p core biopsies taken on 8/27/2021  - axillary LN was positive  - stage III cancer process at least  - ER positive at 80.5 %, NV positive at 3%; Her2Nu (3+)  -Ki67 25%  - discussed the pathology and the latest  NCCN guidelines (version 8.2021)  - recommend neoadjuvant chemotherapy with herceptin + perjeta based regimen which studies have shown to be beneficial to survival and endocrine therapy  - she will need echo, portacath and PEt scan  - will set up chemotherapy school to discuss the drugs, side-effects and provide literature on the drugs    9/30/2021:  - Her case was presented at the [facility] Tumor Board yesterday and consensus was to get PEt scan and proceed with TCHP chemotherapy.   - She had chemotherapy school with Sus[facility].   - She had portacath placed on [facility] CW with Dr Smith.   - She is set to start chemotherapy on Oct 5th.   - echo on chart from 9/24/2021  - discussed the chemotherapy regimen, provided literature and discussed the side-effect profile of the drugs    10/26/2021:  - getting cycle #2 today  - getting Udenyca tomorrow and she also requested some IVF tomorrow as well  - discussed the PEt scan findings from 10/21/2021     12/7/2021:  - 4th cycle chemo this week with IVF and antiemtics as needed  - order MRI of breasts for next week to re-assess response    1/10/2022:  - She completed the 4th cycle of chemotherapy and had subsequent breast MRI on 12/23/2021 which showed an excellent response to the therapy; she has decided to stop the chemotherapy and proceed with surgery; she is adamant that she does not want to continue with the neoadjuvant regiment; she is aware that she will still at least need to continue the herceptin post-op and may need additional chemotherapy depending on the pathology findings from the surgery  - She saw Dr Smith the other day and is seeing Dr Nguyen with plastic surgery tomorrow    2/15/2022:  - She had surgery with Dr Smith and Dr Nguyen with plastic surgery at [place] on Jan 28th 2022; pathology reports that she had no residual breast cancer in the left and right breast and the six sentinel LN's on the left were all negative. She is healing well and has some residual  aches and pain under the left arm. She sees Dr Smith again 2 weeks and Dr Nguyen on 2/21/2022  - Dr Nguyen wants to either move or remove portacath to help with the cosmetic surgery better  - she will need to continue with Herceptin and Perjeta for the next year, she can either get PICC or intermittent IV's    3/16/2022:  - she has since resumed therapy post-op with perjeta and herceptin  - will need periodic echocardiograms  - monitor labs  - she no long has portacath (moved to facilitate reconstruction)     5/4/2022:  - She has since started back on perjeta and herceptin and is tolerating it well. Discussed the recommendation for consideration for oopherectomy, and patient is unsure if she wants to proceed in that direction. She has not had a period since starting chemotherapy.   - discussed and encouraged her to at least consider oral antihormone therapy with tamoxifen and is is willing to proceed  - she needs to follow-up with rad/onc as well for XRT evaluation      6/15/2022:  - she is doing ok with the current regimen    8/11/2022:  - she is doing well with the current regimen  - she is also on tamoxifen  - latest MUGA was on 7/28/2022    10/25/2022:  - completing the herceptin/perjeta regimen  - will need to get periodic echos for next 1-2 yrs  - continued on tamoxifen  - discussed the latest developments in ctDNA monitoring technology     (2) no HTN     (3) No DM     (4) two c-sections with healthy children - she is aware that with chemotherapy she could potentially not be able to reproduce or have any more children - she is comfortable with that fact      VISIT DIAGNOSES:      Malignant neoplasm of overlapping sites of left female breast, unspecified estrogen receptor status    Microcytic anemia    HER2-positive carcinoma of breast    ER+ (estrogen receptor positive status)    Chemotherapy-induced neutropenia          PLAN:  1. completing the regimen of Perjeta and herceptin regimen; continued on tamoxifen  2.  Continue with periodic echo's or MUGA's  as scheduled and directed  3. Check up to date labs at least monthly  4. s/p chemotherapy school, discussed the medications, side-effect profiles and obtained consents  5. Previously discussed and proposed consideration for oophorectomy in future but she is still undecided  6. F/u with rad/onc  7. Discussed the new developments in ctDNA monitoring technology        RTC in 4 weeks with Whit;  12 weeks with myself    Fax note to Lonny Smith; Carly; Liz Catherine/Brayden       Discussion:     COVID-19 Discussion:     I had long discussion with patient and any applicable family about the COVID-19 coronavirus epidemic and the recommended precautions with regard to cancer and/or hematology patients. I have re-iterated the CDC recommendations for adequate hand washing, use of hand -like products, and coughing into elbow, etc. In addition, especially for our patients who are on chemotherapy and/or our otherwise immunocompromised patients, I have recommended avoidance of crowds, including movie theaters, restaurants, churches, etc. I have recommended avoidance of any sick or symptomatic family members and/or friends. I have also recommended avoidance of any raw and unwashed food products, and general avoidance of food items that have not been prepared by themselves. The patient has been asked to call us immediately with any symptom developments, issues, questions or other general concerns.      Pathology Discussion:     I reviewed and discussed the pathology report(s) and radiograph reports (if available) in as simple to understand and/or laymen's terms to the best of my ability. I had an indepth conversation with the patient and went over the patient's individual diagnosis based on the information that was currently available. I discussed the TNM staging process with regard to the patient's particular cancer type, and the calculated stage based on the currently  "available TNM data and literature. I discussed the available prognostic data with regard to the current staging information and how it relates to the prognosis of their particular neoplastic process.          NCCN Guidelines:     I discussed the available treatment option(s) in accordance with the latest literature from the NCCN Clinical Practice Guidelines for the patient's particular type of cancer disorder. The NCCN Guidelines provide a "document evidence-based (and) consensus-driven management" of the care of oncology patients. The treatment recommendations were made not only in accordance to the NCCN guidelines, but also factored in to account the patient's overall age, condition, performance status and their medical co-morbidities. I went over the risks and benefits of the the treatment options (if any could be made) with regard to their particular cancer type, their cancer stage, their age, and their co-morbidities.      Chemotherapy Discussion:        I discussed the available treatment option(s) in accordance with the latest/current national evidence-based guidelines (NCCN, UpToDate, NCI, ASCO, etc where applicable), their overall age/condition and their co-morbidities. I also went over the risks and benefits of the chemotherapy with regard to their particular cancer type, their cancer stage, their age/condition, and their co-morbidities. I provided literature on the chemotherapy regimen and discussed the chemotherapy side-effect profiles of the drug(s). I discussed the importance of compliance with obtaining and monitoring weekly lab work, and went over the potential hematopathology issues and risks with anemia, leucopenia and thrombocytopenia that can occur with chemotherapy. I discussed the potential risks of liver and kidney damage, which could be permanent and could necessitate dialysis long-term if kidney failure developed. I discussed the emetic and/or diarrheal potential of the regimen and the " potential need for use of antiemetic and anti-diarrheal medications. I discussed the risk for development of anaphylactic shock, bronchospasm, dysrhythmia, and respiratory/cardiovascular arrest and/or failure. I discussed the potential risks for development of alopecia, cold sensory issues, ringing in ears, vertigo, cataracts, glaucoma, and neuropathy, all of which could end up being chronic and life-long. Some chemotherpyI discussed the risks of hand-foot syndrome and rashes, and development of other autoimmune mediated processes such as pneumonitis, hepatitis, and colitis which could be life threatening. I discussed the risks of the potential development of a rare but fatal viral mediated disease known as PML (Progressive Multifocal Leukoencephalopathy), and risk of future development of leukemia and/or lymphoma from use of certain chemotherapy agents. I discussed the need for neutropenic precautions, basic hygiene/sanitation behaviors and dietary restrictions.     The patient's consent has been obtained to proceed with the chemotherapy.The patient will be referred to Chemotherapy School /[facility] for training and education on chemotherapy, use of antiemetics and/or anti-diarrheals, use of NSAID's, potential chemotherapy side-effects, and any specific recommendations and precautions with the particular chemotherapy agents.       I answered all of the patient's (and family's, if applicable) questions to the best of my ability and to their complete satisfaction. The patient acknowledged full understanding of the risks, recommendations and plan(s).      I have explained and the patient understands all of  the current recommendation(s). I have answered all of their questions to the best of my ability and to their complete satisfaction.      Antihormone Therapy Discussion:    I discussed the advantages of antihormone therapy with the patient with regard to their particular neoplastic or carcinoma in situ  condition. I went over the side-effect profile of the medication including risk for potential development of endometrial cancer and/or hyperplasia in women who still have a uterus and the need for yearly GYN evaluation and follow-up. I discussed the risks for thromboembolic events such as DVT's, pulmonary emboli, CVA's, retinal vascular clots, phlebitis, and TIA's. I discussed the potential risks for development of ocular disturbances, retinopathy, cataracts, corneal changes, flushing, hot flashes, amenorrhea, altered menses, fluid retention, weight changes, elevations in LFT's, liver damage, and mood disturbances. I discussed the potential risk of arthropathy and joint pains/aches which could be chronic and debilitating. I discussed potential adverse effects on bone mineralization and the risk of osteopenia and/or osteoporosis which could led to increase risk of fractures.   A consent form was obtained and a copy was provided to the patient.       I spent over 25 mins of time with the patient. Reviewed results of the recently ordered labs, tests and studies; made directives with regards to the results. Over half of this time was spent couseling and coordinating care.    I have explained all of the above in detail and the patient understands all of the current recommendation(s). I have answered all of their questions to the best of my ability and to their complete satisfaction.   The patient is to continue with the current management plan.            Electronically signed by Nilesh Jackson MD

## 2022-10-25 ENCOUNTER — INFUSION (OUTPATIENT)
Dept: INFUSION THERAPY | Facility: HOSPITAL | Age: 32
End: 2022-10-25
Attending: INTERNAL MEDICINE
Payer: MEDICAID

## 2022-10-25 ENCOUNTER — OFFICE VISIT (OUTPATIENT)
Dept: HEMATOLOGY/ONCOLOGY | Facility: CLINIC | Age: 32
End: 2022-10-25
Payer: MEDICAID

## 2022-10-25 VITALS
HEIGHT: 60 IN | HEART RATE: 80 BPM | DIASTOLIC BLOOD PRESSURE: 72 MMHG | RESPIRATION RATE: 18 BRPM | TEMPERATURE: 98 F | OXYGEN SATURATION: 98 % | WEIGHT: 134.38 LBS | SYSTOLIC BLOOD PRESSURE: 107 MMHG | BODY MASS INDEX: 26.38 KG/M2

## 2022-10-25 VITALS
HEIGHT: 60 IN | DIASTOLIC BLOOD PRESSURE: 74 MMHG | BODY MASS INDEX: 26.38 KG/M2 | TEMPERATURE: 97 F | HEART RATE: 82 BPM | SYSTOLIC BLOOD PRESSURE: 110 MMHG | OXYGEN SATURATION: 100 % | WEIGHT: 134.38 LBS | RESPIRATION RATE: 18 BRPM

## 2022-10-25 DIAGNOSIS — D70.1 CHEMOTHERAPY-INDUCED NEUTROPENIA: Primary | ICD-10-CM

## 2022-10-25 DIAGNOSIS — C50.812 MALIGNANT NEOPLASM OF OVERLAPPING SITES OF LEFT FEMALE BREAST, UNSPECIFIED ESTROGEN RECEPTOR STATUS: ICD-10-CM

## 2022-10-25 DIAGNOSIS — E86.0 DEHYDRATION: ICD-10-CM

## 2022-10-25 DIAGNOSIS — C50.919 HER2-POSITIVE CARCINOMA OF BREAST: ICD-10-CM

## 2022-10-25 DIAGNOSIS — D70.1 CHEMOTHERAPY-INDUCED NEUTROPENIA: ICD-10-CM

## 2022-10-25 DIAGNOSIS — Z17.0 ER+ (ESTROGEN RECEPTOR POSITIVE STATUS): ICD-10-CM

## 2022-10-25 DIAGNOSIS — D50.9 MICROCYTIC ANEMIA: Chronic | ICD-10-CM

## 2022-10-25 DIAGNOSIS — T45.1X5A CHEMOTHERAPY-INDUCED NEUTROPENIA: Primary | ICD-10-CM

## 2022-10-25 DIAGNOSIS — C50.812 MALIGNANT NEOPLASM OF OVERLAPPING SITES OF LEFT FEMALE BREAST, UNSPECIFIED ESTROGEN RECEPTOR STATUS: Primary | ICD-10-CM

## 2022-10-25 DIAGNOSIS — T45.1X5A CHEMOTHERAPY-INDUCED NEUTROPENIA: ICD-10-CM

## 2022-10-25 PROCEDURE — 3074F SYST BP LT 130 MM HG: CPT | Mod: CPTII,S$GLB,, | Performed by: INTERNAL MEDICINE

## 2022-10-25 PROCEDURE — 25000003 PHARM REV CODE 250: Performed by: INTERNAL MEDICINE

## 2022-10-25 PROCEDURE — 96417 CHEMO IV INFUS EACH ADDL SEQ: CPT

## 2022-10-25 PROCEDURE — 1160F PR REVIEW ALL MEDS BY PRESCRIBER/CLIN PHARMACIST DOCUMENTED: ICD-10-PCS | Mod: CPTII,S$GLB,, | Performed by: INTERNAL MEDICINE

## 2022-10-25 PROCEDURE — 1160F RVW MEDS BY RX/DR IN RCRD: CPT | Mod: CPTII,S$GLB,, | Performed by: INTERNAL MEDICINE

## 2022-10-25 PROCEDURE — 63600175 PHARM REV CODE 636 W HCPCS: Mod: TB | Performed by: INTERNAL MEDICINE

## 2022-10-25 PROCEDURE — 1159F MED LIST DOCD IN RCRD: CPT | Mod: CPTII,S$GLB,, | Performed by: INTERNAL MEDICINE

## 2022-10-25 PROCEDURE — 99214 OFFICE O/P EST MOD 30 MIN: CPT | Mod: S$GLB,,, | Performed by: INTERNAL MEDICINE

## 2022-10-25 PROCEDURE — 3078F PR MOST RECENT DIASTOLIC BLOOD PRESSURE < 80 MM HG: ICD-10-PCS | Mod: CPTII,S$GLB,, | Performed by: INTERNAL MEDICINE

## 2022-10-25 PROCEDURE — 3074F PR MOST RECENT SYSTOLIC BLOOD PRESSURE < 130 MM HG: ICD-10-PCS | Mod: CPTII,S$GLB,, | Performed by: INTERNAL MEDICINE

## 2022-10-25 PROCEDURE — 3078F DIAST BP <80 MM HG: CPT | Mod: CPTII,S$GLB,, | Performed by: INTERNAL MEDICINE

## 2022-10-25 PROCEDURE — 96413 CHEMO IV INFUSION 1 HR: CPT

## 2022-10-25 PROCEDURE — 99214 PR OFFICE/OUTPT VISIT, EST, LEVL IV, 30-39 MIN: ICD-10-PCS | Mod: S$GLB,,, | Performed by: INTERNAL MEDICINE

## 2022-10-25 PROCEDURE — 1159F PR MEDICATION LIST DOCUMENTED IN MEDICAL RECORD: ICD-10-PCS | Mod: CPTII,S$GLB,, | Performed by: INTERNAL MEDICINE

## 2022-10-25 RX ADMIN — PERTUZUMAB 420 MG: 30 INJECTION, SOLUTION, CONCENTRATE INTRAVENOUS at 02:10

## 2022-10-25 RX ADMIN — TRASTUZUMAB 384 MG: 420 INJECTION, POWDER, LYOPHILIZED, FOR SOLUTION INTRAVENOUS at 03:10

## 2022-10-25 RX ADMIN — SODIUM CHLORIDE: 9 INJECTION, SOLUTION INTRAVENOUS at 02:10

## 2022-10-25 NOTE — PLAN OF CARE
Problem: Fatigue  Goal: Improved Activity Tolerance  Outcome: Ongoing, Progressing  Intervention: Promote Improved Energy  Flowsheets (Taken 10/25/2022 3099)  Fatigue Management:   fatigue-related activity identified   activity assistance provided   activity schedule adjusted   frequent rest breaks encouraged   paced activity encouraged  Sleep/Rest Enhancement:   noise level reduced   relaxation techniques promoted   consistent schedule promoted   regular sleep/rest pattern promoted  Activity Management:   Ambulated -L4   Up in chair - L3

## 2022-12-02 NOTE — PROGRESS NOTES
"    [facility] Hematology/Oncology  PROGRESS NOTE -  Follow-up Visit  Survivorship visit     Subjective:       Patient ID:   NAME: Esperanaz Peters : 1990     31 y.o. female    Referring Doc: Dr Cheri Smith  Other Physicians: Armaan Mukherjee           Chief Complaint: left breast cancer f/u       History of Present Illness:     Patient returns today for a regularly scheduled survivorship visit today. The patient is here today to go over the results of the recently ordered labs, tests and studies. She is here by herself today.     She previously had surgery with Dr Smith and Dr Nguyen with plastic surgery at [place] on 2022; pathology reports that she had no residual breast cancer in the left and right breast and the six sentinel Ln's on the left were all negative.    She received her entire year of perjeta and herceptin and finished last month. She remains on Tamoxifen as well.   She is due for reconstructive surgery on 22.     Breathing ok, no current CP, SOB, HA's or N/V; neuropathy resolved and she reports that she feels "fine."    discussed covid19 precautions      ROS:   GEN: normal without any fever, night sweats or weight loss  HEENT: normal with no HA's, sore throat, stiff neck, changes in vision  CV: normal with no CP, SOB, PND, CHASE or orthopnea  PULM: normal with no SOB, cough, hemoptysis, sputum or pleuritic pain  GI: normal with no abdominal pain, nausea, vomiting, constipation, diarrhea, melanotic stools, BRBPR, or hematemesis  : normal with no hematuria, dysuria  BREAST: no palpable masses, expanders in place   SKIN: normal with no rash, erythema, bruising, or swelling    Pain Scale:  0    Allergies:  Review of patient's allergies indicates:  No Known Allergies    Medications:    Current Outpatient Medications:     acetaminophen (TYLENOL) 500 MG tablet, Take 500 mg by mouth once as needed for Pain., Disp: , Rfl:     clindamycin (CLEOCIN) 300 MG capsule, Take 1 capsule three times a " day for 7 days, Disp: 21 capsule, Rfl: 0    diazePAM (VALIUM) 5 MG tablet, Take 1 tablet every 8 hours as needed, Disp: 20 tablet, Rfl: 0    ondansetron (ZOFRAN-ODT) 8 MG TbDL, Let 1 tablet dissolve in mouth every 8 hours as needed for nausea, Disp: 20 tablet, Rfl: 0    oxyCODONE-acetaminophen (PERCOCET) 5-325 mg per tablet, Take 1 tablet every 6 hours as needed for pain., Disp: 28 tablet, Rfl: 0    tamoxifen (NOLVADEX) 20 MG Tab, Take 1 tablet (20 mg total) by mouth once daily., Disp: 30 tablet, Rfl: 11    magic mouthwash diphen/antac/lidoc/nysta, Take 10 mLs by mouth 4 (four) times daily. (Patient not taking: Reported on 5/18/2022), Disp: 120 mL, Rfl: 1    HYDROcodone-acetaminophen (NORCO) 5-325 mg per tablet, Take 1 tablet by mouth 3 (three) times daily as needed for pain. (Patient not taking: Reported on 5/18/2022), Disp: 8 tablet, Rfl: 0    PMHx/PSHx Updates:  See patient's last visit with Dr. Jackson on 10/25/22  See H&P on 9/13/2021        Pathology:   Cancer Staging   Malignant neoplasm of overlapping sites of left female breast  Staging form: Breast, AJCC 8th Edition  - Clinical: Stage IIB (cT3, cN1(f), cM0, G3, ER+, MT+, HER2+) - Unsigned  - Pathologic: No Stage Recommended (ypT0, pN0(sn), cM0) - Signed by Obed Hill Jr., MD on 5/18/2022      Bilateral mastectomies : 1/128/2022 at [facility]  - pathology report on chart      Objective:     Vitals:  Blood pressure 103/66, pulse 72, temperature 98.2 °F (36.8 °C), resp. rate 18, height 5' (1.524 m), weight 61.2 kg (135 lb).    Physical Examination:   GEN: no apparent distress, comfortable; AAOx3  HEAD: atraumatic and normocephalic  EYES: no pallor, no icterus, PERRLA  ENT: OMM, no pharyngeal erythema, external ears WNL; no nasal discharge; no thrush  NECK: no masses, thyroid normal, trachea midline, no LAD/LN's, supple  CV: RRR with no murmur; normal pulse; normal S1 and S2; no pedal edema; Rght CW portacath since removed  CHEST: Normal respiratory effort;  CTAB; normal breath sounds; no wheeze or crackles  ABDOM: nontender and nondistended; soft; normal bowel sounds; no rebound/guarding  MUSC/Skeletal: ROM normal; no crepitus; joints normal; no deformities or arthropathy  EXTREM: no clubbing, cyanosis, inflammation or swelling  SKIN: no rashes, lesions, ulcers, petechiae or subcutaneous nodules  : no pacheco  NEURO: grossly intact; motor/sensory WNL; AAOx3; no tremors  PSYCH: normal mood, affect and behavior  LYMPH: normal cervical, supraclavicular, axillary and groin LN's  Breast: s/p bilateral mastectomies with expanders in place      Labs:     Lab Results   Component Value Date    WBC 4.73 10/25/2022    HGB 10.6 (L) 10/25/2022    HCT 34.4 (L) 10/25/2022    MCV 80 (L) 10/25/2022     10/25/2022       CMP  Sodium   Date Value Ref Range Status   10/25/2022 140 136 - 145 mmol/L Final     Potassium   Date Value Ref Range Status   10/25/2022 3.9 3.5 - 5.1 mmol/L Final     Chloride   Date Value Ref Range Status   10/25/2022 107 95 - 110 mmol/L Final     CO2   Date Value Ref Range Status   10/25/2022 27 23 - 29 mmol/L Final     Glucose   Date Value Ref Range Status   10/25/2022 93 70 - 110 mg/dL Final     BUN   Date Value Ref Range Status   10/25/2022 13 6 - 20 mg/dL Final     Creatinine   Date Value Ref Range Status   10/25/2022 0.9 0.5 - 1.4 mg/dL Final     Calcium   Date Value Ref Range Status   10/25/2022 9.1 8.7 - 10.5 mg/dL Final     Total Protein   Date Value Ref Range Status   10/25/2022 6.9 6.0 - 8.4 g/dL Final     Albumin   Date Value Ref Range Status   10/25/2022 3.7 3.5 - 5.2 g/dL Final     Total Bilirubin   Date Value Ref Range Status   10/25/2022 0.4 0.1 - 1.0 mg/dL Final     Comment:     For infants and newborns, interpretation of results should be based  on gestational age, weight and in agreement with clinical  observations.    Premature Infant recommended reference ranges:  Up to 24 hours.............<8.0 mg/dL  Up to 48 hours............<12.0  mg/dL  3-5 days..................<15.0 mg/dL  6-29 days.................<15.0 mg/dL       Alkaline Phosphatase   Date Value Ref Range Status   10/25/2022 51 (L) 55 - 135 U/L Final     AST   Date Value Ref Range Status   10/25/2022 20 10 - 40 U/L Final     ALT   Date Value Ref Range Status   10/25/2022 14 10 - 44 U/L Final     Anion Gap   Date Value Ref Range Status   10/25/2022 6 (L) 8 - 16 mmol/L Final     eGFR if    Date Value Ref Range Status   07/23/2022 >60.0 >60 mL/min/1.73 m^2 Final     eGFR if non    Date Value Ref Range Status   07/23/2022 >60.0 >60 mL/min/1.73 m^2 Final     Comment:     Calculation used to obtain the estimated glomerular filtration  rate (eGFR) is the CKD-EPI equation.              Radiology/Diagnostic Studies:    ECHO 10/15/22:   The left ventricle is normal in size with normal systolic function.  The estimated ejection fraction is 60%.  Normal left ventricular diastolic function.  The estimated PA systolic pressure is 20 mmHg.  Normal right ventricular size with normal right ventricular systolic function.  Normal central venous pressure (3 mmHg).  Mild tricuspid regurgitation.       MUGA  7/28/2022:  IMPRESSION:  1. Left ventricular ejection fraction of 53%.  2. Normal left ventricular wall motion        MRI breast  12/23/2021:  -    Impression:     1. Complete resolution of previous enhancing multicentric left breast malignancy.  2. Complete resolution of prior enlarged left axillary lymph nodes.  3. Persistent retroareolar left breast distortion near 6 o'clock position, characteristic of scarring.  Recommendation: Patient is currently under medical oncology and surgical care.        PET 10/21/2021:    Impression:     Hypermetabolic left breast mass consistent with primary breast malignancy. Please correlate with dedicated breast imaging.     No pathologically enlarged or hypermetabolic axillary or intrathoracic lymph nodes.     Hypermetabolic  retroperitoneal lymph node is nonspecific and would be unusual for breast cancer metastasis. Attention on follow-up is recommended.     Low level FDG activity associated with prominent cervical lymph nodes, bilateral and symmetric, most likely reactive in nature.     Reactive red marrow.      CT scans 9/29/2021:    IMPRESSION:  1. Large irregular subareolar left breast mass and findings compatible with multifocal/multicentric disease in the left breast (better appreciated on the recent MRI).  2. Left axillary lymphadenopathy compatible with additional sites of disease.  3. Borderline enlarged right hilar node, possibly an additional site of disease, although felt less likely this could be further assessed with PET/CT.        NM Bone Scan Whole Body    Result Date: 9/27/2021  CMS MANDATED QUALITY DATA-BONE SCAN-147 HISTORY: Staging breast carcinoma, and 63.25. COMPARISON: Multiple prior chest radiographs. FINDINGS: 22 mCi Tc 99m MDP was administered IV per protocol, with delayed whole-body images obtained in the anterior and posterior projections. Nodular focus of increased radiotracer uptake in the sternum near the level of the sternomanubrial articulation is nonspecific. There is otherwise no abnormal focal increased radiotracer uptake throughout the axial or appendicular skeleton to suggest osteoblastic metastatic disease. There is normal radiotracer uptake by the kidneys, with normal excretion into the renal collecting systems and urinary bladder. IMPRESSION: 1. Focus of increased osteoblastic activity in the sternum near the sternomanubrial junction, nonspecific. Consider further evaluation with chest CT with contrast. 2. Otherwise no evidence of osteoblastic metastatic disease. Electronically signed by:  Keron Neil MD  9/27/2021 1:41 PM CDT Workstation: [id number]    X-Ray Chest AP Portable    Result Date: 9/20/2021  Portable chest x-ray at 11:13 AM is compared to prior study dated 9/16/2021 Clinical history  is Port-A-Cath placement There is a right subclavian vein Port-A-Cath with tip overlying the superior vena cava. There is no pneumothorax. The lungs are clear. The cardiomediastinal silhouette is normal in size. There are no acute osseous normality's. IMPRESSION: No pneumothorax status post right subclavian vein Port-A-Cath placement Electronically signed by:  Breana Lebron MD  9/20/2021 11:52 AM CDT Workstation: [id number]    MRI Breast w/wo Contrast, w/CAD, Bilateral    Result Date: 9/28/2021  EXAMINATION: MRI BREAST W/WO CONTRAST, W/CAD, BILATERAL CLINICAL HISTORY: Staging left breast carcinoma, C 50.812, N 63.21, N 63.24, N 63.25, malignant neoplasm of overlapping sites of left female breast TECHNIQUE: CMS MANDATED QUALITY DATA - MAMMOGRAPHY - 225 Bilateral breast MRI was performed with a dedicated breast coil. The patient was placed prone in the breast immobilizer with light compression. The following localization sequences were obtained: Axial inversion recovery, axial 3-D non-fat-suppressed, axial 3-D fat suppressed pre-contrast, followed by axial 3-D fat-suppressed post-contrast dynamic images with 6.5 mL Gadavist IV contrast. Post-processing including subtraction imaging, reconstruction in the sagittal and coronal planes, and MIP of both breasts was performed on an independent workstation. The interpretation was assisted by Computer Aided Detection. FINDINGS: Comparison to the diagnostic mammogram and ultrasound of 08/20/2021.  The breasts are composed of mostly fibroglandular elements and interspersed fat.  Background parenchymal enhancement is mild. There is a dominant heterogeneously enhancing solid mass with irregular margins in the left breast retroareolar region measuring 37 mm AP x 3 mm transverse by 35 mm craniocaudal dimension, with suspicious enhancement kinetics.  There are several additional enhancing nodular and irregular infiltrative masses in the left breast, some which are contiguous with  the dominant retroareolar mass, extending into the all quadrants of the breast predominantly the upper outer quadrant.  Total area of disease in the left breast measures roughly 38 mm AP x 60 mm transverse by 90 mm craniocaudal dimension, roughly 30-40% of the volume of the left breast.  No abnormal enhancement to suggest involvement of the left pectoralis musculature. There are several enlarged infiltrated appearing left axillary lymph nodes suspicious for metastatic disease, measuring 16 mm maximum short axis dimension.  There are no enlarged right axillary lymph nodes or enlarged internal mammary chain lymph nodes. Stippled randomly scattered non masslike enhancement in the right breast is nonspecific, with no enhancing right breast mass. There is a 23 mm STIR hyperintense enhancing right hilar mass suspicious for hilar lymphadenopathy, with no additional abnormal enhancement in the chest wall or mediastinum evident.     1. Multifocal, multicentric left breast carcinoma as described, with several enlarged left axillary lymph nodes consistent with metastatic disease. 2. No findings of contralateral right breast carcinoma, with scattered stippled probably benign non masslike enhancement in the right breast. 3. Enhancing right hilar mass suspicious for metastatic disease.  Consider further evaluation with whole body PET CT. BI-RADS CATEGORY 6: KNOWN MALIGNANCY. [facility] utilizes a reminder system to ensure that patients receive reminder letters for appointments. This includes reminders for routine screening mammograms, diagnostic mammograms, or other breast imaging interventions as appropriate. This patient will be placed in the appropriate reminder system. Electronically signed by: Keron Neil MD Date:    09/28/2021 Time:    09:32           I have reviewed all available lab results and radiology reports.    Assessment/Plan:   (1) 31 y.o. female with diagnosis of left breast cancer who has been  referred by Dr Cheri Smith for evaluation by medical hematology/oncology.   - left breast masses of overlapping sites involving Outer upper Quadrant and Lower inner Quadrant respectively  - s/p core biopsies taken on 8/27/2021  - axillary LN was positive  - stage III cancer process at least  - ER positive at 80.5 %, CT positive at 3%; Her2Nu (3+)  -Ki67 25%  - discussed the pathology and the latest NCCN guidelines (version 8.2021)  - recommend neoadjuvant chemotherapy with herceptin + perjeta based regimen which studies have shown to be beneficial to survival and endocrine therapy  - she will need echo, portacath and PEt scan  - will set up chemotherapy school to discuss the drugs, side-effects and provide literature on the drugs    9/30/2021:  - Her case was presented at the [facility] Tumor Board yesterday and consensus was to get PEt scan and proceed with TCHP chemotherapy.   - She had chemotherapy school with [facility].   - She had portacath placed on [facility] CW with Dr Smith.   - She is set to start chemotherapy on Oct 5th.   - echo on chart from 9/24/2021  - discussed the chemotherapy regimen, provided literature and discussed the side-effect profile of the drugs    10/26/2021:  - getting cycle #2 today  - getting Udenyca tomorrow and she also requested some IVF tomorrow as well  - discussed the PEt scan findings from 10/21/2021     12/7/2021:  - 4th cycle chemo this week with IVF and antiemtics as needed  - order MRI of breasts for next week to re-assess response    1/10/2022:  - She completed the 4th cycle of chemotherapy and had subsequent breast MRI on 12/23/2021 which showed an excellent response to the therapy; she has decided to stop the chemotherapy and proceed with surgery; she is adamant that she does not want to continue with the neoadjuvant regiment; she is aware that she will still at least need to continue the herceptin post-op and may need additional chemotherapy depending on the pathology  findings from the surgery  - She saw Dr Smith the other day and is seeing Dr Nguyen with plastic surgery tomorrow    2/15/2022:  - She had surgery with Dr Smith and Dr Nguyen with plastic surgery at [place] on Jan 28th 2022; pathology reports that she had no residual breast cancer in the left and right breast and the six sentinel LN's on the left were all negative. She is healing well and has some residual aches and pain under the left arm. She sees Dr Smith again 2 weeks and Dr Nguyen on 2/21/2022  - Dr Nguyen wants to either move or remove portacath to help with the cosmetic surgery better  - she will need to continue with Herceptin and Perjeta for the next year, she can either get PICC or intermittent IV's    3/16/2022:  - she has since resumed therapy post-op with perjeta and herceptin  - will need periodic echocardiograms  - monitor labs  - she no long has portacath (moved to facilitate reconstruction)     5/4/2022:  - She has since started back on perjeta and herceptin and is tolerating it well. Discussed the recommendation for consideration for oopherectomy, and patient is unsure if she wants to proceed in that direction. She has not had a period since starting chemotherapy.   - discussed and encouraged her to at least consider oral antihormone therapy with tamoxifen and is is willing to proceed  - she needs to follow-up with rad/onc as well for XRT evaluation      6/15/2022:  - she is doing ok with the current regimen    8/11/2022:  - she is doing well with the current regimen  - she is also on tamoxifen  - latest MUGA was on 7/28/2022    10/25/2022:  - completing the herceptin/perjeta regimen  - will need to get periodic echos for next 1-2 yrs  - continued on tamoxifen  - discussed the latest developments in ctDNA monitoring technology    12/2/22:   - completed herceptin/perjeta   - decided against radiation   - continued on tamoxifen   - enrollment in ctDNA trial with [facility] - sent a message to [facility]      (2) no HTN      (3) No DM     (4) two c-sections with healthy children - she is aware that with chemotherapy she could potentially not be able to reproduce or have any more children - she is comfortable with that fact      VISIT DIAGNOSES:      Malignant neoplasm of overlapping sites of left breast in female, estrogen receptor positive    HER2-positive carcinoma of breast  -     Echo; Future; Expected date: 01/23/2023          PLAN:  1. completed the regimen of Perjeta and herceptin regimen; continued on tamoxifen  2. Continue with periodic echo's or MUGA's  as scheduled and directed  3. Check up to date labs at least monthly  4. s/p chemotherapy school, discussed the medications, side-effect profiles and obtained consents  5. Previously discussed and proposed consideration for oophorectomy in future but she is still undecided  6. F/u with rad/onc  7. Discussed the new developments in ctDNA monitoring technology - interested in the trial with [facility]   8. Survivorship careplan reviewed with patient   9. Reconstruction planned for Monday         RTC in 8 weeks with Dr. Jackson and 16 weeks with me     Fax note Renetta        Discussion:     COVID-19 Discussion:     I had long discussion with patient and any applicable family about the COVID-19 coronavirus epidemic and the recommended precautions with regard to cancer and/or hematology patients. I have re-iterated the CDC recommendations for adequate hand washing, use of hand -like products, and coughing into elbow, etc. In addition, especially for our patients who are on chemotherapy and/or our otherwise immunocompromised patients, I have recommended avoidance of crowds, including movie theaters, restaurants, churches, etc. I have recommended avoidance of any sick or symptomatic family members and/or friends. I have also recommended avoidance of any raw and unwashed food products, and general avoidance of food items that have not been prepared by themselves. The patient  "has been asked to call us immediately with any symptom developments, issues, questions or other general concerns.      Pathology Discussion:     I reviewed and discussed the pathology report(s) and radiograph reports (if available) in as simple to understand and/or laymen's terms to the best of my ability. I had an indepth conversation with the patient and went over the patient's individual diagnosis based on the information that was currently available. I discussed the TNM staging process with regard to the patient's particular cancer type, and the calculated stage based on the currently available TNM data and literature. I discussed the available prognostic data with regard to the current staging information and how it relates to the prognosis of their particular neoplastic process.          NCCN Guidelines:     I discussed the available treatment option(s) in accordance with the latest literature from the NCCN Clinical Practice Guidelines for the patient's particular type of cancer disorder. The NCCN Guidelines provide a "document evidence-based (and) consensus-driven management" of the care of oncology patients. The treatment recommendations were made not only in accordance to the NCCN guidelines, but also factored in to account the patient's overall age, condition, performance status and their medical co-morbidities. I went over the risks and benefits of the the treatment options (if any could be made) with regard to their particular cancer type, their cancer stage, their age, and their co-morbidities.      Chemotherapy Discussion:        I discussed the available treatment option(s) in accordance with the latest/current national evidence-based guidelines (NCCN, UpToDate, NCI, ASCO, etc where applicable), their overall age/condition and their co-morbidities. I also went over the risks and benefits of the chemotherapy with regard to their particular cancer type, their cancer stage, their age/condition, and their " co-morbidities. I provided literature on the chemotherapy regimen and discussed the chemotherapy side-effect profiles of the drug(s). I discussed the importance of compliance with obtaining and monitoring weekly lab work, and went over the potential hematopathology issues and risks with anemia, leucopenia and thrombocytopenia that can occur with chemotherapy. I discussed the potential risks of liver and kidney damage, which could be permanent and could necessitate dialysis long-term if kidney failure developed. I discussed the emetic and/or diarrheal potential of the regimen and the potential need for use of antiemetic and anti-diarrheal medications. I discussed the risk for development of anaphylactic shock, bronchospasm, dysrhythmia, and respiratory/cardiovascular arrest and/or failure. I discussed the potential risks for development of alopecia, cold sensory issues, ringing in ears, vertigo, cataracts, glaucoma, and neuropathy, all of which could end up being chronic and life-long. Some chemotherpyI discussed the risks of hand-foot syndrome and rashes, and development of other autoimmune mediated processes such as pneumonitis, hepatitis, and colitis which could be life threatening. I discussed the risks of the potential development of a rare but fatal viral mediated disease known as PML (Progressive Multifocal Leukoencephalopathy), and risk of future development of leukemia and/or lymphoma from use of certain chemotherapy agents. I discussed the need for neutropenic precautions, basic hygiene/sanitation behaviors and dietary restrictions.     The patient's consent has been obtained to proceed with the chemotherapy.The patient will be referred to Chemotherapy School /[facility] for training and education on chemotherapy, use of antiemetics and/or anti-diarrheals, use of NSAID's, potential chemotherapy side-effects, and any specific recommendations and precautions with the particular chemotherapy agents.        I answered all of the patient's (and family's, if applicable) questions to the best of my ability and to their complete satisfaction. The patient acknowledged full understanding of the risks, recommendations and plan(s).      I have explained and the patient understands all of  the current recommendation(s). I have answered all of their questions to the best of my ability and to their complete satisfaction.      Antihormone Therapy Discussion:    I discussed the advantages of antihormone therapy with the patient with regard to their particular neoplastic or carcinoma in situ condition. I went over the side-effect profile of the medication including risk for potential development of endometrial cancer and/or hyperplasia in women who still have a uterus and the need for yearly GYN evaluation and follow-up. I discussed the risks for thromboembolic events such as DVT's, pulmonary emboli, CVA's, retinal vascular clots, phlebitis, and TIA's. I discussed the potential risks for development of ocular disturbances, retinopathy, cataracts, corneal changes, flushing, hot flashes, amenorrhea, altered menses, fluid retention, weight changes, elevations in LFT's, liver damage, and mood disturbances. I discussed the potential risk of arthropathy and joint pains/aches which could be chronic and debilitating. I discussed potential adverse effects on bone mineralization and the risk of osteopenia and/or osteoporosis which could led to increase risk of fractures.   A consent form was obtained and a copy was provided to the patient.        I have explained all of the above in detail and the patient understands all of the current recommendation(s). I have answered all of their questions to the best of my ability and to their complete satisfaction.   The patient is to continue with the current management plan.          Cancer Treatment Summary  Provided by Susana Bradshaw on 12/2/2022  Discussed with patient on 12/2/22      General  Information   Patient Name Esperanza Peters    Date of Birth 1990   Patient ID [id number]    Phone [phone] (home)    Email [email or web address]   Support contact Extended Emergency Contact Information  Primary Emergency Contact: Rita Peters  Address: [facility] [employer] [address] [place]  Home Phone: [phone]  Mobile Phone: [phone]  Relation: Mother     Care Team   Medical Oncologist Dr Nilesh Jackson   Surgeon Dr Latanya Smith   Radiation Oncologist Dr. Hill    Plastic Surgeon Dr. Carroll   Primary Care Physician    [facility]   Navigator Paige Sheriff RN [facility]   Other Providers      Cancer Diagnosis Information   Diagnosis Malignant neoplasm of overlapping sites of left female breast   Staging information IIB, cT3(m)N1M0 g3 multicentric IDC L breast, ER+/MD+/HER2+ converted to [id number]   Side Left   Receptor Status ER positive at 80.5 %, MD positive at 3%; Her2Nu (3+)        Background Information   Age at diagnosis 30 y.o.   Tumor type Invasive Carcinoma   Past cancer history Oncology History   Malignant neoplasm of overlapping sites of left female breast        Clinical Trial - discussed [place] Trial with [facility]   Active Research Studies       Surgical Treatment - patient received a bilateral mastectomy on 1/28/22 and she plans to have reconstruction on 12/5/22     Surgical procedure/findings: Oncology History   Malignant neoplasm of overlapping sites of left female breast      Method of Lymph Node Assessment   [place] Node     Radiation Treatment - patient decided to not undergo radiation at this time    Radiation Oncology History   Malignant neoplasm of overlapping sites of left female breast           Systemic Therapy   Systemic therapy (chemotherapy, targeted therapy, other) Yes   Relation to surgery Before     Summary     Plan Name OP BREAST DOCETAXEL CARBOPLATIN TRASTUZUMAB PERTUZUMAB (TCHP) Q3W   Treatment  Goal Curative   Status Active   Start Date 10/5/2021   End Date 10/25/2022   Provider Nilesh Jackson MD   Chemotherapy CARBOplatin (PARAPLATIN) 760 mg in sodium chloride 0.9% 576 mL chemo infusion, 760 mg (100 % of original dose 762 mg), Intravenous, Clinic/HOD 1 time, 4 of 4 cycles  Dose modification:   (original dose 762 mg, Cycle 1, Reason: MD Discretion),   (original dose 762 mg, Cycle 2)  Administration: 760 mg (10/5/2021), 760 mg (10/26/2021), 760 mg (11/16/2021), 760 mg (12/7/2021)    DOCEtaxeL (TAXOTERE) 75 mg/m2 = 125 mg in sodium chloride 0.9% 262.5 mL chemo infusion, 75 mg/m2 = 125 mg, Intravenous, Clinic/HOD 1 time, 4 of 4 cycles  Administration: 125 mg (10/5/2021), 125 mg (10/26/2021), 125 mg (11/16/2021), 125 mg (12/7/2021)    trastuzumab 384 mg in sodium chloride 0.9% 250 mL chemo infusion, 6 mg/kg = 384 mg (100 % of original dose 6 mg/kg), Intravenous, Clinic/HOD 1 time, 2 of 2 cycles  Dose modification: 6 mg/kg (original dose 6 mg/kg, Cycle 6), 6 mg/kg (original dose 6 mg/kg, Cycle 7)  Administration: 384 mg (3/16/2022), 384 mg (4/13/2022)    pertuzumab (PERJETA) 840 mg in sodium chloride 0.9% 278 mL infusion, 840 mg, Intravenous, Clinic/HOD 1 time, 15 of 15 cycles  Administration: 840 mg (10/5/2021), 420 mg (10/26/2021), 420 mg (2/23/2022), 420 mg (11/16/2021), 420 mg (12/7/2021), 420 mg (5/25/2022), 420 mg (3/16/2022), 420 mg (4/13/2022), 420 mg (5/4/2022), 420 mg (6/15/2022), 420 mg (7/6/2022), 420 mg (7/27/2022), 420 mg (8/23/2022), 420 mg (10/4/2022), 420 mg (10/25/2022)    trastuzumab-dkst (OGIVRI) 512 mg in sodium chloride 0.9% 250 mL chemo infusion, 8 mg/kg = 512 mg, Intravenous, Clinic/hospitals 1 time, 13 of 13 cycles  Administration: 512 mg (10/5/2021), 384 mg (10/26/2021), 384 mg (2/23/2022), 384 mg (11/16/2021), 384 mg (12/7/2021), 384 mg (5/25/2022), 384 mg (5/4/2022), 384 mg (6/15/2022), 384 mg (7/6/2022), 384 mg (7/27/2022), 384 mg (8/23/2022), 384 mg (10/4/2022), 384 mg  (10/25/2022)            Persistent symptoms or side effects at completion of treatment   None at this time           Ongoing Treatment   Endocrine Therapy Patient is taking Tamoxifen and will take for 5-10 years          Familial Cancer Risk Assessment   Cancer-related family history Cancer-related family history includes Breast cancer in her maternal aunt.   Received genetic counseling No  BRCA testing was negative    Genetic testing Oncology History   Malignant neoplasm of overlapping sites of left female breast          Follow-Up Care Plan     Comments              Your follow-up care plan is design to inform you and primary care providers regarding the recommended and required follow-up, cancer screening and routine health maintenance that is needed to maintain optimal health.   Possible late- and long-term effects that someone with this type of cancer and treatment may experience:  Weakening of the heart presenting as shortness of breath and swelling of legs (rare < 5%); and bones become weak and at risk for fracture (osteoporosis).  It is important to remember that these symptoms can be due to other causes like diabetes or with normal aging. If these or any other new symptoms occur bring these to attention of your health care provider.       These symptoms should be brought to the attention of your provider:   Anything that represents a brand new symptom;  Anything that represents a persistent symptom;  3.   Anything you are worried about that might be related to the cancer coming back.   Please continue to see your primary care provider for all general health care recommended for a woman your age such as routine immunizations, and routine non-breast cancer screening like colonoscopy or bone density exams.  Consult with your health care provider about prevention and screening for bone loss using bone density tests.        Schedule for Clinical Visits        Clinic visit with either surgery, medical oncology,  or radiation oncology every 3-6 months for the first 3 years, then annually for 2 years, the regular follow up with PCP.      Patient is interested in enrolling in the ctDNA trial       Cancer Surveillance or Other Recommended Tests   Coordinating Provider Testing to be done How often    Mammogram Annually (if you didn't have a bilateral mastectomy)   PCP or Gyn Pap/pelvic exam Yearly    PCP Colonoscopy As indicated by provider   PCP or Med Onc Bone density Every 2 years if on an aromatase inhibitor or as indicated by your provider    MRI breast Every 6 months     Echo Every 6 months        Breast cancer survivors may experience issues with the areas listed below. If you have any concerns in these or other areas, please speak with your doctors or nurses to find out how you can get help with them.     Anxiety or depression  Emotional and mental health  Fatigue  Fertility  Financial advice or assistance  Insurance  Memory or concentration loss Parenting  Physical functioning  School/Work  Sexual functioning  Stopping Smoking  Weight changes  Other     A number of lifestyle/behaviors can affect your ongoing health, including the risk for the cancer coming back or developing another cancer. Discuss these recommendations with your doctor or nurse:    Alcohol use  Diet  Management of my medications  Management of my other illnesses  Physical activity Sun screen use  Tobacco use/cessation  Weight management (loss/gain)  Other     Resources you may be interested in:     www.cancer.net      © 2018 American [employer] of [employer] Oncology.  All rights reserved.  Important caution: this is a summary document whose purpose is to review the highlights of the cancer treatment for this patient. This does not replace information available in the medical record, a complete medical history provided by the patient, examination and diagnostic information, or educational materials that describe strategies for coping with cancer and cancer  therapies in detail. Both medical science and an individuals health care needs change, and therefore this document is current only as of the date of preparation. This summary document does not prescribe or recommend any particular medical treatment or care for cancer or any other disease and does not substitute for the independent medical judgment of the treating professional         Electronically signed by Susana Bradshaw, MSN,APRN,AGNP-C

## 2023-01-01 ENCOUNTER — HOSPITAL ENCOUNTER (EMERGENCY)
Facility: HOSPITAL | Age: 33
Discharge: ANOTHER HOSPITAL | End: 2023-11-17
Attending: EMERGENCY MEDICINE
Payer: MEDICAID

## 2023-01-01 ENCOUNTER — ANESTHESIA (OUTPATIENT)
Dept: SURGERY | Facility: HOSPITAL | Age: 33
DRG: 025 | End: 2023-01-01
Payer: MEDICAID

## 2023-01-01 ENCOUNTER — ANESTHESIA EVENT (OUTPATIENT)
Dept: SURGERY | Facility: HOSPITAL | Age: 33
DRG: 025 | End: 2023-01-01
Payer: MEDICAID

## 2023-01-01 ENCOUNTER — HOSPITAL ENCOUNTER (INPATIENT)
Facility: HOSPITAL | Age: 33
LOS: 10 days | DRG: 025 | End: 2023-11-27
Attending: PSYCHIATRY & NEUROLOGY | Admitting: PSYCHIATRY & NEUROLOGY
Payer: MEDICAID

## 2023-01-01 ENCOUNTER — PATIENT MESSAGE (OUTPATIENT)
Dept: RESEARCH | Facility: HOSPITAL | Age: 33
End: 2023-01-01
Payer: MEDICAID

## 2023-01-01 ENCOUNTER — TELEPHONE (OUTPATIENT)
Dept: HEMATOLOGY/ONCOLOGY | Facility: CLINIC | Age: 33
End: 2023-01-01

## 2023-01-01 ENCOUNTER — DOCUMENTATION ONLY (OUTPATIENT)
Dept: NEUROLOGY | Facility: CLINIC | Age: 33
End: 2023-01-01
Payer: MEDICAID

## 2023-01-01 ENCOUNTER — TELEPHONE (OUTPATIENT)
Dept: RESEARCH | Facility: HOSPITAL | Age: 33
End: 2023-01-01

## 2023-01-01 ENCOUNTER — TELEPHONE (OUTPATIENT)
Dept: NEUROSURGERY | Facility: CLINIC | Age: 33
End: 2023-01-01
Payer: MEDICAID

## 2023-01-01 ENCOUNTER — HOSPITAL ENCOUNTER (OUTPATIENT)
Dept: CARDIOLOGY | Facility: HOSPITAL | Age: 33
Discharge: HOME | End: 2023-01-25
Attending: NURSE PRACTITIONER
Payer: MEDICAID

## 2023-01-01 VITALS
HEART RATE: 116 BPM | DIASTOLIC BLOOD PRESSURE: 65 MMHG | RESPIRATION RATE: 26 BRPM | HEIGHT: 60 IN | TEMPERATURE: 98 F | BODY MASS INDEX: 26.5 KG/M2 | SYSTOLIC BLOOD PRESSURE: 106 MMHG | WEIGHT: 135 LBS | OXYGEN SATURATION: 100 %

## 2023-01-01 VITALS
WEIGHT: 135 LBS | BODY MASS INDEX: 26.5 KG/M2 | DIASTOLIC BLOOD PRESSURE: 97 MMHG | RESPIRATION RATE: 9 BRPM | TEMPERATURE: 96 F | OXYGEN SATURATION: 100 % | SYSTOLIC BLOOD PRESSURE: 144 MMHG | HEIGHT: 60 IN | HEART RATE: 87 BPM

## 2023-01-01 VITALS — HEIGHT: 60 IN | WEIGHT: 135 LBS | BODY MASS INDEX: 26.5 KG/M2

## 2023-01-01 DIAGNOSIS — I49.9 ABNORMAL HEART RHYTHM: ICD-10-CM

## 2023-01-01 DIAGNOSIS — R55 SYNCOPE: ICD-10-CM

## 2023-01-01 DIAGNOSIS — G93.89 BRAIN MASS: ICD-10-CM

## 2023-01-01 DIAGNOSIS — C50.919 HER2-POSITIVE CARCINOMA OF BREAST: ICD-10-CM

## 2023-01-01 DIAGNOSIS — G93.5 NEOPLASM OF BRAIN CAUSING MASS EFFECT AND BRAIN COMPRESSION ON ADJACENT STRUCTURES: Primary | ICD-10-CM

## 2023-01-01 DIAGNOSIS — N39.0 URINARY TRACT INFECTION WITHOUT HEMATURIA, SITE UNSPECIFIED: ICD-10-CM

## 2023-01-01 DIAGNOSIS — D49.6 NEOPLASM OF BRAIN CAUSING MASS EFFECT AND BRAIN COMPRESSION ON ADJACENT STRUCTURES: Primary | ICD-10-CM

## 2023-01-01 LAB
ABO + RH BLD: NORMAL
ALBUMIN SERPL BCP-MCNC: 2.3 G/DL (ref 3.5–5.2)
ALBUMIN SERPL BCP-MCNC: 2.8 G/DL (ref 3.5–5.2)
ALBUMIN SERPL BCP-MCNC: 2.8 G/DL (ref 3.5–5.2)
ALBUMIN SERPL BCP-MCNC: 3 G/DL (ref 3.5–5.2)
ALBUMIN SERPL BCP-MCNC: 3.1 G/DL (ref 3.5–5.2)
ALBUMIN SERPL BCP-MCNC: 3.1 G/DL (ref 3.5–5.2)
ALBUMIN SERPL BCP-MCNC: 3.2 G/DL (ref 3.5–5.2)
ALBUMIN SERPL BCP-MCNC: 3.7 G/DL (ref 3.5–5.2)
ALBUMIN SERPL BCP-MCNC: 3.7 G/DL (ref 3.5–5.2)
ALBUMIN SERPL BCP-MCNC: 4.4 G/DL (ref 3.5–5.2)
ALLENS TEST: ABNORMAL
ALP SERPL-CCNC: 32 U/L (ref 55–135)
ALP SERPL-CCNC: 32 U/L (ref 55–135)
ALP SERPL-CCNC: 35 U/L (ref 55–135)
ALP SERPL-CCNC: 37 U/L (ref 55–135)
ALP SERPL-CCNC: 39 U/L (ref 55–135)
ALP SERPL-CCNC: 40 U/L (ref 55–135)
ALP SERPL-CCNC: 41 U/L (ref 55–135)
ALP SERPL-CCNC: 45 U/L (ref 55–135)
ALT SERPL W/O P-5'-P-CCNC: 11 U/L (ref 10–44)
ALT SERPL W/O P-5'-P-CCNC: 12 U/L (ref 10–44)
ALT SERPL W/O P-5'-P-CCNC: 16 U/L (ref 10–44)
ALT SERPL W/O P-5'-P-CCNC: 21 U/L (ref 10–44)
ALT SERPL W/O P-5'-P-CCNC: 35 U/L (ref 10–44)
ALT SERPL W/O P-5'-P-CCNC: 38 U/L (ref 10–44)
ALT SERPL W/O P-5'-P-CCNC: 40 U/L (ref 10–44)
ALT SERPL W/O P-5'-P-CCNC: 45 U/L (ref 10–44)
ALT SERPL W/O P-5'-P-CCNC: 52 U/L (ref 10–44)
ALT SERPL W/O P-5'-P-CCNC: 61 U/L (ref 10–44)
AMPHET+METHAMPHET UR QL: NEGATIVE
ANION GAP SERPL CALC-SCNC: 10 MMOL/L (ref 8–16)
ANION GAP SERPL CALC-SCNC: 10 MMOL/L (ref 8–16)
ANION GAP SERPL CALC-SCNC: 11 MMOL/L (ref 8–16)
ANION GAP SERPL CALC-SCNC: 13 MMOL/L (ref 8–16)
ANION GAP SERPL CALC-SCNC: 5 MMOL/L (ref 8–16)
ANION GAP SERPL CALC-SCNC: 7 MMOL/L (ref 8–16)
ANION GAP SERPL CALC-SCNC: 7 MMOL/L (ref 8–16)
ANION GAP SERPL CALC-SCNC: 8 MMOL/L (ref 8–16)
ANION GAP SERPL CALC-SCNC: 9 MMOL/L (ref 8–16)
ANISOCYTOSIS BLD QL SMEAR: SLIGHT
AORTIC ROOT ANNULUS: 2.4 CM
AORTIC VALVE CUSP SEPERATION: 1.7 CM
APTT PPP: 21.7 SEC (ref 21–32)
APTT PPP: 24.3 SEC (ref 21–32)
APTT PPP: 26.5 SEC (ref 21–32)
ASCENDING AORTA: 2.34 CM
AST SERPL-CCNC: 14 U/L (ref 10–40)
AST SERPL-CCNC: 18 U/L (ref 10–40)
AST SERPL-CCNC: 19 U/L (ref 10–40)
AST SERPL-CCNC: 24 U/L (ref 10–40)
AST SERPL-CCNC: 26 U/L (ref 10–40)
AST SERPL-CCNC: 27 U/L (ref 10–40)
AST SERPL-CCNC: 28 U/L (ref 10–40)
AST SERPL-CCNC: 30 U/L (ref 10–40)
AST SERPL-CCNC: 40 U/L (ref 10–40)
AST SERPL-CCNC: 41 U/L (ref 10–40)
AV INDEX (PROSTH): 0.72
AV INDEX (PROSTH): 0.85
AV MEAN GRADIENT: 3 MMHG
AV MEAN GRADIENT: 4 MMHG
AV PEAK GRADIENT: 5 MMHG
AV PEAK GRADIENT: 8 MMHG
AV VALVE AREA BY VELOCITY RATIO: 2.14 CM²
AV VALVE AREA: 1.99 CM²
AV VALVE AREA: 2.66 CM2
AV VELOCITY RATIO: 0.78
AV VELOCITY RATIO: 0.95
B-HCG UR QL: NEGATIVE
BACTERIA #/AREA URNS HPF: ABNORMAL /HPF
BARBITURATES UR QL SCN>200 NG/ML: NEGATIVE
BASOPHILS # BLD AUTO: 0.01 K/UL (ref 0–0.2)
BASOPHILS # BLD AUTO: 0.02 K/UL (ref 0–0.2)
BASOPHILS # BLD AUTO: 0.03 K/UL (ref 0–0.2)
BASOPHILS # BLD AUTO: 0.03 K/UL (ref 0–0.2)
BASOPHILS # BLD AUTO: 0.04 K/UL (ref 0–0.2)
BASOPHILS # BLD AUTO: ABNORMAL K/UL (ref 0–0.2)
BASOPHILS NFR BLD: 0 % (ref 0–1.9)
BASOPHILS NFR BLD: 0.1 % (ref 0–1.9)
BASOPHILS NFR BLD: 0.2 % (ref 0–1.9)
BASOPHILS NFR BLD: 0.2 % (ref 0–1.9)
BASOPHILS NFR BLD: 0.4 % (ref 0–1.9)
BENZODIAZ UR QL SCN>200 NG/ML: NEGATIVE
BILIRUB SERPL-MCNC: 0.1 MG/DL (ref 0.1–1)
BILIRUB SERPL-MCNC: 0.2 MG/DL (ref 0.1–1)
BILIRUB SERPL-MCNC: 0.3 MG/DL (ref 0.1–1)
BILIRUB SERPL-MCNC: 0.3 MG/DL (ref 0.1–1)
BILIRUB SERPL-MCNC: 0.4 MG/DL (ref 0.1–1)
BILIRUB SERPL-MCNC: 0.4 MG/DL (ref 0.1–1)
BILIRUB SERPL-MCNC: 0.5 MG/DL (ref 0.1–1)
BILIRUB UR QL STRIP: NEGATIVE
BILIRUB UR QL STRIP: NEGATIVE
BLD GP AB SCN CELLS X3 SERPL QL: NORMAL
BLD PROD TYP BPU: NORMAL
BLD PROD TYP BPU: NORMAL
BLOOD UNIT EXPIRATION DATE: NORMAL
BLOOD UNIT EXPIRATION DATE: NORMAL
BLOOD UNIT TYPE CODE: 5100
BLOOD UNIT TYPE CODE: 5100
BLOOD UNIT TYPE: NORMAL
BLOOD UNIT TYPE: NORMAL
BNP SERPL-MCNC: 20 PG/ML (ref 0–99)
BSA FOR ECHO PROCEDURE: 1.61 M2
BSA FOR ECHO PROCEDURE: 1.61 M2
BUN SERPL-MCNC: 12 MG/DL (ref 6–20)
BUN SERPL-MCNC: 12 MG/DL (ref 6–20)
BUN SERPL-MCNC: 13 MG/DL (ref 6–20)
BUN SERPL-MCNC: 14 MG/DL (ref 6–20)
BUN SERPL-MCNC: 14 MG/DL (ref 6–20)
BUN SERPL-MCNC: 21 MG/DL (ref 6–20)
BUN SERPL-MCNC: 23 MG/DL (ref 6–20)
BUN SERPL-MCNC: 6 MG/DL (ref 6–20)
BUN SERPL-MCNC: 7 MG/DL (ref 6–20)
BUN SERPL-MCNC: 7 MG/DL (ref 6–20)
BUN SERPL-MCNC: 9 MG/DL (ref 6–20)
BURR CELLS BLD QL SMEAR: ABNORMAL
BZE UR QL SCN: NEGATIVE
CALCIUM SERPL-MCNC: 8 MG/DL (ref 8.7–10.5)
CALCIUM SERPL-MCNC: 8 MG/DL (ref 8.7–10.5)
CALCIUM SERPL-MCNC: 8.2 MG/DL (ref 8.7–10.5)
CALCIUM SERPL-MCNC: 8.3 MG/DL (ref 8.7–10.5)
CALCIUM SERPL-MCNC: 8.4 MG/DL (ref 8.7–10.5)
CALCIUM SERPL-MCNC: 8.7 MG/DL (ref 8.7–10.5)
CALCIUM SERPL-MCNC: 8.8 MG/DL (ref 8.7–10.5)
CALCIUM SERPL-MCNC: 8.8 MG/DL (ref 8.7–10.5)
CALCIUM SERPL-MCNC: 9 MG/DL (ref 8.7–10.5)
CALCIUM SERPL-MCNC: 9.2 MG/DL (ref 8.7–10.5)
CALCIUM SERPL-MCNC: 9.3 MG/DL (ref 8.7–10.5)
CANNABINOIDS UR QL SCN: NEGATIVE
CHLORIDE SERPL-SCNC: 103 MMOL/L (ref 95–110)
CHLORIDE SERPL-SCNC: 104 MMOL/L (ref 95–110)
CHLORIDE SERPL-SCNC: 105 MMOL/L (ref 95–110)
CHLORIDE SERPL-SCNC: 105 MMOL/L (ref 95–110)
CHLORIDE SERPL-SCNC: 107 MMOL/L (ref 95–110)
CHLORIDE SERPL-SCNC: 112 MMOL/L (ref 95–110)
CHLORIDE SERPL-SCNC: 112 MMOL/L (ref 95–110)
CHLORIDE SERPL-SCNC: 129 MMOL/L (ref 95–110)
CHLORIDE SERPL-SCNC: 130 MMOL/L (ref 95–110)
CHOLEST SERPL-MCNC: 144 MG/DL (ref 120–199)
CHOLEST/HDLC SERPL: 2.6 {RATIO} (ref 2–5)
CK SERPL-CCNC: 282 U/L (ref 20–180)
CK SERPL-CCNC: 541 U/L (ref 20–180)
CLARITY UR REFRACT.AUTO: CLEAR
CLARITY UR: ABNORMAL
CO2 SERPL-SCNC: 18 MMOL/L (ref 23–29)
CO2 SERPL-SCNC: 19 MMOL/L (ref 23–29)
CO2 SERPL-SCNC: 19 MMOL/L (ref 23–29)
CO2 SERPL-SCNC: 20 MMOL/L (ref 23–29)
CO2 SERPL-SCNC: 21 MMOL/L (ref 23–29)
CO2 SERPL-SCNC: 22 MMOL/L (ref 23–29)
CO2 SERPL-SCNC: 23 MMOL/L (ref 23–29)
CO2 SERPL-SCNC: 24 MMOL/L (ref 23–29)
CO2 SERPL-SCNC: 24 MMOL/L (ref 23–29)
CO2 SERPL-SCNC: 25 MMOL/L (ref 23–29)
CO2 SERPL-SCNC: 27 MMOL/L (ref 23–29)
CODING SYSTEM: NORMAL
CODING SYSTEM: NORMAL
COLOR UR AUTO: YELLOW
COLOR UR: YELLOW
CREAT SERPL-MCNC: 0.6 MG/DL (ref 0.5–1.4)
CREAT SERPL-MCNC: 0.7 MG/DL (ref 0.5–1.4)
CREAT SERPL-MCNC: 0.8 MG/DL (ref 0.5–1.4)
CREAT SERPL-MCNC: 1 MG/DL (ref 0.5–1.4)
CREAT SERPL-MCNC: 1.1 MG/DL (ref 0.5–1.4)
CREAT UR-MCNC: 210.9 MG/DL (ref 15–325)
CREAT UR-MCNC: 61 MG/DL (ref 15–325)
CROSSMATCH INTERPRETATION: NORMAL
CROSSMATCH INTERPRETATION: NORMAL
CTP QC/QA: YES
CV ECHO LV RWT: 0.31 CM
CV ECHO LV RWT: 0.32 CM
DELSYS: ABNORMAL
DIFFERENTIAL METHOD: ABNORMAL
DISPENSE STATUS: NORMAL
DISPENSE STATUS: NORMAL
DOP CALC AO PEAK VEL: 1.17 M/S
DOP CALC AO PEAK VEL: 1.41 M/S
DOP CALC AO VTI: 25.5 CM
DOP CALC AO VTI: 25.77 CM
DOP CALC LVOT AREA: 2.7 CM2
DOP CALC LVOT AREA: 3.1 CM2
DOP CALC LVOT DIAMETER: 1.87 CM
DOP CALC LVOT DIAMETER: 2 CM
DOP CALC LVOT PEAK VEL: 1.1 M/S
DOP CALC LVOT PEAK VEL: 1.11 M/S
DOP CALC LVOT STROKE VOLUME: 51.22 CM3
DOP CALC LVOT STROKE VOLUME: 67.82 CM3
DOP CALCLVOT PEAK VEL VTI: 18.66 CM
DOP CALCLVOT PEAK VEL VTI: 21.6 CM
E WAVE DECELERATION TIME: 103.36 MSEC
E WAVE DECELERATION TIME: 264 MSEC
E/A RATIO: 1.12
E/A RATIO: 1.13
E/E' RATIO: 5.75 M/S
E/E' RATIO: 7.78 M/S
ECHO LV POSTERIOR WALL: 0.52 CM (ref 0.6–1.1)
ECHO LV POSTERIOR WALL: 0.64 CM (ref 0.6–1.1)
EJECTION FRACTION: 60 %
EOSINOPHIL # BLD AUTO: 0 K/UL (ref 0–0.5)
EOSINOPHIL # BLD AUTO: ABNORMAL K/UL (ref 0–0.5)
EOSINOPHIL NFR BLD: 0 % (ref 0–8)
EOSINOPHIL NFR BLD: 0.1 % (ref 0–8)
EOSINOPHIL NFR BLD: 0.2 % (ref 0–8)
ERYTHROCYTE [DISTWIDTH] IN BLOOD BY AUTOMATED COUNT: 14.3 % (ref 11.5–14.5)
ERYTHROCYTE [DISTWIDTH] IN BLOOD BY AUTOMATED COUNT: 14.4 % (ref 11.5–14.5)
ERYTHROCYTE [DISTWIDTH] IN BLOOD BY AUTOMATED COUNT: 14.5 % (ref 11.5–14.5)
ERYTHROCYTE [DISTWIDTH] IN BLOOD BY AUTOMATED COUNT: 14.6 % (ref 11.5–14.5)
ERYTHROCYTE [DISTWIDTH] IN BLOOD BY AUTOMATED COUNT: 14.7 % (ref 11.5–14.5)
ERYTHROCYTE [DISTWIDTH] IN BLOOD BY AUTOMATED COUNT: 14.9 % (ref 11.5–14.5)
ERYTHROCYTE [DISTWIDTH] IN BLOOD BY AUTOMATED COUNT: 14.9 % (ref 11.5–14.5)
ERYTHROCYTE [SEDIMENTATION RATE] IN BLOOD BY WESTERGREN METHOD: 12 MM/H
ERYTHROCYTE [SEDIMENTATION RATE] IN BLOOD BY WESTERGREN METHOD: 16 MM/H
ERYTHROCYTE [SEDIMENTATION RATE] IN BLOOD BY WESTERGREN METHOD: 17 MM/H
ERYTHROCYTE [SEDIMENTATION RATE] IN BLOOD BY WESTERGREN METHOD: 18 MM/H
ERYTHROCYTE [SEDIMENTATION RATE] IN BLOOD BY WESTERGREN METHOD: 9 MM/H
ERYTHROCYTE [SEDIMENTATION RATE] IN BLOOD BY WESTERGREN METHOD: 9 MM/H
EST. GFR  (NO RACE VARIABLE): >60 ML/MIN/1.73 M^2
ESTIMATED AVG GLUCOSE: 94 MG/DL (ref 68–131)
FIO2: 100
FIO2: 100
FIO2: 30
FIO2: 40
FRACTIONAL SHORTENING: 29 % (ref 28–44)
FRACTIONAL SHORTENING: 45 % (ref 28–44)
GLUCOSE SERPL-MCNC: 105 MG/DL (ref 70–110)
GLUCOSE SERPL-MCNC: 111 MG/DL (ref 70–110)
GLUCOSE SERPL-MCNC: 116 MG/DL (ref 70–110)
GLUCOSE SERPL-MCNC: 126 MG/DL (ref 70–110)
GLUCOSE SERPL-MCNC: 127 MG/DL (ref 70–110)
GLUCOSE SERPL-MCNC: 131 MG/DL (ref 70–110)
GLUCOSE SERPL-MCNC: 165 MG/DL (ref 70–110)
GLUCOSE SERPL-MCNC: 169 MG/DL (ref 70–110)
GLUCOSE SERPL-MCNC: 171 MG/DL (ref 70–110)
GLUCOSE SERPL-MCNC: 179 MG/DL (ref 70–110)
GLUCOSE SERPL-MCNC: 338 MG/DL (ref 70–110)
GLUCOSE SERPL-MCNC: 365 MG/DL (ref 70–110)
GLUCOSE SERPL-MCNC: 97 MG/DL (ref 70–110)
GLUCOSE UR QL STRIP: NEGATIVE
GLUCOSE UR QL STRIP: NEGATIVE
HBA1C MFR BLD: 4.9 % (ref 4–5.6)
HCG INTACT+B SERPL-ACNC: <0.6 MIU/ML
HCO3 UR-SCNC: 18.9 MMOL/L (ref 24–28)
HCO3 UR-SCNC: 19 MMOL/L (ref 24–28)
HCO3 UR-SCNC: 20 MMOL/L (ref 24–28)
HCO3 UR-SCNC: 20.2 MMOL/L (ref 24–28)
HCO3 UR-SCNC: 20.3 MMOL/L (ref 24–28)
HCO3 UR-SCNC: 21.2 MMOL/L (ref 24–28)
HCO3 UR-SCNC: 22 MMOL/L (ref 24–28)
HCO3 UR-SCNC: 23.1 MMOL/L (ref 24–28)
HCO3 UR-SCNC: 24 MMOL/L (ref 24–28)
HCO3 UR-SCNC: 25.2 MMOL/L (ref 24–28)
HCO3 UR-SCNC: 26.1 MMOL/L (ref 24–28)
HCT VFR BLD AUTO: 30.3 % (ref 37–48.5)
HCT VFR BLD AUTO: 31.1 % (ref 37–48.5)
HCT VFR BLD AUTO: 32.7 % (ref 37–48.5)
HCT VFR BLD AUTO: 33.1 % (ref 37–48.5)
HCT VFR BLD AUTO: 35.6 % (ref 37–48.5)
HCT VFR BLD AUTO: 36.8 % (ref 37–48.5)
HCT VFR BLD AUTO: 37.7 % (ref 37–48.5)
HCT VFR BLD AUTO: 38.7 % (ref 37–48.5)
HCT VFR BLD AUTO: 39.3 % (ref 37–48.5)
HCT VFR BLD AUTO: 44 % (ref 37–48.5)
HCT VFR BLD CALC: 22 %PCV (ref 36–54)
HCT VFR BLD CALC: 23 %PCV (ref 36–54)
HCT VFR BLD CALC: 23 %PCV (ref 36–54)
HCT VFR BLD CALC: 24 %PCV (ref 36–54)
HCT VFR BLD CALC: 24 %PCV (ref 36–54)
HCT VFR BLD CALC: 30 %PCV (ref 36–54)
HCT VFR BLD CALC: 30 %PCV (ref 36–54)
HCT VFR BLD CALC: 36 %PCV (ref 36–54)
HDLC SERPL-MCNC: 56 MG/DL (ref 40–75)
HDLC SERPL: 38.9 % (ref 20–50)
HGB BLD-MCNC: 10.3 G/DL (ref 12–16)
HGB BLD-MCNC: 10.4 G/DL (ref 12–16)
HGB BLD-MCNC: 11.1 G/DL (ref 12–16)
HGB BLD-MCNC: 11.8 G/DL (ref 12–16)
HGB BLD-MCNC: 11.9 G/DL (ref 12–16)
HGB BLD-MCNC: 12 G/DL (ref 12–16)
HGB BLD-MCNC: 12.3 G/DL (ref 12–16)
HGB BLD-MCNC: 13.6 G/DL (ref 12–16)
HGB BLD-MCNC: 9.5 G/DL (ref 12–16)
HGB BLD-MCNC: 9.9 G/DL (ref 12–16)
HGB UR QL STRIP: ABNORMAL
HGB UR QL STRIP: ABNORMAL
HYALINE CASTS #/AREA URNS LPF: 12 /LPF
HYPOCHROMIA BLD QL SMEAR: ABNORMAL
IMM GRANULOCYTES # BLD AUTO: 0.03 K/UL (ref 0–0.04)
IMM GRANULOCYTES # BLD AUTO: 0.04 K/UL (ref 0–0.04)
IMM GRANULOCYTES # BLD AUTO: 0.06 K/UL (ref 0–0.04)
IMM GRANULOCYTES # BLD AUTO: 0.13 K/UL (ref 0–0.04)
IMM GRANULOCYTES # BLD AUTO: 0.19 K/UL (ref 0–0.04)
IMM GRANULOCYTES # BLD AUTO: 0.2 K/UL (ref 0–0.04)
IMM GRANULOCYTES # BLD AUTO: 0.34 K/UL (ref 0–0.04)
IMM GRANULOCYTES # BLD AUTO: ABNORMAL K/UL (ref 0–0.04)
IMM GRANULOCYTES NFR BLD AUTO: 0.3 % (ref 0–0.5)
IMM GRANULOCYTES NFR BLD AUTO: 0.4 % (ref 0–0.5)
IMM GRANULOCYTES NFR BLD AUTO: 0.7 % (ref 0–0.5)
IMM GRANULOCYTES NFR BLD AUTO: 1.1 % (ref 0–0.5)
IMM GRANULOCYTES NFR BLD AUTO: 1.2 % (ref 0–0.5)
IMM GRANULOCYTES NFR BLD AUTO: 1.8 % (ref 0–0.5)
IMM GRANULOCYTES NFR BLD AUTO: ABNORMAL % (ref 0–0.5)
INR PPP: 1.1 (ref 0.8–1.2)
INTERVENTRICULAR SEPTUM: 0.64 CM (ref 0.6–1.1)
INTERVENTRICULAR SEPTUM: 0.79 CM (ref 0.6–1.1)
IVRT: 137 MSEC
IVRT: 78.02 MSEC
KETONES UR QL STRIP: ABNORMAL
KETONES UR QL STRIP: NEGATIVE
LA MAJOR: 4.33 CM
LA MINOR: 5.01 CM
LA WIDTH: 2.95 CM
LDLC SERPL CALC-MCNC: ABNORMAL MG/DL (ref 63–159)
LEFT ATRIUM SIZE: 2.33 CM
LEFT ATRIUM SIZE: 2.7 CM
LEFT ATRIUM VOLUME INDEX MOD: 15.9 ML/M2
LEFT ATRIUM VOLUME INDEX: 17.2 ML/M2
LEFT ATRIUM VOLUME MOD: 25.11 CM3
LEFT ATRIUM VOLUME: 27.14 CM3
LEFT INTERNAL DIMENSION IN SYSTOLE: 1.87 CM (ref 2.1–4)
LEFT INTERNAL DIMENSION IN SYSTOLE: 2.84 CM (ref 2.1–4)
LEFT VENTRICLE DIASTOLIC VOLUME INDEX: 29.62 ML/M2
LEFT VENTRICLE DIASTOLIC VOLUME INDEX: 44.45 ML/M2
LEFT VENTRICLE DIASTOLIC VOLUME: 46.8 ML
LEFT VENTRICLE DIASTOLIC VOLUME: 70.23 ML
LEFT VENTRICLE MASS INDEX: 34 G/M2
LEFT VENTRICLE MASS INDEX: 44 G/M2
LEFT VENTRICLE SYSTOLIC VOLUME INDEX: 11.8 ML/M2
LEFT VENTRICLE SYSTOLIC VOLUME INDEX: 19.3 ML/M2
LEFT VENTRICLE SYSTOLIC VOLUME: 18.7 ML
LEFT VENTRICLE SYSTOLIC VOLUME: 30.56 ML
LEFT VENTRICULAR INTERNAL DIMENSION IN DIASTOLE: 3.38 CM (ref 3.5–6)
LEFT VENTRICULAR INTERNAL DIMENSION IN DIASTOLE: 4.01 CM (ref 3.5–6)
LEFT VENTRICULAR MASS: 54.3 G
LEFT VENTRICULAR MASS: 70.12 G
LEUKOCYTE ESTERASE UR QL STRIP: NEGATIVE
LEUKOCYTE ESTERASE UR QL STRIP: NEGATIVE
LV LATERAL E/E' RATIO: 5.11 M/S
LV LATERAL E/E' RATIO: 7.78 M/S
LV SEPTAL E/E' RATIO: 6.57 M/S
LV SEPTAL E/E' RATIO: 7.78 M/S
LVOT MG: 2 MMHG
LVOT MV: 0.72 CM/S
LYMPHOCYTES # BLD AUTO: 0.5 K/UL (ref 1–4.8)
LYMPHOCYTES # BLD AUTO: 0.6 K/UL (ref 1–4.8)
LYMPHOCYTES # BLD AUTO: 0.7 K/UL (ref 1–4.8)
LYMPHOCYTES # BLD AUTO: 0.8 K/UL (ref 1–4.8)
LYMPHOCYTES # BLD AUTO: 1.1 K/UL (ref 1–4.8)
LYMPHOCYTES # BLD AUTO: 1.2 K/UL (ref 1–4.8)
LYMPHOCYTES # BLD AUTO: 1.6 K/UL (ref 1–4.8)
LYMPHOCYTES # BLD AUTO: 1.6 K/UL (ref 1–4.8)
LYMPHOCYTES # BLD AUTO: 1.7 K/UL (ref 1–4.8)
LYMPHOCYTES # BLD AUTO: ABNORMAL K/UL (ref 1–4.8)
LYMPHOCYTES NFR BLD: 17.8 % (ref 18–48)
LYMPHOCYTES NFR BLD: 2 % (ref 18–48)
LYMPHOCYTES NFR BLD: 3.7 % (ref 18–48)
LYMPHOCYTES NFR BLD: 3.8 % (ref 18–48)
LYMPHOCYTES NFR BLD: 5.2 % (ref 18–48)
LYMPHOCYTES NFR BLD: 5.8 % (ref 18–48)
LYMPHOCYTES NFR BLD: 6.1 % (ref 18–48)
LYMPHOCYTES NFR BLD: 7.4 % (ref 18–48)
LYMPHOCYTES NFR BLD: 8.6 % (ref 18–48)
LYMPHOCYTES NFR BLD: 9.3 % (ref 18–48)
MAGNESIUM SERPL-MCNC: 1.5 MG/DL (ref 1.6–2.6)
MAGNESIUM SERPL-MCNC: 1.6 MG/DL (ref 1.6–2.6)
MAGNESIUM SERPL-MCNC: 1.8 MG/DL (ref 1.6–2.6)
MAGNESIUM SERPL-MCNC: 1.8 MG/DL (ref 1.6–2.6)
MAGNESIUM SERPL-MCNC: 1.9 MG/DL (ref 1.6–2.6)
MAGNESIUM SERPL-MCNC: 2 MG/DL (ref 1.6–2.6)
MAGNESIUM SERPL-MCNC: 2.4 MG/DL (ref 1.6–2.6)
MAGNESIUM SERPL-MCNC: 2.8 MG/DL (ref 1.6–2.6)
MCH RBC QN AUTO: 24.5 PG (ref 27–31)
MCH RBC QN AUTO: 24.7 PG (ref 27–31)
MCH RBC QN AUTO: 24.7 PG (ref 27–31)
MCH RBC QN AUTO: 24.8 PG (ref 27–31)
MCH RBC QN AUTO: 24.8 PG (ref 27–31)
MCH RBC QN AUTO: 24.9 PG (ref 27–31)
MCH RBC QN AUTO: 24.9 PG (ref 27–31)
MCH RBC QN AUTO: 25.1 PG (ref 27–31)
MCH RBC QN AUTO: 25.2 PG (ref 27–31)
MCH RBC QN AUTO: 25.5 PG (ref 27–31)
MCHC RBC AUTO-ENTMCNC: 30.9 G/DL (ref 32–36)
MCHC RBC AUTO-ENTMCNC: 31 G/DL (ref 32–36)
MCHC RBC AUTO-ENTMCNC: 31.1 G/DL (ref 32–36)
MCHC RBC AUTO-ENTMCNC: 31.2 G/DL (ref 32–36)
MCHC RBC AUTO-ENTMCNC: 31.3 G/DL (ref 32–36)
MCHC RBC AUTO-ENTMCNC: 31.4 G/DL (ref 32–36)
MCHC RBC AUTO-ENTMCNC: 31.6 G/DL (ref 32–36)
MCHC RBC AUTO-ENTMCNC: 31.8 G/DL (ref 32–36)
MCHC RBC AUTO-ENTMCNC: 31.8 G/DL (ref 32–36)
MCHC RBC AUTO-ENTMCNC: 32.1 G/DL (ref 32–36)
MCV RBC AUTO: 78 FL (ref 82–98)
MCV RBC AUTO: 79 FL (ref 82–98)
MCV RBC AUTO: 80 FL (ref 82–98)
METAMYELOCYTES NFR BLD MANUAL: 1 %
MICROSCOPIC COMMENT: ABNORMAL
MODE: ABNORMAL
MONOCYTES # BLD AUTO: 0.1 K/UL (ref 0.3–1)
MONOCYTES # BLD AUTO: 0.5 K/UL (ref 0.3–1)
MONOCYTES # BLD AUTO: 0.6 K/UL (ref 0.3–1)
MONOCYTES # BLD AUTO: 0.9 K/UL (ref 0.3–1)
MONOCYTES # BLD AUTO: 1.1 K/UL (ref 0.3–1)
MONOCYTES # BLD AUTO: 1.3 K/UL (ref 0.3–1)
MONOCYTES # BLD AUTO: 1.4 K/UL (ref 0.3–1)
MONOCYTES # BLD AUTO: 2.1 K/UL (ref 0.3–1)
MONOCYTES # BLD AUTO: 2.3 K/UL (ref 0.3–1)
MONOCYTES # BLD AUTO: ABNORMAL K/UL (ref 0.3–1)
MONOCYTES NFR BLD: 0.6 % (ref 4–15)
MONOCYTES NFR BLD: 12.1 % (ref 4–15)
MONOCYTES NFR BLD: 12.4 % (ref 4–15)
MONOCYTES NFR BLD: 3.6 % (ref 4–15)
MONOCYTES NFR BLD: 6.3 % (ref 4–15)
MONOCYTES NFR BLD: 6.3 % (ref 4–15)
MONOCYTES NFR BLD: 6.4 % (ref 4–15)
MONOCYTES NFR BLD: 7.3 % (ref 4–15)
MONOCYTES NFR BLD: 8.1 % (ref 4–15)
MONOCYTES NFR BLD: 9 % (ref 4–15)
MV PEAK A VEL: 0.62 M/S
MV PEAK A VEL: 0.82 M/S
MV PEAK E VEL: 0.7 M/S
MV PEAK E VEL: 0.92 M/S
MV STENOSIS PRESSURE HALF TIME: 29.98 MS
MV STENOSIS PRESSURE HALF TIME: 81 MS
MV VALVE AREA P 1/2 METHOD: 2.72 CM2
MV VALVE AREA P 1/2 METHOD: 7.34 CM2
MYELOCYTES NFR BLD MANUAL: 1 %
NEUTROPHILS # BLD AUTO: 11.7 K/UL (ref 1.8–7.7)
NEUTROPHILS # BLD AUTO: 11.8 K/UL (ref 1.8–7.7)
NEUTROPHILS # BLD AUTO: 11.9 K/UL (ref 1.8–7.7)
NEUTROPHILS # BLD AUTO: 13.9 K/UL (ref 1.8–7.7)
NEUTROPHILS # BLD AUTO: 14.2 K/UL (ref 1.8–7.7)
NEUTROPHILS # BLD AUTO: 14.3 K/UL (ref 1.8–7.7)
NEUTROPHILS # BLD AUTO: 14.4 K/UL (ref 1.8–7.7)
NEUTROPHILS # BLD AUTO: 14.9 K/UL (ref 1.8–7.7)
NEUTROPHILS # BLD AUTO: 7.3 K/UL (ref 1.8–7.7)
NEUTROPHILS NFR BLD: 75 % (ref 38–73)
NEUTROPHILS NFR BLD: 77.2 % (ref 38–73)
NEUTROPHILS NFR BLD: 80 % (ref 38–73)
NEUTROPHILS NFR BLD: 82 % (ref 38–73)
NEUTROPHILS NFR BLD: 82.7 % (ref 38–73)
NEUTROPHILS NFR BLD: 84.9 % (ref 38–73)
NEUTROPHILS NFR BLD: 85.8 % (ref 38–73)
NEUTROPHILS NFR BLD: 87.5 % (ref 38–73)
NEUTROPHILS NFR BLD: 92.2 % (ref 38–73)
NEUTROPHILS NFR BLD: 93.8 % (ref 38–73)
NEUTS BAND NFR BLD MANUAL: 7 %
NITRITE UR QL STRIP: NEGATIVE
NITRITE UR QL STRIP: POSITIVE
NONHDLC SERPL-MCNC: 88 MG/DL
NRBC BLD-RTO: 0 /100 WBC
NUM UNITS TRANS PACKED RBC: NORMAL
NUM UNITS TRANS PACKED RBC: NORMAL
OPIATES UR QL SCN: NEGATIVE
OSMOLALITY SERPL: 298 MOSM/KG (ref 275–295)
OSMOLALITY UR: 545 MOSM/KG (ref 50–1200)
OVALOCYTES BLD QL SMEAR: ABNORMAL
PCO2 BLDA: 25.1 MMHG (ref 35–45)
PCO2 BLDA: 25.6 MMHG (ref 35–45)
PCO2 BLDA: 26.5 MMHG (ref 35–45)
PCO2 BLDA: 28 MMHG (ref 35–45)
PCO2 BLDA: 30.5 MMHG (ref 35–45)
PCO2 BLDA: 30.8 MMHG (ref 35–45)
PCO2 BLDA: 38.2 MMHG (ref 35–45)
PCO2 BLDA: 38.4 MMHG (ref 35–45)
PCO2 BLDA: 39.2 MMHG (ref 35–45)
PCO2 BLDA: 45.6 MMHG (ref 35–45)
PCO2 BLDA: 76.2 MMHG (ref 35–45)
PCP UR QL SCN>25 NG/ML: NEGATIVE
PEEP: 5
PH SMN: 7.14 [PH] (ref 7.35–7.45)
PH SMN: 7.33 [PH] (ref 7.35–7.45)
PH SMN: 7.35 [PH] (ref 7.35–7.45)
PH SMN: 7.37 [PH] (ref 7.35–7.45)
PH SMN: 7.42 [PH] (ref 7.35–7.45)
PH SMN: 7.43 [PH] (ref 7.35–7.45)
PH SMN: 7.43 [PH] (ref 7.35–7.45)
PH SMN: 7.46 [PH] (ref 7.35–7.45)
PH SMN: 7.49 [PH] (ref 7.35–7.45)
PH SMN: 7.5 [PH] (ref 7.35–7.45)
PH SMN: 7.53 [PH] (ref 7.35–7.45)
PH UR STRIP: 6 [PH] (ref 5–8)
PH UR STRIP: 7 [PH] (ref 5–8)
PHOSPHATE SERPL-MCNC: 2.4 MG/DL (ref 2.7–4.5)
PHOSPHATE SERPL-MCNC: 2.5 MG/DL (ref 2.7–4.5)
PHOSPHATE SERPL-MCNC: 2.8 MG/DL (ref 2.7–4.5)
PHOSPHATE SERPL-MCNC: 2.8 MG/DL (ref 2.7–4.5)
PHOSPHATE SERPL-MCNC: 2.9 MG/DL (ref 2.7–4.5)
PHOSPHATE SERPL-MCNC: 3 MG/DL (ref 2.7–4.5)
PHOSPHATE SERPL-MCNC: 3.1 MG/DL (ref 2.7–4.5)
PHOSPHATE SERPL-MCNC: 3.1 MG/DL (ref 2.7–4.5)
PHOSPHATE SERPL-MCNC: 4.1 MG/DL (ref 2.7–4.5)
PISA TR MAX VEL: 1.8 M/S
PISA TR MAX VEL: 1.81 M/S
PLATELET # BLD AUTO: 157 K/UL (ref 150–450)
PLATELET # BLD AUTO: 198 K/UL (ref 150–450)
PLATELET # BLD AUTO: 203 K/UL (ref 150–450)
PLATELET # BLD AUTO: 211 K/UL (ref 150–450)
PLATELET # BLD AUTO: 213 K/UL (ref 150–450)
PLATELET # BLD AUTO: 229 K/UL (ref 150–450)
PLATELET # BLD AUTO: 251 K/UL (ref 150–450)
PLATELET # BLD AUTO: 258 K/UL (ref 150–450)
PLATELET # BLD AUTO: 277 K/UL (ref 150–450)
PLATELET # BLD AUTO: 278 K/UL (ref 150–450)
PLATELET BLD QL SMEAR: ABNORMAL
PMV BLD AUTO: 10.1 FL (ref 9.2–12.9)
PMV BLD AUTO: 10.2 FL (ref 9.2–12.9)
PMV BLD AUTO: 10.3 FL (ref 9.2–12.9)
PMV BLD AUTO: 9.3 FL (ref 9.2–12.9)
PMV BLD AUTO: 9.4 FL (ref 9.2–12.9)
PMV BLD AUTO: 9.5 FL (ref 9.2–12.9)
PMV BLD AUTO: 9.5 FL (ref 9.2–12.9)
PMV BLD AUTO: 9.6 FL (ref 9.2–12.9)
PMV BLD AUTO: 9.6 FL (ref 9.2–12.9)
PMV BLD AUTO: 9.8 FL (ref 9.2–12.9)
PO2 BLDA: 135 MMHG (ref 80–100)
PO2 BLDA: 148 MMHG (ref 80–100)
PO2 BLDA: 165 MMHG (ref 80–100)
PO2 BLDA: 173 MMHG (ref 80–100)
PO2 BLDA: 186 MMHG (ref 80–100)
PO2 BLDA: 192 MMHG (ref 80–100)
PO2 BLDA: 272 MMHG (ref 80–100)
PO2 BLDA: 274 MMHG (ref 80–100)
PO2 BLDA: 342 MMHG (ref 80–100)
PO2 BLDA: 38 MMHG (ref 40–60)
PO2 BLDA: 582 MMHG (ref 80–100)
POC BE: -2 MMOL/L
POC BE: -3 MMOL/L
POC BE: -4 MMOL/L
POC BE: -5 MMOL/L
POC BE: 0 MMOL/L
POC BE: 1 MMOL/L
POC IONIZED CALCIUM: 1.05 MMOL/L (ref 1.06–1.42)
POC IONIZED CALCIUM: 1.11 MMOL/L (ref 1.06–1.42)
POC IONIZED CALCIUM: 1.18 MMOL/L (ref 1.06–1.42)
POC IONIZED CALCIUM: 1.19 MMOL/L (ref 1.06–1.42)
POC IONIZED CALCIUM: 1.23 MMOL/L (ref 1.06–1.42)
POC IONIZED CALCIUM: 1.23 MMOL/L (ref 1.06–1.42)
POC IONIZED CALCIUM: 1.24 MMOL/L (ref 1.06–1.42)
POC IONIZED CALCIUM: 1.3 MMOL/L (ref 1.06–1.42)
POC SATURATED O2: 100 % (ref 95–100)
POC SATURATED O2: 70 % (ref 95–100)
POC SATURATED O2: 99 % (ref 95–100)
POC SATURATED O2: 99 % (ref 95–100)
POC TCO2: 20 MMOL/L (ref 23–27)
POC TCO2: 20 MMOL/L (ref 23–27)
POC TCO2: 21 MMOL/L (ref 23–27)
POC TCO2: 22 MMOL/L (ref 23–27)
POC TCO2: 23 MMOL/L (ref 24–29)
POC TCO2: 24 MMOL/L (ref 23–27)
POC TCO2: 25 MMOL/L (ref 23–27)
POC TCO2: 26 MMOL/L (ref 23–27)
POC TCO2: 28 MMOL/L (ref 23–27)
POCT GLUCOSE: 119 MG/DL (ref 70–110)
POCT GLUCOSE: 155 MG/DL (ref 70–110)
POIKILOCYTOSIS BLD QL SMEAR: SLIGHT
POLYCHROMASIA BLD QL SMEAR: ABNORMAL
POTASSIUM BLD-SCNC: 3.5 MMOL/L (ref 3.5–5.1)
POTASSIUM BLD-SCNC: 3.9 MMOL/L (ref 3.5–5.1)
POTASSIUM BLD-SCNC: 4 MMOL/L (ref 3.5–5.1)
POTASSIUM BLD-SCNC: 4.1 MMOL/L (ref 3.5–5.1)
POTASSIUM BLD-SCNC: 4.1 MMOL/L (ref 3.5–5.1)
POTASSIUM BLD-SCNC: 4.6 MMOL/L (ref 3.5–5.1)
POTASSIUM SERPL-SCNC: 3.7 MMOL/L (ref 3.5–5.1)
POTASSIUM SERPL-SCNC: 3.9 MMOL/L (ref 3.5–5.1)
POTASSIUM SERPL-SCNC: 4 MMOL/L (ref 3.5–5.1)
POTASSIUM SERPL-SCNC: 4.1 MMOL/L (ref 3.5–5.1)
POTASSIUM SERPL-SCNC: 4.2 MMOL/L (ref 3.5–5.1)
POTASSIUM SERPL-SCNC: 4.3 MMOL/L (ref 3.5–5.1)
PROT SERPL-MCNC: 5.1 G/DL (ref 6–8.4)
PROT SERPL-MCNC: 5.8 G/DL (ref 6–8.4)
PROT SERPL-MCNC: 6 G/DL (ref 6–8.4)
PROT SERPL-MCNC: 6.3 G/DL (ref 6–8.4)
PROT SERPL-MCNC: 6.4 G/DL (ref 6–8.4)
PROT SERPL-MCNC: 6.7 G/DL (ref 6–8.4)
PROT SERPL-MCNC: 6.9 G/DL (ref 6–8.4)
PROT SERPL-MCNC: 7.2 G/DL (ref 6–8.4)
PROT SERPL-MCNC: 7.6 G/DL (ref 6–8.4)
PROT SERPL-MCNC: 7.6 G/DL (ref 6–8.4)
PROT UR QL STRIP: ABNORMAL
PROT UR QL STRIP: NEGATIVE
PROTHROMBIN TIME: 11.5 SEC (ref 9–12.5)
PROTHROMBIN TIME: 11.6 SEC (ref 9–12.5)
PROTHROMBIN TIME: 11.9 SEC (ref 9–12.5)
RA MAJOR: 3.5 CM
RA PRESSURE: 3 MMHG
RA WIDTH: 2.64 CM
RBC # BLD AUTO: 3.82 M/UL (ref 4–5.4)
RBC # BLD AUTO: 4 M/UL (ref 4–5.4)
RBC # BLD AUTO: 4.12 M/UL (ref 4–5.4)
RBC # BLD AUTO: 4.15 M/UL (ref 4–5.4)
RBC # BLD AUTO: 4.5 M/UL (ref 4–5.4)
RBC # BLD AUTO: 4.63 M/UL (ref 4–5.4)
RBC # BLD AUTO: 4.77 M/UL (ref 4–5.4)
RBC # BLD AUTO: 4.85 M/UL (ref 4–5.4)
RBC # BLD AUTO: 4.9 M/UL (ref 4–5.4)
RBC # BLD AUTO: 5.54 M/UL (ref 4–5.4)
RBC #/AREA URNS HPF: >100 /HPF (ref 0–4)
RIGHT VENTRICULAR END-DIASTOLIC DIMENSION: 1.82 CM
SAMPLE: ABNORMAL
SAMPLE: NORMAL
SINUS: 2.54 CM
SITE: ABNORMAL
SODIUM BLD-SCNC: 134 MMOL/L (ref 136–145)
SODIUM BLD-SCNC: 141 MMOL/L (ref 136–145)
SODIUM BLD-SCNC: 144 MMOL/L (ref 136–145)
SODIUM BLD-SCNC: 155 MMOL/L (ref 136–145)
SODIUM BLD-SCNC: 156 MMOL/L (ref 136–145)
SODIUM SERPL-SCNC: 135 MMOL/L (ref 136–145)
SODIUM SERPL-SCNC: 136 MMOL/L (ref 136–145)
SODIUM SERPL-SCNC: 137 MMOL/L (ref 136–145)
SODIUM SERPL-SCNC: 137 MMOL/L (ref 136–145)
SODIUM SERPL-SCNC: 140 MMOL/L (ref 136–145)
SODIUM SERPL-SCNC: 142 MMOL/L (ref 136–145)
SODIUM SERPL-SCNC: 145 MMOL/L (ref 136–145)
SODIUM SERPL-SCNC: 146 MMOL/L (ref 136–145)
SODIUM SERPL-SCNC: 153 MMOL/L (ref 136–145)
SODIUM SERPL-SCNC: 154 MMOL/L (ref 136–145)
SODIUM SERPL-SCNC: 154 MMOL/L (ref 136–145)
SODIUM SERPL-SCNC: 155 MMOL/L (ref 136–145)
SODIUM SERPL-SCNC: 156 MMOL/L (ref 136–145)
SODIUM SERPL-SCNC: 166 MMOL/L (ref 136–145)
SODIUM UR-SCNC: 96 MMOL/L (ref 20–250)
SP GR UR STRIP: 1.02 (ref 1–1.03)
SP GR UR STRIP: >1.03 (ref 1–1.03)
SP02: 100
SP02: 100
SPECIMEN OUTDATE: NORMAL
SQUAMOUS #/AREA URNS HPF: 16 /HPF
STJ: 2.19 CM
TDI LATERAL: 0.09 M/S
TDI LATERAL: 0.18 M/S
TDI SEPTAL: 0.09 M/S
TDI SEPTAL: 0.14 M/S
TDI: 0.09 M/S
TDI: 0.16 M/S
TOXICOLOGY INFORMATION: NORMAL
TR MAX PG: 13 MMHG
TR MAX PG: 13 MMHG
TRICUSPID ANNULAR PLANE SYSTOLIC EXCURSION: 2.08 CM
TRIGL SERPL-MCNC: 427 MG/DL (ref 30–150)
TRIGL SERPL-MCNC: 52 MG/DL (ref 30–150)
TROPONIN I SERPL HS-MCNC: <2.3 PG/ML (ref 0–14.9)
TSH SERPL DL<=0.005 MIU/L-ACNC: 1.12 UIU/ML (ref 0.4–4)
TV REST PULMONARY ARTERY PRESSURE: 16 MMHG
URN SPEC COLLECT METH UR: ABNORMAL
URN SPEC COLLECT METH UR: ABNORMAL
UROBILINOGEN UR STRIP-ACNC: ABNORMAL EU/DL
VT: 340
VT: 400
WBC # BLD AUTO: 12.48 K/UL (ref 3.9–12.7)
WBC # BLD AUTO: 12.77 K/UL (ref 3.9–12.7)
WBC # BLD AUTO: 13.54 K/UL (ref 3.9–12.7)
WBC # BLD AUTO: 16.82 K/UL (ref 3.9–12.7)
WBC # BLD AUTO: 16.94 K/UL (ref 3.9–12.7)
WBC # BLD AUTO: 17.15 K/UL (ref 3.9–12.7)
WBC # BLD AUTO: 17.35 K/UL (ref 3.9–12.7)
WBC # BLD AUTO: 18.66 K/UL (ref 3.9–12.7)
WBC # BLD AUTO: 32.98 K/UL (ref 3.9–12.7)
WBC # BLD AUTO: 9.75 K/UL (ref 3.9–12.7)
WBC #/AREA URNS HPF: 10 /HPF (ref 0–5)
Z-SCORE OF LEFT VENTRICULAR DIMENSION IN END DIASTOLE: -1.21
Z-SCORE OF LEFT VENTRICULAR DIMENSION IN END SYSTOLE: 0.09

## 2023-01-01 PROCEDURE — 82565 ASSAY OF CREATININE: CPT

## 2023-01-01 PROCEDURE — 94002 VENT MGMT INPAT INIT DAY: CPT

## 2023-01-01 PROCEDURE — 36620 ARTERIAL: ICD-10-PCS | Mod: 59,,, | Performed by: ANESTHESIOLOGY

## 2023-01-01 PROCEDURE — 63600175 PHARM REV CODE 636 W HCPCS: Performed by: PSYCHIATRY & NEUROLOGY

## 2023-01-01 PROCEDURE — 82803 BLOOD GASES ANY COMBINATION: CPT

## 2023-01-01 PROCEDURE — 85007 BL SMEAR W/DIFF WBC COUNT: CPT | Performed by: PHYSICIAN ASSISTANT

## 2023-01-01 PROCEDURE — 36000713 HC OR TIME LEV V EA ADD 15 MIN: Performed by: NEUROLOGICAL SURGERY

## 2023-01-01 PROCEDURE — 11000001 HC ACUTE MED/SURG PRIVATE ROOM

## 2023-01-01 PROCEDURE — 85027 COMPLETE CBC AUTOMATED: CPT | Performed by: PHYSICIAN ASSISTANT

## 2023-01-01 PROCEDURE — 36556 INSERT NON-TUNNEL CV CATH: CPT | Mod: ,,, | Performed by: PSYCHIATRY & NEUROLOGY

## 2023-01-01 PROCEDURE — 99233 PR SUBSEQUENT HOSPITAL CARE,LEVL III: ICD-10-PCS | Mod: ,,, | Performed by: NEUROLOGICAL SURGERY

## 2023-01-01 PROCEDURE — 51702 INSERT TEMP BLADDER CATH: CPT

## 2023-01-01 PROCEDURE — 37000009 HC ANESTHESIA EA ADD 15 MINS: Performed by: NEUROLOGICAL SURGERY

## 2023-01-01 PROCEDURE — 93010 ELECTROCARDIOGRAM REPORT: CPT | Mod: ,,, | Performed by: INTERNAL MEDICINE

## 2023-01-01 PROCEDURE — 63600175 PHARM REV CODE 636 W HCPCS: Performed by: STUDENT IN AN ORGANIZED HEALTH CARE EDUCATION/TRAINING PROGRAM

## 2023-01-01 PROCEDURE — 88307 PR  SURG PATH,LEVEL V: ICD-10-PCS | Mod: 26,,, | Performed by: STUDENT IN AN ORGANIZED HEALTH CARE EDUCATION/TRAINING PROGRAM

## 2023-01-01 PROCEDURE — 63600175 PHARM REV CODE 636 W HCPCS: Performed by: NEUROLOGICAL SURGERY

## 2023-01-01 PROCEDURE — 88341 IMHCHEM/IMCYTCHM EA ADD ANTB: CPT | Performed by: STUDENT IN AN ORGANIZED HEALTH CARE EDUCATION/TRAINING PROGRAM

## 2023-01-01 PROCEDURE — 80053 COMPREHEN METABOLIC PANEL: CPT | Performed by: PHYSICIAN ASSISTANT

## 2023-01-01 PROCEDURE — C1729 CATH, DRAINAGE: HCPCS | Performed by: NEUROLOGICAL SURGERY

## 2023-01-01 PROCEDURE — 99292 PR CRITICAL CARE, ADDL 30 MIN: ICD-10-PCS | Mod: 25,,, | Performed by: PSYCHIATRY & NEUROLOGY

## 2023-01-01 PROCEDURE — 88341 PR IHC OR ICC EACH ADD'L SINGLE ANTIBODY  STAINPR: ICD-10-PCS | Mod: 26,59,, | Performed by: STUDENT IN AN ORGANIZED HEALTH CARE EDUCATION/TRAINING PROGRAM

## 2023-01-01 PROCEDURE — 81003 URINALYSIS AUTO W/O SCOPE: CPT | Performed by: PHYSICIAN ASSISTANT

## 2023-01-01 PROCEDURE — C1713 ANCHOR/SCREW BN/BN,TIS/BN: HCPCS | Performed by: NEUROLOGICAL SURGERY

## 2023-01-01 PROCEDURE — 31500 INSERT EMERGENCY AIRWAY: CPT

## 2023-01-01 PROCEDURE — 63600175 PHARM REV CODE 636 W HCPCS

## 2023-01-01 PROCEDURE — 36620 INSERTION CATHETER ARTERY: CPT

## 2023-01-01 PROCEDURE — 84702 CHORIONIC GONADOTROPIN TEST: CPT | Performed by: NURSE PRACTITIONER

## 2023-01-01 PROCEDURE — 99900035 HC TECH TIME PER 15 MIN (STAT)

## 2023-01-01 PROCEDURE — 85025 COMPLETE CBC W/AUTO DIFF WBC: CPT | Mod: 91 | Performed by: PHYSICIAN ASSISTANT

## 2023-01-01 PROCEDURE — 97530 THERAPEUTIC ACTIVITIES: CPT

## 2023-01-01 PROCEDURE — 83735 ASSAY OF MAGNESIUM: CPT | Mod: 91 | Performed by: PHYSICIAN ASSISTANT

## 2023-01-01 PROCEDURE — 99233 SBSQ HOSP IP/OBS HIGH 50: CPT | Mod: ,,, | Performed by: NEUROLOGICAL SURGERY

## 2023-01-01 PROCEDURE — 27100171 HC OXYGEN HIGH FLOW UP TO 24 HOURS

## 2023-01-01 PROCEDURE — 94003 VENT MGMT INPAT SUBQ DAY: CPT

## 2023-01-01 PROCEDURE — 36000710: Performed by: NEUROLOGICAL SURGERY

## 2023-01-01 PROCEDURE — 85025 COMPLETE CBC W/AUTO DIFF WBC: CPT | Performed by: PSYCHIATRY & NEUROLOGY

## 2023-01-01 PROCEDURE — 93306 TTE W/DOPPLER COMPLETE: CPT

## 2023-01-01 PROCEDURE — 99233 PR SUBSEQUENT HOSPITAL CARE,LEVL III: ICD-10-PCS | Mod: ,,, | Performed by: STUDENT IN AN ORGANIZED HEALTH CARE EDUCATION/TRAINING PROGRAM

## 2023-01-01 PROCEDURE — 36415 COLL VENOUS BLD VENIPUNCTURE: CPT | Performed by: PHYSICIAN ASSISTANT

## 2023-01-01 PROCEDURE — 25000003 PHARM REV CODE 250: Performed by: PSYCHIATRY & NEUROLOGY

## 2023-01-01 PROCEDURE — 94761 N-INVAS EAR/PLS OXIMETRY MLT: CPT | Mod: XB

## 2023-01-01 PROCEDURE — 20000000 HC ICU ROOM

## 2023-01-01 PROCEDURE — 63600175 PHARM REV CODE 636 W HCPCS: Performed by: PHYSICIAN ASSISTANT

## 2023-01-01 PROCEDURE — C1762 CONN TISS, HUMAN(INC FASCIA): HCPCS | Performed by: NEUROLOGICAL SURGERY

## 2023-01-01 PROCEDURE — 83880 ASSAY OF NATRIURETIC PEPTIDE: CPT | Performed by: NURSE PRACTITIONER

## 2023-01-01 PROCEDURE — 96375 TX/PRO/DX INJ NEW DRUG ADDON: CPT | Mod: 59

## 2023-01-01 PROCEDURE — 85610 PROTHROMBIN TIME: CPT | Performed by: EMERGENCY MEDICINE

## 2023-01-01 PROCEDURE — 85610 PROTHROMBIN TIME: CPT

## 2023-01-01 PROCEDURE — D9220A PRA ANESTHESIA: Mod: ANES,,, | Performed by: ANESTHESIOLOGY

## 2023-01-01 PROCEDURE — 36620 ARTERIAL LINE: ICD-10-PCS | Mod: ,,, | Performed by: NURSE PRACTITIONER

## 2023-01-01 PROCEDURE — 25000003 PHARM REV CODE 250: Performed by: PHYSICIAN ASSISTANT

## 2023-01-01 PROCEDURE — 84295 ASSAY OF SERUM SODIUM: CPT

## 2023-01-01 PROCEDURE — 25000003 PHARM REV CODE 250: Performed by: NEUROLOGICAL SURGERY

## 2023-01-01 PROCEDURE — 99900031 HC PATIENT EDUCATION (STAT)

## 2023-01-01 PROCEDURE — 83930 ASSAY OF BLOOD OSMOLALITY: CPT | Performed by: PHYSICIAN ASSISTANT

## 2023-01-01 PROCEDURE — 61210: ICD-10-PCS | Mod: 59,78,, | Performed by: NEUROLOGICAL SURGERY

## 2023-01-01 PROCEDURE — 94150 VITAL CAPACITY TEST: CPT

## 2023-01-01 PROCEDURE — 25000003 PHARM REV CODE 250: Performed by: EMERGENCY MEDICINE

## 2023-01-01 PROCEDURE — 88360 TUMOR IMMUNOHISTOCHEM/MANUAL: CPT | Mod: 26,,, | Performed by: STUDENT IN AN ORGANIZED HEALTH CARE EDUCATION/TRAINING PROGRAM

## 2023-01-01 PROCEDURE — 99499 UNLISTED E&M SERVICE: CPT | Mod: ,,, | Performed by: NURSE PRACTITIONER

## 2023-01-01 PROCEDURE — 85610 PROTHROMBIN TIME: CPT | Performed by: STUDENT IN AN ORGANIZED HEALTH CARE EDUCATION/TRAINING PROGRAM

## 2023-01-01 PROCEDURE — 99291 PR CRITICAL CARE, E/M 30-74 MINUTES: ICD-10-PCS | Mod: ,,, | Performed by: PHYSICIAN ASSISTANT

## 2023-01-01 PROCEDURE — 82800 BLOOD PH: CPT

## 2023-01-01 PROCEDURE — 36620 INSERTION CATHETER ARTERY: CPT | Mod: ,,, | Performed by: NURSE PRACTITIONER

## 2023-01-01 PROCEDURE — 37000008 HC ANESTHESIA 1ST 15 MINUTES: Performed by: NEUROLOGICAL SURGERY

## 2023-01-01 PROCEDURE — 99291 CRITICAL CARE FIRST HOUR: CPT | Mod: 25

## 2023-01-01 PROCEDURE — 84100 ASSAY OF PHOSPHORUS: CPT | Performed by: PHYSICIAN ASSISTANT

## 2023-01-01 PROCEDURE — 99900026 HC AIRWAY MAINTENANCE (STAT)

## 2023-01-01 PROCEDURE — 99291 PR CRITICAL CARE, E/M 30-74 MINUTES: ICD-10-PCS | Mod: ,,,

## 2023-01-01 PROCEDURE — 97129 THER IVNTJ 1ST 15 MIN: CPT

## 2023-01-01 PROCEDURE — 99499 NO LOS: ICD-10-PCS | Mod: ,,, | Performed by: NURSE PRACTITIONER

## 2023-01-01 PROCEDURE — 83735 ASSAY OF MAGNESIUM: CPT | Performed by: PHYSICIAN ASSISTANT

## 2023-01-01 PROCEDURE — 61322 PR OPEN SKULL,DECOMPRES,W/DURAPLASTY: ICD-10-PCS | Mod: 78,,, | Performed by: NEUROLOGICAL SURGERY

## 2023-01-01 PROCEDURE — 94761 N-INVAS EAR/PLS OXIMETRY MLT: CPT

## 2023-01-01 PROCEDURE — 85014 HEMATOCRIT: CPT

## 2023-01-01 PROCEDURE — 36600 WITHDRAWAL OF ARTERIAL BLOOD: CPT

## 2023-01-01 PROCEDURE — 88360 TUMOR IMMUNOHISTOCHEM/MANUAL: CPT | Mod: 59 | Performed by: STUDENT IN AN ORGANIZED HEALTH CARE EDUCATION/TRAINING PROGRAM

## 2023-01-01 PROCEDURE — 85610 PROTHROMBIN TIME: CPT | Performed by: PHYSICIAN ASSISTANT

## 2023-01-01 PROCEDURE — 61322 CRNEC/CRNOT DCMPRV W/O LOBEC: CPT | Mod: 78,,, | Performed by: NEUROLOGICAL SURGERY

## 2023-01-01 PROCEDURE — 25000003 PHARM REV CODE 250: Performed by: NURSE PRACTITIONER

## 2023-01-01 PROCEDURE — 27200966 HC CLOSED SUCTION SYSTEM

## 2023-01-01 PROCEDURE — 80061 LIPID PANEL: CPT | Performed by: PHYSICIAN ASSISTANT

## 2023-01-01 PROCEDURE — 83935 ASSAY OF URINE OSMOLALITY: CPT | Performed by: PHYSICIAN ASSISTANT

## 2023-01-01 PROCEDURE — 84300 ASSAY OF URINE SODIUM: CPT | Performed by: PHYSICIAN ASSISTANT

## 2023-01-01 PROCEDURE — 25000003 PHARM REV CODE 250

## 2023-01-01 PROCEDURE — 99292 CRITICAL CARE ADDL 30 MIN: CPT | Mod: 25,,, | Performed by: PSYCHIATRY & NEUROLOGY

## 2023-01-01 PROCEDURE — 85025 COMPLETE CBC W/AUTO DIFF WBC: CPT | Performed by: PHYSICIAN ASSISTANT

## 2023-01-01 PROCEDURE — 99233 SBSQ HOSP IP/OBS HIGH 50: CPT | Mod: FS,,, | Performed by: PSYCHIATRY & NEUROLOGY

## 2023-01-01 PROCEDURE — 80307 DRUG TEST PRSMV CHEM ANLYZR: CPT | Performed by: NURSE PRACTITIONER

## 2023-01-01 PROCEDURE — 25500020 PHARM REV CODE 255: Performed by: INTERNAL MEDICINE

## 2023-01-01 PROCEDURE — 88307 TISSUE EXAM BY PATHOLOGIST: CPT | Mod: 26,,, | Performed by: STUDENT IN AN ORGANIZED HEALTH CARE EDUCATION/TRAINING PROGRAM

## 2023-01-01 PROCEDURE — 25500020 PHARM REV CODE 255: Performed by: PSYCHIATRY & NEUROLOGY

## 2023-01-01 PROCEDURE — 95720 PR EEG, W/VIDEO, CONT RECORD, I&R, >12<26 HRS: ICD-10-PCS | Mod: ,,, | Performed by: PSYCHIATRY & NEUROLOGY

## 2023-01-01 PROCEDURE — 88331 PATH CONSLTJ SURG 1 BLK 1SPC: CPT | Performed by: STUDENT IN AN ORGANIZED HEALTH CARE EDUCATION/TRAINING PROGRAM

## 2023-01-01 PROCEDURE — 25000003 PHARM REV CODE 250: Performed by: STUDENT IN AN ORGANIZED HEALTH CARE EDUCATION/TRAINING PROGRAM

## 2023-01-01 PROCEDURE — 97162 PT EVAL MOD COMPLEX 30 MIN: CPT

## 2023-01-01 PROCEDURE — 27800903 OPTIME MED/SURG SUP & DEVICES OTHER IMPLANTS: Performed by: NEUROLOGICAL SURGERY

## 2023-01-01 PROCEDURE — 95819 EEG AWAKE AND ASLEEP: CPT | Mod: 59

## 2023-01-01 PROCEDURE — A9585 GADOBUTROL INJECTION: HCPCS | Performed by: NEUROLOGICAL SURGERY

## 2023-01-01 PROCEDURE — 99291 CRITICAL CARE FIRST HOUR: CPT | Mod: ,,, | Performed by: PSYCHIATRY & NEUROLOGY

## 2023-01-01 PROCEDURE — 36000711: Performed by: NEUROLOGICAL SURGERY

## 2023-01-01 PROCEDURE — 96366 THER/PROPH/DIAG IV INF ADDON: CPT | Mod: 59

## 2023-01-01 PROCEDURE — 80053 COMPREHEN METABOLIC PANEL: CPT

## 2023-01-01 PROCEDURE — D9220A PRA ANESTHESIA: ICD-10-PCS | Mod: CRNA,,, | Performed by: NURSE ANESTHETIST, CERTIFIED REGISTERED

## 2023-01-01 PROCEDURE — 36000712 HC OR TIME LEV V 1ST 15 MIN: Performed by: NEUROLOGICAL SURGERY

## 2023-01-01 PROCEDURE — 99291 CRITICAL CARE FIRST HOUR: CPT | Mod: 25,,, | Performed by: PSYCHIATRY & NEUROLOGY

## 2023-01-01 PROCEDURE — 27201423 OPTIME MED/SURG SUP & DEVICES STERILE SUPPLY: Performed by: NEUROLOGICAL SURGERY

## 2023-01-01 PROCEDURE — 99238 PR HOSPITAL DISCHARGE DAY,<30 MIN: ICD-10-PCS | Mod: ,,, | Performed by: PHYSICIAN ASSISTANT

## 2023-01-01 PROCEDURE — 63600175 PHARM REV CODE 636 W HCPCS: Performed by: NURSE PRACTITIONER

## 2023-01-01 PROCEDURE — 36415 COLL VENOUS BLD VENIPUNCTURE: CPT

## 2023-01-01 PROCEDURE — 97168 OT RE-EVAL EST PLAN CARE: CPT

## 2023-01-01 PROCEDURE — 96376 TX/PRO/DX INJ SAME DRUG ADON: CPT

## 2023-01-01 PROCEDURE — 85730 THROMBOPLASTIN TIME PARTIAL: CPT | Performed by: EMERGENCY MEDICINE

## 2023-01-01 PROCEDURE — 25000003 PHARM REV CODE 250: Performed by: NURSE ANESTHETIST, CERTIFIED REGISTERED

## 2023-01-01 PROCEDURE — D9220A PRA ANESTHESIA: Mod: ,,, | Performed by: ANESTHESIOLOGY

## 2023-01-01 PROCEDURE — 88342 IMHCHEM/IMCYTCHM 1ST ANTB: CPT | Performed by: STUDENT IN AN ORGANIZED HEALTH CARE EDUCATION/TRAINING PROGRAM

## 2023-01-01 PROCEDURE — 61510 CRNEC TREPH EXC BRN TUM STTL: CPT | Mod: ,,, | Performed by: NEUROLOGICAL SURGERY

## 2023-01-01 PROCEDURE — 84478 ASSAY OF TRIGLYCERIDES: CPT | Performed by: PHYSICIAN ASSISTANT

## 2023-01-01 PROCEDURE — 95720 EEG PHY/QHP EA INCR W/VEEG: CPT | Mod: ,,, | Performed by: PSYCHIATRY & NEUROLOGY

## 2023-01-01 PROCEDURE — 80053 COMPREHEN METABOLIC PANEL: CPT | Performed by: NURSE PRACTITIONER

## 2023-01-01 PROCEDURE — 93306 TTE W/DOPPLER COMPLETE: CPT | Mod: 26,,, | Performed by: INTERNAL MEDICINE

## 2023-01-01 PROCEDURE — 27000221 HC OXYGEN, UP TO 24 HOURS

## 2023-01-01 PROCEDURE — 84295 ASSAY OF SERUM SODIUM: CPT | Performed by: NURSE PRACTITIONER

## 2023-01-01 PROCEDURE — 94760 N-INVAS EAR/PLS OXIMETRY 1: CPT

## 2023-01-01 PROCEDURE — A9585 GADOBUTROL INJECTION: HCPCS | Performed by: INTERNAL MEDICINE

## 2023-01-01 PROCEDURE — 99291 PR CRITICAL CARE, E/M 30-74 MINUTES: ICD-10-PCS | Mod: ,,, | Performed by: PSYCHIATRY & NEUROLOGY

## 2023-01-01 PROCEDURE — 88342 CHG IMMUNOCYTOCHEMISTRY: ICD-10-PCS | Mod: 26,59,, | Performed by: STUDENT IN AN ORGANIZED HEALTH CARE EDUCATION/TRAINING PROGRAM

## 2023-01-01 PROCEDURE — C1751 CATH, INF, PER/CENT/MIDLINE: HCPCS

## 2023-01-01 PROCEDURE — 85730 THROMBOPLASTIN TIME PARTIAL: CPT | Performed by: PHYSICIAN ASSISTANT

## 2023-01-01 PROCEDURE — 61781 PR STEREOTACTIC COMP ASSIST PROC,CRANIAL,INTRADURAL: ICD-10-PCS | Mod: ,,, | Performed by: NEUROLOGICAL SURGERY

## 2023-01-01 PROCEDURE — 82330 ASSAY OF CALCIUM: CPT

## 2023-01-01 PROCEDURE — 82550 ASSAY OF CK (CPK): CPT | Performed by: PHYSICIAN ASSISTANT

## 2023-01-01 PROCEDURE — 61781 SCAN PROC CRANIAL INTRA: CPT | Mod: ,,, | Performed by: NEUROLOGICAL SURGERY

## 2023-01-01 PROCEDURE — 81025 URINE PREGNANCY TEST: CPT | Performed by: EMERGENCY MEDICINE

## 2023-01-01 PROCEDURE — 99233 SBSQ HOSP IP/OBS HIGH 50: CPT | Mod: ,,, | Performed by: STUDENT IN AN ORGANIZED HEALTH CARE EDUCATION/TRAINING PROGRAM

## 2023-01-01 PROCEDURE — 63600175 PHARM REV CODE 636 W HCPCS: Performed by: NURSE ANESTHETIST, CERTIFIED REGISTERED

## 2023-01-01 PROCEDURE — 80048 BASIC METABOLIC PNL TOTAL CA: CPT | Mod: XB | Performed by: NEUROLOGICAL SURGERY

## 2023-01-01 PROCEDURE — 93010 ELECTROCARDIOGRAM REPORT: CPT | Mod: 59,,, | Performed by: INTERNAL MEDICINE

## 2023-01-01 PROCEDURE — D9220A PRA ANESTHESIA: Mod: CRNA,,, | Performed by: NURSE ANESTHETIST, CERTIFIED REGISTERED

## 2023-01-01 PROCEDURE — 88360 PR  TUMOR IMMUNOHISTOCHEM/MANUAL: ICD-10-PCS | Mod: 26,,, | Performed by: STUDENT IN AN ORGANIZED HEALTH CARE EDUCATION/TRAINING PROGRAM

## 2023-01-01 PROCEDURE — 93010 EKG 12-LEAD: ICD-10-PCS | Mod: 59,,, | Performed by: INTERNAL MEDICINE

## 2023-01-01 PROCEDURE — 83036 HEMOGLOBIN GLYCOSYLATED A1C: CPT | Performed by: PSYCHIATRY & NEUROLOGY

## 2023-01-01 PROCEDURE — 61210 BURR HOLE IMPLT VENTR CATH: CPT | Mod: 59,78,, | Performed by: NEUROLOGICAL SURGERY

## 2023-01-01 PROCEDURE — 86901 BLOOD TYPING SEROLOGIC RH(D): CPT

## 2023-01-01 PROCEDURE — 97116 GAIT TRAINING THERAPY: CPT

## 2023-01-01 PROCEDURE — 97165 OT EVAL LOW COMPLEX 30 MIN: CPT

## 2023-01-01 PROCEDURE — 25500020 PHARM REV CODE 255: Performed by: NEUROLOGICAL SURGERY

## 2023-01-01 PROCEDURE — 37799 UNLISTED PX VASCULAR SURGERY: CPT

## 2023-01-01 PROCEDURE — D9220A PRA ANESTHESIA: ICD-10-PCS | Mod: ,,, | Performed by: ANESTHESIOLOGY

## 2023-01-01 PROCEDURE — 99900017 HC EXTUBATION W/PARAMETERS (STAT)

## 2023-01-01 PROCEDURE — 81001 URINALYSIS AUTO W/SCOPE: CPT | Mod: XB | Performed by: NURSE PRACTITIONER

## 2023-01-01 PROCEDURE — 85730 THROMBOPLASTIN TIME PARTIAL: CPT

## 2023-01-01 PROCEDURE — 86901 BLOOD TYPING SEROLOGIC RH(D): CPT | Performed by: EMERGENCY MEDICINE

## 2023-01-01 PROCEDURE — 93005 ELECTROCARDIOGRAM TRACING: CPT | Performed by: INTERNAL MEDICINE

## 2023-01-01 PROCEDURE — 97535 SELF CARE MNGMENT TRAINING: CPT

## 2023-01-01 PROCEDURE — 61510 PR EXCIS SUPRATENT BRAIN TUMOR: ICD-10-PCS | Mod: ,,, | Performed by: NEUROLOGICAL SURGERY

## 2023-01-01 PROCEDURE — 88342 IMHCHEM/IMCYTCHM 1ST ANTB: CPT | Mod: 26,59,, | Performed by: STUDENT IN AN ORGANIZED HEALTH CARE EDUCATION/TRAINING PROGRAM

## 2023-01-01 PROCEDURE — A9585 GADOBUTROL INJECTION: HCPCS | Performed by: PSYCHIATRY & NEUROLOGY

## 2023-01-01 PROCEDURE — 93010 EKG 12-LEAD: ICD-10-PCS | Mod: ,,, | Performed by: INTERNAL MEDICINE

## 2023-01-01 PROCEDURE — D9220A PRA ANESTHESIA: ICD-10-PCS | Mod: ANES,,, | Performed by: ANESTHESIOLOGY

## 2023-01-01 PROCEDURE — 84100 ASSAY OF PHOSPHORUS: CPT

## 2023-01-01 PROCEDURE — 93306 ECHO (CUPID ONLY): ICD-10-PCS | Mod: 26,,, | Performed by: INTERNAL MEDICINE

## 2023-01-01 PROCEDURE — 83735 ASSAY OF MAGNESIUM: CPT | Performed by: NURSE PRACTITIONER

## 2023-01-01 PROCEDURE — 82570 ASSAY OF URINE CREATININE: CPT | Performed by: PHYSICIAN ASSISTANT

## 2023-01-01 PROCEDURE — 63600175 PHARM REV CODE 636 W HCPCS: Performed by: EMERGENCY MEDICINE

## 2023-01-01 PROCEDURE — 99291 CRITICAL CARE FIRST HOUR: CPT | Mod: ,,, | Performed by: PHYSICIAN ASSISTANT

## 2023-01-01 PROCEDURE — 84132 ASSAY OF SERUM POTASSIUM: CPT

## 2023-01-01 PROCEDURE — 99238 HOSP IP/OBS DSCHRG MGMT 30/<: CPT | Mod: ,,, | Performed by: PHYSICIAN ASSISTANT

## 2023-01-01 PROCEDURE — 88331 PATH CONSLTJ SURG 1 BLK 1SPC: CPT | Mod: 26,,, | Performed by: STUDENT IN AN ORGANIZED HEALTH CARE EDUCATION/TRAINING PROGRAM

## 2023-01-01 PROCEDURE — 88341 IMHCHEM/IMCYTCHM EA ADD ANTB: CPT | Mod: 26,59,, | Performed by: STUDENT IN AN ORGANIZED HEALTH CARE EDUCATION/TRAINING PROGRAM

## 2023-01-01 PROCEDURE — 96367 TX/PROPH/DG ADDL SEQ IV INF: CPT

## 2023-01-01 PROCEDURE — 94010 BREATHING CAPACITY TEST: CPT

## 2023-01-01 PROCEDURE — 85025 COMPLETE CBC W/AUTO DIFF WBC: CPT | Performed by: INTERNAL MEDICINE

## 2023-01-01 PROCEDURE — C1765 ADHESION BARRIER: HCPCS | Performed by: NEUROLOGICAL SURGERY

## 2023-01-01 PROCEDURE — 36620 INSERTION CATHETER ARTERY: CPT | Mod: 59,,, | Performed by: ANESTHESIOLOGY

## 2023-01-01 PROCEDURE — 84295 ASSAY OF SERUM SODIUM: CPT | Mod: 91

## 2023-01-01 PROCEDURE — 96365 THER/PROPH/DIAG IV INF INIT: CPT

## 2023-01-01 PROCEDURE — 84443 ASSAY THYROID STIM HORMONE: CPT | Performed by: PHYSICIAN ASSISTANT

## 2023-01-01 PROCEDURE — 80053 COMPREHEN METABOLIC PANEL: CPT | Mod: 91 | Performed by: PHYSICIAN ASSISTANT

## 2023-01-01 PROCEDURE — 83735 ASSAY OF MAGNESIUM: CPT

## 2023-01-01 PROCEDURE — 99233 PR SUBSEQUENT HOSPITAL CARE,LEVL III: ICD-10-PCS | Mod: FS,,, | Performed by: PSYCHIATRY & NEUROLOGY

## 2023-01-01 PROCEDURE — 88307 TISSUE EXAM BY PATHOLOGIST: CPT | Performed by: STUDENT IN AN ORGANIZED HEALTH CARE EDUCATION/TRAINING PROGRAM

## 2023-01-01 PROCEDURE — 36556 INSERT NON-TUNNEL CV CATH: CPT

## 2023-01-01 PROCEDURE — 85025 COMPLETE CBC W/AUTO DIFF WBC: CPT | Performed by: NURSE PRACTITIONER

## 2023-01-01 PROCEDURE — 86920 COMPATIBILITY TEST SPIN: CPT | Performed by: NEUROLOGICAL SURGERY

## 2023-01-01 PROCEDURE — 85730 THROMBOPLASTIN TIME PARTIAL: CPT | Performed by: STUDENT IN AN ORGANIZED HEALTH CARE EDUCATION/TRAINING PROGRAM

## 2023-01-01 PROCEDURE — 99291 PR CRITICAL CARE, E/M 30-74 MINUTES: ICD-10-PCS | Mod: 25,,, | Performed by: PSYCHIATRY & NEUROLOGY

## 2023-01-01 PROCEDURE — 36600 WITHDRAWAL OF ARTERIAL BLOOD: CPT | Mod: 59

## 2023-01-01 PROCEDURE — 97112 NEUROMUSCULAR REEDUCATION: CPT

## 2023-01-01 PROCEDURE — 84484 ASSAY OF TROPONIN QUANT: CPT | Performed by: NURSE PRACTITIONER

## 2023-01-01 PROCEDURE — 99291 CRITICAL CARE FIRST HOUR: CPT | Mod: ,,,

## 2023-01-01 PROCEDURE — 25500020 PHARM REV CODE 255

## 2023-01-01 PROCEDURE — 88331 PR  PATH CONSULT IN SURG,W FRZ SEC: ICD-10-PCS | Mod: 26,,, | Performed by: STUDENT IN AN ORGANIZED HEALTH CARE EDUCATION/TRAINING PROGRAM

## 2023-01-01 PROCEDURE — 27201037 HC PRESSURE MONITORING SET UP

## 2023-01-01 PROCEDURE — 36556 PR INSERT NON-TUNNEL CV CATH 5+ YRS OLD: ICD-10-PCS | Mod: ,,, | Performed by: PSYCHIATRY & NEUROLOGY

## 2023-01-01 DEVICE — SCREW UN3 AXS SD 1.5X4MM: Type: IMPLANTABLE DEVICE | Site: CRANIAL | Status: FUNCTIONAL

## 2023-01-01 DEVICE — DURA MATRIX ONLAY PLUS 2X2: Type: IMPLANTABLE DEVICE | Site: CRANIAL | Status: FUNCTIONAL

## 2023-01-01 DEVICE — PLATE BONE 2X2 HOLE SM BOX: Type: IMPLANTABLE DEVICE | Site: CRANIAL | Status: FUNCTIONAL

## 2023-01-01 DEVICE — MEMBRANE SEPRAFILM 5 X 6: Type: IMPLANTABLE DEVICE | Site: CRANIAL | Status: FUNCTIONAL

## 2023-01-01 DEVICE — DURAMATRIX ONLAY 5X7 MEMBRANE: Type: IMPLANTABLE DEVICE | Site: CRANIAL | Status: FUNCTIONAL

## 2023-01-01 RX ORDER — MANNITOL 20 G/100ML
75 INJECTION, SOLUTION INTRAVENOUS ONCE
Status: COMPLETED | OUTPATIENT
Start: 2023-01-01 | End: 2023-01-01

## 2023-01-01 RX ORDER — LIDOCAINE HYDROCHLORIDE AND EPINEPHRINE 10; 10 MG/ML; UG/ML
INJECTION, SOLUTION INFILTRATION; PERINEURAL
Status: DISCONTINUED | OUTPATIENT
Start: 2023-01-01 | End: 2023-01-01 | Stop reason: HOSPADM

## 2023-01-01 RX ORDER — SODIUM,POTASSIUM PHOSPHATES 280-250MG
2 POWDER IN PACKET (EA) ORAL
Status: DISCONTINUED | OUTPATIENT
Start: 2023-01-01 | End: 2023-01-01

## 2023-01-01 RX ORDER — LORAZEPAM 2 MG/ML
2 INJECTION INTRAMUSCULAR ONCE
Status: DISCONTINUED | OUTPATIENT
Start: 2023-01-01 | End: 2023-01-01

## 2023-01-01 RX ORDER — GADOBUTROL 604.72 MG/ML
7 INJECTION INTRAVENOUS
Status: COMPLETED | OUTPATIENT
Start: 2023-01-01 | End: 2023-01-01

## 2023-01-01 RX ORDER — SODIUM CHLORIDE 9 MG/ML
INJECTION, SOLUTION INTRAVENOUS CONTINUOUS
Status: DISCONTINUED | OUTPATIENT
Start: 2023-01-01 | End: 2023-01-01

## 2023-01-01 RX ORDER — LEVETIRACETAM 500 MG/5ML
1000 INJECTION, SOLUTION, CONCENTRATE INTRAVENOUS EVERY 12 HOURS
Status: DISCONTINUED | OUTPATIENT
Start: 2023-01-01 | End: 2023-01-01

## 2023-01-01 RX ORDER — LEVETIRACETAM 500 MG/5ML
INJECTION, SOLUTION, CONCENTRATE INTRAVENOUS
Status: DISCONTINUED | OUTPATIENT
Start: 2023-01-01 | End: 2023-01-01

## 2023-01-01 RX ORDER — DEXAMETHASONE SODIUM PHOSPHATE 4 MG/ML
INJECTION, SOLUTION INTRA-ARTICULAR; INTRALESIONAL; INTRAMUSCULAR; INTRAVENOUS; SOFT TISSUE
Status: DISCONTINUED | OUTPATIENT
Start: 2023-01-01 | End: 2023-01-01

## 2023-01-01 RX ORDER — PROPOFOL 10 MG/ML
INJECTION, EMULSION INTRAVENOUS
Status: COMPLETED
Start: 2023-01-01 | End: 2023-01-01

## 2023-01-01 RX ORDER — LORAZEPAM 2 MG/ML
INJECTION INTRAMUSCULAR
Status: COMPLETED
Start: 2023-01-01 | End: 2023-01-01

## 2023-01-01 RX ORDER — FENTANYL CITRATE 50 UG/ML
INJECTION, SOLUTION INTRAMUSCULAR; INTRAVENOUS
Status: DISCONTINUED | OUTPATIENT
Start: 2023-01-01 | End: 2023-01-01

## 2023-01-01 RX ORDER — MAGNESIUM SULFATE HEPTAHYDRATE 40 MG/ML
2 INJECTION, SOLUTION INTRAVENOUS
Status: DISCONTINUED | OUTPATIENT
Start: 2023-01-01 | End: 2023-01-01

## 2023-01-01 RX ORDER — ROCURONIUM BROMIDE 10 MG/ML
INJECTION, SOLUTION INTRAVENOUS
Status: DISCONTINUED | OUTPATIENT
Start: 2023-01-01 | End: 2023-01-01

## 2023-01-01 RX ORDER — FAMOTIDINE 10 MG/ML
20 INJECTION INTRAVENOUS 2 TIMES DAILY
Status: DISCONTINUED | OUTPATIENT
Start: 2023-01-01 | End: 2023-01-01

## 2023-01-01 RX ORDER — ONDANSETRON 2 MG/ML
4 INJECTION INTRAMUSCULAR; INTRAVENOUS EVERY 8 HOURS PRN
Status: DISCONTINUED | OUTPATIENT
Start: 2023-01-01 | End: 2023-01-01

## 2023-01-01 RX ORDER — NOREPINEPHRINE BITARTRATE/D5W 4MG/250ML
PLASTIC BAG, INJECTION (ML) INTRAVENOUS
Status: DISPENSED
Start: 2023-01-01 | End: 2023-01-01

## 2023-01-01 RX ORDER — MUPIROCIN 20 MG/G
OINTMENT TOPICAL 2 TIMES DAILY
Status: DISCONTINUED | OUTPATIENT
Start: 2023-01-01 | End: 2023-01-01

## 2023-01-01 RX ORDER — ONDANSETRON 2 MG/ML
INJECTION INTRAMUSCULAR; INTRAVENOUS
Status: DISCONTINUED | OUTPATIENT
Start: 2023-01-01 | End: 2023-01-01

## 2023-01-01 RX ORDER — HEPARIN SODIUM 5000 [USP'U]/ML
5000 INJECTION, SOLUTION INTRAVENOUS; SUBCUTANEOUS EVERY 8 HOURS
Status: DISCONTINUED | OUTPATIENT
Start: 2023-01-01 | End: 2023-01-01

## 2023-01-01 RX ORDER — LEVETIRACETAM 500 MG/5ML
1000 INJECTION, SOLUTION, CONCENTRATE INTRAVENOUS ONCE
Status: COMPLETED | OUTPATIENT
Start: 2023-01-01 | End: 2023-01-01

## 2023-01-01 RX ORDER — OXYCODONE HYDROCHLORIDE 5 MG/1
5 TABLET ORAL EVERY 4 HOURS PRN
Status: DISCONTINUED | OUTPATIENT
Start: 2023-01-01 | End: 2023-01-01

## 2023-01-01 RX ORDER — ROCURONIUM BROMIDE 10 MG/ML
100 INJECTION, SOLUTION INTRAVENOUS ONCE
Status: COMPLETED | OUTPATIENT
Start: 2023-01-01 | End: 2023-01-01

## 2023-01-01 RX ORDER — DEXAMETHASONE 4 MG/1
4 TABLET ORAL EVERY 6 HOURS
Status: DISCONTINUED | OUTPATIENT
Start: 2023-01-01 | End: 2023-01-01

## 2023-01-01 RX ORDER — ROCURONIUM BROMIDE 10 MG/ML
INJECTION, SOLUTION INTRAVENOUS
Status: COMPLETED
Start: 2023-01-01 | End: 2023-01-01

## 2023-01-01 RX ORDER — LEVETIRACETAM 500 MG/1
1000 TABLET ORAL 2 TIMES DAILY
Status: DISCONTINUED | OUTPATIENT
Start: 2023-01-01 | End: 2023-01-01

## 2023-01-01 RX ORDER — SODIUM CHLORIDE 9 MG/ML
INJECTION, SOLUTION INTRAVENOUS
Status: DISCONTINUED | OUTPATIENT
Start: 2023-01-01 | End: 2023-01-01

## 2023-01-01 RX ORDER — MIDAZOLAM HYDROCHLORIDE 1 MG/ML
1 INJECTION INTRAMUSCULAR; INTRAVENOUS
Status: DISCONTINUED | OUTPATIENT
Start: 2023-01-01 | End: 2023-01-01

## 2023-01-01 RX ORDER — LEVETIRACETAM 500 MG/5ML
1500 INJECTION, SOLUTION, CONCENTRATE INTRAVENOUS
Status: DISCONTINUED | OUTPATIENT
Start: 2023-01-01 | End: 2023-01-01

## 2023-01-01 RX ORDER — MIDAZOLAM HYDROCHLORIDE 1 MG/ML
INJECTION, SOLUTION INTRAMUSCULAR; INTRAVENOUS
Status: DISCONTINUED | OUTPATIENT
Start: 2023-01-01 | End: 2023-01-01

## 2023-01-01 RX ORDER — PHENYLEPHRINE HCL IN 0.9% NACL 1 MG/10 ML
SYRINGE (ML) INTRAVENOUS
Status: DISPENSED
Start: 2023-01-01 | End: 2023-01-01

## 2023-01-01 RX ORDER — NOREPINEPHRINE BITARTRATE/D5W 4MG/250ML
0-3 PLASTIC BAG, INJECTION (ML) INTRAVENOUS CONTINUOUS
Status: DISCONTINUED | OUTPATIENT
Start: 2023-01-01 | End: 2023-01-01

## 2023-01-01 RX ORDER — BACITRACIN ZINC 500 UNIT/G
OINTMENT (GRAM) TOPICAL
Status: DISCONTINUED | OUTPATIENT
Start: 2023-01-01 | End: 2023-01-01 | Stop reason: HOSPADM

## 2023-01-01 RX ORDER — LEVETIRACETAM 500 MG/5ML
250 INJECTION, SOLUTION, CONCENTRATE INTRAVENOUS EVERY 12 HOURS
Status: DISCONTINUED | OUTPATIENT
Start: 2023-01-01 | End: 2023-01-01

## 2023-01-01 RX ORDER — PROPOFOL 10 MG/ML
INJECTION, EMULSION INTRAVENOUS CONTINUOUS PRN
Status: DISCONTINUED | OUTPATIENT
Start: 2023-01-01 | End: 2023-01-01

## 2023-01-01 RX ORDER — LORAZEPAM 2 MG/ML
2 INJECTION INTRAMUSCULAR ONCE
Status: COMPLETED | OUTPATIENT
Start: 2023-01-01 | End: 2023-01-01

## 2023-01-01 RX ORDER — MIDAZOLAM HYDROCHLORIDE 1 MG/ML
INJECTION INTRAMUSCULAR; INTRAVENOUS
Status: COMPLETED
Start: 2023-01-01 | End: 2023-01-01

## 2023-01-01 RX ORDER — DEXAMETHASONE SODIUM PHOSPHATE 4 MG/ML
10 INJECTION, SOLUTION INTRA-ARTICULAR; INTRALESIONAL; INTRAMUSCULAR; INTRAVENOUS; SOFT TISSUE ONCE
Status: COMPLETED | OUTPATIENT
Start: 2023-01-01 | End: 2023-01-01

## 2023-01-01 RX ORDER — DEXTROSE MONOHYDRATE 50 MG/ML
INJECTION, SOLUTION INTRAVENOUS CONTINUOUS
Status: DISCONTINUED | OUTPATIENT
Start: 2023-01-01 | End: 2023-01-01

## 2023-01-01 RX ORDER — LEVETIRACETAM 500 MG/5ML
1500 INJECTION, SOLUTION, CONCENTRATE INTRAVENOUS
Status: DISCONTINUED | OUTPATIENT
Start: 2023-01-01 | End: 2023-01-01 | Stop reason: SDUPTHER

## 2023-01-01 RX ORDER — NICARDIPINE HYDROCHLORIDE 0.2 MG/ML
0-15 INJECTION INTRAVENOUS CONTINUOUS
Status: DISCONTINUED | OUTPATIENT
Start: 2023-01-01 | End: 2023-01-01

## 2023-01-01 RX ORDER — PROPOFOL 10 MG/ML
0-50 INJECTION, EMULSION INTRAVENOUS CONTINUOUS
Status: DISCONTINUED | OUTPATIENT
Start: 2023-01-01 | End: 2023-01-01

## 2023-01-01 RX ORDER — PROPOFOL 10 MG/ML
VIAL (ML) INTRAVENOUS
Status: DISCONTINUED | OUTPATIENT
Start: 2023-01-01 | End: 2023-01-01

## 2023-01-01 RX ORDER — POLYETHYLENE GLYCOL 3350 17 G/17G
17 POWDER, FOR SOLUTION ORAL DAILY
Status: DISCONTINUED | OUTPATIENT
Start: 2023-01-01 | End: 2023-01-01

## 2023-01-01 RX ORDER — 3% SODIUM CHLORIDE 3 G/100ML
50 INJECTION, SOLUTION INTRAVENOUS CONTINUOUS
Status: DISCONTINUED | OUTPATIENT
Start: 2023-01-01 | End: 2023-01-01

## 2023-01-01 RX ORDER — DEXAMETHASONE SODIUM PHOSPHATE 4 MG/ML
6 INJECTION, SOLUTION INTRA-ARTICULAR; INTRALESIONAL; INTRAMUSCULAR; INTRAVENOUS; SOFT TISSUE
Status: COMPLETED | OUTPATIENT
Start: 2023-01-01 | End: 2023-01-01

## 2023-01-01 RX ORDER — ETOMIDATE 2 MG/ML
INJECTION INTRAVENOUS CODE/TRAUMA/SEDATION MEDICATION
Status: DISCONTINUED | OUTPATIENT
Start: 2023-01-01 | End: 2023-01-01 | Stop reason: HOSPADM

## 2023-01-01 RX ORDER — DEXAMETHASONE SODIUM PHOSPHATE 4 MG/ML
4 INJECTION, SOLUTION INTRA-ARTICULAR; INTRALESIONAL; INTRAMUSCULAR; INTRAVENOUS; SOFT TISSUE EVERY 6 HOURS
Status: DISCONTINUED | OUTPATIENT
Start: 2023-01-01 | End: 2023-01-01

## 2023-01-01 RX ORDER — CEFTRIAXONE 1 G/1
1 INJECTION, POWDER, FOR SOLUTION INTRAMUSCULAR; INTRAVENOUS
Status: DISCONTINUED | OUTPATIENT
Start: 2023-01-01 | End: 2023-01-01

## 2023-01-01 RX ORDER — LEVETIRACETAM 500 MG/1
500 TABLET ORAL 2 TIMES DAILY
Status: DISCONTINUED | OUTPATIENT
Start: 2023-01-01 | End: 2023-01-01

## 2023-01-01 RX ORDER — CEFTRIAXONE 1 G/1
INJECTION, POWDER, FOR SOLUTION INTRAMUSCULAR; INTRAVENOUS
Status: DISCONTINUED | OUTPATIENT
Start: 2023-01-01 | End: 2023-01-01 | Stop reason: HOSPADM

## 2023-01-01 RX ORDER — PROPOFOL 10 MG/ML
0-50 INJECTION, EMULSION INTRAVENOUS CONTINUOUS
Status: DISCONTINUED | OUTPATIENT
Start: 2023-01-01 | End: 2023-01-01 | Stop reason: HOSPADM

## 2023-01-01 RX ORDER — ACETAMINOPHEN 325 MG/1
650 TABLET ORAL EVERY 4 HOURS PRN
Status: DISCONTINUED | OUTPATIENT
Start: 2023-01-01 | End: 2023-01-01

## 2023-01-01 RX ORDER — FENTANYL CITRATE 50 UG/ML
50 INJECTION, SOLUTION INTRAMUSCULAR; INTRAVENOUS
Status: DISCONTINUED | OUTPATIENT
Start: 2023-01-01 | End: 2023-01-01

## 2023-01-01 RX ORDER — POTASSIUM CHLORIDE 7.45 MG/ML
60 INJECTION INTRAVENOUS
Status: DISCONTINUED | OUTPATIENT
Start: 2023-01-01 | End: 2023-01-01

## 2023-01-01 RX ORDER — LANOLIN ALCOHOL/MO/W.PET/CERES
800 CREAM (GRAM) TOPICAL
Status: DISCONTINUED | OUTPATIENT
Start: 2023-01-01 | End: 2023-01-01

## 2023-01-01 RX ORDER — LEVETIRACETAM 15 MG/ML
1500 INJECTION INTRAVASCULAR
Status: COMPLETED | OUTPATIENT
Start: 2023-01-01 | End: 2023-01-01

## 2023-01-01 RX ORDER — LORAZEPAM 2 MG/ML
2 INJECTION INTRAMUSCULAR EVERY 10 MIN PRN
Status: DISCONTINUED | OUTPATIENT
Start: 2023-01-01 | End: 2023-01-01

## 2023-01-01 RX ORDER — NOREPINEPHRINE BITARTRATE/D5W 4MG/250ML
0.04 PLASTIC BAG, INJECTION (ML) INTRAVENOUS CONTINUOUS
Status: DISCONTINUED | OUTPATIENT
Start: 2023-01-01 | End: 2023-01-01

## 2023-01-01 RX ORDER — LORAZEPAM 2 MG/ML
INJECTION INTRAMUSCULAR
Status: DISCONTINUED
Start: 2023-01-01 | End: 2023-01-01 | Stop reason: HOSPADM

## 2023-01-01 RX ORDER — POTASSIUM CHLORIDE 29.8 MG/ML
40 INJECTION INTRAVENOUS
Status: DISCONTINUED | OUTPATIENT
Start: 2023-01-01 | End: 2023-01-01

## 2023-01-01 RX ORDER — ENOXAPARIN SODIUM 100 MG/ML
40 INJECTION SUBCUTANEOUS EVERY 24 HOURS
Status: COMPLETED | OUTPATIENT
Start: 2023-01-01 | End: 2023-01-01

## 2023-01-01 RX ORDER — FENTANYL CITRATE 50 UG/ML
100 INJECTION, SOLUTION INTRAMUSCULAR; INTRAVENOUS ONCE
Status: DISCONTINUED | OUTPATIENT
Start: 2023-01-01 | End: 2023-01-01

## 2023-01-01 RX ORDER — SUCCINYLCHOLINE CHLORIDE 20 MG/ML
INJECTION INTRAMUSCULAR; INTRAVENOUS
Status: DISCONTINUED | OUTPATIENT
Start: 2023-01-01 | End: 2023-01-01

## 2023-01-01 RX ORDER — ROCURONIUM BROMIDE 10 MG/ML
INJECTION, SOLUTION INTRAVENOUS CODE/TRAUMA/SEDATION MEDICATION
Status: DISCONTINUED | OUTPATIENT
Start: 2023-01-01 | End: 2023-01-01 | Stop reason: HOSPADM

## 2023-01-01 RX ORDER — CEFAZOLIN 2 G/1
INJECTION, POWDER, FOR SOLUTION INTRAMUSCULAR; INTRAVENOUS
Status: DISCONTINUED | OUTPATIENT
Start: 2023-01-01 | End: 2023-01-01

## 2023-01-01 RX ORDER — REMIFENTANIL HYDROCHLORIDE 1 MG/ML
INJECTION, POWDER, LYOPHILIZED, FOR SOLUTION INTRAVENOUS CONTINUOUS PRN
Status: DISCONTINUED | OUTPATIENT
Start: 2023-01-01 | End: 2023-01-01

## 2023-01-01 RX ORDER — LORAZEPAM 2 MG/ML
1 INJECTION INTRAMUSCULAR
Status: COMPLETED | OUTPATIENT
Start: 2023-01-01 | End: 2023-01-01

## 2023-01-01 RX ORDER — FAMOTIDINE 20 MG/1
20 TABLET, FILM COATED ORAL 2 TIMES DAILY
Status: DISCONTINUED | OUTPATIENT
Start: 2023-01-01 | End: 2023-01-01

## 2023-01-01 RX ORDER — FENTANYL CITRATE-0.9 % NACL/PF 10 MCG/ML
0-200 PLASTIC BAG, INJECTION (ML) INTRAVENOUS CONTINUOUS
Status: DISCONTINUED | OUTPATIENT
Start: 2023-01-01 | End: 2023-01-01

## 2023-01-01 RX ORDER — LORAZEPAM 2 MG/ML
1 INJECTION INTRAMUSCULAR
Status: CANCELLED | OUTPATIENT
Start: 2023-01-01 | End: 2023-01-01

## 2023-01-01 RX ORDER — DEXAMETHASONE SODIUM PHOSPHATE 4 MG/ML
6 INJECTION, SOLUTION INTRA-ARTICULAR; INTRALESIONAL; INTRAMUSCULAR; INTRAVENOUS; SOFT TISSUE EVERY 6 HOURS
Status: DISCONTINUED | OUTPATIENT
Start: 2023-01-01 | End: 2023-01-01

## 2023-01-01 RX ORDER — MAGNESIUM SULFATE HEPTAHYDRATE 40 MG/ML
4 INJECTION, SOLUTION INTRAVENOUS
Status: DISCONTINUED | OUTPATIENT
Start: 2023-01-01 | End: 2023-01-01

## 2023-01-01 RX ORDER — PROPOFOL 10 MG/ML
100 VIAL (ML) INTRAVENOUS ONCE
Status: COMPLETED | OUTPATIENT
Start: 2023-01-01 | End: 2023-01-01

## 2023-01-01 RX ORDER — METOPROLOL TARTRATE 1 MG/ML
INJECTION, SOLUTION INTRAVENOUS
Status: DISPENSED
Start: 2023-01-01 | End: 2023-01-01

## 2023-01-01 RX ORDER — LEVETIRACETAM 500 MG/5ML
INJECTION, SOLUTION, CONCENTRATE INTRAVENOUS
Status: COMPLETED
Start: 2023-01-01 | End: 2023-01-01

## 2023-01-01 RX ORDER — POTASSIUM CHLORIDE 7.45 MG/ML
80 INJECTION INTRAVENOUS
Status: DISCONTINUED | OUTPATIENT
Start: 2023-01-01 | End: 2023-01-01

## 2023-01-01 RX ORDER — LEVETIRACETAM 500 MG/5ML
500 INJECTION, SOLUTION, CONCENTRATE INTRAVENOUS EVERY 12 HOURS
Status: DISCONTINUED | OUTPATIENT
Start: 2023-01-01 | End: 2023-01-01

## 2023-01-01 RX ORDER — POTASSIUM CHLORIDE 14.9 MG/ML
60 INJECTION INTRAVENOUS
Status: DISCONTINUED | OUTPATIENT
Start: 2023-01-01 | End: 2023-01-01

## 2023-01-01 RX ORDER — PHENYLEPHRINE HCL IN 0.9% NACL 20MG/250ML
0-5 PLASTIC BAG, INJECTION (ML) INTRAVENOUS CONTINUOUS
Status: DISCONTINUED | OUTPATIENT
Start: 2023-01-01 | End: 2023-01-01

## 2023-01-01 RX ORDER — LIDOCAINE HYDROCHLORIDE AND EPINEPHRINE 10; 10 MG/ML; UG/ML
10 INJECTION, SOLUTION INFILTRATION; PERINEURAL ONCE
Status: DISCONTINUED | OUTPATIENT
Start: 2023-01-01 | End: 2023-01-01

## 2023-01-01 RX ORDER — LIDOCAINE HYDROCHLORIDE 20 MG/ML
INJECTION, SOLUTION EPIDURAL; INFILTRATION; INTRACAUDAL; PERINEURAL
Status: DISCONTINUED | OUTPATIENT
Start: 2023-01-01 | End: 2023-01-01

## 2023-01-01 RX ORDER — FUROSEMIDE 10 MG/ML
INJECTION INTRAMUSCULAR; INTRAVENOUS
Status: DISCONTINUED | OUTPATIENT
Start: 2023-01-01 | End: 2023-01-01

## 2023-01-01 RX ORDER — LEVETIRACETAM 500 MG/5ML
INJECTION, SOLUTION, CONCENTRATE INTRAVENOUS
Status: DISPENSED
Start: 2023-01-01 | End: 2023-01-01

## 2023-01-01 RX ORDER — ETOMIDATE 2 MG/ML
INJECTION INTRAVENOUS
Status: DISPENSED
Start: 2023-01-01 | End: 2023-01-01

## 2023-01-01 RX ORDER — SUCCINYLCHOLINE CHLORIDE 20 MG/ML
INJECTION INTRAMUSCULAR; INTRAVENOUS
Status: DISPENSED
Start: 2023-01-01 | End: 2023-01-01

## 2023-01-01 RX ORDER — OXYCODONE HYDROCHLORIDE 5 MG/1
10 TABLET ORAL EVERY 4 HOURS PRN
Status: DISCONTINUED | OUTPATIENT
Start: 2023-01-01 | End: 2023-01-01

## 2023-01-01 RX ORDER — MANNITOL 20 G/100ML
INJECTION, SOLUTION INTRAVENOUS
Status: DISCONTINUED | OUTPATIENT
Start: 2023-01-01 | End: 2023-01-01

## 2023-01-01 RX ORDER — MIDAZOLAM HYDROCHLORIDE 1 MG/ML
2 INJECTION INTRAMUSCULAR; INTRAVENOUS ONCE
Status: COMPLETED | OUTPATIENT
Start: 2023-01-01 | End: 2023-01-01

## 2023-01-01 RX ORDER — POTASSIUM CHLORIDE 29.8 MG/ML
80 INJECTION INTRAVENOUS
Status: DISCONTINUED | OUTPATIENT
Start: 2023-01-01 | End: 2023-01-01

## 2023-01-01 RX ORDER — POTASSIUM CHLORIDE 7.45 MG/ML
40 INJECTION INTRAVENOUS
Status: DISCONTINUED | OUTPATIENT
Start: 2023-01-01 | End: 2023-01-01

## 2023-01-01 RX ORDER — ACETAMINOPHEN 10 MG/ML
INJECTION, SOLUTION INTRAVENOUS
Status: DISCONTINUED | OUTPATIENT
Start: 2023-01-01 | End: 2023-01-01

## 2023-01-01 RX ORDER — CEFTRIAXONE 1 G/1
INJECTION, POWDER, FOR SOLUTION INTRAMUSCULAR; INTRAVENOUS
Status: DISCONTINUED | OUTPATIENT
Start: 2023-01-01 | End: 2023-01-01

## 2023-01-01 RX ORDER — SUCCINYLCHOLINE CHLORIDE 20 MG/ML
INJECTION INTRAMUSCULAR; INTRAVENOUS CODE/TRAUMA/SEDATION MEDICATION
Status: DISCONTINUED | OUTPATIENT
Start: 2023-01-01 | End: 2023-01-01 | Stop reason: HOSPADM

## 2023-01-01 RX ORDER — HYDROMORPHONE HYDROCHLORIDE 1 MG/ML
0.5 INJECTION, SOLUTION INTRAMUSCULAR; INTRAVENOUS; SUBCUTANEOUS ONCE
Status: COMPLETED | OUTPATIENT
Start: 2023-01-01 | End: 2023-01-01

## 2023-01-01 RX ORDER — PROPOFOL 10 MG/ML
VIAL (ML) INTRAVENOUS
Status: COMPLETED
Start: 2023-01-01 | End: 2023-01-01

## 2023-01-01 RX ORDER — METOCLOPRAMIDE HYDROCHLORIDE 5 MG/ML
5 INJECTION INTRAMUSCULAR; INTRAVENOUS EVERY 6 HOURS PRN
Status: DISCONTINUED | OUTPATIENT
Start: 2023-01-01 | End: 2023-01-01

## 2023-01-01 RX ORDER — SODIUM CHLORIDE 0.9 % (FLUSH) 0.9 %
10 SYRINGE (ML) INJECTION
Status: DISCONTINUED | OUTPATIENT
Start: 2023-01-01 | End: 2023-01-01

## 2023-01-01 RX ADMIN — Medication 2 MCG/KG/MIN: at 02:11

## 2023-01-01 RX ADMIN — FAMOTIDINE 20 MG: 10 INJECTION, SOLUTION INTRAVENOUS at 08:11

## 2023-01-01 RX ADMIN — ACETAMINOPHEN 650 MG: 325 TABLET ORAL at 09:11

## 2023-01-01 RX ADMIN — GADOBUTROL 7 ML: 604.72 INJECTION INTRAVENOUS at 12:11

## 2023-01-01 RX ADMIN — DEXAMETHASONE SODIUM PHOSPHATE 4 MG: 4 INJECTION INTRA-ARTICULAR; INTRALESIONAL; INTRAMUSCULAR; INTRAVENOUS; SOFT TISSUE at 12:11

## 2023-01-01 RX ADMIN — FAMOTIDINE 20 MG: 10 INJECTION, SOLUTION INTRAVENOUS at 09:11

## 2023-01-01 RX ADMIN — GLYCOPYRROLATE 0.2 MG: 0.2 INJECTION INTRAMUSCULAR; INTRAVENOUS at 02:11

## 2023-01-01 RX ADMIN — VASOPRESSIN 0.04 UNITS/MIN: 20 INJECTION INTRAVENOUS at 06:11

## 2023-01-01 RX ADMIN — MIDAZOLAM HYDROCHLORIDE 1 MG: 2 INJECTION, SOLUTION INTRAMUSCULAR; INTRAVENOUS at 11:11

## 2023-01-01 RX ADMIN — LEVETIRACETAM 500 MG: 100 INJECTION INTRAVENOUS at 09:11

## 2023-01-01 RX ADMIN — MANNITOL 50 G: 20 INJECTION, SOLUTION INTRAVENOUS at 02:11

## 2023-01-01 RX ADMIN — MIDAZOLAM 2 MG: 1 INJECTION INTRAMUSCULAR; INTRAVENOUS at 02:11

## 2023-01-01 RX ADMIN — SODIUM CHLORIDE: 9 INJECTION, SOLUTION INTRAVENOUS at 07:11

## 2023-01-01 RX ADMIN — PROPOFOL 20 MCG/KG/MIN: 10 INJECTION, EMULSION INTRAVENOUS at 02:11

## 2023-01-01 RX ADMIN — Medication 100 MG: at 02:11

## 2023-01-01 RX ADMIN — SODIUM CHLORIDE: 9 INJECTION, SOLUTION INTRAVENOUS at 08:11

## 2023-01-01 RX ADMIN — DEXAMETHASONE SODIUM PHOSPHATE 4 MG: 4 INJECTION INTRA-ARTICULAR; INTRALESIONAL; INTRAMUSCULAR; INTRAVENOUS; SOFT TISSUE at 11:11

## 2023-01-01 RX ADMIN — CEFAZOLIN 2 G: 2 INJECTION, POWDER, FOR SOLUTION INTRAMUSCULAR; INTRAVENOUS at 09:11

## 2023-01-01 RX ADMIN — REMIFENTANIL HYDROCHLORIDE 0.2 MCG/KG/MIN: 1 INJECTION, POWDER, LYOPHILIZED, FOR SOLUTION INTRAVENOUS at 12:11

## 2023-01-01 RX ADMIN — HEPARIN SODIUM 5000 UNITS: 5000 INJECTION INTRAVENOUS; SUBCUTANEOUS at 05:11

## 2023-01-01 RX ADMIN — LEVETIRACETAM INJECTION 1500 MG: 15 INJECTION INTRAVENOUS at 04:11

## 2023-01-01 RX ADMIN — POLYETHYLENE GLYCOL 3350 17 G: 17 POWDER, FOR SOLUTION ORAL at 09:11

## 2023-01-01 RX ADMIN — PROPOFOL 150 MG: 10 INJECTION, EMULSION INTRAVENOUS at 12:11

## 2023-01-01 RX ADMIN — LEVETIRACETAM 1000 MG: 100 INJECTION INTRAVENOUS at 01:11

## 2023-01-01 RX ADMIN — ETOMIDATE 10 MG: 2 INJECTION, SOLUTION INTRAVENOUS at 06:11

## 2023-01-01 RX ADMIN — LEVETIRACETAM 500 MG: 100 INJECTION INTRAVENOUS at 08:11

## 2023-01-01 RX ADMIN — MUPIROCIN: 20 OINTMENT TOPICAL at 09:11

## 2023-01-01 RX ADMIN — DEXAMETHASONE SODIUM PHOSPHATE 4 MG: 4 INJECTION INTRA-ARTICULAR; INTRALESIONAL; INTRAMUSCULAR; INTRAVENOUS; SOFT TISSUE at 05:11

## 2023-01-01 RX ADMIN — Medication 3 MCG/KG/MIN: at 04:11

## 2023-01-01 RX ADMIN — LEVETIRACETAM 1000 MG: 100 INJECTION, SOLUTION INTRAVENOUS at 02:11

## 2023-01-01 RX ADMIN — SODIUM CHLORIDE: 9 INJECTION, SOLUTION INTRAVENOUS at 12:11

## 2023-01-01 RX ADMIN — LORAZEPAM 1 MG: 2 INJECTION INTRAMUSCULAR; INTRAVENOUS at 07:11

## 2023-01-01 RX ADMIN — PROPOFOL 30 MCG/KG/MIN: 10 INJECTION, EMULSION INTRAVENOUS at 01:11

## 2023-01-01 RX ADMIN — MUPIROCIN: 20 OINTMENT TOPICAL at 08:11

## 2023-01-01 RX ADMIN — FENTANYL CITRATE 50 MCG: 50 INJECTION INTRAMUSCULAR; INTRAVENOUS at 03:11

## 2023-01-01 RX ADMIN — DEXAMETHASONE SODIUM PHOSPHATE 10 MG: 4 INJECTION INTRA-ARTICULAR; INTRALESIONAL; INTRAMUSCULAR; INTRAVENOUS; SOFT TISSUE at 11:11

## 2023-01-01 RX ADMIN — ENOXAPARIN SODIUM 40 MG: 100 INJECTION SUBCUTANEOUS at 05:11

## 2023-01-01 RX ADMIN — NOREPINEPHRINE BITARTRATE 0.04 MCG/KG/MIN: 4 INJECTION, SOLUTION INTRAVENOUS at 09:11

## 2023-01-01 RX ADMIN — ONDANSETRON 4 MG: 2 INJECTION INTRAMUSCULAR; INTRAVENOUS at 05:11

## 2023-01-01 RX ADMIN — POTASSIUM CHLORIDE 40 MEQ: 29.8 INJECTION, SOLUTION INTRAVENOUS at 09:11

## 2023-01-01 RX ADMIN — SODIUM CHLORIDE 250 ML: 3 INJECTION, SOLUTION INTRAVENOUS at 04:11

## 2023-01-01 RX ADMIN — DEXTROSE MONOHYDRATE 500 ML: 50 INJECTION, SOLUTION INTRAVENOUS at 11:11

## 2023-01-01 RX ADMIN — DEXAMETHASONE SODIUM PHOSPHATE 6 MG: 4 INJECTION, SOLUTION INTRA-ARTICULAR; INTRALESIONAL; INTRAMUSCULAR; INTRAVENOUS; SOFT TISSUE at 04:11

## 2023-01-01 RX ADMIN — DEXTROSE MONOHYDRATE: 50 INJECTION, SOLUTION INTRAVENOUS at 07:11

## 2023-01-01 RX ADMIN — DEXAMETHASONE SODIUM PHOSPHATE 4 MG: 4 INJECTION INTRA-ARTICULAR; INTRALESIONAL; INTRAMUSCULAR; INTRAVENOUS; SOFT TISSUE at 06:11

## 2023-01-01 RX ADMIN — LORAZEPAM 2 MG: 2 INJECTION INTRAMUSCULAR at 02:11

## 2023-01-01 RX ADMIN — SODIUM CHLORIDE 1000 ML: 9 INJECTION, SOLUTION INTRAVENOUS at 04:11

## 2023-01-01 RX ADMIN — IOHEXOL 75 ML: 350 INJECTION, SOLUTION INTRAVENOUS at 12:11

## 2023-01-01 RX ADMIN — MIDAZOLAM HYDROCHLORIDE 2 MG: 1 INJECTION INTRAMUSCULAR; INTRAVENOUS at 02:11

## 2023-01-01 RX ADMIN — SODIUM CHLORIDE 0.1 MCG/KG/MIN: 9 INJECTION, SOLUTION INTRAVENOUS at 02:11

## 2023-01-01 RX ADMIN — MAGNESIUM SULFATE HEPTAHYDRATE 2 G: 40 INJECTION, SOLUTION INTRAVENOUS at 04:11

## 2023-01-01 RX ADMIN — NOREPINEPHRINE BITARTRATE 0.04 MCG/KG/MIN: 4 INJECTION, SOLUTION INTRAVENOUS at 01:11

## 2023-01-01 RX ADMIN — DEXAMETHASONE 4 MG: 4 TABLET ORAL at 11:11

## 2023-01-01 RX ADMIN — VASOPRESSIN 0.04 UNITS/MIN: 20 INJECTION INTRAVENOUS at 11:11

## 2023-01-01 RX ADMIN — SODIUM CHLORIDE: 9 INJECTION, SOLUTION INTRAVENOUS at 10:11

## 2023-01-01 RX ADMIN — LEVETIRACETAM 1000 MG: 100 INJECTION, SOLUTION INTRAVENOUS at 09:11

## 2023-01-01 RX ADMIN — DEXAMETHASONE SODIUM PHOSPHATE 4 MG: 4 INJECTION, SOLUTION INTRAMUSCULAR; INTRAVENOUS at 06:11

## 2023-01-01 RX ADMIN — FENTANYL CITRATE 50 MCG: 50 INJECTION INTRAMUSCULAR; INTRAVENOUS at 04:11

## 2023-01-01 RX ADMIN — GADOBUTROL 7 ML: 604.72 INJECTION INTRAVENOUS at 11:11

## 2023-01-01 RX ADMIN — FAMOTIDINE 20 MG: 10 INJECTION, SOLUTION INTRAVENOUS at 10:11

## 2023-01-01 RX ADMIN — DEXAMETHASONE SODIUM PHOSPHATE 12 MG: 4 INJECTION INTRA-ARTICULAR; INTRALESIONAL; INTRAMUSCULAR; INTRAVENOUS; SOFT TISSUE at 01:11

## 2023-01-01 RX ADMIN — LEVETIRACETAM 1000 MG: 500 INJECTION, SOLUTION, CONCENTRATE INTRAVENOUS at 02:11

## 2023-01-01 RX ADMIN — LEVETIRACETAM 500 MG: 100 INJECTION INTRAVENOUS at 07:11

## 2023-01-01 RX ADMIN — VASOPRESSIN 0.04 UNITS/MIN: 20 INJECTION INTRAVENOUS at 01:11

## 2023-01-01 RX ADMIN — PROPOFOL 100 MG: 10 INJECTION, EMULSION INTRAVENOUS at 02:11

## 2023-01-01 RX ADMIN — FUROSEMIDE 20 MG: 10 INJECTION, SOLUTION INTRAMUSCULAR; INTRAVENOUS at 02:11

## 2023-01-01 RX ADMIN — POLYETHYLENE GLYCOL 3350 17 G: 17 POWDER, FOR SOLUTION ORAL at 08:11

## 2023-01-01 RX ADMIN — FENTANYL CITRATE 50 MCG: 50 INJECTION INTRAMUSCULAR; INTRAVENOUS at 12:11

## 2023-01-01 RX ADMIN — FAMOTIDINE 20 MG: 10 INJECTION, SOLUTION INTRAVENOUS at 03:11

## 2023-01-01 RX ADMIN — FAMOTIDINE 20 MG: 10 INJECTION, SOLUTION INTRAVENOUS at 07:11

## 2023-01-01 RX ADMIN — LEVETIRACETAM 1000 MG: 100 INJECTION, SOLUTION INTRAVENOUS at 08:11

## 2023-01-01 RX ADMIN — DEXAMETHASONE SODIUM PHOSPHATE 4 MG: 4 INJECTION INTRA-ARTICULAR; INTRALESIONAL; INTRAMUSCULAR; INTRAVENOUS; SOFT TISSUE at 07:11

## 2023-01-01 RX ADMIN — VASOPRESSIN 0.04 UNITS/MIN: 20 INJECTION INTRAVENOUS at 02:11

## 2023-01-01 RX ADMIN — PROPOFOL 20 MCG/KG/MIN: 10 INJECTION, EMULSION INTRAVENOUS at 06:11

## 2023-01-01 RX ADMIN — IOHEXOL 15 ML: 350 INJECTION, SOLUTION INTRAVENOUS at 10:11

## 2023-01-01 RX ADMIN — LEVETIRACETAM 1000 MG: 100 INJECTION, SOLUTION INTRAVENOUS at 12:11

## 2023-01-01 RX ADMIN — Medication 1.5 MCG/KG/MIN: at 11:11

## 2023-01-01 RX ADMIN — SODIUM CHLORIDE, SODIUM GLUCONATE, SODIUM ACETATE, POTASSIUM CHLORIDE AND MAGNESIUM CHLORIDE: 526; 502; 368; 37; 30 INJECTION, SOLUTION INTRAVENOUS at 04:11

## 2023-01-01 RX ADMIN — OXYCODONE HYDROCHLORIDE 10 MG: 5 TABLET ORAL at 05:11

## 2023-01-01 RX ADMIN — Medication 4 MCG/KG/MIN: at 06:11

## 2023-01-01 RX ADMIN — LORAZEPAM 1 MG: 2 INJECTION INTRAMUSCULAR at 07:11

## 2023-01-01 RX ADMIN — MAGNESIUM SULFATE HEPTAHYDRATE 2 G: 40 INJECTION, SOLUTION INTRAVENOUS at 01:11

## 2023-01-01 RX ADMIN — DEXAMETHASONE SODIUM PHOSPHATE 6 MG: 4 INJECTION INTRA-ARTICULAR; INTRALESIONAL; INTRAMUSCULAR; INTRAVENOUS; SOFT TISSUE at 05:11

## 2023-01-01 RX ADMIN — VASOPRESSIN 0.04 UNITS/MIN: 20 INJECTION INTRAVENOUS at 08:11

## 2023-01-01 RX ADMIN — PROPOFOL 100 MCG/KG/MIN: 10 INJECTION, EMULSION INTRAVENOUS at 12:11

## 2023-01-01 RX ADMIN — PROPOFOL 45 MCG/KG/MIN: 10 INJECTION, EMULSION INTRAVENOUS at 05:11

## 2023-01-01 RX ADMIN — LEVETIRACETAM 500 MG: 500 TABLET, FILM COATED ORAL at 09:11

## 2023-01-01 RX ADMIN — GADOBUTROL 7 ML: 604.72 INJECTION INTRAVENOUS at 03:11

## 2023-01-01 RX ADMIN — ROCURONIUM BROMIDE 50 MG: 10 INJECTION INTRAVENOUS at 11:11

## 2023-01-01 RX ADMIN — ROCURONIUM BROMIDE 100 MG: 10 INJECTION, SOLUTION INTRAVENOUS at 02:11

## 2023-01-01 RX ADMIN — MANNITOL 75 G: 20 INJECTION, SOLUTION INTRAVENOUS at 10:11

## 2023-01-01 RX ADMIN — SUCCINYLCHOLINE CHLORIDE 100 MG: 20 INJECTION, SOLUTION INTRAMUSCULAR; INTRAVENOUS; PARENTERAL at 12:11

## 2023-01-01 RX ADMIN — SODIUM PHOSPHATE, MONOBASIC, MONOHYDRATE AND SODIUM PHOSPHATE, DIBASIC, ANHYDROUS 15 MMOL: 142; 276 INJECTION, SOLUTION INTRAVENOUS at 05:11

## 2023-01-01 RX ADMIN — OXYCODONE HYDROCHLORIDE 10 MG: 5 TABLET ORAL at 04:11

## 2023-01-01 RX ADMIN — CEFAZOLIN 2 G: 330 INJECTION, POWDER, FOR SOLUTION INTRAMUSCULAR; INTRAVENOUS at 11:11

## 2023-01-01 RX ADMIN — LEVETIRACETAM 500 MG: 100 INJECTION INTRAVENOUS at 10:11

## 2023-01-01 RX ADMIN — FENTANYL CITRATE 100 MCG: 50 INJECTION INTRAMUSCULAR; INTRAVENOUS at 12:11

## 2023-01-01 RX ADMIN — LORAZEPAM 1 MG: 2 INJECTION, SOLUTION INTRAMUSCULAR; INTRAVENOUS at 03:11

## 2023-01-01 RX ADMIN — ROCURONIUM BROMIDE 5 MG: 10 INJECTION INTRAVENOUS at 12:11

## 2023-01-01 RX ADMIN — LORAZEPAM 2 MG: 2 INJECTION INTRAMUSCULAR; INTRAVENOUS at 02:11

## 2023-01-01 RX ADMIN — HYPROMELLOSE 2910 1 DROP: 5 SOLUTION/ DROPS OPHTHALMIC at 03:11

## 2023-01-01 RX ADMIN — LORAZEPAM 1 MG: 2 INJECTION INTRAMUSCULAR; INTRAVENOUS at 04:11

## 2023-01-01 RX ADMIN — Medication 25 MCG/HR: at 09:11

## 2023-01-01 RX ADMIN — HYDROMORPHONE HYDROCHLORIDE 0.5 MG: 1 INJECTION, SOLUTION INTRAMUSCULAR; INTRAVENOUS; SUBCUTANEOUS at 09:11

## 2023-01-01 RX ADMIN — VASOPRESSIN 0.04 UNITS/MIN: 20 INJECTION INTRAVENOUS at 05:11

## 2023-01-01 RX ADMIN — DEXAMETHASONE SODIUM PHOSPHATE 6 MG: 4 INJECTION INTRA-ARTICULAR; INTRALESIONAL; INTRAMUSCULAR; INTRAVENOUS; SOFT TISSUE at 12:11

## 2023-01-01 RX ADMIN — CEFTRIAXONE 2 G: 1 INJECTION, POWDER, FOR SOLUTION INTRAMUSCULAR; INTRAVENOUS at 01:11

## 2023-01-01 RX ADMIN — NOREPINEPHRINE BITARTRATE 0.04 MCG/KG/MIN: 4 INJECTION, SOLUTION INTRAVENOUS at 12:11

## 2023-01-01 RX ADMIN — DEXAMETHASONE SODIUM PHOSPHATE 6 MG: 4 INJECTION INTRA-ARTICULAR; INTRALESIONAL; INTRAMUSCULAR; INTRAVENOUS; SOFT TISSUE at 03:11

## 2023-01-01 RX ADMIN — SODIUM CHLORIDE, SODIUM GLUCONATE, SODIUM ACETATE, POTASSIUM CHLORIDE AND MAGNESIUM CHLORIDE: 526; 502; 368; 37; 30 INJECTION, SOLUTION INTRAVENOUS at 01:11

## 2023-01-01 RX ADMIN — HYPROMELLOSE 2910 1 DROP: 5 SOLUTION/ DROPS OPHTHALMIC at 08:11

## 2023-01-01 RX ADMIN — IOHEXOL 15 ML: 350 INJECTION, SOLUTION INTRAVENOUS at 11:11

## 2023-01-01 RX ADMIN — CALCIUM CHLORIDE 0.5 G: 100 INJECTION, SOLUTION INTRAVENOUS at 01:11

## 2023-01-01 RX ADMIN — LEVETIRACETAM INJECTION 1500 MG: 15 INJECTION INTRAVENOUS at 05:11

## 2023-01-01 RX ADMIN — ACETAMINOPHEN 1000 MG: 10 INJECTION, SOLUTION INTRAVENOUS at 03:11

## 2023-01-01 RX ADMIN — LIDOCAINE HYDROCHLORIDE 60 MG: 20 INJECTION, SOLUTION EPIDURAL; INFILTRATION; INTRACAUDAL at 12:11

## 2023-01-01 RX ADMIN — SODIUM CHLORIDE 1 MCG/KG/MIN: 9 INJECTION, SOLUTION INTRAVENOUS at 12:11

## 2023-01-01 RX ADMIN — CEFAZOLIN 2 G: 2 INJECTION, POWDER, FOR SOLUTION INTRAMUSCULAR; INTRAVENOUS at 03:11

## 2023-01-01 RX ADMIN — IOHEXOL 100 ML: 350 INJECTION, SOLUTION INTRAVENOUS at 01:11

## 2023-01-01 RX ADMIN — PROPOFOL 10 MCG/KG/MIN: 10 INJECTION, EMULSION INTRAVENOUS at 06:11

## 2023-01-01 RX ADMIN — FENTANYL CITRATE 50 MCG: 50 INJECTION INTRAMUSCULAR; INTRAVENOUS at 02:11

## 2023-01-01 RX ADMIN — DEXAMETHASONE SODIUM PHOSPHATE 10 MG: 4 INJECTION INTRA-ARTICULAR; INTRALESIONAL; INTRAMUSCULAR; INTRAVENOUS; SOFT TISSUE at 09:11

## 2023-01-01 RX ADMIN — ROCURONIUM BROMIDE 100 MG: 10 INJECTION INTRAVENOUS at 02:11

## 2023-01-01 RX ADMIN — FAMOTIDINE 20 MG: 20 TABLET, FILM COATED ORAL at 08:11

## 2023-01-01 RX ADMIN — Medication 100 MG: at 06:11

## 2023-01-01 RX ADMIN — OXYCODONE HYDROCHLORIDE 5 MG: 5 TABLET ORAL at 08:11

## 2023-01-01 RX ADMIN — SODIUM CHLORIDE 500 ML: 9 INJECTION, SOLUTION INTRAVENOUS at 09:11

## 2023-01-01 RX ADMIN — DEXAMETHASONE 4 MG: 4 TABLET ORAL at 05:11

## 2023-01-01 RX ADMIN — CEFTRIAXONE SODIUM 1 G: 1 INJECTION, POWDER, FOR SOLUTION INTRAMUSCULAR; INTRAVENOUS at 06:11

## 2023-01-01 RX ADMIN — Medication 0.4 MCG/KG/MIN: at 09:11

## 2023-01-01 RX ADMIN — MIDAZOLAM 2 MG: 1 INJECTION INTRAMUSCULAR; INTRAVENOUS at 12:11

## 2023-01-01 RX ADMIN — SODIUM CHLORIDE 75 ML/HR: 9 INJECTION, SOLUTION INTRAVENOUS at 12:11

## 2023-01-01 RX ADMIN — PROPOFOL 35 MCG/KG/MIN: 10 INJECTION, EMULSION INTRAVENOUS at 10:11

## 2023-01-01 RX ADMIN — SODIUM CHLORIDE: 9 INJECTION, SOLUTION INTRAVENOUS at 05:11

## 2023-01-01 RX ADMIN — Medication 2.5 MCG/KG/MIN: at 09:11

## 2023-01-01 RX ADMIN — ACETAMINOPHEN 650 MG: 325 TABLET ORAL at 02:11

## 2023-01-01 RX ADMIN — SODIUM CHLORIDE: 9 INJECTION, SOLUTION INTRAVENOUS at 09:11

## 2023-01-01 RX ADMIN — ACETAMINOPHEN 650 MG: 325 TABLET ORAL at 08:11

## 2023-01-01 RX ADMIN — FENTANYL CITRATE 50 MCG: 50 INJECTION INTRAMUSCULAR; INTRAVENOUS at 01:11

## 2023-01-08 NOTE — PROGRESS NOTES
[facility] Hematology/Oncology  PROGRESS NOTE -  Follow-up Visit      Subjective:       Patient ID:   NAME: Esperanza Peters : 1990     31 y.o. female    Referring Doc: Dr Cheri Smith  Other Physicians: Armaan Mukherjee           Chief Complaint: left breast cancer f/u       History of Present Illness:     Patient returns today for a regularly scheduled follow-up visit.  The patient is here today to go over the results of the recently ordered labs, tests and studies. She is here by herself today.     She previously had surgery with Dr Smith and Dr Nguyen with plastic surgery at [place] on 2022; pathology reports that she had no residual breast cancer in the left and right breast and the six sentinel Ln's on the left were all negative.    She has since started back on perjeta and herceptin and is tolerating it well. Discussed the recommendation for consideration for oopherectomy, and patient is unsure if she wants to proceed in that direction. She has not had a period since starting chemotherapy.     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       discussed covid19 precautions      ROS:   GEN: normal without any fever, night sweats or weight loss  HEENT: normal with no HA's, sore throat, stiff neck, changes in vision  CV: normal with no CP, SOB, PND, CHASE or orthopnea  PULM: normal with no SOB, cough, hemoptysis, sputum or pleuritic pain  GI: normal with no abdominal pain, nausea, vomiting, constipation, diarrhea, melanotic stools, BRBPR, or hematemesis  : normal with no hematuria, dysuria  BREAST: no palpable masses  SKIN: normal with no rash, erythema, bruising, or swelling    Pain Scale:  0    Allergies:  Review of patient's allergies indicates:  No Known Allergies    Medications:    Current Outpatient Medications:     acetaminophen (TYLENOL) 500 MG tablet, Take 500 mg by mouth once as needed for  Activity Instructions:   - Vaginal delivery: Nothing in the vagina for 6 weeks, no intercourse for 6 weeks, you are not restricted on other activities, but rest for 1-2 weeks to allow your body to recover from delivery.    Schedule postpartum visit with your provider in 6 weeks for a post partum visit. Call if you experience any of the following: fever >100.4F, vaginal bleeding greater than 1 pad/hour, pain not relieved by pain medications, severe nausea and vomiting or foul-smelling vaginal discharge.      Postpartum Vaginal Delivery Instructions    Activity     Ask family and friends for help when you need it.  Do not place anything in your vagina for 6 weeks.  You are not restricted on other activities, but take it easy for a few weeks to allow your body to recover from delivery.  You are able to do any activities you feel up to that point.  No driving until you have stopped taking your pain medications (usually two weeks after delivery).     Call your health care provider if you have any of these symptoms:     Increased pain, swelling, redness, or fluid around your stiches from an episiotomy or perineal tear.  A fever above 100.4 F (38 C) with or without chills when placing a thermometer under your tongue.  You soak a sanitary pad with blood within 1 hour, or you see blood clots larger than a golf ball.  Bleeding that lasts more than 6 weeks.  Vaginal discharge that smells bad.  Severe pain, cramping or tenderness in your lower belly area.  A need to urinate more frequently (use the toilet more often), more urgently (use the toilet very quickly), or it burns when you urinate.  Nausea and vomiting.  Redness, swelling or pain around a vein in your leg.  Problems breastfeeding or a red or painful area on your breast.  Chest pain and cough or are gasping for air.  Problems coping with sadness, anxiety, or depression.  If you have any concerns about hurting yourself or the baby, call your provider immediately.   You  Pain., Disp: , Rfl:     magic mouthwash diphen/antac/lidoc/nysta, Take 10 mLs by mouth 4 (four) times daily. (Patient not taking: Reported on 5/18/2022), Disp: 120 mL, Rfl: 1    HYDROcodone-acetaminophen (NORCO) 5-325 mg per tablet, Take 1 tablet by mouth 3 (three) times daily as needed for pain. (Patient not taking: Reported on 5/18/2022), Disp: 8 tablet, Rfl: 0    tamoxifen (NOLVADEX) 20 MG Tab, Take 1 tablet (20 mg total) by mouth once daily., Disp: 30 tablet, Rfl: 11  No current facility-administered medications for this visit.    Facility-Administered Medications Ordered in Other Visits:     diphenhydrAMINE injection 50 mg, 50 mg, Intravenous, Once PRN, Nilesh Jackson MD    EPINEPHrine (EPIPEN) 0.3 mg/0.3 mL pen injection 0.3 mg, 0.3 mg, Intramuscular, Once PRN, Nilesh Jackson MD    hydrocortisone sodium succinate injection 100 mg, 100 mg, Intravenous, Once PRN, Nilesh Jackson MD    pertuzumab (PERJETA) 420 mg in sodium chloride 0.9% 264 mL infusion, 420 mg, Intravenous, 1 time in Clinic/HOD, Nilesh Jackson MD, Last Rate: 528 mL/hr at 06/15/22 1425, 420 mg at 06/15/22 1425    sodium chloride 0.9% flush 10 mL, 10 mL, Intravenous, PRN, Nilesh Jackson MD    trastuzumab-dkst (OGIVRI) 384 mg in sodium chloride 0.9% 250 mL chemo infusion, 6 mg/kg (Treatment Plan Recorded), Intravenous, 1 time in Clinic/HOD, Nilesh Jackson MD    PMHx/PSHx Updates:  See patient's last visit with me on 5/4/2022  See H&P on 9/13/2021        Pathology:  Cancer Staging  Malignant neoplasm of overlapping sites of left female breast  Staging form: Breast, AJCC 8th Edition  - Clinical: Stage IIB (cT3, cN1(f), cM0, G3, ER+, IA+, HER2+) - Unsigned  - Pathologic: No Stage Recommended (ypT0, pN0(sn), cM0) - Signed by Obed Hill Jr., MD on 5/18/2022      Bilateral mastectomies : 1/128/2022 at [facility]  - pathology report on chart      Objective:     Vitals:  Blood pressure 109/65, pulse 84, temperature 98.1  have questions or concerns after you return home.     Keep your hands clean:  Always wash your hands before touching your perineal area and stitches.  This helps reduce your risk of infection.  If your hands aren't dirty, you may use an alcohol hand-rub to clean your hands. Keep your nails clean and short.       °F (36.7 °C), resp. rate 18, height 5' (1.524 m), weight 58.5 kg (129 lb).    Physical Examination:   GEN: no apparent distress, comfortable; AAOx3  HEAD: atraumatic and normocephalic  EYES: no pallor, no icterus, PERRLA  ENT: OMM, no pharyngeal erythema, external ears WNL; no nasal discharge; no thrush  NECK: no masses, thyroid normal, trachea midline, no LAD/LN's, supple  CV: RRR with no murmur; normal pulse; normal S1 and S2; no pedal edema; Rght CW portacath since remoived  CHEST: Normal respiratory effort; CTAB; normal breath sounds; no wheeze or crackles  ABDOM: nontender and nondistended; soft; normal bowel sounds; no rebound/guarding  MUSC/Skeletal: ROM normal; no crepitus; joints normal; no deformities or arthropathy  EXTREM: no clubbing, cyanosis, inflammation or swelling  SKIN: no rashes, lesions, ulcers, petechiae or subcutaneous nodules  : no pacheco  NEURO: grossly intact; motor/sensory WNL; AAOx3; no tremors  PSYCH: normal mood, affect and behavior  LYMPH: normal cervical, supraclavicular, axillary and groin LN's  Breast: s/p bilateral mastectomies with reconstruction             Labs:     Lab Results   Component Value Date    WBC 5.03 06/15/2022    HGB 11.5 (L) 06/15/2022    HCT 37.6 06/15/2022    MCV 80 (L) 06/15/2022     06/15/2022       CMP  Sodium   Date Value Ref Range Status   06/15/2022 137 136 - 145 mmol/L Final     Potassium   Date Value Ref Range Status   06/15/2022 3.7 3.5 - 5.1 mmol/L Final     Chloride   Date Value Ref Range Status   06/15/2022 102 95 - 110 mmol/L Final     CO2   Date Value Ref Range Status   06/15/2022 28 23 - 29 mmol/L Final     Glucose   Date Value Ref Range Status   06/15/2022 105 70 - 110 mg/dL Final     BUN   Date Value Ref Range Status   06/15/2022 13 6 - 20 mg/dL Final     Creatinine   Date Value Ref Range Status   06/15/2022 0.8 0.5 - 1.4 mg/dL Final     Calcium   Date Value Ref Range Status   06/15/2022 9.5 8.7 - 10.5 mg/dL Final     Total Protein   Date  Value Ref Range Status   06/15/2022 7.5 6.0 - 8.4 g/dL Final     Albumin   Date Value Ref Range Status   06/15/2022 3.9 3.5 - 5.2 g/dL Final     Total Bilirubin   Date Value Ref Range Status   06/15/2022 0.7 0.1 - 1.0 mg/dL Final     Comment:     For infants and newborns, interpretation of results should be based  on gestational age, weight and in agreement with clinical  observations.    Premature Infant recommended reference ranges:  Up to 24 hours.............<8.0 mg/dL  Up to 48 hours............<12.0 mg/dL  3-5 days..................<15.0 mg/dL  6-29 days.................<15.0 mg/dL       Alkaline Phosphatase   Date Value Ref Range Status   06/15/2022 48 (L) 55 - 135 U/L Final     AST   Date Value Ref Range Status   06/15/2022 20 10 - 40 U/L Final     ALT   Date Value Ref Range Status   06/15/2022 13 10 - 44 U/L Final     Anion Gap   Date Value Ref Range Status   06/15/2022 7 (L) 8 - 16 mmol/L Final     eGFR if    Date Value Ref Range Status   06/15/2022 >60.0 >60 mL/min/1.73 m^2 Final     eGFR if non    Date Value Ref Range Status   06/15/2022 >60.0 >60 mL/min/1.73 m^2 Final     Comment:     Calculation used to obtain the estimated glomerular filtration  rate (eGFR) is the CKD-EPI equation.              Radiology/Diagnostic Studies:    MRI breast  12/23/2021:  -    Impression:     1. Complete resolution of previous enhancing multicentric left breast malignancy.  2. Complete resolution of prior enlarged left axillary lymph nodes.  3. Persistent retroareolar left breast distortion near 6 o'clock position, characteristic of scarring.  Recommendation: Patient is currently under medical oncology and surgical care.        PET 10/21/2021:    Impression:     Hypermetabolic left breast mass consistent with primary breast malignancy. Please correlate with dedicated breast imaging.     No pathologically enlarged or hypermetabolic axillary or intrathoracic lymph nodes.     Hypermetabolic  retroperitoneal lymph node is nonspecific and would be unusual for breast cancer metastasis. Attention on follow-up is recommended.     Low level FDG activity associated with prominent cervical lymph nodes, bilateral and symmetric, most likely reactive in nature.     Reactive red marrow.      CT scans 9/29/2021:    IMPRESSION:  1. Large irregular subareolar left breast mass and findings compatible with multifocal/multicentric disease in the left breast (better appreciated on the recent MRI).  2. Left axillary lymphadenopathy compatible with additional sites of disease.  3. Borderline enlarged right hilar node, possibly an additional site of disease, although felt less likely this could be further assessed with PET/CT.        NM Bone Scan Whole Body    Result Date: 9/27/2021  CMS MANDATED QUALITY DATA-BONE SCAN-147 HISTORY: Staging breast carcinoma, and 63.25. COMPARISON: Multiple prior chest radiographs. FINDINGS: 22 mCi Tc 99m MDP was administered IV per protocol, with delayed whole-body images obtained in the anterior and posterior projections. Nodular focus of increased radiotracer uptake in the sternum near the level of the sternomanubrial articulation is nonspecific. There is otherwise no abnormal focal increased radiotracer uptake throughout the axial or appendicular skeleton to suggest osteoblastic metastatic disease. There is normal radiotracer uptake by the kidneys, with normal excretion into the renal collecting systems and urinary bladder. IMPRESSION: 1. Focus of increased osteoblastic activity in the sternum near the sternomanubrial junction, nonspecific. Consider further evaluation with chest CT with contrast. 2. Otherwise no evidence of osteoblastic metastatic disease. Electronically signed by:  Keron Neil MD  9/27/2021 1:41 PM CDT Workstation: [id number]    X-Ray Chest AP Portable    Result Date: 9/20/2021  Portable chest x-ray at 11:13 AM is compared to prior study dated 9/16/2021 Clinical history  is Port-A-Cath placement There is a right subclavian vein Port-A-Cath with tip overlying the superior vena cava. There is no pneumothorax. The lungs are clear. The cardiomediastinal silhouette is normal in size. There are no acute osseous normality's. IMPRESSION: No pneumothorax status post right subclavian vein Port-A-Cath placement Electronically signed by:  Breana Lebron MD  9/20/2021 11:52 AM CDT Workstation: [id number]    MRI Breast w/wo Contrast, w/CAD, Bilateral    Result Date: 9/28/2021  EXAMINATION: MRI BREAST W/WO CONTRAST, W/CAD, BILATERAL CLINICAL HISTORY: Staging left breast carcinoma, C 50.812, N 63.21, N 63.24, N 63.25, malignant neoplasm of overlapping sites of left female breast TECHNIQUE: CMS MANDATED QUALITY DATA - MAMMOGRAPHY - 225 Bilateral breast MRI was performed with a dedicated breast coil. The patient was placed prone in the breast immobilizer with light compression. The following localization sequences were obtained: Axial inversion recovery, axial 3-D non-fat-suppressed, axial 3-D fat suppressed pre-contrast, followed by axial 3-D fat-suppressed post-contrast dynamic images with 6.5 mL Gadavist IV contrast. Post-processing including subtraction imaging, reconstruction in the sagittal and coronal planes, and MIP of both breasts was performed on an independent workstation. The interpretation was assisted by Computer Aided Detection. FINDINGS: Comparison to the diagnostic mammogram and ultrasound of 08/20/2021.  The breasts are composed of mostly fibroglandular elements and interspersed fat.  Background parenchymal enhancement is mild. There is a dominant heterogeneously enhancing solid mass with irregular margins in the left breast retroareolar region measuring 37 mm AP x 3 mm transverse by 35 mm craniocaudal dimension, with suspicious enhancement kinetics.  There are several additional enhancing nodular and irregular infiltrative masses in the left breast, some which are contiguous with  the dominant retroareolar mass, extending into the all quadrants of the breast predominantly the upper outer quadrant.  Total area of disease in the left breast measures roughly 38 mm AP x 60 mm transverse by 90 mm craniocaudal dimension, roughly 30-40% of the volume of the left breast.  No abnormal enhancement to suggest involvement of the left pectoralis musculature. There are several enlarged infiltrated appearing left axillary lymph nodes suspicious for metastatic disease, measuring 16 mm maximum short axis dimension.  There are no enlarged right axillary lymph nodes or enlarged internal mammary chain lymph nodes. Stippled randomly scattered non masslike enhancement in the right breast is nonspecific, with no enhancing right breast mass. There is a 23 mm STIR hyperintense enhancing right hilar mass suspicious for hilar lymphadenopathy, with no additional abnormal enhancement in the chest wall or mediastinum evident.     1. Multifocal, multicentric left breast carcinoma as described, with several enlarged left axillary lymph nodes consistent with metastatic disease. 2. No findings of contralateral right breast carcinoma, with scattered stippled probably benign non masslike enhancement in the right breast. 3. Enhancing right hilar mass suspicious for metastatic disease.  Consider further evaluation with whole body PET CT. BI-RADS CATEGORY 6: KNOWN MALIGNANCY. [facility] utilizes a reminder system to ensure that patients receive reminder letters for appointments. This includes reminders for routine screening mammograms, diagnostic mammograms, or other breast imaging interventions as appropriate. This patient will be placed in the appropriate reminder system. Electronically signed by: Keron Neil MD Date:    09/28/2021 Time:    09:32           I have reviewed all available lab results and radiology reports.    Assessment/Plan:   (1) 31 y.o. female with diagnosis of left breast cancer who has been  referred by Dr Cheri Smith for evaluation by medical hematology/oncology.   - left breast masses of overlapping sites involving Outer upper Quadrant and Lower inner Quadrant respectively  - s/p core biopsies taken on 8/27/2021  - axillary LN was positive  - stage III cancer process at least  - ER positive at 80.5 %, HI positive at 3%; Her2Nu (3+)  -Ki67 25%  - discussed the pathology and the latest NCCN guidelines (version 8.2021)  - recommend neoadjuvant chemotherapy with herceptin + perjeta based regimen which studies have shown to be beneficial to survival and endocrine therapy  - she will need echo, portacath and PEt scan  - will set up chemotherapy school to discuss the drugs, side-effects and provide literature on the drugs    9/30/2021:  - Her case was presented at the [facility] Tumor Board yesterday and consensus was to get PEt scan and proceed with TCHP chemotherapy.   - She had chemotherapy school with Susana.   - She had portacath placed on [facility] CW with Dr Smith.   - She is set to start chemotherapy on Oct 5th.   - echo on chart from 9/24/2021  - discussed the chemotherapy regimen, provided literature and discussed the side-effect profile of the drugs    10/26/2021:  - getting cycle #2 today  - getting Udenyca tomorrow and she also requested some IVF tomorrow as well  - discussed the PEt scan findings from 10/21/2021     12/7/2021:  - 4th cycle chemo this week with IVF and antiemtics as needed  - order MRI of breasts for next week to re-assess response    1/10/2022:  - She completed the 4th cycle of chemotherapy and had subsequent breast MRI on 12/23/2021 which showed an excellent response to the therapy; she has decided to stop the chemotherapy and proceed with surgery; she is adamant that she does not want to continue with the neoadjuvant regiment; she is aware that she will still at least need to continue the herceptin post-op and may need additional chemotherapy depending on the pathology  findings from the surgery  - She saw Dr Smith the other day and is seeing Dr Nguyen with plastic surgery tomorrow    2/15/2022:  - She had surgery with Dr Smith and Dr Nguyen with plastic surgery at [place] on Jan 28th 2022; pathology reports that she had no residual breast cancer in the left and right breast and the six sentinel LN's on the left were all negative. She is healing well and has some residual aches and pain under the left arm. She sees Dr Smith again 2 weeks and Dr Nguyen on 2/21/2022  - Dr Nguyen wants to either move or remove portacath to help with the cosmetic surgery better  - she will need to continue with Herceptin and Perjeta for the next year, she can either get PICC or intermittent IV's    3/16/2022:  - she has since resumed therapy post-op with perjeta and herceptin  - will need periodic echocardiograms  - monitor labs  - she no long has portacath (moved to facilitate reconstruction)     5/4/2022:  - She has since started back on perjeta and herceptin and is tolerating it well. Discussed the recommendation for consideration for oopherectomy, and patient is unsure if she wants to proceed in that direction. She has not had a period since starting chemotherapy.   - discussed and encouraged her to at least consider oral antihormone therapy with tamoxifen and is is willing to proceed  - she needs to follow-up with rad/onc as well for XRT evaluation      6/15/2022:  - she is doing ok with the current regimen     (2) no HTN     (3) No DM     (4) two c-sections with healthy children - she is aware that with chemotherapy she could potentially not be able to reproduce or have any more children - she is comfortable with that fact      VISIT DIAGNOSES:      Malignant neoplasm of overlapping sites of left female breast, unspecified estrogen receptor status    Microcytic anemia    HER2-positive carcinoma of breast    ER+ (estrogen receptor positive status)            PLAN:  1. resumed and continued on Perjeta and  herceptin  2. Continue with peridoic echo's as scheduled and directed  3. Check up to date labs at least monthly  4. s/p chemotherapy school, discussed the medications, side-effect profiles and obtained consents  5. Tamoxifen oral antihormone and previously proposed consideration for oophorectomy in future  6. F/u with rad/onc        RTC in 4 weeks with Whit;  8 weeks with myself    Fax note to Lonny Smith; Carly; Liz Catherine/Brayden       Discussion:     COVID-19 Discussion:     I had long discussion with patient and any applicable family about the COVID-19 coronavirus epidemic and the recommended precautions with regard to cancer and/or hematology patients. I have re-iterated the CDC recommendations for adequate hand washing, use of hand -like products, and coughing into elbow, etc. In addition, especially for our patients who are on chemotherapy and/or our otherwise immunocompromised patients, I have recommended avoidance of crowds, including movie theaters, restaurants, churches, etc. I have recommended avoidance of any sick or symptomatic family members and/or friends. I have also recommended avoidance of any raw and unwashed food products, and general avoidance of food items that have not been prepared by themselves. The patient has been asked to call us immediately with any symptom developments, issues, questions or other general concerns.      Pathology Discussion:     I reviewed and discussed the pathology report(s) and radiograph reports (if available) in as simple to understand and/or laymen's terms to the best of my ability. I had an indepth conversation with the patient and went over the patient's individual diagnosis based on the information that was currently available. I discussed the TNM staging process with regard to the patient's particular cancer type, and the calculated stage based on the currently available TNM data and literature. I discussed the available prognostic  "data with regard to the current staging information and how it relates to the prognosis of their particular neoplastic process.          NCCN Guidelines:     I discussed the available treatment option(s) in accordance with the latest literature from the NCCN Clinical Practice Guidelines for the patient's particular type of cancer disorder. The NCCN Guidelines provide a "document evidence-based (and) consensus-driven management" of the care of oncology patients. The treatment recommendations were made not only in accordance to the NCCN guidelines, but also factored in to account the patient's overall age, condition, performance status and their medical co-morbidities. I went over the risks and benefits of the the treatment options (if any could be made) with regard to their particular cancer type, their cancer stage, their age, and their co-morbidities.      Chemotherapy Discussion:        I discussed the available treatment option(s) in accordance with the latest/current national evidence-based guidelines (NCCN, UpToDate, NCI, ASCO, etc where applicable), their overall age/condition and their co-morbidities. I also went over the risks and benefits of the chemotherapy with regard to their particular cancer type, their cancer stage, their age/condition, and their co-morbidities. I provided literature on the chemotherapy regimen and discussed the chemotherapy side-effect profiles of the drug(s). I discussed the importance of compliance with obtaining and monitoring weekly lab work, and went over the potential hematopathology issues and risks with anemia, leucopenia and thrombocytopenia that can occur with chemotherapy. I discussed the potential risks of liver and kidney damage, which could be permanent and could necessitate dialysis long-term if kidney failure developed. I discussed the emetic and/or diarrheal potential of the regimen and the potential need for use of antiemetic and anti-diarrheal medications. I " discussed the risk for development of anaphylactic shock, bronchospasm, dysrhythmia, and respiratory/cardiovascular arrest and/or failure. I discussed the potential risks for development of alopecia, cold sensory issues, ringing in ears, vertigo, cataracts, glaucoma, and neuropathy, all of which could end up being chronic and life-long. Some chemotherpyI discussed the risks of hand-foot syndrome and rashes, and development of other autoimmune mediated processes such as pneumonitis, hepatitis, and colitis which could be life threatening. I discussed the risks of the potential development of a rare but fatal viral mediated disease known as PML (Progressive Multifocal Leukoencephalopathy), and risk of future development of leukemia and/or lymphoma from use of certain chemotherapy agents. I discussed the need for neutropenic precautions, basic hygiene/sanitation behaviors and dietary restrictions.     The patient's consent has been obtained to proceed with the chemotherapy.The patient will be referred to [facility] for training and education on chemotherapy, use of antiemetics and/or anti-diarrheals, use of NSAID's, potential chemotherapy side-effects, and any specific recommendations and precautions with the particular chemotherapy agents.       I answered all of the patient's (and family's, if applicable) questions to the best of my ability and to their complete satisfaction. The patient acknowledged full understanding of the risks, recommendations and plan(s).      I have explained and the patient understands all of  the current recommendation(s). I have answered all of their questions to the best of my ability and to their complete satisfaction.      Antihormone Therapy Discussion:    I discussed the advantages of antihormone therapy with the patient with regard to their particular neoplastic or carcinoma in situ condition. I went over the side-effect profile of the medication including  risk for potential development of endometrial cancer and/or hyperplasia in women who still have a uterus and the need for yearly GYN evaluation and follow-up. I discussed the risks for thromboembolic events such as DVT's, pulmonary emboli, CVA's, retinal vascular clots, phlebitis, and TIA's. I discussed the potential risks for development of ocular disturbances, retinopathy, cataracts, corneal changes, flushing, hot flashes, amenorrhea, altered menses, fluid retention, weight changes, elevations in LFT's, liver damage, and mood disturbances. I discussed the potential risk of arthropathy and joint pains/aches which could be chronic and debilitating. I discussed potential adverse effects on bone mineralization and the risk of osteopenia and/or osteoporosis which could led to increase risk of fractures.   A consent form was obtained and a copy was provided to the patient.       I spent over 25 mins of time with the patient. Reviewed results of the recently ordered labs, tests and studies; made directives with regards to the results. Over half of this time was spent couseling and coordinating care.    I have explained all of the above in detail and the patient understands all of the current recommendation(s). I have answered all of their questions to the best of my ability and to their complete satisfaction.   The patient is to continue with the current management plan.            Electronically signed by Nilesh Jackson MD

## 2023-11-17 PROBLEM — Z99.11 ON MECHANICALLY ASSISTED VENTILATION: Status: ACTIVE | Noted: 2023-01-01

## 2023-11-17 PROBLEM — E87.8 ALTERATION IN ELECTROLYTE AND FLUID BALANCE: Status: ACTIVE | Noted: 2023-01-01

## 2023-11-17 PROBLEM — G93.5 BRAIN COMPRESSION: Status: ACTIVE | Noted: 2023-01-01

## 2023-11-17 PROBLEM — R56.9 OBSERVED SEIZURE-LIKE ACTIVITY: Status: ACTIVE | Noted: 2023-01-01

## 2023-11-17 PROBLEM — E87.1 HYPONATREMIA: Status: ACTIVE | Noted: 2023-01-01

## 2023-11-17 PROBLEM — G93.89 BRAIN MASS: Status: ACTIVE | Noted: 2023-01-01

## 2023-11-17 PROBLEM — G93.6 VASOGENIC BRAIN EDEMA: Status: ACTIVE | Noted: 2023-01-01

## 2023-11-17 PROBLEM — R40.2430 GLASGOW COMA SCALE TOTAL SCORE 3-8: Status: ACTIVE | Noted: 2023-01-01

## 2023-11-17 PROBLEM — G93.40 ENCEPHALOPATHY: Status: ACTIVE | Noted: 2023-01-01

## 2023-11-17 NOTE — ED PROVIDER NOTES
Encounter Date: 2023       History     Chief Complaint   Patient presents with    Loss of Consciousness     Syncopal episode at work, migraine x 7 days.      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  Patient completed treatment 2022.  Patient presents emergency department with syncopal episode which was witnessed by coworkers while at work.  Patient had been complaining prior of headache which was described as bitemporal frontal for the last 7 days.  Per patient's  denies known history of nausea, vomiting, fever.  No seizure activity prior to today noted.      Review of patient's allergies indicates:  No Known Allergies  Past Medical History:   Diagnosis Date    ER+ (estrogen receptor positive status) 2021    HER2-positive carcinoma of breast 2021    Malignant neoplasm of overlapping sites of left female breast 2021    Mass of lower inner quadrant of left breast 2021    Mass of upper outer quadrant of left breast 2021    Mass overlapping multiple quadrants of left breast 2021     Past Surgical History:   Procedure Laterality Date     SECTION  2008    x2 2008 & 2019    INSERTION OF TUNNELED CENTRAL VENOUS CATHETER (CVC) WITH SUBCUTANEOUS PORT N/A 2021    Procedure: INSERTION, PORT-A-CATH;  Surgeon: Latanya Smith MD;  Location: [facility];  Service: General;  Laterality: N/A;     Family History   Problem Relation Age of Onset    Hypertension Mother     Breast cancer Maternal Aunt      Social History     Tobacco Use    Smoking status: Never    Smokeless tobacco: Never   Substance Use Topics    Alcohol use: No    Drug use: No     Review of Systems   Constitutional:  Negative for fever.   HENT:  Negative for sore throat.    Respiratory:  Negative for shortness of breath.    Cardiovascular:  Negative for chest pain.   Gastrointestinal:  Negative for nausea.   Genitourinary:   Negative for dysuria.   Musculoskeletal:  Negative for back pain.   Skin:  Negative for rash.   Neurological:  Positive for syncope and headaches. Negative for weakness.   Hematological:  Does not bruise/bleed easily.       Physical Exam     Initial Vitals [11/17/23 1330]   BP Pulse Resp Temp SpO2   134/82 83 16 97.8 °F (36.6 °C) 100 %      MAP       --         Physical Exam    Nursing note and vitals reviewed.  Constitutional: She appears well-developed and well-nourished.   HENT:   Head: Normocephalic and atraumatic.   Nose: Nose normal.   Mouth/Throat: Oropharynx is clear and moist.   Eyes: Conjunctivae are normal. Pupils are equal, round, and reactive to light.   With dysconjugate gaze noted on exam.  Pupils 3+ reactive bilaterally   Neck: Neck supple. No thyromegaly present. No tracheal deviation present.   Normal range of motion.  Cardiovascular:  Normal rate, regular rhythm, normal heart sounds and intact distal pulses.     Exam reveals no gallop and no friction rub.       No murmur heard.  Pulmonary/Chest: No stridor. No respiratory distress.   Course bilateral breath sounds no adventitious sounds   Abdominal: Abdomen is soft. Bowel sounds are normal. She exhibits no distension and no mass. There is no abdominal tenderness. There is no rebound and no guarding.   Musculoskeletal:         General: No edema. Normal range of motion.      Cervical back: Normal range of motion and neck supple.     Lymphadenopathy:     She has no cervical adenopathy.   Neurological: GCS score is 15. GCS eye subscore is 4. GCS verbal subscore is 5. GCS motor subscore is 6.   Patient arousable to questioning.  Confused to date and month.  Attempting to follow simple commands.  Patient hyperreflexic on the right arm and right leg.  E-4 M-4 V-4 =  12   Skin: Skin is warm and dry. Capillary refill takes less than 2 seconds.   Psychiatric:   Flat affect         ED Course   Intubation    Date/Time: 11/17/2023 6:33 PM  Location procedure  "was performed: [facility] EMERGENCY DEPARTMENT    Performed by: Lb Richmond MD  Authorized by: Lb Richmond MD  Consent Done: Yes  Consent: Verbal consent obtained.  Risks and benefits: risks, benefits and alternatives were discussed  Patient understanding: patient states understanding of the procedure being performed  Procedure consent: procedure consent matches procedure scheduled  Time out: Immediately prior to procedure a "time out" was called to verify the correct patient, procedure, equipment, support staff and site/side marked as required.  Indications: airway protection  Intubation method: video-assisted  Patient status: paralyzed (RSI)  Preoxygenation: nonrebreather mask, BVM and nasal cannula  Sedatives: etomidate  Paralytic: succinylcholine  Laryngoscope size: Glide 3  Tube size: 7.0 mm  Tube type: cuffed  Number of attempts: 1  Cricoid pressure: no  Cords visualized: yes  Post-procedure assessment: chest rise, ETCO2 monitor and CO2 detector  Breath sounds: clear  Cuff inflated: yes  ETT to teeth: 22 cm  Tube secured with: ETT andrews  Chest x-ray interpreted by me.  Chest x-ray findings: endotracheal tube in appropriate position  Patient tolerance: Patient tolerated the procedure well with no immediate complications  Complications: No  Specimens: No  Implants: No        Labs Reviewed   CBC W/ AUTO DIFFERENTIAL - Abnormal; Notable for the following components:       Result Value    MCV 80 (*)     MCH 25.1 (*)     MCHC 31.3 (*)     RDW 14.6 (*)     Gran % 75.0 (*)     Lymph % 17.8 (*)     All other components within normal limits    Narrative:     Release to patient->Immediate   COMPREHENSIVE METABOLIC PANEL - Abnormal; Notable for the following components:    Sodium 135 (*)     CO2 22 (*)     Alkaline Phosphatase 40 (*)     All other components within normal limits    Narrative:     Release to patient->Immediate   URINALYSIS, REFLEX TO URINE CULTURE - Abnormal; Notable for the following components:    " Appearance, UA Hazy (*)     Specific Gravity, UA >1.030 (*)     Protein, UA 1+ (*)     Ketones, UA Trace (*)     Occult Blood UA 3+ (*)     Nitrite, UA Positive (*)     Urobilinogen, UA 2.0-3.0 (*)     All other components within normal limits    Narrative:     Specimen Source->Urine   URINALYSIS MICROSCOPIC - Abnormal; Notable for the following components:    RBC, UA >100 (*)     WBC, UA 10 (*)     Bacteria Moderate (*)     Hyaline Casts, UA 12 (*)     All other components within normal limits    Narrative:     Specimen Source->Urine   ISTAT PROCEDURE - Abnormal; Notable for the following components:    POC PCO2 30.8 (*)     POC PO2 274 (*)     POC HCO3 20.3 (*)     POC BE -4 (*)     POC TCO2 21 (*)     All other components within normal limits   B-TYPE NATRIURETIC PEPTIDE    Narrative:     Release to patient->Immediate   DRUG SCREEN PANEL, URINE EMERGENCY    Narrative:     Specimen Source->Urine   HCG, QUANTITATIVE    Narrative:     Release to patient->Immediate   MAGNESIUM    Narrative:     Release to patient->Immediate   TROPONIN I HIGH SENSITIVITY    Narrative:     Release to patient->Immediate   APTT   PROTIME-INR   POCT URINE PREGNANCY   TYPE & SCREEN   ISTAT CREATININE   POCT CREATININE        ECG Results              EKG 12-lead (In process)  Result time 11/17/23 15:37:11      In process by Interface, Lab In [facility] (11/17/23 15:37:11)                   Narrative:    Test Reason : R55,    Vent. Rate : 072 BPM     Atrial Rate : 072 BPM     P-R Int : 150 ms          QRS Dur : 072 ms      QT Int : 386 ms       P-R-T Axes : 056 062 039 degrees     QTc Int : 422 ms    Normal sinus rhythm with sinus arrhythmia  Possible Left atrial enlargement  Borderline Abnormal ECG  When compared with ECG of 25-JAN-2022 15:30,  No significant change was found    Referred By: [email or web address]   SELF           Confirmed By:                       In process by Interface, Lab In [facility] (11/17/23 13:59:51)                   Narrative:     Test Reason : R55,    Vent. Rate : 072 BPM     Atrial Rate : 072 BPM     P-R Int : 150 ms          QRS Dur : 072 ms      QT Int : 386 ms       P-R-T Axes : 056 062 039 degrees     QTc Int : 422 ms    Normal sinus rhythm with sinus arrhythmia  Possible Left atrial enlargement  Borderline Abnormal ECG  When compared with ECG of 25-JAN-2022 15:30,  No significant change was found    Referred By: [email or web address]   SELF           Confirmed By:                                   Imaging Results              X-Ray Chest 1 View (In process)                      CT Head Without Contrast (Final result)  Result time 11/17/23 15:53:59      Final result by Wolfgang Patel MD (11/17/23 15:53:59)                   Narrative:    CMS MANDATED QUALITY DATA - CT RADIATION 436    All CT scans at this facility utilize dose modulation, iterative reconstruction, and/or weight based dosing when appropriate to reduce radiation dose to as low as reasonably achievable.    CLINICAL HISTORY:  32 years (1990) Female Headache, new or worsening, neuro deficit (Age 19-49y)    TECHNIQUE:  CT HEAD WITHOUT IV CONTRAST. Axial CT of the brain without contrast using soft tissue and bone algorithm. Please note in the acute setting if there is a clinical concern for an acute stroke MRI would be more sensitive/specific for evaluation of ischemia.    COMPARISON:  None available.    FINDINGS:  Irregular heterogeneous isoattenuating structure centered at the level of the left caudate head/basal ganglia at the level of the left frontal horn measuring 3.5 x 3.5 cm (image 19). There is marked edema in the adjacent frontal lobe.    This effaces the frontal and temporal horn of the left lateral ventricle and causes midline shift of approximately 9 mm (left to right).    No hydrocephalus, acute intracranial hemorrhage, or skull fracture is identified.    Orbital contents appear within normal limits. External auditory canals are unremarkable. The visualized  paranasal sinuses and mastoid air cells are essentially clear.    IMPRESSION:  1. Isoattenuating mass like structure centered along the frontal horn of the left lateral ventricle highly suspicious for malignancy/metastatic disease. Consider further characterization with contrast-enhanced MRI of the brain.  2. Midline shift, left to right of up to 9 mm.  3. No acute intracranial hemorrhage, hydrocephalus or acute osseous abnormality identified.                  RESULT NOTIFICATION: These observations were discussed by the dictating physician, by phone and acknowledged by the ordering provider ABEL HIGGINS at 11/17/2023 3:49 PM CST      Electronically signed by:  Wolfgang Patel MD  11/17/2023 03:53 PM CST Workstation: [id number]                                     Medications   iohexoL (OMNIPAQUE 350) injection 100 mL (has no administration in time range)   cefTRIAXone (ROCEPHIN) 1 g in dextrose 5 % 100 mL IVPB (ready to mix) (1 g Intravenous New Bag 11/17/23 1832)   rocuronium injection (10 mg Intravenous Canceled Entry 11/17/23 1808)   etomidate injection (10 mg Intravenous Given 11/17/23 1809)   succinylcholine injection (100 mg Intravenous Given 11/17/23 1809)   propofol (DIPRIVAN) 10 mg/mL infusion (20 mcg/kg/min × 61.2 kg Intravenous New Bag 11/17/23 1829)   LORazepam injection 1 mg (1 mg Intravenous Given 11/17/23 1541)   dexAMETHasone injection 6 mg (6 mg Intravenous Given 11/17/23 1612)   levETIRAcetam in NaCl (iso-os) IVPB 1,500 mg (0 mg Intravenous Stopped 11/17/23 1653)   LORazepam injection 1 mg ( Intravenous Canceled Entry 11/17/23 1630)   levETIRAcetam in NaCl (iso-os) IVPB 1,500 mg (0 mg Intravenous Stopped 11/17/23 1746)     Medical Decision Making  Amount and/or Complexity of Data Reviewed  Labs: ordered.  Radiology: ordered.    Risk  Prescription drug management.              Attending Attestation:         Attending Critical Care:   Critical Care Times:   Direct Patient Care (initial evaluation,  reassessments, and time considering the case)................................................................30 minutes.   Additional History from reviewing old medical records or taking additional history from the family, EMS, PCP, etc.......................5 minutes.   Ordering, Reviewing, and Interpreting Diagnostic Studies...............................................................................................................5 minutes.   Documentation..................................................................................................................................................................................5 minutes.   Consultation with other Physicians. .................................................................................................................................................5 minutes.   Consultation with the patient's family directly relating to the patient's condition, care, and DNR status (when patient unable)......5 minutes.   ==============================================================  Total Critical Care Time - exclusive of procedural time: 55 minutes.  ==============================================================  Critical care reasons: Seizure.   Critical care was time spent personally by me on the following activities: obtaining history from patient or relative, examination of patient, review of x-rays / CT sent with the patient, review of old charts, ordering lab, x-rays, and/or EKG, development of treatment plan with patient or relative, ordering and performing treatments and interventions, evaluation of patient's response to treatment, discussion with consultants and re-evaluation of patient's conition.   Critical Care Condition: potentially life-threatening           ED Course as of 11/17/23 1835   Fri Nov 17, 2023   1618 Patient seen evaluated emergency department.  Patient with waxing and waning mental status after had a syncopal episode while at  work.  Patient presents emergency department found to be somnolent and confused.  GCS 12 on evaluation.  Patient found to be hyperreflexic to right lower in upper extremity.  Patient did undergo CT brain Irregular heterogeneous isoattenuating structure centered at the level of the left caudate head/basal ganglia at the level of the left frontal horn measuring 3.5 x 3.5 cm (image 19). There is marked edema in the adjacent frontal lobe.     This effaces the frontal and temporal horn of the left lateral ventricle and causes midline shift of approximately 9 mm (left to right).     No hydrocephalus, acute intracranial hemorrhage, or skull fracture is identified.    Patient given Ativan 1 mg IV.  Patient did have improvement in mental status after Ativan administration.  Patient most likely with complex partial seizures.  Patient was loaded with Keppra 1500 mg IV piggyback.  Also was given 6 mg Decadron IV x1.  Case was discussed with Neurosurgery on-call Dr. Latia Almonte.  At this time patient will be transferred to Neurosurgery ICU at [facility].  Family notified and present at patient bedside.   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  Patient completed treatment October 2022.  Patient presents emergency department with syncopal episode which was witnessed by coworkers while at work.  Patient had been complaining prior of headache which was described as bitemporal frontal for the last 7 days.  Per patient's  denies known history of nausea, vomiting, fever.  No seizure activity prior to today noted.      Differential Diagnosis:   Complex partial seizure, brain tumor, syncope, vasovagal syncope, history of HER2 positive breast cancer concern for brain metastasis  Clinical Tests:   Lab Tests: Ordered and  Reviewed  Radiological Study: Ordered and Reviewed  Medical Tests: Ordered and Reviewed          Clinical Impression:  Final diagnoses:  [R55] Syncope  [D49.6, G93.5] Neoplasm of brain causing mass effect and brain compression on adjacent structures (Primary)  [N39.0] Urinary tract infection without hematuria, site unspecified  [I49.9] Abnormal heart rhythm  [G93.89] Brain mass          ED Disposition Condition    Transfer to Another Facility Stable                Lb Richmond MD  11/17/23 1650       Lb Richmond MD  11/17/23 8710

## 2023-11-17 NOTE — ED NOTES
REPORTED SZ PTA. REPORTEDLY STARING INTO SPACE WHILE SITTING AT WORK AND BECAME UNRESPONSIVE.  NOW ORIENTED X 3. RESPONDS TO VOICE.  PUPILS EQUAL BILATERALLY.  TRACKING APPROPRIATE.  FOLLOWS COMMANDS. NON LABORED RESPIRATIONS.  2LNC. NON DISTENDED ABDOMEN.  FAMILY STATES MONTHLY CYCLE IN PROGRESS. . SKIN WDI. FALLS PRECAUTIONS, SZ PRECAUTIONS. CALL LIGHT IN REACH.

## 2023-11-18 NOTE — NURSING TRANSFER
Patient arrived to [facility] from [facility]    Report received from:         Current symptoms: Intubated, sedated on propofol.  Tremors in bilateral lower extremities.     Skin Assessment done: Yes.  Wounds noted: Surgical scar on abdomen, approximated      *If wounds noted, was Wound Care consulted? Y  *If wounds noted, LDA placed? Y  Skin Assessment Verified by:    4 eyes: ERNESTO Chris. ERNESTO Stark Completed? N    Patient Belongings on Admit: None    NCC notified: EVARISTO Harrington.

## 2023-11-18 NOTE — HPI
Esperanza Peters is a 32-year-old female with PMHx of HER2 positive breast cancer s/p bilateral mastectomy (Jan 2022) and neoadjuvant therapy w/ perjeta and herceptin (completed Oct 2022; declined radiation) and chronic HA who presents to [facility] as a transfer from [place] for NSGY and NCC evaluation of new finding of brain mass of L caudate head/L BG with frontal lobe vasogenic edema on CTH.     Patient presented to OSH c/o syncopal episode earlier today while at work with waxing and waning mentation since the episode. On initial OSH exam, hyperreflexia was noted to RUE and RLE. GCS was 12 and she had dysconjugate gaze. She was treated for seizure, and given 1mg Ativan IV, which was later followed by 1.5g Keppra and 6mg Decadron IV. Mentation reportedly improved after Ativan. She does not have a seizure history prior to this event.    On arrival to [facility], patient is post-intubation for airway protection during transport. She arrived on propofol gtt and exhibiting intermittent BLE tremors and full body tremors. EEG cap placed and Epilepsy reported no seizure activity on prelim read. STAT MRI W/WO is pending and NSGY has been contacted regarding patient's arrival. Labs and CXR and KUB confirming ETT and OGT placement are pending.     Patient's mother presents to bedside and notes that the patient frequently has severe HA, which the patient confirmed to be bitemporal frontal earlier today. Patient is unable to follow commands, though her mother noted a hand squeeze to LUE which included the movement of the second digit. Patient first localized to noxious stimuli on arrival and gradually began to move extremities spontaneously more frequently. Eyelids persistently slit open bilaterally and gaze dysconjugate. Propofol gtt continued at 35 mcg/kg/min for now, titrating as indicated by RASS goals. Holding further steroids until MRI results in event that lymphoma present.

## 2023-11-18 NOTE — SUBJECTIVE & OBJECTIVE
Past Medical History:   Diagnosis Date    ER+ (estrogen receptor positive status) 2021    HER2-positive carcinoma of breast 2021    Malignant neoplasm of overlapping sites of left female breast 2021    Mass of lower inner quadrant of left breast 2021    Mass of upper outer quadrant of left breast 2021    Mass overlapping multiple quadrants of left breast 2021     Past Surgical History:   Procedure Laterality Date     SECTION  2008    x2 2008 & 2019    INSERTION OF TUNNELED CENTRAL VENOUS CATHETER (CVC) WITH SUBCUTANEOUS PORT N/A 2021    Procedure: INSERTION, PORT-A-CATH;  Surgeon: Latanya Smith MD;  Location: [facility] OR;  Service: General;  Laterality: N/A;      Current Facility-Administered Medications on File Prior to Encounter   Medication Dose Route Frequency Provider Last Rate Last Admin    [COMPLETED] cefTRIAXone (ROCEPHIN) 1 g in dextrose 5 % 100 mL IVPB (ready to mix)  1 g Intravenous Once Lb Richmond MD   Stopped at 23 194    [COMPLETED] dexAMETHasone injection 6 mg  6 mg Intravenous ED 1 Time bL Richmond MD   6 mg at 23 1612    [COMPLETED] levETIRAcetam in NaCl (iso-os) IVPB 1,500 mg  1,500 mg Intravenous ED 1 Time Lb Richmond MD   Stopped at 23 1653    [COMPLETED] levETIRAcetam in NaCl (iso-os) IVPB 1,500 mg  1,500 mg Intravenous ED 1 Time Lb Richmond MD   Stopped at 23 1746    [COMPLETED] LORazepam injection 1 mg  1 mg Intravenous ED 1 Time Lb Richmond MD   1 mg at 23 1541    [COMPLETED] LORazepam injection 1 mg  1 mg Intravenous ED 1 Time Lb Richmond MD   1 mg at 23 1623    [COMPLETED] LORazepam injection 1 mg  1 mg Intravenous ED 1 Time Fermin Pacheco DO   1 mg at 23 194    [DISCONTINUED] cefTRIAXone injection 1 g  1 g Intramuscular ED 1 Time Lb Richmond MD        [DISCONTINUED] etomidate injection   Intravenous Code/trauma/sedation Med Lb Richmond MD   10  mg at 11/17/23 1809    [DISCONTINUED] iohexoL (OMNIPAQUE 350) injection 100 mL  100 mL Intravenous ONCE PRN Lb Richmond MD        [DISCONTINUED] levETIRAcetam injection 1,500 mg  1,500 mg Intravenous ED 1 Time Lb Richmond MD        [DISCONTINUED] levETIRAcetam injection 1,500 mg  1,500 mg Intravenous ED 1 Time Lb Richmond MD        [DISCONTINUED] propofol (DIPRIVAN) 10 mg/mL infusion   Intravenous Code/Trauma/sedation Continuous Med Lb Richmond MD   Held at 11/17/23 1829    [DISCONTINUED] propofol (DIPRIVAN) 10 mg/mL infusion  0-50 mcg/kg/min Intravenous Continuous Lb Richmond MD 12.9 mL/hr at 11/17/23 1924 35 mcg/kg/min at 11/17/23 1924    [DISCONTINUED] rocuronium injection   Intravenous Code/trauma/sedation Lb Wolf MD        [DISCONTINUED] succinylcholine injection   Intravenous Code/trauma/sedation Lb Wolf MD   100 mg at 11/17/23 1809     Current Outpatient Medications on File Prior to Encounter   Medication Sig Dispense Refill    acetaminophen (TYLENOL) 500 MG tablet Take 500 mg by mouth once as needed for Pain.      clindamycin (CLEOCIN) 300 MG capsule Take 1 capsule three times a day for 7 days 21 capsule 0    clindamycin (CLEOCIN) 300 MG capsule Take 1 capsule by mouth three times a day for 7 days 21 capsule 0    diazePAM (VALIUM) 5 MG tablet Take 1 tablet every 8 hours as needed 20 tablet 0    diazePAM (VALIUM) 5 MG tablet Take 1 tablet by mouth every 8 hours as needed 20 tablet 0    magic mouthwash diphen/antac/lidoc/nysta Take 10 mLs by mouth 4 (four) times daily. (Patient not taking: Reported on 5/18/2022) 120 mL 1    HYDROcodone-acetaminophen (NORCO) 5-325 mg per tablet Take 1 tablet by mouth 3 (three) times daily as needed for pain. (Patient not taking: Reported on 5/18/2022) 8 tablet 0    ondansetron (ZOFRAN-ODT) 8 MG TbDL Let 1 tablet dissolve in mouth every 8 hours as needed for nausea 20 tablet 0    ondansetron (ZOFRAN-ODT) 8 MG TbDL  Dissolve 1 tablet in mouth every 8 hours  as needed 20 tablet 0    oxyCODONE-acetaminophen (PERCOCET) 5-325 mg per tablet Take 1 tablet every 6 hours as needed for pain. 28 tablet 0    oxyCODONE-acetaminophen (PERCOCET) 5-325 mg per tablet Take 1 tablet every 6 hours as needed for pain. 28 tablet 0    tamoxifen (NOLVADEX) 20 MG Tab Take 1 tablet (20 mg total) by mouth once daily. 30 tablet 11      Allergies: Patient has no known allergies.    N/A  Family History   Problem Relation Age of Onset    Hypertension Mother     Breast cancer Maternal Aunt      Social History     Tobacco Use    Smoking status: Never    Smokeless tobacco: Never   Substance Use Topics    Alcohol use: No    Drug use: No     Review of Systems   Unable to perform ROS: Intubated     Objective:     Vitals:    Temp: 99.1 °F (37.3 °C)  Pulse: (!) 120  Resp: 19  SpO2: 100 %    Temp  Min: 97.8 °F (36.6 °C)  Max: 99.7 °F (37.6 °C)  Pulse  Min: 69  Max: 131  BP  Min: 100/63  Max: 140/58  MAP (mmHg)  Min: 76  Max: 90  Resp  Min: 16  Max: 33  SpO2  Min: 98 %  Max: 100 %  Oxygen Concentration (%)  Min: 60  Max: 60    No intake/output data recorded.            Physical Exam  Vitals and nursing note reviewed.   Constitutional:       General: She is not in acute distress.     Appearance: She is normal weight. She is ill-appearing. She is not toxic-appearing.      Interventions: She is sedated and intubated.   HENT:      Head: Normocephalic and atraumatic.      Comments: EEG cap in place     Right Ear: External ear normal.      Left Ear: External ear normal.      Nose: Nose normal. No signs of injury.      Right Nostril: No epistaxis.      Left Nostril: No epistaxis.      Mouth/Throat:      Lips: Pink. No lesions.      Mouth: Mucous membranes are moist. No lacerations or angioedema.   Eyes:      General: No scleral icterus.     Conjunctiva/sclera: Conjunctivae normal.      Right eye: Right conjunctiva is not injected.      Left eye: Left conjunctiva is not  injected.   Neck:      Vascular: No JVD.      Trachea: No abnormal tracheal secretions.   Cardiovascular:      Rate and Rhythm: Regular rhythm. Tachycardia present. No extrasystoles (HR 120s) are present.     Pulses: No decreased pulses.   Pulmonary:      Effort: No tachypnea, bradypnea or accessory muscle usage. She is intubated.      Breath sounds: No stridor.   Abdominal:      General: Abdomen is flat.      Palpations: Abdomen is soft.      Tenderness: There is no guarding.   Musculoskeletal:      Cervical back: Neck supple. No edema or erythema.      Right lower leg: No edema.      Left lower leg: No edema.   Skin:     General: Skin is warm and dry.      Capillary Refill: Capillary refill takes less than 2 seconds.      Coloration: Skin is not ashen or jaundiced.   Neurological:      Mental Status: She is lethargic.      Comments:   Lethargic, unclear attempts to fully open eyes   Sedated on propofol  GCS: 7 ([id number])  Dysconjugate gaze  PERRL (4mm and brisk), pupillometer confirmed  No facial asymmetry apparent  Not handling oral secretions  SARAH, localizes to noxious stimuli   Psychiatric:      Comments: VALERIE, pt intubated     Unable to test orientation, language, memory, judgment, insight, fund of knowledge, hearing, shoulder shrug, tongue protrusion, coordination, gait due to level of consciousness.       Today I personally reviewed pertinent medications, lines/drains/airways, imaging, cardiology results, laboratory results, microbiology results,    CTH 11/17 with mass-like structure at frontal horn of L lateral ventricle, L-to-R MLS of 9mm, and frontal lobe vasogenic edema.

## 2023-11-18 NOTE — ASSESSMENT & PLAN NOTE
- Bilateral mastectomy (Jan 2022)  - Completed chemo (Oct 2022)  - Declined radiation  - CT C/A/P with contrast completed

## 2023-11-18 NOTE — ASSESSMENT & PLAN NOTE
32-year-old female with PMHx of HER2 positive breast cancer s/p bilateral mastectomy (Jan 2022) and chemo (completed Oct 2022; declined radiation) and chronic HA presents as transfer from [place] w/ brain mass of L caudate head/L BG with frontal lobe vasogenic edema on CTH. Presented to OSH c/o syncopal episode earlier today, intermittent AMS since. Hyperreflexia noted to RUE and RLE, GCS was 12 and had dysconjugate gaze. Treated for seizure. Given 1mg Ativan IV (apparently w/ improvement), 1.5g Keppra and 6mg Decadron IV. No PMHx seizures. Intubated for airway protection. Transported without issue. Arrived on propofol w/ intermittent BLE tremors and full body tremors. EEG cap placed and Epilepsy reported negative prelim read. Holding further steroids until MRI results in event that lymphoma present.    - MRI W/WO: pending, STAT  - Admit to NCC for close neurologic monitoring  - Q1H neuro checks  - Q1H VS  - NSGY consulted; f/u recs  - Keppra 500mg BID  - Decadron held    - SBP goal <160   - PRN labetalol, hydralazine   - Home meds:  - Goal euvolemia, eunatremia none  - CXR: pending  - EKG: sinus tachycardia  - TTE: pending    - PT/INR, PTT: pending  - F/u A1c, TSH, lipids  - Daily CBC, CMP, Mg, Phos   - Replete electrolytes PRN  - SCDs placed, SQH pending surgical plan  - PT/OT/SLP consulted  - Nutrition, PMR, and SW/CM consulted

## 2023-11-18 NOTE — PROGRESS NOTES
[facility] - Neuro Critical Care  Neurosurgery  Progress Note    Subjective:     History of Present Illness: 32F h/o HER2 positive carcinoma of the breast requiring lumpectomy and chemotherapy in remission since 10/2022, presented to [place] with syncopal episode which was witnessed by coworkers while at work. Patient had been complaining prior of headache which was described as bitemporal frontal for the last 7 days. Patient then noted to have seizure activity in ED and intubated for airway protection. CTH with 3.5 x 3.5 cm  heterogeneous isoattenuating structure centered at the level of the left caudate head/basal ganglia at the level of the left frontal horn measuring with significant vasogenic edema which results in approximately 1cm MLS. Family bedside and deny AC/AP.     Post-Op Info:  * No surgery found *       Interval History 11/18:  11/18: admitted for L BG mass. Remains intubated. Pending CT C/A/P. MRI with 4cm enhancing mass L BG, 8mm MLS, 1.2cm frontoparietal enhancing lesion. OR plan pending      Medications Prior to Admission   Medication Sig Dispense Refill Last Dose    acetaminophen (TYLENOL) 500 MG tablet Take 500 mg by mouth once as needed for Pain.       clindamycin (CLEOCIN) 300 MG capsule Take 1 capsule three times a day for 7 days 21 capsule 0     clindamycin (CLEOCIN) 300 MG capsule Take 1 capsule by mouth three times a day for 7 days 21 capsule 0     diazePAM (VALIUM) 5 MG tablet Take 1 tablet every 8 hours as needed 20 tablet 0     diazePAM (VALIUM) 5 MG tablet Take 1 tablet by mouth every 8 hours as needed 20 tablet 0     magic mouthwash diphen/antac/lidoc/nysta Take 10 mLs by mouth 4 (four) times daily. (Patient not taking: Reported on 5/18/2022) 120 mL 1     HYDROcodone-acetaminophen (NORCO) 5-325 mg per tablet Take 1 tablet by mouth 3 (three) times daily as needed for pain. (Patient not taking: Reported on 5/18/2022) 8 tablet 0     ondansetron (ZOFRAN-ODT) 8 MG TbDL Let 1 tablet dissolve  in mouth every 8 hours as needed for nausea 20 tablet 0     ondansetron (ZOFRAN-ODT) 8 MG TbDL Dissolve 1 tablet in mouth every 8 hours  as needed 20 tablet 0     oxyCODONE-acetaminophen (PERCOCET) 5-325 mg per tablet Take 1 tablet every 6 hours as needed for pain. 28 tablet 0     oxyCODONE-acetaminophen (PERCOCET) 5-325 mg per tablet Take 1 tablet every 6 hours as needed for pain. 28 tablet 0     tamoxifen (NOLVADEX) 20 MG Tab Take 1 tablet (20 mg total) by mouth once daily. 30 tablet 11        Review of patient's allergies indicates:  No Known Allergies    Past Medical History:   Diagnosis Date    ER+ (estrogen receptor positive status) 2021    HER2-positive carcinoma of breast 2021    Malignant neoplasm of overlapping sites of left female breast 2021    Mass of lower inner quadrant of left breast 2021    Mass of upper outer quadrant of left breast 2021    Mass overlapping multiple quadrants of left breast 2021     Past Surgical History:   Procedure Laterality Date     SECTION  2008    x2 2008 & 2019    INSERTION OF TUNNELED CENTRAL VENOUS CATHETER (CVC) WITH SUBCUTANEOUS PORT N/A 2021    Procedure: INSERTION, PORT-A-CATH;  Surgeon: Latanya Smtih MD;  Location: [facility];  Service: General;  Laterality: N/A;     Family History       Problem Relation (Age of Onset)    Breast cancer Maternal Aunt    Hypertension Mother          Tobacco Use    Smoking status: Never    Smokeless tobacco: Never   Substance and Sexual Activity    Alcohol use: No    Drug use: No    Sexual activity: Not on file       Objective:     Weight: 61.2 kg (135 lb)  Body mass index is 26.37 kg/m².  Vital Signs (Most Recent):  Temp: 98.7 °F (37.1 °C) (23)  Pulse: 102 (23)  Resp: 18 (23)  BP: 108/74 (23)  SpO2: 100 % (23) Vital Signs (24h Range):  Temp:  [97.8 °F (36.6 °C)-99.7 °F (37.6 °C)] 98.7 °F (37.1 °C)  Pulse:  [] 102  Resp:   "[16-33] 18  SpO2:  [98 %-100 %] 100 %  BP: ()/(57-82) 108/74     Date 11/18/23 0700 - 11/19/23 0659   Shift 6952-1871 2692-3962 3712-8976 24 Hour Total   INTAKE   I.V.(mL/kg) 140.8(2.3)   140.8(2.3)   IV Piggyback 0   0   Shift Total(mL/kg) 140.8(2.3)   140.8(2.3)   OUTPUT   Shift Total(mL/kg)       Weight (kg) 61.2 61.2 61.2 61.2              Vent Mode: A/C  Oxygen Concentration (%):  [40-60] 40  Resp Rate Total:  [18 br/min-32 br/min] 18 br/min  Vt Set:  [300 mL-340 mL] 340 mL  PEEP/CPAP:  [5 cmH20] 5 cmH20  Pressure Support:  [0 cmH20] 0 cmH20  Mean Airway Pressure:  [7.6 [id number] cmH20] 11 cmH20        Female External Urinary Catheter 11/17/23 2112 (Active)          Physical Exam         Neurosurgery Physical Exam    [id number]  Sedated on prop, intubated  RP/LP 4mm and brisk  +c/c/g  Spontanous and purposeful ag x 4      Significant Labs:  Recent Labs   Lab 11/17/23  1353 11/17/23  2158 11/18/23  0127   GLU 97 126* 105   * 137 136   K 3.9 3.9 4.2    105 104   CO2 22* 21* 19*   BUN 9 7 7   CREATININE 0.8 0.7 0.8   CALCIUM 9.3 9.2 8.8   MG 1.9 1.6 1.5*       Recent Labs   Lab 11/17/23  1353 11/17/23  2158 11/18/23  0655   WBC 9.75 12.48 16.94*   HGB 12.3 11.9* 13.6   HCT 39.3 37.7 44.0    251 278       No results for input(s): "LABPT", "INR", "APTT" in the last 48 hours.  Microbiology Results (last 7 days)       ** No results found for the last 168 hours. **          All pertinent labs from the last 24 hours have been reviewed.    Significant Diagnostics:  I have reviewed all pertinent imaging results/findings within the past 24 hours.  No results found in the last 24 hours.  MRI Brain W WO Contrast    Result Date: 11/18/2023  Solid enhancing mass centered in the left basal ganglia/caudate head, measuring up to 4.0 cm.  Large surrounding vasogenic edema with resultant 8 mm rightward midline shift.  Finding concerning for metastatic lesion in the setting of reported breast cancer. 1.2 cm " enhancing intra-axial lesion at the frontoparietal vertex, with minimal surrounding edema.  Finding also concerning for metastatic lesion. This report was flagged in Epic as abnormal. Electronically signed by resident: Yuri Conner Date:    11/18/2023 Time:    00:40 Electronically signed by: Brian Shah MD Date:    11/18/2023 Time:    01:23    X-Ray Chest 1 View    Result Date: 11/17/2023  Endotracheal tube as above. Electronically signed by: Clive Saez Date:    11/17/2023 Time:    23:25    XR Gastric tube check, non-radiologist performed    Result Date: 11/17/2023  Nasogastric tube as above. Electronically signed by: Clive Saez Date:    11/17/2023 Time:    23:23     Assessment/Plan:     * Brain mass  32F h/o HER2 positive carcinoma of the breast requiring lumpectomy and chemotherapy in remission since 10/2022, presented to [place] with syncopal episode and subsequent seizure. CTH with 3.5 x 3.5 cm  heterogeneous isoattenuating structure centered at the level of the left caudate head/basal ganglia at the level of the left frontal horn measuring with significant vasogenic edema which results in approximately 1cm MLS.     Imaging:  CTH 11/17: CTH with 3.5 x 3.5 cm  heterogeneous isoattenuating structure centered at the level of the left caudate head/basal ganglia at the level of the left frontal horn measuring with significant vasogenic edema which results in approximately 1cm MLS.  MRI brain w/wo 11/18: L BG 4cm enhancing mass, 8mm MLS, 1.2cm frontoparietal enhancing lesion    --Patient admitted to ICU on telemetry      -q1h neurochecks in ICU, q2h neurochecks in stepdown, q4h neurochecks on floor  --CT chest/abdo/pelvis w/wo ordered  --SBP <160   --Na >135  --AED per NCC  --cvEEG per NCC  --Dex 4q6  --HOB >30  --Follow-up full pre-op labs (CBC/CMP/PT-INR/PTT/T&S)  --NPO at this time for possible operative intervention; OR plan pending  --PPI  --Continue to monitor clinically, notify NSGY immediately with  any changes in neuro status    Dispo: ICU    Plan d/w Dr. Miles.          Samir Bernard MD  Neurosurgery  [facility] - Neuro Critical Care

## 2023-11-18 NOTE — PLAN OF CARE
"[facility] Care Plan    POC reviewed with Esperanza Peters and family at 1400. Pt verbalized understanding. Questions and concerns addressed. No acute events today. Pt progressing toward goals. Will continue to monitor. See below and flowsheets for full assessment and VS info.     -CT of Abdomen, Pelvis, Chest with oral contrast completed  -D/C propofol  -D/C CEEG  -Echo completed  -1604: Extubated   -A/O x4  -Moore passed  -Craniotomy with neoplasm excision on Monday, time TBA        Is this a stroke patient? no    Neuro:  [place] Coma Scale  Best Eye Response: 2-->(E2) to pain  Best Motor Response: 5-->(M5) localizes pain  Best Verbal Response: 1-->(V1) none  [place] Coma Scale Score: 8  Assessment Qualifiers: patient chemically sedated or paralyzed  Pupil PERRLA: yes     24 hr Temp:  [98.7 °F (37.1 °C)-99.7 °F (37.6 °C)]     CV:   Rhythm: sinus tachycardia  BP goals:   SBP < 160  MAP > 65    Resp:      Vent Mode: Spont  Set Rate: 18 BPM  Oxygen Concentration (%): 40  Vt Set: 340 mL  PEEP/CPAP: 5 cmH20  Pressure Support: 5 cmH20    Plan: N/A    GI/:     Diet/Nutrition Received: NPO  Last Bowel Movement: 11/16/23  Voiding Characteristics: external catheter    Intake/Output Summary (Last 24 hours) at 11/18/2023 1648  Last data filed at 11/18/2023 1505  Gross per 24 hour   Intake 2580.15 ml   Output 1700 ml   Net 880.15 ml          Labs/Accuchecks:  Recent Labs   Lab 11/18/23  0655   WBC 16.94*   RBC 5.54*   HGB 13.6   HCT 44.0         Recent Labs   Lab 11/18/23  0127      K 4.2   CO2 19*      BUN 7   CREATININE 0.8   ALKPHOS 45*   ALT 38   AST 24   BILITOT 0.3    No results for input(s): "PROTIME", "INR", "APTT", "HEPANTIXA" in the last 168 hours.   Recent Labs   Lab 11/18/23  0127   *       Electrolytes: No replacement orders  Accuchecks: none    Gtts:   sodium chloride 0.9% 75 mL/hr at 11/18/23 1505       LDA/Wounds:  Lines/Drains/Airways       Drain  Duration                  NG/OG Tube " 11/17/23 1815 Earnestine brewer 16 Fr. Right mouth <1 day    Female External Urinary Catheter 11/17/23 2112 <1 day              Peripheral Intravenous Line  Duration                  Peripheral IV - Single Lumen 11/17/23 20 G Anterior;Left;Proximal Forearm 1 day         Peripheral IV - Single Lumen 11/17/23 20 G Left Antecubital 1 day                  Wounds: No  Wound care consulted: No

## 2023-11-18 NOTE — HPI
32F h/o HER2 positive carcinoma of the breast requiring lumpectomy and chemotherapy in remission since 10/2022, presented to [place] with syncopal episode which was witnessed by coworkers while at work. Patient had been complaining prior of headache which was described as bitemporal frontal for the last 7 days. Patient then noted to have seizure activity in ED and intubated for airway protection. CTH with 3.5 x 3.5 cm  heterogeneous isoattenuating structure centered at the level of the left caudate head/basal ganglia at the level of the left frontal horn measuring with significant vasogenic edema which results in approximately 1cm MLS. Family bedside and deny AC/AP.

## 2023-11-18 NOTE — PLAN OF CARE
"[facility] Care Plan    POC reviewed with Esperanza Peters and family at 0300. Pt verbalized understanding. Questions and concerns addressed. No acute events overnight. Pt progressing toward goals. Will continue to monitor. See below and flowsheets for full assessment and VS info.     -MRI scan  -Mag replaced  -1.5L bolus given  -Fentanyl given PRN  -Propofol gtt  - NS @75 cc/hr  -Mosley removed  -Bladder scanned. Straight cath x2  -EEG cap placed        Is this a stroke patient? no    Neuro:  [place] Coma Scale  Best Eye Response: 2-->(E2) to pain  Best Motor Response: 5-->(M5) localizes pain  Best Verbal Response: 1-->(V1) none  [place] Coma Scale Score: 8  Pupil PERRLA: yes     24hr Temp:  [97.8 °F (36.6 °C)-99.7 °F (37.6 °C)]     CV:   Rhythm: sinus tachycardia  BP goals:   SBP < 160  MAP > 65    Resp:      Vent Mode: A/C  Set Rate: 18 BPM  Oxygen Concentration (%): 40  Vt Set: 340 mL  PEEP/CPAP: 5 cmH20    Plan: wean to extubate    GI/:     Diet/Nutrition Received: NPO  Last Bowel Movement: 11/16/23  Voiding Characteristics: external catheter    Intake/Output Summary (Last 24 hours) at 11/18/2023 0428  Last data filed at 11/18/2023 0101  Gross per 24 hour   Intake --   Output 150 ml   Net -150 ml          Labs/Accuchecks:  Recent Labs   Lab 11/17/23 2158   WBC 12.48   RBC 4.77   HGB 11.9*   HCT 37.7         Recent Labs   Lab 11/18/23  0127      K 4.2   CO2 19*      BUN 7   CREATININE 0.8   ALKPHOS 45*   ALT 38   AST 24   BILITOT 0.3    No results for input(s): "PROTIME", "INR", "APTT", "HEPANTIXA" in the last 168 hours.   Recent Labs   Lab 11/18/23 0127   *       Electrolytes: Electrolytes replaced  Accuchecks: none    Gtts:   sodium chloride 0.9% 75 mL/hr at 11/18/23 0044    propofoL 35 mcg/kg/min (11/17/23 2207)       LDA/Wounds:  Lines/Drains/Airways       Drain  Duration                  NG/OG Tube 11/17/23 181 Earnestine brewer 16 Fr. Right mouth <1 day    Female External Urinary " Catheter 11/17/23 2112 <1 day              Airway  Duration                  Airway - Non-Surgical 11/17/23 1811 Endotracheal Tube <1 day              Peripheral Intravenous Line  Duration                  Peripheral IV - Single Lumen 11/17/23 20 G Anterior;Left;Proximal Forearm 1 day         Peripheral IV - Single Lumen 11/17/23 20 G Left Antecubital 1 day                  Wounds: No  Wound care consulted: No

## 2023-11-18 NOTE — PROGRESS NOTES
Chief Complaint: Brain mass     History of Present Illness: Esperanza Peters is a 32-year-old female with PMHx of HER2 positive breast cancer s/p bilateral mastectomy (Jan 2022) and neoadjuvant therapy w/ perjeta and herceptin (completed Oct 2022; declined radiation) and chronic HA who presents to [facility] as a transfer from [place] for NSGY and NCC evaluation of new finding of brain mass of L caudate head/L BG with frontal lobe vasogenic edema on CTH.      Patient presented to OSH c/o syncopal episode earlier today while at work with waxing and waning mentation since the episode. On initial OSH exam, hyperreflexia was noted to RUE and RLE. GCS was 12 and she had dysconjugate gaze. She was treated for seizure, and given 1mg Ativan IV, which was later followed by 1.5g Keppra and 6mg Decadron IV. Mentation reportedly improved after Ativan. She does not have a seizure history prior to this event.     On arrival to [facility], patient is post-intubation for airway protection during transport. She arrived on propofol gtt and exhibiting intermittent BLE tremors and full body tremors. EEG cap placed and Epilepsy reported no seizure activity on prelim read. STAT MRI W/WO is pending and NSGY has been contacted regarding patient's arrival. Labs and CXR and KUB confirming ETT and OGT placement are pending.      Patient's mother presents to bedside and notes that the patient frequently has severe HA, which the patient confirmed to be bitemporal frontal earlier today. Patient is unable to follow commands, though her mother noted a hand squeeze to LUE which included the movement of the second digit. Patient first localized to noxious stimuli on arrival and gradually began to move extremities spontaneously more frequently. Eyelids persistently slit open bilaterally and gaze dysconjugate. Propofol gtt continued at 35 mcg/kg/min for now, titrating as indicated by RASS goals. Holding further steroids until MRI results in event that lymphoma  present.    11/18/2023 - continues on mechanical ventilation. No acute events overnight. Plan for CT Chest/abdomen/pelvis    Vitals:    11/18/23 1205   BP: 120/78   Pulse: 88   Resp: 18   Temp:      Physical Exam  Vitals and nursing note reviewed.   Constitutional:       General: She is not in acute distress.     Appearance: She is normal weight. She is ill-appearing. She is not toxic-appearing.      Interventions: She is sedated and intubated.   HENT:      Head: Normocephalic and atraumatic.      Comments: EEG cap in place     Right Ear: External ear normal.      Left Ear: External ear normal.      Nose: Nose normal. No signs of injury.      Right Nostril: No epistaxis.      Left Nostril: No epistaxis.      Mouth/Throat:      Lips: Pink. No lesions.      Mouth: Mucous membranes are moist. No lacerations or angioedema.   Eyes:      General: No scleral icterus.     Conjunctiva/sclera: Conjunctivae normal.      Right eye: Right conjunctiva is not injected.      Left eye: Left conjunctiva is not injected.   Neck:      Vascular: No JVD.      Trachea: No abnormal tracheal secretions.   Cardiovascular:      Rate and Rhythm: Regular rhythm. Tachycardia present. No extrasystoles (HR 120s) are present.     Pulses: No decreased pulses.   Pulmonary:      Effort: No tachypnea, bradypnea or accessory muscle usage. She is intubated.      Breath sounds: No stridor.   Abdominal:      General: Abdomen is flat.      Palpations: Abdomen is soft.      Tenderness: There is no guarding.   Musculoskeletal:      Cervical back: Neck supple. No edema or erythema.      Right lower leg: No edema.      Left lower leg: No edema.   Skin:     General: Skin is warm and dry.      Capillary Refill: Capillary refill takes less than 2 seconds.      Coloration: Skin is not ashen or jaundiced.   Neurological:      Mental Status: She is lethargic.      Comments:   Lethargic, unclear attempts to fully open eyes   Sedated on propofol  GCS: 7  ([id number])  Dysconjugate gaze  PERRL (4mm and brisk), pupillometer confirmed  No facial asymmetry apparent  Not handling oral secretions  SARAH, localizes to noxious stimuli   Psychiatric:      Comments: VALERIE, pt intubated      Unable to test orientation, language, memory, judgment, insight, fund of knowledge, hearing, shoulder shrug, tongue protrusion, coordination, gait due to level of consciousness.    Assessment and Plan    Neuro  * Brain mass  32-year-old female with PMHx of HER2 positive breast cancer s/p bilateral mastectomy (Jan 2022) and chemo (completed Oct 2022; declined radiation) and chronic HA presents as transfer from [place] w/ brain mass of L caudate head/L BG with frontal lobe vasogenic edema on CTH. Presented to OSH c/o syncopal episode earlier today, intermittent AMS since. Hyperreflexia noted to RUE and RLE, GCS was 12 and had dysconjugate gaze. Treated for seizure. Given 1mg Ativan IV (apparently w/ improvement), 1.5g Keppra and 6mg Decadron IV. No PMHx seizures. Intubated for airway protection. Transported without issue. Arrived on propofol w/ intermittent BLE tremors and full body tremors. EEG cap placed and Epilepsy reported negative prelim read. Holding further steroids until MRI results in event that lymphoma present.     - MRI W/WO: pending, STAT  - Admit to NCC for close neurologic monitoring  - Q1H neuro checks  - Q1H VS  - NSGY consulted; f/u recs  - Keppra 500mg BID  - Decadron held     - SBP goal <160         - PRN labetalol, hydralazine         - Home meds:  - Goal euvolemia, eunatremia none  - CXR: pending  - EKG: sinus tachycardia  - TTE: pending     - PT/INR, PTT: pending  - F/u A1c, TSH, lipids  - Daily CBC, CMP, Mg, Phos         - Replete electrolytes PRN  - SCDs placed, SQH pending surgical plan  - PT/OT/SLP consulted  - Nutrition, PMR, and SW/CM consulted  - pending CT chest/abdomen/pelvis     Observed seizure-like activity  - At OSH  - s/p 1mg IV Ativan, 1.5g IV Keppra, 6mg IV  dexamethasone  - No PMHx  - See Brain mass      Brain compression  - 2/2 vasogenic edema  - 1cm MLS on CTH  - See Brain mass      Silva coma scale total score 3-8  - Arrived GCS 7 on admission  - See Brain mass      Vasogenic brain edema  - Resume steroids when clear by neurosurgery  - Eunatremia  - See Brain mass      Acute encephalopathy  - 2/2 post-ictal state vs. iatrogenic (propofol) vs. mass/edema  - Strict eunatremia, defer use of HTS tonight (received dexa at OSH)  - F/u MRI  - STAT CTH for neuro worsening  - See Brain mass      Pulmonary  On mechanically assisted ventilation  - For airway protection  - Propofol gtt for sedation  - wean as tolerated  - See Brain mass      Renal/  Alteration in electrolyte and fluid balance  - CK elevated on admit >200         - Obtained for observed body tremors  - Tachycardia to 120s-30s  - Na 135, improved to 137 after 500mL IVF  - C/w aggressive hydration  - BUN/Cr normal     Hyponatremia  - Na 135 prior to transfer  - See Alteration in electrolyte and fluid balance       Time spent: 42 mins  Víctor Ndiaye MD

## 2023-11-18 NOTE — RESPIRATORY THERAPY
Patient extubated per MD order.  Placed patient on 2L NC; sats are currently 100%.  Will continue to monitor.

## 2023-11-18 NOTE — CONSULTS
[facility] - Neuro Critical Care  Neurosurgery  Consult Note    Consults  Subjective:     Chief Complaint/Reason for Admission: Brain mass    History of Present Illness: 32F h/o HER2 positive carcinoma of the breast requiring lumpectomy and chemotherapy in remission since 10/2022, presented to [place] with syncopal episode which was witnessed by coworkers while at work. Patient had been complaining prior of headache which was described as bitemporal frontal for the last 7 days. Patient then noted to have seizure activity in ED and intubated for airway protection. CTH with 3.5 x 3.5 cm  heterogeneous isoattenuating structure centered at the level of the left caudate head/basal ganglia at the level of the left frontal horn measuring with significant vasogenic edema which results in approximately 1cm MLS. Family bedside and deny AC/AP.     Medications Prior to Admission   Medication Sig Dispense Refill Last Dose    acetaminophen (TYLENOL) 500 MG tablet Take 500 mg by mouth once as needed for Pain.       clindamycin (CLEOCIN) 300 MG capsule Take 1 capsule three times a day for 7 days 21 capsule 0     clindamycin (CLEOCIN) 300 MG capsule Take 1 capsule by mouth three times a day for 7 days 21 capsule 0     diazePAM (VALIUM) 5 MG tablet Take 1 tablet every 8 hours as needed 20 tablet 0     diazePAM (VALIUM) 5 MG tablet Take 1 tablet by mouth every 8 hours as needed 20 tablet 0     magic mouthwash diphen/antac/lidoc/nysta Take 10 mLs by mouth 4 (four) times daily. (Patient not taking: Reported on 5/18/2022) 120 mL 1     HYDROcodone-acetaminophen (NORCO) 5-325 mg per tablet Take 1 tablet by mouth 3 (three) times daily as needed for pain. (Patient not taking: Reported on 5/18/2022) 8 tablet 0     ondansetron (ZOFRAN-ODT) 8 MG TbDL Let 1 tablet dissolve in mouth every 8 hours as needed for nausea 20 tablet 0     ondansetron (ZOFRAN-ODT) 8 MG TbDL Dissolve 1 tablet in mouth every 8 hours  as needed 20 tablet 0      oxyCODONE-acetaminophen (PERCOCET) 5-325 mg per tablet Take 1 tablet every 6 hours as needed for pain. 28 tablet 0     oxyCODONE-acetaminophen (PERCOCET) 5-325 mg per tablet Take 1 tablet every 6 hours as needed for pain. 28 tablet 0     tamoxifen (NOLVADEX) 20 MG Tab Take 1 tablet (20 mg total) by mouth once daily. 30 tablet 11        Review of patient's allergies indicates:  No Known Allergies    Past Medical History:   Diagnosis Date    ER+ (estrogen receptor positive status) 2021    HER2-positive carcinoma of breast 2021    Malignant neoplasm of overlapping sites of left female breast 2021    Mass of lower inner quadrant of left breast 2021    Mass of upper outer quadrant of left breast 2021    Mass overlapping multiple quadrants of left breast 2021     Past Surgical History:   Procedure Laterality Date     SECTION  2008    x2 2008 & 2019    INSERTION OF TUNNELED CENTRAL VENOUS CATHETER (CVC) WITH SUBCUTANEOUS PORT N/A 2021    Procedure: INSERTION, PORT-A-CATH;  Surgeon: Latanya Smith MD;  Location: [facility];  Service: General;  Laterality: N/A;     Family History       Problem Relation (Age of Onset)    Breast cancer Maternal Aunt    Hypertension Mother          Tobacco Use    Smoking status: Never    Smokeless tobacco: Never   Substance and Sexual Activity    Alcohol use: No    Drug use: No    Sexual activity: Not on file     Review of Systems   Unable to perform ROS: Mental status change     Objective:     Weight: 61.2 kg (135 lb)  Body mass index is 26.37 kg/m².  Vital Signs (Most Recent):  Temp: 99.1 °F (37.3 °C) (23)  Pulse: (!) 120 (23)  Resp: 19 (23)  SpO2: 100 % (23) Vital Signs (24h Range):  Temp:  [97.8 °F (36.6 °C)-99.7 °F (37.6 °C)] 99.1 °F (37.3 °C)  Pulse:  [] 120  Resp:  [16-33] 19  SpO2:  [98 %-100 %] 100 %  BP: (100-140)/(57-82) 106/65                Vent Mode: A/C  Oxygen Concentration (%):   "[60] 60  Resp Rate Total:  [18 br/min] 18 br/min  Vt Set:  [300 mL] 300 mL  PEEP/CPAP:  [5 cmH20] 5 cmH20  Pressure Support:  [0 cmH20] 0 cmH20  Mean Airway Pressure:  [7.7 cmH20] 7.7 cmH20        Female External Urinary Catheter 11/17/23 2112 (Active)          Physical Exam         Neurosurgery Physical Exam    E3VtM5  Sedated on prop 30, intubated  RP/LP 4mm and brisk  +c/c/g  Spontanous and purposeful ag x 4      Significant Labs:  Recent Labs   Lab 11/17/23  1353 11/17/23  2158   GLU 97 126*   * 137   K 3.9 3.9    105   CO2 22* 21*   BUN 9 7   CREATININE 0.8 0.7   CALCIUM 9.3 9.2   MG 1.9 1.6     Recent Labs   Lab 11/17/23  1353 11/17/23  2158   WBC 9.75 12.48   HGB 12.3 11.9*   HCT 39.3 37.7    251     No results for input(s): "LABPT", "INR", "APTT" in the last 48 hours.  Microbiology Results (last 7 days)       ** No results found for the last 168 hours. **          All pertinent labs from the last 24 hours have been reviewed.    Significant Diagnostics:  I have reviewed all pertinent imaging results/findings within the past 24 hours.  No results found in the last 24 hours.  Assessment/Plan:     Brain mass  32F h/o HER2 positive carcinoma of the breast requiring lumpectomy and chemotherapy in remission since 10/2022, presented to [place] with syncopal episode and subsequent seizure. CTH with 3.5 x 3.5 cm  heterogeneous isoattenuating structure centered at the level of the left caudate head/basal ganglia at the level of the left frontal horn measuring with significant vasogenic edema which results in approximately 1cm MLS.     --Patient admitted to ICU on telemetry      -q1h neurochecks in ICU, q2h neurochecks in stepdown, q4h neurochecks on floor  --Follow-up MRI w/wo contrast w/ [facility]      -CT chest/abdo/pelvis w/  --SBP <160   --Na >135  --AED per NCC  --cvEEG per NCC  --Hold Dex until MRI completed then load 10mg and subsequently 4q6  --HOB >30  --Follow-up full pre-op labs " (CBC/CMP/PT-INR/PTT/T&S)  --NPO at this time for possible operative intervention  --PPI  --Continue to monitor clinically, notify NSGY immediately with any changes in neuro status    Dispo: ICU    Plan d/w Dr. Miles.          Thank you for your consult. I will follow-up with patient. Please contact us if you have any additional questions.    Ethan Lomas MD  Neurosurgery  [facility] - Neuro Critical Care

## 2023-11-18 NOTE — HOSPITAL COURSE
11/18: admitted for L BG mass. Remains intubated. Pending CT C/A/P. MRI with 4cm enhancing mass L BG, 8mm MLS, 1.2cm frontoparietal enhancing lesion. OR plan pending  11/21: NAEO. Patient downgraded to NPU yesterday. MRI [facility] completed. Patient was tilted in MRI so planning for CTH [facility] for operative navigation. Patient denies headache or any other complaints this AM.   11/23 POD1 s/p L frontal craniotomy for tumor resection. Post op CT and MRI completed with satisfactory resection, exam improving overnight. To be monitored in ICU today, PTOT OOB and continue to monitor in post acuter period. Ok to DC A line and pacheco  11/24/2023: Seizure noted overnight with resolution upon 6mg ativan and 2g Keppra. RSI performed for airway protection. STAT CTH with interval increase brenden-resection vasogenic edema. AFVSS. Exam worsened. On EEG, HTS, sedation.  11/25/2023 OR yesterday for DHC for cerebral edema. Exam is 3T today. EVD in place likely nonfunctional ongoing GOC per NCC.

## 2023-11-18 NOTE — ASSESSMENT & PLAN NOTE
- 2/2 post-ictal state vs. iatrogenic (propofol) vs. mass/edema  - Strict eunatremia, defer use of HTS tonight (received dexa at OSH)  - F/u MRI  - STAT CTH for neuro worsening  - See Brain mass

## 2023-11-18 NOTE — PROCEDURES
EEG REPORT      Esperanza Peters  [id number]  1990    DATE OF SERVICE: 11/17/2023     23-    METHODOLOGY      Extended electroencephalographic recording is made while the patient is ambulatory and continuing normal daily activities.  Electrodes are placed according to the International 10-20 placement system and included T1 and T2 electrode placement.  Twenty four (24) channels of digital signal (sampling rate of 512/sec) was simultaneously recorded from the scalp including EKG and eye monitors.  Recording band pass was 0.1 to 100 hz and all data was stored digitally on the recorder.  The patient is instructed to press an event button when clinical symptoms occur and write the symptoms into a diary. Activation procedures which include photic stimulation, hyperventilation and instructing patients to perform simple task are done in selected patients.        The EEG is displayed on a monitor screen and can be reformatted into different montages for evaluation.  The entire recoding is submitted for computer assisted analysis to detect spike and electrographic seizure activity.  The entire recording is visually reviewed and the times identified by computer analysis as being spikes or seizures are reviewed again.  Compresses spectral analysis (CSA) is also performed on the activity recorded from each individual channel.  This is displayed as a power display of frequencies from 0 to 30 Hz over time.   The CSA analysis is done and displayed continuously.  This is reviewed for asymmetries in power between homologous areas of the scalp and for presence of changes in power which canbe seen when seizures occur.  Sections of suspected abnormalities on the CSA is then compared with the original EEG recording.  .     [employer] software was also utilized in the review of this study.  This software suite analyzes the EEG recording in multiple domains.  Coherence and rhythmicity is computed to identify EEG sections which may  contain organized seizures.  Each channel undergoes analysis to detect presence of spike and sharp waves which have special and morphological characteristic of epileptic activity.  The routine EEG recording is converted from spacial into frequency domain.  This is then displayed comparing homologous areas to identify areas of significant asymmetry.  Algorithm to identify non-cortically generated artifact is used to separate eye movement, EMG and other artifact from the EEG     Recording Times  Start on 11/17/2023  Stop on 11/18/2023    A total of 9:35:39 and 3:01:09 hours of EEG was recorded.      EEG FINDINGS:  Background activity:   This is a cap study of good technical quality.  There is intermittent artifact due to lead contact in the right parasaggital chain.    The background rhythm was characterized by poorly organized alpha and theta activity with absent posterior dominant alpha rhythm.   Symmetry and continuity: there is intermittent polymorphic delta activity over the left hemisphere     Sleep:   No sleep transients although there is cycling of the background.    Activation procedures:   NA    Abnormal activity:   No epileptiform discharges, periodic discharges, lateralized rhythmic delta activity or electrographic seizures were seen.    IMPRESSION:   Abnormal EEG due to mild to moderate left greater than right hemisphere dysfunction with no electrographic seizures or indications of seizure tendency.      Danis Hutchins MD  Neurology-Epilepsy.  [facility].

## 2023-11-18 NOTE — SUBJECTIVE & OBJECTIVE
Interval History 11/18:  11/18: admitted for L BG mass. Remains intubated. Pending CT C/A/P. MRI with 4cm enhancing mass L BG, 8mm MLS, 1.2cm frontoparietal enhancing lesion. OR plan pending      Medications Prior to Admission   Medication Sig Dispense Refill Last Dose    acetaminophen (TYLENOL) 500 MG tablet Take 500 mg by mouth once as needed for Pain.       clindamycin (CLEOCIN) 300 MG capsule Take 1 capsule three times a day for 7 days 21 capsule 0     clindamycin (CLEOCIN) 300 MG capsule Take 1 capsule by mouth three times a day for 7 days 21 capsule 0     diazePAM (VALIUM) 5 MG tablet Take 1 tablet every 8 hours as needed 20 tablet 0     diazePAM (VALIUM) 5 MG tablet Take 1 tablet by mouth every 8 hours as needed 20 tablet 0     magic mouthwash diphen/antac/lidoc/nysta Take 10 mLs by mouth 4 (four) times daily. (Patient not taking: Reported on 5/18/2022) 120 mL 1     HYDROcodone-acetaminophen (NORCO) 5-325 mg per tablet Take 1 tablet by mouth 3 (three) times daily as needed for pain. (Patient not taking: Reported on 5/18/2022) 8 tablet 0     ondansetron (ZOFRAN-ODT) 8 MG TbDL Let 1 tablet dissolve in mouth every 8 hours as needed for nausea 20 tablet 0     ondansetron (ZOFRAN-ODT) 8 MG TbDL Dissolve 1 tablet in mouth every 8 hours  as needed 20 tablet 0     oxyCODONE-acetaminophen (PERCOCET) 5-325 mg per tablet Take 1 tablet every 6 hours as needed for pain. 28 tablet 0     oxyCODONE-acetaminophen (PERCOCET) 5-325 mg per tablet Take 1 tablet every 6 hours as needed for pain. 28 tablet 0     tamoxifen (NOLVADEX) 20 MG Tab Take 1 tablet (20 mg total) by mouth once daily. 30 tablet 11        Review of patient's allergies indicates:  No Known Allergies    Past Medical History:   Diagnosis Date    ER+ (estrogen receptor positive status) 9/29/2021    HER2-positive carcinoma of breast 9/29/2021    Malignant neoplasm of overlapping sites of left female breast 9/12/2021    Mass of lower inner quadrant of left breast  2021    Mass of upper outer quadrant of left breast 2021    Mass overlapping multiple quadrants of left breast 2021     Past Surgical History:   Procedure Laterality Date     SECTION  2008    x2 2008 & 2019    INSERTION OF TUNNELED CENTRAL VENOUS CATHETER (CVC) WITH SUBCUTANEOUS PORT N/A 2021    Procedure: INSERTION, PORT-A-CATH;  Surgeon: Latanya Smith MD;  Location: [facility];  Service: General;  Laterality: N/A;     Family History       Problem Relation (Age of Onset)    Breast cancer Maternal Aunt    Hypertension Mother          Tobacco Use    Smoking status: Never    Smokeless tobacco: Never   Substance and Sexual Activity    Alcohol use: No    Drug use: No    Sexual activity: Not on file       Objective:     Weight: 61.2 kg (135 lb)  Body mass index is 26.37 kg/m².  Vital Signs (Most Recent):  Temp: 98.7 °F (37.1 °C) (23 07)  Pulse: 102 (23 08)  Resp: 18 (23 08)  BP: 108/74 (23 08)  SpO2: 100 % (23 08) Vital Signs (24h Range):  Temp:  [97.8 °F (36.6 °C)-99.7 °F (37.6 °C)] 98.7 °F (37.1 °C)  Pulse:  [] 102  Resp:  [16-33] 18  SpO2:  [98 %-100 %] 100 %  BP: ()/(57-82) 108/74     Date 23 07 - 23 0659   Shift 3603-5716 6447-6815 8287-6766 24 Hour Total   INTAKE   I.V.(mL/kg) 140.8(2.3)   140.8(2.3)   IV Piggyback 0   0   Shift Total(mL/kg) 140.8(2.3)   140.8(2.3)   OUTPUT   Shift Total(mL/kg)       Weight (kg) 61.2 61.2 61.2 61.2              Vent Mode: A/C  Oxygen Concentration (%):  [40-60] 40  Resp Rate Total:  [18 br/min-32 br/min] 18 br/min  Vt Set:  [300 mL-340 mL] 340 mL  PEEP/CPAP:  [5 cmH20] 5 cmH20  Pressure Support:  [0 cmH20] 0 cmH20  Mean Airway Pressure:  [7.6 btZ84-46 cmH20] 11 cmH20        Female External Urinary Catheter 23 (Active)          Physical Exam         Neurosurgery Physical Exam    E3VtM5  Sedated on prop, intubated  RP/LP 4mm and brisk  +c/c/g  Spontanous and purposeful ag  "x 4      Significant Labs:  Recent Labs   Lab 11/17/23  1353 11/17/23  2158 11/18/23  0127   GLU 97 126* 105   * 137 136   K 3.9 3.9 4.2    105 104   CO2 22* 21* 19*   BUN 9 7 7   CREATININE 0.8 0.7 0.8   CALCIUM 9.3 9.2 8.8   MG 1.9 1.6 1.5*       Recent Labs   Lab 11/17/23  1353 11/17/23  2158 11/18/23  0655   WBC 9.75 12.48 16.94*   HGB 12.3 11.9* 13.6   HCT 39.3 37.7 44.0    251 278       No results for input(s): "LABPT", "INR", "APTT" in the last 48 hours.  Microbiology Results (last 7 days)       ** No results found for the last 168 hours. **          All pertinent labs from the last 24 hours have been reviewed.    Significant Diagnostics:  I have reviewed all pertinent imaging results/findings within the past 24 hours.  No results found in the last 24 hours.  MRI Brain W WO Contrast    Result Date: 11/18/2023  Solid enhancing mass centered in the left basal ganglia/caudate head, measuring up to 4.0 cm.  Large surrounding vasogenic edema with resultant 8 mm rightward midline shift.  Finding concerning for metastatic lesion in the setting of reported breast cancer. 1.2 cm enhancing intra-axial lesion at the frontoparietal vertex, with minimal surrounding edema.  Finding also concerning for metastatic lesion. This report was flagged in Epic as abnormal. Electronically signed by resident: Yuri Conner Date:    11/18/2023 Time:    00:40 Electronically signed by: Brian Shah MD Date:    11/18/2023 Time:    01:23    X-Ray Chest 1 View    Result Date: 11/17/2023  Endotracheal tube as above. Electronically signed by: Clive Saez Date:    11/17/2023 Time:    23:25    XR Gastric tube check, non-radiologist performed    Result Date: 11/17/2023  Nasogastric tube as above. Electronically signed by: Clive Saez Date:    11/17/2023 Time:    23:23     "

## 2023-11-18 NOTE — H&P
[facility] - Neuro Critical Care  Neurocritical Care  History & Physical    Admit Date: 11/17/2023  Service Date: 11/18/2023  Length of Stay: 1    Subjective:     Chief Complaint: Brain mass    History of Present Illness: Esperanza Peters is a 32-year-old female with PMHx of HER2 positive breast cancer s/p bilateral mastectomy (Jan 2022) and neoadjuvant therapy w/ perjeta and herceptin (completed Oct 2022; declined radiation) and chronic HA who presents to [facility] [place] a [place] from [place] for NSGY and NCC evaluation of new finding of brain mass of L caudate head/L BG with frontal lobe vasogenic edema on CTH.     Patient presented to OSH c/o syncopal episode earlier today while at work with waxing and waning mentation since the episode. On initial OSH exam, hyperreflexia was noted to RUE and RLE. GCS was 12 and she had dysconjugate gaze. She was treated for seizure, and given 1mg Ativan IV, which was later followed by 1.5g Keppra and 6mg Decadron IV. Mentation reportedly improved after Ativan. She does not have a seizure history prior to this event.    On arrival to [facility], patient is post-intubation for airway protection during transport. She arrived on propofol gtt and exhibiting intermittent BLE tremors and full body tremors. EEG cap placed and Epilepsy reported no seizure activity on prelim read. STAT MRI W/WO is pending and NSGY has been contacted regarding patient's arrival. Labs and CXR and KUB confirming ETT and OGT placement are pending.     Patient's mother presents to bedside and notes that the patient frequently has severe HA, which the patient confirmed to be bitemporal frontal earlier today. Patient is unable to follow commands, though her mother noted a hand squeeze to LUE which included the movement of the second digit. Patient first localized to noxious stimuli on arrival and gradually began to move extremities spontaneously more frequently. Eyelids persistently slit open bilaterally and gaze  dysconjugate. Propofol gtt continued at 35 mcg/kg/min for now, titrating as indicated by RASS goals. Holding further steroids until MRI results in event that lymphoma present.      Past Medical History:   Diagnosis Date    ER+ (estrogen receptor positive status) 2021    HER2-positive carcinoma of breast 2021    Malignant neoplasm of overlapping sites of left female breast 2021    Mass of lower inner quadrant of left breast 2021    Mass of upper outer quadrant of left breast 2021    Mass overlapping multiple quadrants of left breast 2021     Past Surgical History:   Procedure Laterality Date     SECTION  2008    x2 2008 & 2019    INSERTION OF TUNNELED CENTRAL VENOUS CATHETER (CVC) WITH SUBCUTANEOUS PORT N/A 2021    Procedure: INSERTION, PORT-A-CATH;  Surgeon: Latanya Smith MD;  Location: [facility];  Service: General;  Laterality: N/A;      Current Facility-Administered Medications on File Prior to Encounter   Medication Dose Route Frequency Provider Last Rate Last Admin    [COMPLETED] cefTRIAXone (ROCEPHIN) 1 g in dextrose 5 % 100 mL IVPB (ready to mix)  1 g Intravenous Once Lb Richmond MD   Stopped at 23 194    [COMPLETED] dexAMETHasone injection 6 mg  6 mg Intravenous ED 1 Time Lb Richmond MD   6 mg at 23 1612    [COMPLETED] levETIRAcetam in NaCl (iso-os) IVPB 1,500 mg  1,500 mg Intravenous ED 1 Time Lb Richmond MD   Stopped at 23 1653    [COMPLETED] levETIRAcetam in NaCl (iso-os) IVPB 1,500 mg  1,500 mg Intravenous ED 1 Time Lb Richmond MD   Stopped at 23 1746    [COMPLETED] LORazepam injection 1 mg  1 mg Intravenous ED 1 Time Lb Richmond MD   1 mg at 23 1541    [COMPLETED] LORazepam injection 1 mg  1 mg Intravenous ED 1 Time Lb Richmond MD   1 mg at 23 1623    [COMPLETED] LORazepam injection 1 mg  1 mg Intravenous ED 1 Time Fermin Pacheco DO   1 mg at 23 194    [DISCONTINUED]  cefTRIAXone injection 1 g  1 g Intramuscular ED 1 Time Lb Richmond MD        [DISCONTINUED] etomidate injection   Intravenous Code/trauma/sedation Med Lb Richmond MD   10 mg at 11/17/23 1809    [DISCONTINUED] iohexoL (OMNIPAQUE 350) injection 100 mL  100 mL Intravenous ONCE PRN Lb Richmond MD        [DISCONTINUED] levETIRAcetam injection 1,500 mg  1,500 mg Intravenous ED 1 Time Lb Richmond MD        [DISCONTINUED] levETIRAcetam injection 1,500 mg  1,500 mg Intravenous ED 1 Time Lb Richmond MD        [DISCONTINUED] propofol (DIPRIVAN) 10 mg/mL infusion   Intravenous Code/Trauma/sedation Continuous Med Lb Richmond MD   Held at 11/17/23 1829    [DISCONTINUED] propofol (DIPRIVAN) 10 mg/mL infusion  0-50 mcg/kg/min Intravenous Continuous Lb Richmond MD 12.9 mL/hr at 11/17/23 1924 35 mcg/kg/min at 11/17/23 1924    [DISCONTINUED] rocuronium injection   Intravenous Code/trauma/sedation Lb Wolf MD        [DISCONTINUED] succinylcholine injection   Intravenous Code/trauma/sedation Med Lb Richmond MD   100 mg at 11/17/23 1809     Current Outpatient Medications on File Prior to Encounter   Medication Sig Dispense Refill    acetaminophen (TYLENOL) 500 MG tablet Take 500 mg by mouth once as needed for Pain.      clindamycin (CLEOCIN) 300 MG capsule Take 1 capsule three times a day for 7 days 21 capsule 0    clindamycin (CLEOCIN) 300 MG capsule Take 1 capsule by mouth three times a day for 7 days 21 capsule 0    diazePAM (VALIUM) 5 MG tablet Take 1 tablet every 8 hours as needed 20 tablet 0    diazePAM (VALIUM) 5 MG tablet Take 1 tablet by mouth every 8 hours as needed 20 tablet 0    magic mouthwash diphen/antac/lidoc/nysta Take 10 mLs by mouth 4 (four) times daily. (Patient not taking: Reported on 5/18/2022) 120 mL 1    HYDROcodone-acetaminophen (NORCO) 5-325 mg per tablet Take 1 tablet by mouth 3 (three) times daily as needed for pain. (Patient not taking: Reported on  5/18/2022) 8 tablet 0    ondansetron (ZOFRAN-ODT) 8 MG TbDL Let 1 tablet dissolve in mouth every 8 hours as needed for nausea 20 tablet 0    ondansetron (ZOFRAN-ODT) 8 MG TbDL Dissolve 1 tablet in mouth every 8 hours  as needed 20 tablet 0    oxyCODONE-acetaminophen (PERCOCET) 5-325 mg per tablet Take 1 tablet every 6 hours as needed for pain. 28 tablet 0    oxyCODONE-acetaminophen (PERCOCET) 5-325 mg per tablet Take 1 tablet every 6 hours as needed for pain. 28 tablet 0    tamoxifen (NOLVADEX) 20 MG Tab Take 1 tablet (20 mg total) by mouth once daily. 30 tablet 11      Allergies: Patient has no known allergies.    N/A  Family History   Problem Relation Age of Onset    Hypertension Mother     Breast cancer Maternal Aunt      Social History     Tobacco Use    Smoking status: Never    Smokeless tobacco: Never   Substance Use Topics    Alcohol use: No    Drug use: No     Review of Systems   Unable to perform ROS: Intubated     Objective:     Vitals:    Temp: 99.1 °F (37.3 °C)  Pulse: (!) 120  Resp: 19  SpO2: 100 %    Temp  Min: 97.8 °F (36.6 °C)  Max: 99.7 °F (37.6 °C)  Pulse  Min: 69  Max: 131  BP  Min: 100/63  Max: 140/58  MAP (mmHg)  Min: 76  Max: 90  Resp  Min: 16  Max: 33  SpO2  Min: 98 %  Max: 100 %  Oxygen Concentration (%)  Min: 60  Max: 60    No intake/output data recorded.            Physical Exam  Vitals and nursing note reviewed.   Constitutional:       General: She is not in acute distress.     Appearance: She is normal weight. She is ill-appearing. She is not toxic-appearing.      Interventions: She is sedated and intubated.   HENT:      Head: Normocephalic and atraumatic.      Comments: EEG cap in place     Right Ear: External ear normal.      Left Ear: External ear normal.      Nose: Nose normal. No signs of injury.      Right Nostril: No epistaxis.      Left Nostril: No epistaxis.      Mouth/Throat:      Lips: Pink. No lesions.      Mouth: Mucous membranes are moist. No lacerations or angioedema.    Eyes:      General: No scleral icterus.     Conjunctiva/sclera: Conjunctivae normal.      Right eye: Right conjunctiva is not injected.      Left eye: Left conjunctiva is not injected.   Neck:      Vascular: No JVD.      Trachea: No abnormal tracheal secretions.   Cardiovascular:      Rate and Rhythm: Regular rhythm. Tachycardia present. No extrasystoles (HR 120s) are present.     Pulses: No decreased pulses.   Pulmonary:      Effort: No tachypnea, bradypnea or accessory muscle usage. She is intubated.      Breath sounds: No stridor.   Abdominal:      General: Abdomen is flat.      Palpations: Abdomen is soft.      Tenderness: There is no guarding.   Musculoskeletal:      Cervical back: Neck supple. No edema or erythema.      Right lower leg: No edema.      Left lower leg: No edema.   Skin:     General: Skin is warm and dry.      Capillary Refill: Capillary refill takes less than 2 seconds.      Coloration: Skin is not ashen or jaundiced.   Neurological:      Mental Status: She is lethargic.      Comments:   Lethargic, unclear attempts to fully open eyes   Sedated on propofol  GCS: 7 ([id number])  Dysconjugate gaze  PERRL (4mm and brisk), pupillometer confirmed  No facial asymmetry apparent  Not handling oral secretions  SARAH, localizes to noxious stimuli   Psychiatric:      Comments: VALERIE, pt intubated     Unable to test orientation, language, memory, judgment, insight, fund of knowledge, hearing, shoulder shrug, tongue protrusion, coordination, gait due to level of consciousness.       Today I personally reviewed pertinent medications, lines/drains/airways, imaging, cardiology results, laboratory results, microbiology results,    CTH 11/17 with mass-like structure at frontal horn of L lateral ventricle, L-to-R MLS of 9mm, and frontal lobe vasogenic edema.     Assessment/Plan:     Neuro  * Brain mass  32-year-old female with PMHx of HER2 positive breast cancer s/p bilateral mastectomy (Jan 2022) and chemo (completed  Oct 2022; declined radiation) and chronic HA presents as transfer from [place] w/ brain mass of L caudate head/L BG with frontal lobe vasogenic edema on CTH. Presented to OSH c/o syncopal episode earlier today, intermittent AMS since. Hyperreflexia noted to RUE and RLE, GCS was 12 and had dysconjugate gaze. Treated for seizure. Given 1mg Ativan IV (apparently w/ improvement), 1.5g Keppra and 6mg Decadron IV. No PMHx seizures. Intubated for airway protection. Transported without issue. Arrived on propofol w/ intermittent BLE tremors and full body tremors. EEG cap placed and Epilepsy reported negative prelim read. Holding further steroids until MRI results in event that lymphoma present.    - MRI W/WO: pending, STAT  - Admit to [facility] for close neurologic monitoring  - Q1H neuro checks  - Q1H VS  - NSGY consulted; f/u recs  - Keppra 500mg BID  - Decadron held    - SBP goal <160   - PRN labetalol, hydralazine   - Home meds:  - Goal euvolemia, eunatremia none  - CXR: pending  - EKG: sinus tachycardia  - TTE: pending    - PT/INR, PTT: pending  - F/u A1c, TSH, lipids  - Daily CBC, CMP, Mg, Phos   - Replete electrolytes PRN  - SCDs placed, SQH pending surgical plan  - PT/OT/SLP consulted  - Nutrition, PMR, and SW/CM consulted    Observed seizure-like activity  - At OSH  - s/p 1mg IV Ativan, 1.5g IV Keppra, 6mg IV dexamethasone  - No PMHx  - See Brain mass     Brain compression  - 2/2 vasogenic edema  - 1cm MLS on CTH  - See Brain mass     [place] coma scale total score 3-8  - Arrived GCS 7 on admission  - See Brain mass     Vasogenic brain edema  - Resume steroids as appropriate  - Eunatremia  - See Brain mass     Acute encephalopathy  - 2/2 post-ictal state vs. iatrogenic (propofol) vs. mass/edema  - Strict eunatremia, defer use of HTS tonight (received dexa at OSH)  - F/u MRI  - STAT CTH for neuro worsening  - See Brain mass     Pulmonary  On mechanically assisted ventilation  - For airway protection  - Propofol gtt for  sedation  - See Brain mass     Renal/  Alteration in electrolyte and fluid balance  - CK elevated on admit >200   - Obtained for observed body tremors  - Tachycardia to 120s-30s  - Na 135, improved to 137 after 500mL IVF  - C/w aggressive hydration  - BUN/Cr normal    Hyponatremia  - Na 135 prior to transfer  - See Alteration in electrolyte and fluid balance       The patient is being Prophylaxed for:  Venous Thromboembolism with: Mechanical  Stress Ulcer with: H2B  Ventilator Pneumonia with: chlorhexidine oral care    N/A  Family history non-contributory to current problem     Activity Orders            Progressive Mobility Protocol (mobilize patient to their highest level of functioning at least twice daily) starting at 11/18 0800    Diet NPO: NPO starting at 11/17 2114          Full Code    Critical condition in that Patient has a condition that poses threat to life and bodily function: Acute encephalopathy on ventilator, hyponatremia and brain edema with new finding of brain mass     46 minutes of Critical care time was spent personally by me on the following activities: development of treatment plan with patient or surrogate and bedside caregivers, discussions with consultants, evaluation of patient's response to treatment, examination of patient, ordering and performing treatments and interventions, ordering and review of laboratory studies, ordering and review of radiographic studies, pulse oximetry, antibiotic titration if applicable, vasopressor titration if applicable, re-evaluation of patient's condition. This critical care time did not overlap with that of any other provider or involve time for any procedures. There is high probability for acute neurological change leading to clinical and possibly life-threatening deterioration requiring highest level of physician preparedness for urgent intervention.     Fadia Harrington PA-C  Neurocritical Care  [facility] - Neuro Critical Care

## 2023-11-18 NOTE — ANESTHESIA PREPROCEDURE EVALUATION
[facility]  Anesthesia Pre-Operative Evaluation   11/18/2023        Esperanza Peters, 1990  [id number]  Procedure(s) (LRB):  CRANIOTOMY, WITH NEOPLASM EXCISION USING COMPUTER-ASSISTED NAVIGATION (Left MIS craniotomy w/ Vycor tubes) (Left)    Subjective    Esperanza Peters is a 32 y.o. female w/ a significant PMHx of breast CA and chronic HA found to have new bran mass with vasogenic edema. Pt is alert and oriented x4.     Patient now presents for above procedure(s).     Prev Airway: None documented.    LDA: None documented.       Peripheral IV - Single Lumen 11/17/23 20 G Left Antecubital (Active)   Site Assessment Clean;Dry;Intact;No swelling;No redness 11/18/23 1505   Extremity Assessment Distal to IV No abnormal discoloration 11/18/23 1505   Line Status Flushed;Saline locked 11/18/23 1505   Dressing Status Clean;Dry;Intact 11/18/23 1505   Dressing Intervention Integrity maintained 11/18/23 1505   Dressing Change Due 11/21/23 11/18/23 1505   Site Change Due 11/21/23 11/18/23 1505   Reason Not Rotated Not due 11/18/23 1505   Number of days: 1            Peripheral IV - Single Lumen 11/17/23 20 G Anterior;Left;Proximal Forearm (Active)   Site Assessment Clean;Dry;Intact;No redness;No swelling 11/18/23 1505   Extremity Assessment Distal to IV No abnormal discoloration 11/18/23 1505   Line Status Infusing 11/18/23 1505   Dressing Status Clean;Dry;Intact 11/18/23 1505   Dressing Intervention Integrity maintained 11/18/23 1505   Dressing Change Due 11/21/23 11/18/23 1505   Site Change Due 11/21/23 11/18/23 0705   Reason Not Rotated Not due 11/18/23 1505   Number of days: 1            NG/OG Tube 11/17/23 1815 Tift sump 16 Fr. Right mouth (Active)   Placement Check placement verified by distal tube length measurement 11/18/23 1505   Distal Tube Length (cm) 57 11/17/23 2330   Tolerance no signs/symptoms of discomfort 11/18/23 1505   Securement secured to commercial device 11/18/23 1505   Clamp  Status/Tolerance clamped 11/18/23 1505   Flush/Irrigation flushed w/;water;no resistance met 11/18/23 1505   Number of days: 0       Female External Urinary Catheter 11/17/23 2112 (Active)   Skin no breakdown;no redness 11/18/23 1505   Tolerance no signs/symptoms of discomfort 11/18/23 1505   Suction Continuous suction at 70 mmHg 11/18/23 1505   Date of last wick change 11/18/23 11/18/23 1505   Output (mL) 300 mL 11/18/23 1505   Number of days: 0       Drips: None documented   sodium chloride 0.9% 75 mL/hr at 11/18/23 1505       Patient Active Problem List   Diagnosis    Routine gynecological examination    Malignant neoplasm of overlapping sites of left female breast    Mass of upper outer quadrant of left breast    Mass of lower inner quadrant of left breast    Mass overlapping multiple quadrants of left breast    HER2-positive carcinoma of breast    ER+ (estrogen receptor positive status)    Chemotherapy-induced neutropenia    Dehydration    Fever in immunocompromised patient    Hypokalemia    Microcytic anemia    Port-A-Cath in place    Brain mass    Acute encephalopathy    Vasogenic brain edema    [place] coma scale total score 3-8    On mechanically assisted ventilation    Hyponatremia    Alteration in electrolyte and fluid balance    Brain compression    Observed seizure-like activity       Review of patient's allergies indicates:  No Known Allergies    Current Inpatient Medications:    dexAMETHasone  4 mg Intravenous Q6H    famotidine (PF)  20 mg Intravenous BID    [START ON 11/19/2023] heparin (porcine)  5,000 Units Subcutaneous Q8H    levETIRAcetam (Keppra) IV (PEDS and ADULTS)  500 mg Intravenous Q12H    polyethylene glycol  17 g Per NG tube Daily       No current facility-administered medications on file prior to encounter.     Current Outpatient Medications on File Prior to Encounter   Medication Sig Dispense Refill    acetaminophen (TYLENOL) 500 MG tablet Take 500 mg by mouth once as needed for Pain.       clindamycin (CLEOCIN) 300 MG capsule Take 1 capsule three times a day for 7 days 21 capsule 0    clindamycin (CLEOCIN) 300 MG capsule Take 1 capsule by mouth three times a day for 7 days 21 capsule 0    diazePAM (VALIUM) 5 MG tablet Take 1 tablet every 8 hours as needed 20 tablet 0    diazePAM (VALIUM) 5 MG tablet Take 1 tablet by mouth every 8 hours as needed 20 tablet 0    magic mouthwash diphen/antac/lidoc/nysta Take 10 mLs by mouth 4 (four) times daily. (Patient not taking: Reported on 2022) 120 mL 1    HYDROcodone-acetaminophen (NORCO) 5-325 mg per tablet Take 1 tablet by mouth 3 (three) times daily as needed for pain. (Patient not taking: Reported on 2022) 8 tablet 0    ondansetron (ZOFRAN-ODT) 8 MG TbDL Let 1 tablet dissolve in mouth every 8 hours as needed for nausea 20 tablet 0    ondansetron (ZOFRAN-ODT) 8 MG TbDL Dissolve 1 tablet in mouth every 8 hours  as needed 20 tablet 0    oxyCODONE-acetaminophen (PERCOCET) 5-325 mg per tablet Take 1 tablet every 6 hours as needed for pain. 28 tablet 0    oxyCODONE-acetaminophen (PERCOCET) 5-325 mg per tablet Take 1 tablet every 6 hours as needed for pain. 28 tablet 0    tamoxifen (NOLVADEX) 20 MG Tab Take 1 tablet (20 mg total) by mouth once daily. 30 tablet 11       Past Surgical History:   Procedure Laterality Date     SECTION  2008    x2 2008 & 2019    INSERTION OF TUNNELED CENTRAL VENOUS CATHETER (CVC) WITH SUBCUTANEOUS PORT N/A 2021    Procedure: INSERTION, PORT-A-CATH;  Surgeon: Latanya Smith MD;  Location: [facility];  Service: General;  Laterality: N/A;       Social History:  Tobacco Use: Low Risk  (2023)    Patient History     Smoking Tobacco Use: Never     Smokeless Tobacco Use: Never     Passive Exposure: Not on file       Alcohol Use: Not on file       Objective    Vital Signs Range:  BMI Readings from Last 1 Encounters:   23 26.37 kg/m²       Temp:  [37.1 °C (98.7 °F)-37.4 °C (99.3 °F)]   Pulse:  []    Resp:  [12-35]   BP: (108-127)/(68-80)   SpO2:  [100 %]        Significant Labs:        Component Value Date/Time    WBC 16.94 (H) 11/18/2023 0655    HGB 13.6 11/18/2023 0655    HCT 44.0 11/18/2023 0655     11/18/2023 0655     11/18/2023 0127    K 4.2 11/18/2023 0127     11/18/2023 0127    CO2 19 (L) 11/18/2023 0127     11/18/2023 0127    BUN 7 11/18/2023 0127    CREATININE 0.8 11/18/2023 0127    MG 1.5 (L) 11/18/2023 0127    PHOS 3.0 11/18/2023 0127    CALCIUM 8.8 11/18/2023 0127    ALBUMIN 3.7 11/18/2023 0127    PROT 7.6 11/18/2023 0127    ALKPHOS 45 (L) 11/18/2023 0127    BILITOT 0.3 11/18/2023 0127    AST 24 11/18/2023 0127    ALT 38 11/18/2023 0127    INR 1.2 12/11/2021 1600    HGBA1C 4.9 11/18/2023 0655        Please see Results Review for additional labs.     Diagnostic Studies: All relevant studies, reviewed.      EKG:   Results for orders placed or performed during the hospital encounter of 11/17/23   EKG 12-lead    Collection Time: 11/17/23  6:54 PM    Narrative    Test Reason : G93.89,    Vent. Rate : 115 BPM     Atrial Rate : 115 BPM     P-R Int : 138 ms          QRS Dur : 078 ms      QT Int : 322 ms       P-R-T Axes : 051 077 032 degrees     QTc Int : 445 ms    Sinus tachycardia  Nonspecific T wave abnormality  Abnormal ECG  When compared with ECG of 17-NOV-2023 13:56,  Vent. rate has increased BY  43 BPM    Referred By: TEX LYNCH           Confirmed By:        ECHO:  Results for orders placed during the hospital encounter of 01/25/23    Echo    Interpretation Summary  · The left ventricle is normal in size with normal systolic function.  · The estimated ejection fraction is 60%.  · Normal left ventricular diastolic function.  · Normal right ventricular size with normal right ventricular systolic function.  · Mild tricuspid regurgitation.  · Normal central venous pressure (3 mmHg).  · The estimated PA systolic pressure is 16 mmHg.        Pre-op Assessment    I have  reviewed the Patient Summary Reports.     I have reviewed the Nursing Notes. I have reviewed the NPO Status.   I have reviewed the Medications.     Review of Systems  Anesthesia Hx:   Neg history of prior surgery.          Denies Family Hx of Anesthesia complications.    Denies Personal Hx of Anesthesia complications.                    Social:  Non-Smoker       Hematology/Oncology:       -- Denies Anemia:                  Current/Recent Cancer.                Cardiovascular:      Denies Hypertension.   Denies MI.     Denies CABG/stent.     Denies CHF.       ECG has been reviewed.                          Pulmonary:    Denies COPD.  Denies Asthma.                    Renal/:   Denies Chronic Renal Disease.                Hepatic/GI:      Denies GERD. Denies Liver Disease.            Musculoskeletal:         Denies Spine Disorders             Neurological:    Denies CVA.   Headaches Denies Seizures.                                Endocrine:  Denies Diabetes. Denies Hypothyroidism.              Physical Exam  General: Well nourished, Cooperative, Alert and Oriented    Airway:  Mallampati: II   Tongue: Normal        Anesthesia Plan  Type of Anesthesia, risks & benefits discussed:    Anesthesia Type: Gen ETT  Intra-op Monitoring Plan: Standard ASA Monitors and Art Line  Post Op Pain Control Plan: multimodal analgesia and IV/PO Opioids PRN  Induction:  IV  Airway Plan: Direct and Video  Informed Consent: Informed consent signed with the Patient and all parties understand the risks and agree with anesthesia plan.  All questions answered. Patient consented to blood products? Yes  ASA Score: 3  Day of Surgery Review of History & Physical: H&P Update referred to the surgeon/provider.    Ready For Surgery From Anesthesia Perspective.     .

## 2023-11-18 NOTE — PLAN OF CARE
Recommendations     1.) TF recs: - if pt is to remain on Propofol sedation, recommend to initiate TF of iPawn Peptide 1.5 at trickle rate and slowly advance to goal of 40ml/hr to provide 1476kcal, 71g PRO, and 672ml FF + Kcal from Propofol- FWF per MD.      - if pt is off of Propofol sedation, recommend to advance feeds of iPawn Peptide 1.5 to the goal rate of 45ml/hr to provide 1661 kcal, 80g PRO, and 756ml FF- FWF per MD.      - monitor for s/s of intolerance such as residuals >500ml, n/v/d, or abdominal distension.       2.) ADAT, texture per SLP.      3.) RD to monitor wt, nutritional status, skin, labs.        Goals: to meet % of EEN/EPN by next RD f/u  Nutrition Goal Status: new  Communication of RD Recs:  (POC)

## 2023-11-18 NOTE — ASSESSMENT & PLAN NOTE
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 CTH with 3.5 x 3.5 cm  heterogeneous isoattenuating structure centered at the level of the left caudate head/basal ganglia at the level of the left frontal horn measuring with significant vasogenic edema which results in approximately 1cm MLS.     Imaging:  [facility] 11/17: CTH with 3.5 x 3.5 cm  heterogeneous isoattenuating structure centered at the level of the left caudate head/basal ganglia at the level of the left frontal horn measuring with significant vasogenic edema which results in approximately 1cm MLS.  MRI brain w/wo 11/18: L BG 4cm enhancing mass, 8mm MLS, 1.2cm frontoparietal enhancing lesion    --Patient admitted to ICU on telemetry      -q1h neurochecks in ICU, q2h neurochecks in stepdown, q4h neurochecks on floor  --CT chest/abdo/pelvis w/wo ordered  --SBP <160   --Na >135  --AED per NCC  --cvEEG per NCC  --Dex 4q6  --HOB >30  --Follow-up full pre-op labs (CBC/CMP/PT-INR/PTT/T&S)  --NPO at this time for possible operative intervention; OR plan pending  --PPI  --Continue to monitor clinically, notify NSGY immediately with any changes in neuro status    Dispo: ICU    Plan d/w Dr. Miles.

## 2023-11-18 NOTE — CONSULTS
[facility] - Neuro Critical Care  Adult Nutrition  Consult Note    SUMMARY     Recommendations    1.) TF recs: - if pt is to remain on Propofol sedation, recommend to initiate TF of [employer] Peptide 1.5 at trickle rate and slowly advance to goal of 40ml/hr to provide 1476kcal, 71g PRO, and 672ml FF + Kcal from Propofol- FWF per MD.     - if pt is off of Propofol sedation, recommend to advance feeds of [employer] Peptide 1.5 to the goal rate of 45ml/hr to provide 1661 kcal, 80g PRO, and 756ml FF- FWF per MD.     - monitor for s/s of intolerance such as residuals >500ml, n/v/d, or abdominal distension.      2.) ADAT, texture per SLP.     3.) RD to monitor wt, nutritional status, skin, labs.      Goals: to meet % of EEN/EPN by next RD f/u  Nutrition Goal Status: new  Communication of RD Recs:  (POC)    Assessment and Plan    Nutrition Problem  Inadequate oral intake    Related to (etiology):   NPO, sedated, on vent    Signs and Symptoms (as evidenced by):   Need for EN     Interventions/Recommendations (treatment strategy):  Collaboration of nutritional care with other providers.  EN    Nutrition Diagnosis Status:   New     Reason for Assessment    Reason For Assessment: consult  Diagnosis: cancer diagnosis/related complications (Brain Mass)  Relevant Medical History: Breast CA w/ mastectomy  Interdisciplinary Rounds: did not attend  General Information Comments: RD consulted for TF recs: Pt is intubated, sedated. RD unable to obtain nutritional hx at this time. NFPE not appropriate at this time. Per chart review, wt is stable. RD to continue to monitor.   Nutrition Discharge Planning: as per medical course    Nutrition Risk Screen    Nutrition Risk Screen: no indicators present    Anthropometrics    Temp: 98.7 °F (37.1 °C)  Height Method: Estimated  Height: 5' (152.4 cm)  Height (inches): 60 in  Weight Method: Bed Scale  Weight: 61.2 kg (135 lb)  Weight (lb): 135 lb  Ideal Body Weight (IBW), Female: 100 lb  %  Ideal Body Weight, Female (lb): 135 %  BMI (Calculated): 26.4  BMI Grade: 25 - 29.9 - overweight    Lab/Procedures/Meds    Pertinent Labs Reviewed: reviewed  Pertinent Labs Comments: M.5, ALP: 45  Pertinent Medications Reviewed: reviewed  Pertinent Medications Comments: Famotidine, polyethylene glycol, NaCl, propofol    Estimated/Assessed Needs    Weight Used For Calorie Calculations: 61.2 kg (134 lb 14.7 oz)  Energy Calorie Requirements (kcal): 1594 kcal  Energy Need Method: [facility]  Protein Requirements: 74- 86g (1.2-1.4g/kg)  Weight Used For Protein Calculations: 61.2 kg (134 lb 14.7 oz)  Fluid Requirements (mL): 1ml/1kcal or per MD  Estimated Fluid Requirement Method: RDA Method  RDA Method (mL): 1594    Nutrition Prescription Ordered    Current Diet Order: NPO    Evaluation of Received Nutrient/Fluid Intake    Other Calories (kcal): 120 (from 109.4ml of Propofol)  I/O: incomplete  Energy Calories Required: not meeting needs  Protein Required: not meeting needs  Fluid Required:  (as per medical course)  Comments: LBM   Tolerance:  (NPO)  % Intake of Estimated Energy Needs: 0 - 25 %  % Meal Intake: NPO    Nutrition Risk    Level of Risk/Frequency of Follow-up:  (RD to f/u x1-2/week)     Monitor and Evaluation    Food and Nutrient Intake: energy intake  Food and Nutrient Adminstration: enteral and parenteral nutrition administration  Physical Activity and Function: nutrition-related ADLs and IADLs  Anthropometric Measurements: weight, weight change, body mass index  Biochemical Data, Medical Tests and Procedures: electrolyte and renal panel, gastrointestinal profile, glucose/endocrine profile, inflammatory profile, lipid profile  Nutrition-Focused Physical Findings: overall appearance, skin     Nutrition Follow-Up    RD Follow-up?: Yes

## 2023-11-18 NOTE — ASSESSMENT & PLAN NOTE
32F h/o HER2 positive carcinoma of the breast requiring lumpectomy and chemotherapy in remission since 10/2022, presented to [place] with syncopal episode and subsequent seizure. CTH with 3.5 x 3.5 cm  heterogeneous isoattenuating structure centered at the level of the left caudate head/basal ganglia at the level of the left frontal horn measuring with significant vasogenic edema which results in approximately 1cm MLS.     --Patient admitted to ICU on telemetry      -q1h neurochecks in ICU, q2h neurochecks in stepdown, q4h neurochecks on floor  --Follow-up MRI w/wo contrast w/ [facility]      -CT chest/abdo/pelvis w/  --SBP <160   --Na >135  --AED per NCC  --cvEEG per NCC  --Hold Dex until MRI completed then load 10mg and subsequently 4q6  --HOB >30  --Follow-up full pre-op labs (CBC/CMP/PT-INR/PTT/T&S)  --NPO at this time for possible operative intervention  --PPI  --Continue to monitor clinically, notify NSGY immediately with any changes in neuro status    Dispo: ICU    Plan d/w Dr. Miles.

## 2023-11-18 NOTE — PROCEDURES
EEG    Date/Time: 11/17/2023 9:04 PM    Performed by: Shalom Ham MD  Authorized by: Lb Richmond MD        EEG Prelim Read- 11/17/23 at 2147  Bilateral mild to moderate diffuse slowing with further focal slowing over left heisphere suggestive of underlying structural defect such as mass or infarct. No seizures seen. Full report to follow.     Shalom Ham MD  Department of Neurology  [facility]

## 2023-11-18 NOTE — PROCEDURES
EEG REPORT    NAME: Esperanza Peters  : 1990  MRN: [id number]    DATE of EE2023    CLINICAL INDICATION: This is a 32 y.o. female with history of ER2 positive carcinoma of the breast requiring lumpectomy and chemotherapy being evaluated for encephalopathy.      EEG DESCRIPTION:  A 16-channel EEG was performed with electrode placement in 10/20 International System. Longitudinal bipolar and referential montages were utilized in analysis.    This is a technically adequate study with minor muscle and motion artifacts.    The dominant posterior background rhythm was a well modulated, symmetric, synchronous, reactive, 7 Hz rhythm. The record was reactive to eye opening and closure. Anterior derivations showed the expected admixture of low-voltage beta and theta frequencies as well as intermittent eye blink artifact. Drowsiness was characterized by voltage attenuation and lateral eye movements.  Sleep architecture was symmetric and consisted of sleep spindles and K complexes.    Photic stimulation, performed elicited no driving response. Hyperventilation  was not performed.    No epileptiform discharges were recorded. No electrographic or electroclinical seizures were recorded.    DIAGNOSIS: This is a abnormal EEG due to the presence of mild generalised slowing. No lateralizing or epileptiform features are noted. No electrographic or electroclinical seizures are recorded.    INTERPRETATION:  This is an normal EEG indicating mild encephalopathy.      Lobito Jolley MD  Neurology

## 2023-11-18 NOTE — SUBJECTIVE & OBJECTIVE
Medications Prior to Admission   Medication Sig Dispense Refill Last Dose    acetaminophen (TYLENOL) 500 MG tablet Take 500 mg by mouth once as needed for Pain.       clindamycin (CLEOCIN) 300 MG capsule Take 1 capsule three times a day for 7 days 21 capsule 0     clindamycin (CLEOCIN) 300 MG capsule Take 1 capsule by mouth three times a day for 7 days 21 capsule 0     diazePAM (VALIUM) 5 MG tablet Take 1 tablet every 8 hours as needed 20 tablet 0     diazePAM (VALIUM) 5 MG tablet Take 1 tablet by mouth every 8 hours as needed 20 tablet 0     magic mouthwash diphen/antac/lidoc/nysta Take 10 mLs by mouth 4 (four) times daily. (Patient not taking: Reported on 2022) 120 mL 1     HYDROcodone-acetaminophen (NORCO) 5-325 mg per tablet Take 1 tablet by mouth 3 (three) times daily as needed for pain. (Patient not taking: Reported on 2022) 8 tablet 0     ondansetron (ZOFRAN-ODT) 8 MG TbDL Let 1 tablet dissolve in mouth every 8 hours as needed for nausea 20 tablet 0     ondansetron (ZOFRAN-ODT) 8 MG TbDL Dissolve 1 tablet in mouth every 8 hours  as needed 20 tablet 0     oxyCODONE-acetaminophen (PERCOCET) 5-325 mg per tablet Take 1 tablet every 6 hours as needed for pain. 28 tablet 0     oxyCODONE-acetaminophen (PERCOCET) 5-325 mg per tablet Take 1 tablet every 6 hours as needed for pain. 28 tablet 0     tamoxifen (NOLVADEX) 20 MG Tab Take 1 tablet (20 mg total) by mouth once daily. 30 tablet 11        Review of patient's allergies indicates:  No Known Allergies    Past Medical History:   Diagnosis Date    ER+ (estrogen receptor positive status) 2021    HER2-positive carcinoma of breast 2021    Malignant neoplasm of overlapping sites of left female breast 2021    Mass of lower inner quadrant of left breast 2021    Mass of upper outer quadrant of left breast 2021    Mass overlapping multiple quadrants of left breast 2021     Past Surgical History:   Procedure Laterality Date      "SECTION  2008    x2 07/02/2008 & 05/31/2019    INSERTION OF TUNNELED CENTRAL VENOUS CATHETER (CVC) WITH SUBCUTANEOUS PORT N/A 9/20/2021    Procedure: INSERTION, PORT-A-CATH;  Surgeon: Latanya Smith MD;  Location: [facility];  Service: General;  Laterality: N/A;     Family History       Problem Relation (Age of Onset)    Breast cancer Maternal Aunt    Hypertension Mother          Tobacco Use    Smoking status: Never    Smokeless tobacco: Never   Substance and Sexual Activity    Alcohol use: No    Drug use: No    Sexual activity: Not on file     Review of Systems   Unable to perform ROS: Mental status change     Objective:     Weight: 61.2 kg (135 lb)  Body mass index is 26.37 kg/m².  Vital Signs (Most Recent):  Temp: 99.1 °F (37.3 °C) (11/17/23 2136)  Pulse: (!) 120 (11/17/23 2136)  Resp: 19 (11/17/23 2136)  SpO2: 100 % (11/17/23 2136) Vital Signs (24h Range):  Temp:  [97.8 °F (36.6 °C)-99.7 °F (37.6 °C)] 99.1 °F (37.3 °C)  Pulse:  [] 120  Resp:  [16-33] 19  SpO2:  [98 %-100 %] 100 %  BP: (100-140)/(57-82) 106/65                Vent Mode: A/C  Oxygen Concentration (%):  [60] 60  Resp Rate Total:  [18 br/min] 18 br/min  Vt Set:  [300 mL] 300 mL  PEEP/CPAP:  [5 cmH20] 5 cmH20  Pressure Support:  [0 cmH20] 0 cmH20  Mean Airway Pressure:  [7.7 cmH20] 7.7 cmH20        Female External Urinary Catheter 11/17/23 2112 (Active)          Physical Exam         Neurosurgery Physical Exam    [id number]  Sedated on prop 30, intubated  RP/LP 4mm and brisk  +c/c/g  Spontanous and purposeful ag x 4      Significant Labs:  Recent Labs   Lab 11/17/23  1353 11/17/23  2158   GLU 97 126*   * 137   K 3.9 3.9    105   CO2 22* 21*   BUN 9 7   CREATININE 0.8 0.7   CALCIUM 9.3 9.2   MG 1.9 1.6     Recent Labs   Lab 11/17/23  1353 11/17/23  2158   WBC 9.75 12.48   HGB 12.3 11.9*   HCT 39.3 37.7    251     No results for input(s): "LABPT", "INR", "APTT" in the last 48 hours.  Microbiology Results (last 7 days)       ** No " results found for the last 168 hours. **          All pertinent labs from the last 24 hours have been reviewed.    Significant Diagnostics:  I have reviewed all pertinent imaging results/findings within the past 24 hours.  No results found in the last 24 hours.

## 2023-11-18 NOTE — ASSESSMENT & PLAN NOTE
- CK elevated on admit >200   - Obtained for observed body tremors  - Tachycardia to 120s-30s  - Na 135, improved to 137 after 500mL IVF  - C/w aggressive hydration  - BUN/Cr normal

## 2023-11-19 NOTE — PT/OT/SLP EVAL
"Occupational Therapy   Evaluation    Name: Esperanaz Peters  MRN: [id number]  Admitting Diagnosis: Brain mass  Recent Surgery: Procedure(s) (LRB):  CRANIOTOMY, WITH NEOPLASM EXCISION USING COMPUTER-ASSISTED NAVIGATION (LEFT MIS CRANIOTOMY WITH VYCOR TUBES) (Left)      Recommendations:     Discharge Recommendations: Low Intensity Therapy  Discharge Equipment Recommendations:  none  Barriers to discharge:  None    Assessment:     Esperanza Peters is a 32 y.o. female with a medical diagnosis of Brain mass.  She presents with performance deficits affecting function: impaired endurance, impaired self care skills, impaired functional mobility.      Rehab Prognosis: Good; patient would benefit from acute skilled OT services to address these deficits and reach maximum level of function.       Plan:     Patient to be seen 3 x/week to address the above listed problems via neuromuscular re-education, therapeutic exercises, therapeutic activities, self-care/home management  Plan of Care Expires: 12/18/23  Plan of Care Reviewed with: patient, mother    Subjective     Patient:  "I am doing better."    Occupational Profile:  Patient resides in [place] with her fiance and 2 kids ages 15 and 4 in 2 story home with bedroom on the first floor.  Patient is left handed.  PTA patient independent with ADLs including driving.  Works: Wound care nurse.  Hobbies: TV, [employer] music, running.         Pain/Comfort:  Pain Rating 1: 0/10  Pain Rating Post-Intervention 1: 0/10    Patients cultural, spiritual, Advent conflicts given the current situation: no    Objective:     Communicated with: Nurse prior to session.  Patient found supine with bed alarm, peripheral IV, telemetry, pulse ox (continuous), PureWick upon OT entry to room.    General Precautions: Standard, aspiration, fall  Orthopedic Precautions: N/A  Braces: N/A  Respiratory Status: Room air    Occupational Performance:    Bed Mobility:    Patient completed Rolling/Turning to Left " with  supervision  Patient completed Rolling/Turning to Right with supervision  Patient completed Supine to Sit with supervision  Patient completed Sit to Supine with supervision    Functional Mobility/Transfers:  Patient completed Sit <> Stand Transfer with stand by assistance  with  no assistive device   Patient completed Bed <> Chair Transfer using Stand Pivot technique with stand by assistance with no assistive device    Activities of Daily Living:  Grooming: stand by assistance while standing  Feeding:  stand by assist  Upper Body Dressing: stand by assistance    Lower Body Dressing: stand by assistance      Cognitive/Visual Perceptual:  Cognitive/Psychosocial Skills:     -       Oriented to: Person, Place, Time, and Situation   -       Follows Commands/attention:Follows one-step commands  -       Communication: clear/fluent    Physical Exam:  Postural examination/scapula alignment:    -       Rounded shoulders  Upper Extremity Range of Motion:     -       Right Upper Extremity: WNL  -       Left Upper Extremity: WNL  Upper Extremity Strength:    -       Right Upper Extremity: WNL  -       Left Upper Extremity: WNL    AMPAC 6 Click ADL:  AMPAC Total Score: 6    Treatment & Education:  Patient alert and oriented x 3; able to follow 4/4 one step commands.  Patient attentive and interactive throughout the session.      Patient left supine with all lines intact, call button in reach, and bed alarm on    GOALS:   Multidisciplinary Problems       Occupational Therapy Goals          Problem: Occupational Therapy    Goal Priority Disciplines Outcome Interventions   Occupational Therapy Goal     OT, PT/OT Ongoing, Progressing    Description: Goals set 11/19 to be addressed for 14 days with expiration date, 12/3:  Patient will increase functional independence with ADLs by performing:    Patient will demonstrate rolling to the right with modified independence.  Not met   Patient will demonstrate rolling to the left with  modified independence.   Not met  Patient will demonstrate supine -sit with modified independence.   Not met  Patient will demonstrate stand pivot transfers with modified independence.   Not met  Patient will demonstrate grooming while standing with modified independence.   Not met  Patient will demonstrate upper body dressing with modified independence while seated EOB.   Not met  Patient will demonstrate lower body dressing with modified independence while seated EOB.   Not met  Patient will demonstrate toileting with modified independence.   Not met  Patient will demonstrate bathing while seated EOB with modified independence.   Not met  Patient's family / caregiver will demonstrate independence and safety with assisting patient with self-care skills and functional mobility.     Not met                               History:     Past Medical History:   Diagnosis Date    ER+ (estrogen receptor positive status) 2021    HER2-positive carcinoma of breast 2021    Malignant neoplasm of overlapping sites of left female breast 2021    Mass of lower inner quadrant of left breast 2021    Mass of upper outer quadrant of left breast 2021    Mass overlapping multiple quadrants of left breast 2021         Past Surgical History:   Procedure Laterality Date     SECTION  2008    x2 2008 & 2019    INSERTION OF TUNNELED CENTRAL VENOUS CATHETER (CVC) WITH SUBCUTANEOUS PORT N/A 2021    Procedure: INSERTION, PORT-A-CATH;  Surgeon: Latanya Smith MD;  Location: [facility];  Service: General;  Laterality: N/A;       Time Tracking:     OT Date of Treatment: 23  OT Start Time: 900  OT Stop Time: 927  OT Total Time (min): 27 min    Billable Minutes:Evaluation 15  Therapeutic Activity 12    2023

## 2023-11-19 NOTE — PLAN OF CARE
"[facility] Care Plan    POC reviewed with Esperanza Peters and family at 0302. Pt verbalized understanding. Questions and concerns addressed. No acute events overnight.    MRI complete.  NS cont @ 75 mL/hr.  Plan for OR Monday.      Pt progressing toward goals. Will continue to monitor. See below and flowsheets for full assessment and VS info.           Is this a stroke patient? no    Neuro:  Silva Coma Scale  Best Eye Response: 4-->(E4) spontaneous  Best Motor Response: 6-->(M6) obeys commands  Best Verbal Response: 4-->(V4) confused  Silva Coma Scale Score: 14  Assessment Qualifiers: patient not sedated/intubated, no eye obstruction present  Pupil PERRLA: yes     24hr Temp:  [98.5 °F (36.9 °C)-99.3 °F (37.4 °C)]     CV:   Rhythm: normal sinus rhythm  BP goals:   SBP < 160  MAP > 65    Resp:      Vent Mode: Spont  Set Rate: 18 BPM  Oxygen Concentration (%): 40  Vt Set: 340 mL  PEEP/CPAP: 5 cmH20  Pressure Support: 5 cmH20    Plan: N/A    GI/:     Diet/Nutrition Received: NPO  Last Bowel Movement: 11/16/23  Voiding Characteristics: external catheter    Intake/Output Summary (Last 24 hours) at 11/19/2023 0307  Last data filed at 11/19/2023 0302  Gross per 24 hour   Intake 3414.73 ml   Output 2300 ml   Net 1114.73 ml     Unmeasured Output  Urine Occurrence: 1  Stool Occurrence: 0  Pad Count: 2    Labs/Accuchecks:  Recent Labs   Lab 11/18/23  0655   WBC 16.94*   RBC 5.54*   HGB 13.6   HCT 44.0         Recent Labs   Lab 11/19/23  0035      K 4.3   CO2 20*   *   BUN 6   CREATININE 0.7   ALKPHOS 39*   ALT 16   AST 41*   BILITOT 0.4    No results for input(s): "PROTIME", "INR", "APTT", "HEPANTIXA" in the last 168 hours.   Recent Labs   Lab 11/18/23  0127   *       Electrolytes: N/A - electrolytes WDL  Accuchecks: none    Gtts:   sodium chloride 0.9% Stopped (11/19/23 0233)       LDA/Wounds:  Lines/Drains/Airways       Drain  Duration             Female External Urinary Catheter 11/17/23 2115 1 " day              Peripheral Intravenous Line  Duration                  Peripheral IV - Single Lumen 11/17/23 20 G Anterior;Left;Proximal Forearm 2 days         Peripheral IV - Single Lumen 11/17/23 20 G Left Antecubital 2 days                  Wounds: No  Wound care consulted: No       Problem: Adult Inpatient Plan of Care  Goal: Plan of Care Review  Outcome: Ongoing, Progressing  Goal: Optimal Comfort and Wellbeing  Outcome: Ongoing, Progressing     Problem: Fall Injury Risk  Goal: Absence of Fall and Fall-Related Injury  Outcome: Ongoing, Progressing

## 2023-11-19 NOTE — PROGRESS NOTES
[facility] - [facility]  Neurosurgery  Progress Note    Subjective:     History of Present Illness: 32F h/o HER2 positive carcinoma of the breast requiring lumpectomy and chemotherapy in remission since 10/2022, presented to [facility] with syncopal episode which was witnessed by coworkers while at work. Patient had been complaining prior of headache which was described as bitemporal frontal for the last 7 days. Patient then noted to have seizure activity in ED and intubated for airway protection. CTH with 3.5 x 3.5 cm  heterogeneous isoattenuating structure centered at the level of the left caudate head/basal ganglia at the level of the left frontal horn measuring with significant vasogenic edema which results in approximately 1cm MLS. Family bedside and deny AC/AP.     Post-Op Info:  Procedure(s) (LRB):  CRANIOTOMY, WITH NEOPLASM EXCISION USING COMPUTER-ASSISTED NAVIGATION (LEFT MIS CRANIOTOMY WITH VYCOR TUBES) (Left)       Interval History:11/19: Extubated yesterday, NAEON, AFVSS, Exam improved s/p extubation; plan for OR tomorrow, pre-op labs pending      Medications Prior to Admission   Medication Sig Dispense Refill Last Dose    acetaminophen (TYLENOL) 500 MG tablet Take 500 mg by mouth once as needed for Pain.       clindamycin (CLEOCIN) 300 MG capsule Take 1 capsule three times a day for 7 days 21 capsule 0     clindamycin (CLEOCIN) 300 MG capsule Take 1 capsule by mouth three times a day for 7 days 21 capsule 0     diazePAM (VALIUM) 5 MG tablet Take 1 tablet every 8 hours as needed 20 tablet 0     diazePAM (VALIUM) 5 MG tablet Take 1 tablet by mouth every 8 hours as needed 20 tablet 0     magic mouthwash diphen/antac/lidoc/nysta Take 10 mLs by mouth 4 (four) times daily. (Patient not taking: Reported on 5/18/2022) 120 mL 1     HYDROcodone-acetaminophen (NORCO) 5-325 mg per tablet Take 1 tablet by mouth 3 (three) times daily as needed for pain. (Patient not taking: Reported on 5/18/2022) 8 tablet 0      ondansetron (ZOFRAN-ODT) 8 MG TbDL Let 1 tablet dissolve in mouth every 8 hours as needed for nausea 20 tablet 0     ondansetron (ZOFRAN-ODT) 8 MG TbDL Dissolve 1 tablet in mouth every 8 hours  as needed 20 tablet 0     oxyCODONE-acetaminophen (PERCOCET) 5-325 mg per tablet Take 1 tablet every 6 hours as needed for pain. 28 tablet 0     oxyCODONE-acetaminophen (PERCOCET) 5-325 mg per tablet Take 1 tablet every 6 hours as needed for pain. 28 tablet 0     tamoxifen (NOLVADEX) 20 MG Tab Take 1 tablet (20 mg total) by mouth once daily. 30 tablet 11        Review of patient's allergies indicates:  No Known Allergies    Past Medical History:   Diagnosis Date    ER+ (estrogen receptor positive status) 2021    HER2-positive carcinoma of breast 2021    Malignant neoplasm of overlapping sites of left female breast 2021    Mass of lower inner quadrant of left breast 2021    Mass of upper outer quadrant of left breast 2021    Mass overlapping multiple quadrants of left breast 2021     Past Surgical History:   Procedure Laterality Date     SECTION  2008    x2 2008 & 2019    INSERTION OF TUNNELED CENTRAL VENOUS CATHETER (CVC) WITH SUBCUTANEOUS PORT N/A 2021    Procedure: INSERTION, PORT-A-CATH;  Surgeon: Latanya Smith MD;  Location: [facility];  Service: General;  Laterality: N/A;     Family History       Problem Relation (Age of Onset)    Breast cancer Maternal Aunt    Hypertension Mother          Tobacco Use    Smoking status: Never    Smokeless tobacco: Never   Substance and Sexual Activity    Alcohol use: No    Drug use: No    Sexual activity: Not on file       Objective:     Weight: 61.2 kg (135 lb)  Body mass index is 26.37 kg/m².  Vital Signs (Most Recent):  Temp: 98.5 °F (36.9 °C) (23 030)  Pulse: 65 (23)  Resp: 16 (23)  BP: 98/60 (23)  SpO2: 98 % (23) Vital Signs (24h Range):  Temp:  [98.5 °F (36.9 °C)-99.3 °F (37.4  "°C)] 98.5 °F (36.9 °C)  Pulse:  [] 65  Resp:  [12-35] 16  SpO2:  [95 %-100 %] 98 %  BP: ()/(55-80) 98/60                  Vent Mode: Spont  Oxygen Concentration (%):  [40] 40  Resp Rate Total:  [12 br/min-40 br/min] 40 br/min  Vt Set:  [340 mL] 340 mL  PEEP/CPAP:  [5 cmH20] 5 cmH20  Pressure Support:  [5 cmH20] 5 cmH20  Mean Airway Pressure:  [7.2 peC61-14 cmH20] 7.2 cmH20        Female External Urinary Catheter 11/17/23 2112 (Active)          Physical Exam   Neurosurgery Physical Exam  General: AOx3, GCS E4V5M6  CNII-XII: Intact on detailed exam, PERRL, visual fields grossly intact, EOMi, facial sensation preserved, no facial assymetry, tongue/uvula/palate midline, shoulder shrug equal, mild RUE pronator drift  Extremities: 5/5 motor throughout, sensorium intact throughout, coordination intact throughout      Significant Labs:  Recent Labs   Lab 11/17/23 2158 11/18/23  0127 11/19/23  0035   * 105 111*    136 142   K 3.9 4.2 4.3    104 112*   CO2 21* 19* 20*   BUN 7 7 6   CREATININE 0.7 0.8 0.7   CALCIUM 9.2 8.8 8.4*   MG 1.6 1.5* 2.0       Recent Labs   Lab 11/17/23 2158 11/18/23  0655 11/19/23  0337   WBC 12.48 16.94* 12.77*   HGB 11.9* 13.6 11.1*   HCT 37.7 44.0 35.6*    278 213       No results for input(s): "LABPT", "INR", "APTT" in the last 48 hours.  Microbiology Results (last 7 days)       ** No results found for the last 168 hours. **          All pertinent labs from the last 24 hours have been reviewed.    Significant Diagnostics:  I have reviewed all pertinent imaging results/findings within the past 24 hours.  Assessment/Plan:     * Brain mass  32F h/o HER2 positive carcinoma of the breast requiring lumpectomy and chemotherapy in remission since 10/2022, presented to [facility] with syncopal episode and subsequent seizure. CTH with 3.5 x 3.5 cm  heterogeneous isoattenuating structure centered at the level of the left caudate head/basal ganglia at the level of the left " frontal horn measuring with significant vasogenic edema which results in approximately 1cm MLS.     Imaging:  [facility] 11/17: CTH with 3.5 x 3.5 cm  heterogeneous isoattenuating structure centered at the level of the left caudate head/basal ganglia at the level of the left frontal horn measuring with significant vasogenic edema which results in approximately 1cm MLS.  MRI brain w/wo 11/18: L BG 4cm enhancing mass, 8mm MLS, 1.2cm frontoparietal enhancing lesion  MRI synaptive 11/18: completed for OR  CT C/A/P 11/18: pending read    --Patient admitted to ICU on telemetry; NCC & medicine teams primary      -q1h neurochecks in ICU  --SBP <160   --Na >135  --AEDs per NCC  --Dex 4q6  --HOB >30  --ADAT per primary team  --Tentative plan for OR in AM (consented/marked; pre-op labs pending, NPO @ [facility])  --PPI while on steroids  --PT/OT/OOB  --Continue to monitor clinically, notify NSGY immediately with any changes in neuro status    Dispo: Per primary team          Jassi Nguyen MD  Neurosurgery  [facility]

## 2023-11-19 NOTE — PLAN OF CARE
Goals remain appropriate.  Problem: Occupational Therapy  Goal: Occupational Therapy Goal  Description: Goals set 11/19 to be addressed for 14 days with expiration date, 12/3:  Patient will increase functional independence with ADLs by performing:    Patient will demonstrate rolling to the right with modified independence.  Not met   Patient will demonstrate rolling to the left with modified independence.   Not met  Patient will demonstrate supine -sit with modified independence.   Not met  Patient will demonstrate stand pivot transfers with modified independence.   Not met  Patient will demonstrate grooming while standing with modified independence.   Not met  Patient will demonstrate upper body dressing with modified independence while seated EOB.   Not met  Patient will demonstrate lower body dressing with modified independence while seated EOB.   Not met  Patient will demonstrate toileting with modified independence.   Not met  Patient will demonstrate bathing while seated EOB with modified independence.   Not met  Patient's family / caregiver will demonstrate independence and safety with assisting patient with self-care skills and functional mobility.     Not met          Outcome: Ongoing, Progressing

## 2023-11-19 NOTE — ASSESSMENT & PLAN NOTE
32F h/o HER2 positive carcinoma of the breast requiring lumpectomy and chemotherapy in remission since 10/2022, presented to [facility] with syncopal episode and subsequent seizure. CTH with 3.5 x 3.5 cm  heterogeneous isoattenuating structure centered at the level of the left caudate head/basal ganglia at the level of the left frontal horn measuring with significant vasogenic edema which results in approximately 1cm MLS.     Imaging:  [facility] 11/17: CTH with 3.5 x 3.5 cm  heterogeneous isoattenuating structure centered at the level of the left caudate head/basal ganglia at the level of the left frontal horn measuring with significant vasogenic edema which results in approximately 1cm MLS.  MRI brain w/wo 11/18: L BG 4cm enhancing mass, 8mm MLS, 1.2cm frontoparietal enhancing lesion  MRI synaptive 11/18: completed for OR  CT C/A/P 11/18: pending read    --Patient admitted to ICU on telemetry; NCC & medicine teams primary      -q1h neurochecks in ICU  --SBP <160   --Na >135  --AEDs per NCC  --Dex 4q6  --HOB >30  --ADAT per primary team  --Tentative plan for OR in AM (consented/marked; pre-op labs pending, NPO @ [facility])  --PPI while on steroids  --PT/OT/OOB  --Continue to monitor clinically, notify NSGY immediately with any changes in neuro status    Dispo: Per primary team

## 2023-11-19 NOTE — PROGRESS NOTES
Chief Complaint: Brain mass     History of Present Illness: Esperanza Peters is a 32-year-old female with PMHx of HER2 positive breast cancer s/p bilateral mastectomy (Jan 2022) and neoadjuvant therapy w/ perjeta and herceptin (completed Oct 2022; declined radiation) and chronic HA who [place] [facility] [place] from [place] for NSGY and NCC evaluation of new finding of brain mass of L caudate head/L BG with frontal lobe vasogenic edema on CTH.      Patient presented to OSH c/o syncopal episode earlier today while at work with waxing and waning mentation since the episode. On initial OSH exam, hyperreflexia was noted to RUE and RLE. GCS was 12 and she had dysconjugate gaze. She was treated for seizure, and given 1mg Ativan IV, which was later followed by 1.5g Keppra and 6mg Decadron IV. Mentation reportedly improved after Ativan. She does not have a seizure history prior to this event.     On arrival to [facility], patient is post-intubation for airway protection during transport. She arrived on propofol gtt and exhibiting intermittent BLE tremors and full body tremors. EEG cap placed and Epilepsy reported no seizure activity on prelim read. STAT MRI W/WO is pending and NSGY has been contacted regarding patient's arrival. Labs and CXR and KUB confirming ETT and OGT placement are pending.      Patient's mother presents to bedside and notes that the patient frequently has severe HA, which the patient confirmed to be bitemporal frontal earlier today. Patient is unable to follow commands, though her mother noted a hand squeeze to LUE which included the movement of the second digit. Patient first localized to noxious stimuli on arrival and gradually began to move extremities spontaneously more frequently. Eyelids persistently slit open bilaterally and gaze dysconjugate. Propofol gtt continued at 35 mcg/kg/min for now, titrating as indicated by RASS goals. Holding further steroids until MRI results in event that lymphoma  present.    11/18/2023 - continues on mechanical ventilation. No acute events overnight. Plan for CT Chest/abdomen/pelvis  11/19/2023 - intervally extubated. No acute events overnight. Pending OR with neurosurgery tomorrow.    Vitals:    11/19/23 1130   BP:    Pulse: 85   Resp: 20   Temp:      Physical Exam  Vitals and nursing note reviewed.   Constitutional:       General: She is not in acute distress.     Appearance: She is normal weight.  She is not toxic-appearing.        HENT:      Head: Normocephalic and atraumatic.      Right Ear: External ear normal.      Left Ear: External ear normal.      Nose: Nose normal. No signs of injury.      Right Nostril: No epistaxis.      Left Nostril: No epistaxis.      Mouth/Throat:      Lips: Pink. No lesions.      Mouth: Mucous membranes are moist. No lacerations or angioedema.   Eyes:      General: No scleral icterus.     Conjunctiva/sclera: Conjunctivae normal.      Right eye: Right conjunctiva is not injected.      Left eye: Left conjunctiva is not injected.   Neck:      Vascular: No JVD.      Trachea: No abnormal tracheal secretions.   Cardiovascular:      Rate and Rhythm: Regular rhythm.      Pulses: No decreased pulses.   Pulmonary:      Effort: No tachypnea, bradypnea or accessory muscle usage.      Breath sounds: No stridor.   Abdominal:      General: Abdomen is flat.      Palpations: Abdomen is soft.      Tenderness: There is no guarding.   Musculoskeletal:      Cervical back: Neck supple. No edema or erythema.      Right lower leg: No edema.      Left lower leg: No edema.   Skin:     General: Skin is warm and dry.      Capillary Refill: Capillary refill takes less than 2 seconds.      Coloration: Skin is not ashen or jaundiced.   Neurological:      Mental Status: She is awake.      Comments:   Awake, alert, regards examiner. Follows all commands, 5/5 strength in all extremities.  Psychiatric:      Comments: VALERIE, pt intubated      Unable to test orientation,  language, memory, judgment, insight, fund of knowledge, hearing, shoulder shrug, tongue protrusion, coordination, gait due to level of consciousness.    Assessment and Plan    Neuro  * Brain mass  32-year-old female with PMHx of HER2 positive breast cancer s/p bilateral mastectomy (Jan 2022) and chemo (completed Oct 2022; declined radiation) and chronic HA presents as transfer from [place] w/ brain mass of L caudate head/L BG with frontal lobe vasogenic edema on CTH. Presented to OSH c/o syncopal episode earlier today, intermittent AMS since. Hyperreflexia noted to RUE and RLE, GCS was 12 and had dysconjugate gaze. Treated for seizure. Given 1mg Ativan IV (apparently w/ improvement), 1.5g Keppra and 6mg Decadron IV. No PMHx seizures. Intubated for airway protection. Transported without issue. Arrived on propofol w/ intermittent BLE tremors and full body tremors. EEG cap placed and Epilepsy reported negative prelim read. Holding further steroids until MRI results in event that lymphoma present.     - Admit to NCC for close neurologic monitoring  - Q1H neuro checks  - Q1H VS  - NSGY consulted; f/u recs; pending OR tomorrow  - Keppra 500mg BID   - SBP goal <160         - PRN labetalol, hydralazine         - Home meds:  - Goal euvolemia, eunatremia none  - CXR: pending  - EKG: sinus tachycardia  - TTE: pending     - PT/INR, PTT: pending  - F/u A1c, TSH, lipids  - Daily CBC, CMP, Mg, Phos         - Replete electrolytes PRN  - SCDs placed, SQH pending surgical plan  - PT/OT/SLP consulted  - Nutrition, PMR, and SW/CM consulted  - pending CT chest/abdomen/pelvis     Observed seizure-like activity  - At OSH  - s/p 1mg IV Ativan, 1.5g IV Keppra, 6mg IV dexamethasone  - No PMHx  - See Brain mass      Brain compression  - 2/2 vasogenic edema  - 1cm MLS on CTH  - See Brain mass      Silva coma scale total score 3-8  - Arrived GCS 7 on admission  - See Brain mass      Vasogenic brain edema  - Resume steroids when clear by  neurosurgery  - Eunatremia  - See Brain mass      Acute encephalopathy  - 2/2 post-ictal state vs. iatrogenic (propofol) vs. mass/edema  - Strict eunatremia, defer use of HTS tonight (received dexa at OSH)  - F/u MRI  - STAT CTH for neuro worsening  - See Brain mass      Pulmonary  On mechanically assisted ventilation  - intervally extubated     Renal/  Alteration in electrolyte and fluid balance  - CK elevated on admit >200         - Obtained for observed body tremors  - Tachycardia to 120s-30s  - Na 135, improved to 137 after 500mL IVF  - C/w aggressive hydration  - BUN/Cr normal     Hyponatremia  - Na 135 prior to transfer  - See Alteration in electrolyte and fluid balance       Time spent: 42 mins  Víctor Ndiaye MD

## 2023-11-19 NOTE — SUBJECTIVE & OBJECTIVE
Interval History:11/19: Extubated yesterday, NAEON, AFVSS, Exam improved s/p extubation; plan for OR tomorrow, pre-op labs pending      Medications Prior to Admission   Medication Sig Dispense Refill Last Dose    acetaminophen (TYLENOL) 500 MG tablet Take 500 mg by mouth once as needed for Pain.       clindamycin (CLEOCIN) 300 MG capsule Take 1 capsule three times a day for 7 days 21 capsule 0     clindamycin (CLEOCIN) 300 MG capsule Take 1 capsule by mouth three times a day for 7 days 21 capsule 0     diazePAM (VALIUM) 5 MG tablet Take 1 tablet every 8 hours as needed 20 tablet 0     diazePAM (VALIUM) 5 MG tablet Take 1 tablet by mouth every 8 hours as needed 20 tablet 0     magic mouthwash diphen/antac/lidoc/nysta Take 10 mLs by mouth 4 (four) times daily. (Patient not taking: Reported on 5/18/2022) 120 mL 1     HYDROcodone-acetaminophen (NORCO) 5-325 mg per tablet Take 1 tablet by mouth 3 (three) times daily as needed for pain. (Patient not taking: Reported on 5/18/2022) 8 tablet 0     ondansetron (ZOFRAN-ODT) 8 MG TbDL Let 1 tablet dissolve in mouth every 8 hours as needed for nausea 20 tablet 0     ondansetron (ZOFRAN-ODT) 8 MG TbDL Dissolve 1 tablet in mouth every 8 hours  as needed 20 tablet 0     oxyCODONE-acetaminophen (PERCOCET) 5-325 mg per tablet Take 1 tablet every 6 hours as needed for pain. 28 tablet 0     oxyCODONE-acetaminophen (PERCOCET) 5-325 mg per tablet Take 1 tablet every 6 hours as needed for pain. 28 tablet 0     tamoxifen (NOLVADEX) 20 MG Tab Take 1 tablet (20 mg total) by mouth once daily. 30 tablet 11        Review of patient's allergies indicates:  No Known Allergies    Past Medical History:   Diagnosis Date    ER+ (estrogen receptor positive status) 9/29/2021    HER2-positive carcinoma of breast 9/29/2021    Malignant neoplasm of overlapping sites of left female breast 9/12/2021    Mass of lower inner quadrant of left breast 9/12/2021    Mass of upper outer quadrant of left breast  2021    Mass overlapping multiple quadrants of left breast 2021     Past Surgical History:   Procedure Laterality Date     SECTION  2008    x2 2008 & 2019    INSERTION OF TUNNELED CENTRAL VENOUS CATHETER (CVC) WITH SUBCUTANEOUS PORT N/A 2021    Procedure: INSERTION, PORT-A-CATH;  Surgeon: Latanya Smith MD;  Location: [facility];  Service: General;  Laterality: N/A;     Family History       Problem Relation (Age of Onset)    Breast cancer Maternal Aunt    Hypertension Mother          Tobacco Use    Smoking status: Never    Smokeless tobacco: Never   Substance and Sexual Activity    Alcohol use: No    Drug use: No    Sexual activity: Not on file       Objective:     Weight: 61.2 kg (135 lb)  Body mass index is 26.37 kg/m².  Vital Signs (Most Recent):  Temp: 98.5 °F (36.9 °C) (23 0302)  Pulse: 65 (23 06)  Resp: 16 (23 06)  BP: 98/60 (23 06)  SpO2: 98 % (23 06) Vital Signs (24h Range):  Temp:  [98.5 °F (36.9 °C)-99.3 °F (37.4 °C)] 98.5 °F (36.9 °C)  Pulse:  [] 65  Resp:  [12-35] 16  SpO2:  [95 %-100 %] 98 %  BP: ()/(55-80) 98/60                  Vent Mode: Spont  Oxygen Concentration (%):  [40] 40  Resp Rate Total:  [12 br/min-40 br/min] 40 br/min  Vt Set:  [340 mL] 340 mL  PEEP/CPAP:  [5 cmH20] 5 cmH20  Pressure Support:  [5 cmH20] 5 cmH20  Mean Airway Pressure:  [7.2 rwL63-22 cmH20] 7.2 cmH20        Female External Urinary Catheter 23 (Active)          Physical Exam   Neurosurgery Physical Exam  General: AOx3, GCS E4V5M6  CNII-XII: Intact on detailed exam, PERRL, visual fields grossly intact, EOMi, facial sensation preserved, no facial assymetry, tongue/uvula/palate midline, shoulder shrug equal, mild RUE pronator drift  Extremities: 5/5 motor throughout, sensorium intact throughout, coordination intact throughout      Significant Labs:  Recent Labs   Lab 23  2158 23  0127 23  0035   * 105 111*   NA  "137 136 142   K 3.9 4.2 4.3    104 112*   CO2 21* 19* 20*   BUN 7 7 6   CREATININE 0.7 0.8 0.7   CALCIUM 9.2 8.8 8.4*   MG 1.6 1.5* 2.0       Recent Labs   Lab 11/17/23  2158 11/18/23  0655 11/19/23  0337   WBC 12.48 16.94* 12.77*   HGB 11.9* 13.6 11.1*   HCT 37.7 44.0 35.6*    278 213       No results for input(s): "LABPT", "INR", "APTT" in the last 48 hours.  Microbiology Results (last 7 days)       ** No results found for the last 168 hours. **          All pertinent labs from the last 24 hours have been reviewed.    Significant Diagnostics:  I have reviewed all pertinent imaging results/findings within the past 24 hours.  "

## 2023-11-20 NOTE — PLAN OF CARE
OR moved to Wednesday  Okay for diet today, NPO Wednesday at midnight  Okay for lovenox today, hold tomorrow  Please notify nsgy for any questions / concerns

## 2023-11-20 NOTE — PLAN OF CARE
[facility] Care Plan    POC reviewed with Esperanza Peters and family at 0430. Pt verbalized understanding. Questions and concerns addressed. No acute events overnight. Pt progressing toward goals.     NPO at midnight for OR today.  Mg and phos replaced.    Will continue to monitor. See below and flowsheets for full assessment and VS info.           Is this a stroke patient? no    Neuro:  [place] Coma Scale  Best Eye Response: 4-->(E4) spontaneous  Best Motor Response: 6-->(M6) obeys commands  Best Verbal Response: 5-->(V5) oriented  [place] Coma Scale Score: 15  Assessment Qualifiers: patient not sedated/intubated, no eye obstruction present  Pupil PERRLA: yes     24hr Temp:  [98.5 °F (36.9 °C)-98.8 °F (37.1 °C)]     CV:   Rhythm: normal sinus rhythm  BP goals:   SBP < 160  MAP > 65    Resp:      Vent Mode: Spont  Set Rate: 18 BPM  Oxygen Concentration (%): 40  Vt Set: 340 mL  PEEP/CPAP: 5 cmH20  Pressure Support: 5 cmH20    Plan: N/A    GI/:     Diet/Nutrition Received: regular  Last Bowel Movement: 11/19/23  Voiding Characteristics: external catheter    Intake/Output Summary (Last 24 hours) at 11/20/2023 0507  Last data filed at 11/20/2023 0502  Gross per 24 hour   Intake 1343.9 ml   Output 1350 ml   Net -6.1 ml     Unmeasured Output  Urine Occurrence: 1  Stool Occurrence: 0  Pad Count: 1    Labs/Accuchecks:  Recent Labs   Lab 11/20/23  0005   WBC 13.54*   RBC 4.15   HGB 10.3*   HCT 33.1*         Recent Labs   Lab 11/20/23  0005      K 4.0   CO2 24      BUN 14   CREATININE 0.7   ALKPHOS 37*   ALT 21   AST 30   BILITOT 0.2      Recent Labs   Lab 11/20/23  0005   INR 1.1   APTT 24.3      Recent Labs   Lab 11/18/23  0127   *       Electrolytes: Electrolytes replaced  Accuchecks: none    Gtts:      LDA/Wounds:  Lines/Drains/Airways       Drain  Duration             Female External Urinary Catheter 11/17/23 2112 2 days              Peripheral Intravenous Line  Duration                   Peripheral IV - Single Lumen 11/20/23 0156 20 G Right Wrist <1 day         Peripheral IV - Single Lumen 11/20/23 0157 20 G Right Antecubital <1 day                  Wounds: No  Wound care consulted: No       Problem: Adult Inpatient Plan of Care  Goal: Plan of Care Review  Outcome: Ongoing, Progressing  Goal: Optimal Comfort and Wellbeing  Outcome: Ongoing, Progressing     Problem: Fall Injury Risk  Goal: Absence of Fall and Fall-Related Injury  Outcome: Ongoing, Progressing

## 2023-11-20 NOTE — PROGRESS NOTES
Chief Complaint: Brain mass     History of Present Illness: Esperanza Peters is a 32-year-old female with PMHx of HER2 positive breast cancer s/p bilateral mastectomy (Jan 2022) and neoadjuvant therapy w/ perjeta and herceptin (completed Oct 2022; declined radiation) and chronic HA who [place] [facility] [place] for NSGY and NCC evaluation of new finding of brain mass of L caudate head/L BG with frontal lobe vasogenic edema on CTH.      Patient presented to OSH c/o syncopal episode earlier today while at work with waxing and waning mentation since the episode. On initial OSH exam, hyperreflexia was noted to RUE and RLE. GCS was 12 and she had dysconjugate gaze. She was treated for seizure, and given 1mg Ativan IV, which was later followed by 1.5g Keppra and 6mg Decadron IV. Mentation reportedly improved after Ativan. She does not have a seizure history prior to this event.     [place] [facility] [place] is post-intubation for airway protection during transport. She arrived on propofol gtt and exhibiting intermittent BLE tremors and full body tremors. EEG cap placed and Epilepsy reported no seizure activity on prelim read. STAT MRI W/WO is pending and NSGY has been contacted regarding patient's arrival. Labs and CXR and KUB confirming ETT and OGT placement are pending.      Patient's mother presents to bedside and notes that the patient frequently has severe HA, which the patient confirmed to be bitemporal frontal earlier today. Patient is unable to follow commands, though her mother noted a hand squeeze to LUE which included the movement of the second digit. Patient first localized to noxious stimuli on arrival and gradually began to move extremities spontaneously more frequently. Eyelids persistently slit open bilaterally and gaze dysconjugate. Propofol gtt continued at 35 mcg/kg/min for now, titrating as indicated by RASS goals. Holding further steroids until MRI results in event that lymphoma  present.    11/18/2023 - continues on mechanical ventilation. No acute events overnight. Plan for CT Chest/abdomen/pelvis  11/19/2023 - intervally extubated. No acute events overnight. Pending OR with neurosurgery tomorrow.  11/20/2023 - pending OR. Noted to be rescheduled for 11/22. No acute events overnight.    Vitals:    11/20/23 1010   BP: (!) 111/53   Pulse: 67   Resp: (!) 22   Temp:      Physical Exam  Vitals and nursing note reviewed.   Constitutional:       General: She is not in acute distress.     Appearance: She is normal weight.  She is not toxic-appearing.        HENT:      Head: Normocephalic and atraumatic.      Right Ear: External ear normal.      Left Ear: External ear normal.      Nose: Nose normal. No signs of injury.      Right Nostril: No epistaxis.      Left Nostril: No epistaxis.      Mouth/Throat:      Lips: Pink. No lesions.      Mouth: Mucous membranes are moist. No lacerations or angioedema.   Eyes:      General: No scleral icterus.     Conjunctiva/sclera: Conjunctivae normal.      Right eye: Right conjunctiva is not injected.      Left eye: Left conjunctiva is not injected.   Neck:      Vascular: No JVD.      Trachea: No abnormal tracheal secretions.   Cardiovascular:      Rate and Rhythm: Regular rhythm.      Pulses: No decreased pulses.   Pulmonary:      Effort: No tachypnea, bradypnea or accessory muscle usage.      Breath sounds: No stridor.   Abdominal:      General: Abdomen is flat.      Palpations: Abdomen is soft.      Tenderness: There is no guarding.   Musculoskeletal:      Cervical back: Neck supple. No edema or erythema.      Right lower leg: No edema.      Left lower leg: No edema.   Skin:     General: Skin is warm and dry.      Capillary Refill: Capillary refill takes less than 2 seconds.      Coloration: Skin is not ashen or jaundiced.   Neurological:      Mental Status: She is awake.      Comments:   Awake, alert, regards examiner. Follows all commands, 5/5 strength in all  extremities.  Psychiatric:      Comments: VALERIE, pt intubated      Unable to test orientation, language, memory, judgment, insight, fund of knowledge, hearing, shoulder shrug, tongue protrusion, coordination, gait due to level of consciousness.    Assessment and Plan    Neuro  * Brain mass  32-year-old female with PMHx of HER2 positive breast cancer s/p bilateral mastectomy (Jan 2022) and chemo (completed Oct 2022; declined radiation) and chronic HA presents as transfer from [place] w/ brain mass of L caudate head/L BG with frontal lobe vasogenic edema on CTH. Presented to OSH c/o syncopal episode earlier today, intermittent AMS since. Hyperreflexia noted to RUE and RLE, GCS was 12 and had dysconjugate gaze. Treated for seizure. Given 1mg Ativan IV (apparently w/ improvement), 1.5g Keppra and 6mg Decadron IV. No PMHx seizures. Intubated for airway protection. Transported without issue. Arrived on propofol w/ intermittent BLE tremors and full body tremors. EEG cap placed and Epilepsy reported negative prelim read. Holding further steroids until MRI results in event that lymphoma present.     - Admit to [facility] for close neurologic monitoring  - Q1H neuro checks  - Q1H VS  - NSGY consulted; f/u recs; pending OR tomorrow  - Keppra 500mg BID   - SBP goal <160         - PRN labetalol, hydralazine         - Home meds:  - Goal euvolemia, eunatremia none  - CXR: pending  - EKG: sinus tachycardia  - TTE: pending     - PT/INR, PTT: pending  - F/u A1c, TSH, lipids  - Daily CBC, CMP, Mg, Phos         - Replete electrolytes PRN  - SCDs placed, SQH pending surgical plan  - PT/OT/SLP consulted  - Nutrition, PMR, and SW/CM consulted  - pending CT chest/abdomen/pelvis     Observed seizure-like activity  - At OSH  - s/p 1mg IV Ativan, 1.5g IV Keppra, 6mg IV dexamethasone  - No PMHx  - See Brain mass      Brain compression  - 2/2 vasogenic edema  - 1cm MLS on CTH  - See Brain mass      [place] coma scale total score 3-8  - Arrived GCS 7 on  admission  - See Brain mass      Vasogenic brain edema  - Resume steroids when clear by neurosurgery  - Eunatremia  - See Brain mass      Acute encephalopathy  - 2/2 post-ictal state vs. iatrogenic (propofol) vs. mass/edema  - Strict eunatremia, defer use of HTS tonight (received dexa at OSH)  - F/u MRI  - STAT CTH for neuro worsening  - See Brain mass      Pulmonary  On mechanically assisted ventilation  - intervally extubated     Renal/  Alteration in electrolyte and fluid balance  - CK elevated on admit >200         - Obtained for observed body tremors  - Tachycardia to 120s-30s  - Na 135, improved to 137 after 500mL IVF  - C/w aggressive hydration  - BUN/Cr normal     Hyponatremia  - Na 135 prior to transfer  - See Alteration in electrolyte and fluid balance       Time spent: 42 mins  Víctor Ndiaye MD

## 2023-11-20 NOTE — PROGRESS NOTES
[facility] - Neuro Critical Care  Neurosurgery  Progress Note    Subjective:     History of Present Illness: 32F h/o HER2 positive carcinoma of the breast requiring lumpectomy and chemotherapy in remission since 10/2022, presented to [place] with syncopal episode which was witnessed by coworkers while at work. Patient had been complaining prior of headache which was described as bitemporal frontal for the last 7 days. Patient then noted to have seizure activity in ED and intubated for airway protection. CTH with 3.5 x 3.5 cm  heterogeneous isoattenuating structure centered at the level of the left caudate head/basal ganglia at the level of the left frontal horn measuring with significant vasogenic edema which results in approximately 1cm MLS. Family bedside and deny AC/AP.     Post-Op Info:  Procedure(s) (LRB):  CRANIOTOMY, WITH NEOPLASM EXCISION USING COMPUTER-ASSISTED NAVIGATION (LEFT MIS CRANIOTOMY WITH VYCOR TUBES) (Left)       Interval History:11/20: NAEON, AFVSS, Exam stable, plan for OR today      Medications Prior to Admission   Medication Sig Dispense Refill Last Dose    acetaminophen (TYLENOL) 500 MG tablet Take 500 mg by mouth once as needed for Pain.       clindamycin (CLEOCIN) 300 MG capsule Take 1 capsule three times a day for 7 days 21 capsule 0     clindamycin (CLEOCIN) 300 MG capsule Take 1 capsule by mouth three times a day for 7 days 21 capsule 0     diazePAM (VALIUM) 5 MG tablet Take 1 tablet every 8 hours as needed 20 tablet 0     diazePAM (VALIUM) 5 MG tablet Take 1 tablet by mouth every 8 hours as needed 20 tablet 0     magic mouthwash diphen/antac/lidoc/nysta Take 10 mLs by mouth 4 (four) times daily. (Patient not taking: Reported on 5/18/2022) 120 mL 1     HYDROcodone-acetaminophen (NORCO) 5-325 mg per tablet Take 1 tablet by mouth 3 (three) times daily as needed for pain. (Patient not taking: Reported on 5/18/2022) 8 tablet 0     ondansetron (ZOFRAN-ODT) 8 MG TbDL Let 1 tablet dissolve in  mouth every 8 hours as needed for nausea 20 tablet 0     ondansetron (ZOFRAN-ODT) 8 MG TbDL Dissolve 1 tablet in mouth every 8 hours  as needed 20 tablet 0     oxyCODONE-acetaminophen (PERCOCET) 5-325 mg per tablet Take 1 tablet every 6 hours as needed for pain. 28 tablet 0     oxyCODONE-acetaminophen (PERCOCET) 5-325 mg per tablet Take 1 tablet every 6 hours as needed for pain. 28 tablet 0     tamoxifen (NOLVADEX) 20 MG Tab Take 1 tablet (20 mg total) by mouth once daily. 30 tablet 11        Review of patient's allergies indicates:  No Known Allergies    Past Medical History:   Diagnosis Date    ER+ (estrogen receptor positive status) 2021    HER2-positive carcinoma of breast 2021    Malignant neoplasm of overlapping sites of left female breast 2021    Mass of lower inner quadrant of left breast 2021    Mass of upper outer quadrant of left breast 2021    Mass overlapping multiple quadrants of left breast 2021     Past Surgical History:   Procedure Laterality Date     SECTION  2008    x2 2008 & 2019    INSERTION OF TUNNELED CENTRAL VENOUS CATHETER (CVC) WITH SUBCUTANEOUS PORT N/A 2021    Procedure: INSERTION, PORT-A-CATH;  Surgeon: Latanya Smith MD;  Location: [facility];  Service: General;  Laterality: N/A;     Family History       Problem Relation (Age of Onset)    Breast cancer Maternal Aunt    Hypertension Mother          Tobacco Use    Smoking status: Never    Smokeless tobacco: Never   Substance and Sexual Activity    Alcohol use: No    Drug use: No    Sexual activity: Not on file       Objective:     Weight: 61.2 kg (135 lb)  Body mass index is 26.37 kg/m².  Vital Signs (Most Recent):  Temp: 98.6 °F (37 °C) (23)  Pulse: 76 (23)  Resp: (!) 21 (23)  BP: 116/66 (23)  SpO2: 98 % (23) Vital Signs (24h Range):  Temp:  [98.5 °F (36.9 °C)-98.8 °F (37.1 °C)] 98.6 °F (37 °C)  Pulse:  [] 76  Resp:  [15-36]  21  SpO2:  [96 %-100 %] 98 %  BP: ()/(51-74) 116/66                           Female External Urinary Catheter 11/17/23 2112 (Active)          Physical Exam   Neurosurgery Physical Exam  General: AOx3, GCS [id number]  CNII-XII: Intact on detailed exam, PERRL, visual fields grossly intact, EOMi, facial sensation preserved, no facial assymetry, tongue/uvula/palate midline, shoulder shrug equal, mild RUE pronator drift  Extremities: 5/5 motor throughout, sensorium intact throughout, coordination intact throughout      Significant Labs:  Recent Labs   Lab 11/19/23  0035 11/20/23  0005   * 169*    140   K 4.3 4.0   * 107   CO2 20* 24   BUN 6 14   CREATININE 0.7 0.7   CALCIUM 8.4* 8.7   MG 2.0 1.8       Recent Labs   Lab 11/18/23  0655 11/19/23  0337 11/20/23  0005   WBC 16.94* 12.77* 13.54*   HGB 13.6 11.1* 10.3*   HCT 44.0 35.6* 33.1*    213 198       Recent Labs   Lab 11/19/23  0811 11/20/23  0005   INR 1.1 1.1   APTT 26.5 24.3     Microbiology Results (last 7 days)       ** No results found for the last 168 hours. **          All pertinent labs from the last 24 hours have been reviewed.    Significant Diagnostics:  I have reviewed all pertinent imaging results/findings within the past 24 hours.  Review of Systems  Assessment/Plan:     * Brain mass  32F h/o HER2 positive carcinoma of the breast requiring lumpectomy and chemotherapy in remission since 10/2022, presented to [place] with syncopal episode and subsequent seizure. CTH with 3.5 x 3.5 cm  heterogeneous isoattenuating structure centered at the level of the left caudate head/basal ganglia at the level of the left frontal horn measuring with significant vasogenic edema which results in approximately 1cm MLS.     Imaging:  [facility] 11/17: CTH with 3.5 x 3.5 cm  heterogeneous isoattenuating structure centered at the level of the left caudate head/basal ganglia at the level of the left frontal horn measuring with significant vasogenic edema  which results in approximately 1cm MLS.  MRI brain w/wo 11/18: L BG 4cm enhancing mass, 8mm MLS, 1.2cm frontoparietal enhancing lesion  MRI synaptive 11/18: completed for OR  CT C/A/P 11/18: pending read    --Patient admitted to ICU on telemetry; NCC & medicine teams primary      -q1h neurochecks in ICU  --SBP <160   --Na >135  --AEDs per NCC  --Dex 4q6  --HOB >30  --ADAT per primary team  --Tentative plan for OR today; NPO @ [facility], Omniscient MRI   --PPI while on steroids  --PT/OT/OOB  --Continue to monitor clinically, notify NSGY immediately with any changes in neuro status    Dispo: Per primary team          Jassi Nguyen MD  Neurosurgery  [facility] - Neuro Critical Care

## 2023-11-20 NOTE — PT/OT/SLP PROGRESS
Physical Therapy      Patient Name:  Esperanza Peters   MRN:  [id number]    Patient not seen today secondary to Therapist assessment (pt to undergo surgery today, will require new orders for therapy post-op). Will follow-up as appropriate.  Page Newsome, PT

## 2023-11-20 NOTE — ASSESSMENT & PLAN NOTE
32F h/o HER2 positive carcinoma of the breast requiring lumpectomy and chemotherapy in remission since 10/2022, presented to [place] with syncopal episode and subsequent seizure. CTH with 3.5 x 3.5 cm  heterogeneous isoattenuating structure centered at the level of the left caudate head/basal ganglia at the level of the left frontal horn measuring with significant vasogenic edema which results in approximately 1cm MLS.     Imaging:  [facility] 11/17: CTH with 3.5 x 3.5 cm  heterogeneous isoattenuating structure centered at the level of the left caudate head/basal ganglia at the level of the left frontal horn measuring with significant vasogenic edema which results in approximately 1cm MLS.  MRI brain w/wo 11/18: L BG 4cm enhancing mass, 8mm MLS, 1.2cm frontoparietal enhancing lesion  MRI synaptive 11/18: completed for OR  CT C/A/P 11/18: pending read    --Patient admitted to ICU on telemetry; NCC & medicine teams primary      -q1h neurochecks in ICU  --SBP <160   --Na >135  --AEDs per NCC  --Dex 4q6  --HOB >30  --ADAT per primary team  --Tentative plan for OR today; NPO @ [facility], Omniscient MRI   --PPI while on steroids  --PT/OT/OOB  --Continue to monitor clinically, notify NSGY immediately with any changes in neuro status    Dispo: Per primary team

## 2023-11-20 NOTE — PROVIDER TRANSFER
Neuro Critical Care Transfer of Care note    Date of Admit: 11/17/2023  Date of Transfer / Stepdown: 11/20/2023    Brief History of Present Illness:           Chief Complaint: Brain mass     History of Present Illness: Esperanza Peters is a 32-year-old female with PMHx of HER2 positive breast cancer s/p bilateral mastectomy (Jan 2022) and neoadjuvant therapy w/ perjeta and herceptin (completed Oct 2022; declined radiation) and chronic HA who [place] to [facility] [place] a [place] from [place] for NSGY and NCC evaluation of new finding of brain mass of L caudate head/L BG with frontal lobe vasogenic edema on CTH.      Patient presented to OSH c/o syncopal episode earlier today while at work with waxing and waning mentation since the episode. On initial OSH exam, hyperreflexia was noted to RUE and RLE. GCS was 12 and she had dysconjugate gaze. She was treated for seizure, and given 1mg Ativan IV, which was later followed by 1.5g Keppra and 6mg Decadron IV. Mentation reportedly improved after Ativan. She does not have a seizure history prior to this event.     On arrival to [facility], patient is post-intubation for airway protection during transport. She arrived on propofol gtt and exhibiting intermittent BLE tremors and full body tremors. EEG cap placed and Epilepsy reported no seizure activity on prelim read. STAT MRI W/WO is pending and NSGY has been contacted regarding patient's arrival. Labs and CXR and KUB confirming ETT and OGT placement are pending.      Patient's mother presents to bedside and notes that the patient frequently has severe HA, which the patient confirmed to be bitemporal frontal earlier today. Patient is unable to follow commands, though her mother noted a hand squeeze to LUE which included the movement of the second digit. Patient first localized to noxious stimuli on arrival and gradually began to move extremities spontaneously more frequently. Eyelids persistently slit open bilaterally and gaze  dysconjugate. Propofol gtt continued at 35 mcg/kg/min for now, titrating as indicated by RASS goals. Holding further steroids until MRI results in event that lymphoma present.     11/18/2023 - continues on mechanical ventilation. No acute events overnight. Plan for CT Chest/abdomen/pelvis  11/19/2023 - intervally extubated. No acute events overnight. Pending OR with neurosurgery tomorrow.  11/20/2023 - pending OR. Noted to be rescheduled for 11/22. No acute events overnight.                   Hospital Course By Problem with Pertinent Findings:   Neuro  * Brain mass  32-year-old female with PMHx of HER2 positive breast cancer s/p bilateral mastectomy (Jan 2022) and chemo (completed Oct 2022; declined radiation) and chronic HA presents as transfer from [place] w/ brain mass of L caudate head/L BG with frontal lobe vasogenic edema on CTH. Presented to OSH c/o syncopal episode earlier today, intermittent AMS since. Hyperreflexia noted to RUE and RLE, GCS was 12 and had dysconjugate gaze. Treated for seizure. Given 1mg Ativan IV (apparently w/ improvement), 1.5g Keppra and 6mg Decadron IV. No PMHx seizures. Intubated for airway protection. Transported without issue. Arrived on propofol w/ intermittent BLE tremors and full body tremors. EEG cap placed and Epilepsy reported negative prelim read. Holding further steroids until MRI results in event that lymphoma present.     - Admit to NCC for close neurologic monitoring  - Q1H neuro checks  - Q1H VS  - NSGY consulted; f/u recs; pending OR tomorrow  - Keppra 500mg BID   - SBP goal <160         - PRN labetalol, hydralazine         - Home meds:  - Goal euvolemia, eunatremia none  - CXR: pending  - EKG: sinus tachycardia  - TTE: pending     - PT/INR, PTT: pending  - F/u A1c, TSH, lipids  - Daily CBC, CMP, Mg, Phos         - Replete electrolytes PRN  - SCDs placed, SQH pending surgical plan  - PT/OT/SLP consulted  - Nutrition, PMR, and SW/CM consulted  - pending CT  chest/abdomen/pelvis     Observed seizure-like activity  - At OSH  - s/p 1mg IV Ativan, 1.5g IV Keppra, 6mg IV dexamethasone  - No PMHx  - See Brain mass      Brain compression  - 2/2 vasogenic edema  - 1cm MLS on CTH  - See Brain mass      [place] coma scale total score 3-8  - Arrived GCS 7 on admission  - See Brain mass      Vasogenic brain edema  - Resume steroids when clear by neurosurgery  - Eunatremia  - See Brain mass      Acute encephalopathy  - 2/2 post-ictal state vs. iatrogenic (propofol) vs. mass/edema  - Strict eunatremia, defer use of HTS tonight (received dexa at OSH)  - F/u MRI  - STAT CTH for neuro worsening  - See Brain mass      Pulmonary  On mechanically assisted ventilation  - intervally extubated     Renal/  Alteration in electrolyte and fluid balance  - CK elevated on admit >200         - Obtained for observed body tremors  - Tachycardia to 120s-30s  - Na 135, improved to 137 after 500mL IVF  - C/w aggressive hydration  - BUN/Cr normal     Hyponatremia  - Na 135 prior to transfer  - See Alteration in electrolyte and fluid balance       Consultants and Procedures:     Consultants:  VIVEK    Procedures:    None- pending OR on Wednesday w/NSGY    Transfer Information:     Diet:  regular    Physical Activity:  PT/OT    To Do / Pending Studies / Follow ups:  Pending OR on Wednesday w/ NSGY team     Wendy Leo  Neuro Critical Care

## 2023-11-20 NOTE — SUBJECTIVE & OBJECTIVE
Interval History:11/20: NAEON, AFVSS, Exam stable, plan for OR today      Medications Prior to Admission   Medication Sig Dispense Refill Last Dose    acetaminophen (TYLENOL) 500 MG tablet Take 500 mg by mouth once as needed for Pain.       clindamycin (CLEOCIN) 300 MG capsule Take 1 capsule three times a day for 7 days 21 capsule 0     clindamycin (CLEOCIN) 300 MG capsule Take 1 capsule by mouth three times a day for 7 days 21 capsule 0     diazePAM (VALIUM) 5 MG tablet Take 1 tablet every 8 hours as needed 20 tablet 0     diazePAM (VALIUM) 5 MG tablet Take 1 tablet by mouth every 8 hours as needed 20 tablet 0     magic mouthwash diphen/antac/lidoc/nysta Take 10 mLs by mouth 4 (four) times daily. (Patient not taking: Reported on 5/18/2022) 120 mL 1     HYDROcodone-acetaminophen (NORCO) 5-325 mg per tablet Take 1 tablet by mouth 3 (three) times daily as needed for pain. (Patient not taking: Reported on 5/18/2022) 8 tablet 0     ondansetron (ZOFRAN-ODT) 8 MG TbDL Let 1 tablet dissolve in mouth every 8 hours as needed for nausea 20 tablet 0     ondansetron (ZOFRAN-ODT) 8 MG TbDL Dissolve 1 tablet in mouth every 8 hours  as needed 20 tablet 0     oxyCODONE-acetaminophen (PERCOCET) 5-325 mg per tablet Take 1 tablet every 6 hours as needed for pain. 28 tablet 0     oxyCODONE-acetaminophen (PERCOCET) 5-325 mg per tablet Take 1 tablet every 6 hours as needed for pain. 28 tablet 0     tamoxifen (NOLVADEX) 20 MG Tab Take 1 tablet (20 mg total) by mouth once daily. 30 tablet 11        Review of patient's allergies indicates:  No Known Allergies    Past Medical History:   Diagnosis Date    ER+ (estrogen receptor positive status) 9/29/2021    HER2-positive carcinoma of breast 9/29/2021    Malignant neoplasm of overlapping sites of left female breast 9/12/2021    Mass of lower inner quadrant of left breast 9/12/2021    Mass of upper outer quadrant of left breast 9/12/2021    Mass overlapping multiple quadrants of left breast  2021     Past Surgical History:   Procedure Laterality Date     SECTION  2008    x2 2008 & 2019    INSERTION OF TUNNELED CENTRAL VENOUS CATHETER (CVC) WITH SUBCUTANEOUS PORT N/A 2021    Procedure: INSERTION, PORT-A-CATH;  Surgeon: Latanya Smith MD;  Location: [facility];  Service: General;  Laterality: N/A;     Family History       Problem Relation (Age of Onset)    Breast cancer Maternal Aunt    Hypertension Mother          Tobacco Use    Smoking status: Never    Smokeless tobacco: Never   Substance and Sexual Activity    Alcohol use: No    Drug use: No    Sexual activity: Not on file       Objective:     Weight: 61.2 kg (135 lb)  Body mass index is 26.37 kg/m².  Vital Signs (Most Recent):  Temp: 98.6 °F (37 °C) (23 0302)  Pulse: 76 (23 06)  Resp: (!) 21 (23)  BP: 116/66 (23)  SpO2: 98 % (23) Vital Signs (24h Range):  Temp:  [98.5 °F (36.9 °C)-98.8 °F (37.1 °C)] 98.6 °F (37 °C)  Pulse:  [] 76  Resp:  [15-36] 21  SpO2:  [96 %-100 %] 98 %  BP: ()/(51-74) 116/66                           Female External Urinary Catheter 23 (Active)          Physical Exam   Neurosurgery Physical Exam  General: AOx3, GCS E4V5M6  CNII-XII: Intact on detailed exam, PERRL, visual fields grossly intact, EOMi, facial sensation preserved, no facial assymetry, tongue/uvula/palate midline, shoulder shrug equal, mild RUE pronator drift  Extremities: 5/5 motor throughout, sensorium intact throughout, coordination intact throughout      Significant Labs:  Recent Labs   Lab 23  0035 23  0005   * 169*    140   K 4.3 4.0   * 107   CO2 20* 24   BUN 6 14   CREATININE 0.7 0.7   CALCIUM 8.4* 8.7   MG 2.0 1.8       Recent Labs   Lab 23  0655 23  0337 23  0005   WBC 16.94* 12.77* 13.54*   HGB 13.6 11.1* 10.3*   HCT 44.0 35.6* 33.1*    213 198       Recent Labs   Lab 23  0811 23  0005   INR 1.1  1.1   APTT 26.5 24.3     Microbiology Results (last 7 days)       ** No results found for the last 168 hours. **          All pertinent labs from the last 24 hours have been reviewed.    Significant Diagnostics:  I have reviewed all pertinent imaging results/findings within the past 24 hours.  Review of Systems

## 2023-11-20 NOTE — PLAN OF CARE
[facility] Care Plan    POC reviewed with Toyaia Peters and family at 1400. Pt verbalized understanding. Questions and concerns addressed. No acute events today. Pt progressing toward goals. Will continue to monitor. See below and flowsheets for full assessment and VS info.   Surgery scheduled for Wednesday            Is this a stroke patient? no    Neuro:  [place] Coma Scale  Best Eye Response: 4-->(E4) spontaneous  Best Motor Response: 6-->(M6) obeys commands  Best Verbal Response: 5-->(V5) oriented  [place] Coma Scale Score: 15  Assessment Qualifiers: patient not sedated/intubated  Pupil PERRLA: yes     24 hr Temp:  [98.1 °F (36.7 °C)-98.8 °F (37.1 °C)]     CV:   Rhythm: normal sinus rhythm  BP goals:   SBP < 160  MAP > 65    Resp:      Vent Mode: Spont  Set Rate: 18 BPM  Oxygen Concentration (%): 40  Vt Set: 340 mL  PEEP/CPAP: 5 cmH20  Pressure Support: 5 cmH20    Plan: N/A    GI/:     Diet/Nutrition Received: NPO  Last Bowel Movement: 11/19/23  Voiding Characteristics: external catheter    Intake/Output Summary (Last 24 hours) at 11/20/2023 1725  Last data filed at 11/20/2023 1701  Gross per 24 hour   Intake 1421.82 ml   Output 2600 ml   Net -1178.18 ml     Unmeasured Output  Urine Occurrence: 1  Stool Occurrence: 0  Pad Count: 1    Labs/Accuchecks:  Recent Labs   Lab 11/20/23  0005   WBC 13.54*   RBC 4.15   HGB 10.3*   HCT 33.1*         Recent Labs   Lab 11/20/23  0005      K 4.0   CO2 24      BUN 14   CREATININE 0.7   ALKPHOS 37*   ALT 21   AST 30   BILITOT 0.2      Recent Labs   Lab 11/20/23  0005   INR 1.1   APTT 24.3      Recent Labs   Lab 11/18/23  0127   *       Electrolytes: Electrolytes replaced  Accuchecks: none    Gtts:   sodium chloride 0.9% 50 mL/hr at 11/20/23 1701       LDA/Wounds:  Lines/Drains/Airways       Drain  Duration             Female External Urinary Catheter 11/17/23 2112 2 days              Peripheral Intravenous Line  Duration                  Peripheral IV  - Single Lumen 11/20/23 0156 20 G Right Wrist <1 day         Peripheral IV - Single Lumen 11/20/23 0157 20 G Right Antecubital <1 day                  Wounds: No  Wound care consulted: No

## 2023-11-20 NOTE — PLAN OF CARE
[facility] Care Plan    POC reviewed with Elianencia Peters and family at 1400. Pt verbalized understanding. Questions and concerns addressed. No acute events today. Pt progressing toward goals. Will continue to monitor. See below and flowsheets for full assessment and VS info. To OR in am             Is this a stroke patient? no    Neuro:  [place] Coma Scale  Best Eye Response: 4-->(E4) spontaneous  Best Motor Response: 6-->(M6) obeys commands  Best Verbal Response: 5-->(V5) oriented  [place] Coma Scale Score: 15  Assessment Qualifiers: patient not sedated/intubated  Pupil PERRLA: yes     24 hr Temp:  [98.5 °F (36.9 °C)-98.8 °F (37.1 °C)]     CV:   Rhythm: sinus bradycardia  BP goals:   SBP < 160  MAP > 65    Resp:      Vent Mode: Spont  Set Rate: 18 BPM  Oxygen Concentration (%): 40  Vt Set: 340 mL  PEEP/CPAP: 5 cmH20  Pressure Support: 5 cmH20    Plan: N/A    GI/:     Diet/Nutrition Received: regular  Last Bowel Movement: 11/16/23  Voiding Characteristics: external catheter    Intake/Output Summary (Last 24 hours) at 11/19/2023 1942  Last data filed at 11/19/2023 1700  Gross per 24 hour   Intake 1567.12 ml   Output 1150 ml   Net 417.12 ml     Unmeasured Output  Urine Occurrence: 1  Stool Occurrence: 0  Pad Count: 1    Labs/Accuchecks:  Recent Labs   Lab 11/19/23  0337   WBC 12.77*   RBC 4.50   HGB 11.1*   HCT 35.6*         Recent Labs   Lab 11/19/23  0035      K 4.3   CO2 20*   *   BUN 6   CREATININE 0.7   ALKPHOS 39*   ALT 16   AST 41*   BILITOT 0.4      Recent Labs   Lab 11/19/23  0811   INR 1.1   APTT 26.5      Recent Labs   Lab 11/18/23  0127   *       Electrolytes: N/A - electrolytes WDL  Accuchecks: none    Gtts:      LDA/Wounds:  Lines/Drains/Airways       Drain  Duration             Female External Urinary Catheter 11/17/23 2112 1 day              Peripheral Intravenous Line  Duration                  Peripheral IV - Single Lumen 11/17/23 20 G Anterior;Left;Proximal Forearm 2 days          Peripheral IV - Single Lumen 11/17/23 20 G Left Antecubital 2 days                  Wounds: No  Wound care consulted: No

## 2023-11-20 NOTE — PLAN OF CARE
[facility]  Initial Discharge Assessment       Primary Care Provider: No, Primary Doctor    Admission Diagnosis: Brain mass [G93.89]    Admission Date: 11/17/2023  Expected Discharge Date:     Transition of Care Barriers: None    Payor: MEDICAID / Plan: [employer] / Product Type: Managed Medicaid /     Extended Emergency Contact Information  Primary Emergency Contact: Rita Peters  Address: [address] [place] [address]           [address] [place] [address] [id number] [place] [address] of [facility]  Home Phone: [phone]  Work Phone: [phone]  Mobile Phone: [phone]  Relation: Mother    Discharge Plan A: Home  Discharge Plan B: Home Health      [facility]  [address]  [place] [address]  Phone: [phone] Fax: [phone]    CM at bedside to discuss discharge planning assessment.   Patient lives with her fiance' in a two-story home.    Independent with ADL and does not use medical equipment.  Explained CM services during patient's stay in hospital.   My Health packet discussed and left with patient.    Initial Assessment (most recent)       Adult Discharge Assessment - 11/20/23 1113          Discharge Assessment    Assessment Type Discharge Planning Assessment     Confirmed/corrected address, phone number and insurance Yes     Confirmed Demographics Correct on Facesheet     Source of Information family     Reason For Admission brain mass     People in Home significant other     Do you expect to return to your current living situation? Yes     Do you have help at home or someone to help you manage your care at home? Yes     Who are your caregiver(s) and their phone number(s)? Rita Peters (mother)  [phone]     Prior to hospitilization cognitive status: Alert/Oriented     Current cognitive status: Unable to Assess     Walking or Climbing Stairs --   none    Dressing/Bathing --   none    Home Accessibility stairs within home     Equipment  Currently Used at Home none     Readmission within 30 days? No     Patient currently being followed by outpatient case management? No     Do you currently have service(s) that help you manage your care at home? No     Do you take prescription medications? Yes     Do you have prescription coverage? Yes     Coverage MEDICAID - [employer]     Do you have any problems affording any of your prescribed medications? No     Is the patient taking medications as prescribed? yes     How do you get to doctors appointments? family or friend will provide     Are you on dialysis? No     Do you take coumadin? No     DME Needed Upon Discharge  --   TBD    Discharge Plan discussed with: Parent(s)     Transition of Care Barriers None     Discharge Plan A Home     Discharge Plan B Home Health        Physical Activity    On average, how many days per week do you engage in moderate to strenuous exercise (like a brisk walk)? 0 days     On average, how many minutes do you engage in exercise at this level? 0 min        Financial Resource Strain    How hard is it for you to pay for the very basics like food, housing, medical care, and heating? Not hard at all        Housing Stability    In the last 12 months, was there a time when you were not able to pay the mortgage or rent on time? No     In the last 12 months, how many places have you lived? 1     In the last 12 months, was there a time when you did not have a steady place to sleep or slept in a shelter (including now)? No        Transportation Needs    In the past 12 months, has lack of transportation kept you from medical appointments or from getting medications? No     In the past 12 months, has lack of transportation kept you from meetings, work, or from getting things needed for daily living? No        Food Insecurity    Within the past 12 months, you worried that your food would run out before you got the money to buy more. Never true     Within the past 12 months, the  food you bought just didn't last and you didn't have money to get more. Never true        Stress    Do you feel stress - tense, restless, nervous, or anxious, or unable to sleep at night because your mind is troubled all the time - these days? Only a little        Social Connections    In a typical week, how many times do you talk on the phone with family, friends, or neighbors? More than three times a week     How often do you get together with friends or relatives? More than three times a week     How often do you attend Orthodox or Anabaptist services? Never     Do you belong to any clubs or organizations such as Orthodox groups, unions, fraternal or athletic groups, or school groups? No     How often do you attend meetings of the clubs or organizations you belong to? Never     Are you , , , , never , or living with a partner? Never         Alcohol Use    Q1: How often do you have a drink containing alcohol? Never     Q2: How many drinks containing alcohol do you have on a typical day when you are drinking? Patient does not drink     Q3: How often do you have six or more drinks on one occasion? Never

## 2023-11-21 PROBLEM — G93.41 ACUTE METABOLIC ENCEPHALOPATHY: Status: ACTIVE | Noted: 2023-01-01

## 2023-11-21 NOTE — ASSESSMENT & PLAN NOTE
32F h/o HER2 positive carcinoma of the breast requiring lumpectomy and chemotherapy in remission since 10/2022, presented to [place] with syncopal episode and subsequent seizure. CTH with 3.5 x 3.5 cm  heterogeneous isoattenuating structure centered at the level of the left caudate head/basal ganglia at the level of the left frontal horn with significant vasogenic edema which results in approximately 1cm MLS.     Imaging:  [facility] 11/17: CTH with 3.5 x 3.5 cm  heterogeneous isoattenuating structure centered at the level of the left caudate head/basal ganglia at the level of the left frontal horn measuring with significant vasogenic edema which results in approximately 1cm MLS.  MRI brain w/wo 11/18: L BG 4cm enhancing mass, 8mm MLS, 1.2cm frontoparietal enhancing lesion  MRI synaptive 11/18: completed for OR  CT C/A/P 11/18: pending read    --Patient downgraded to NPU, will need Neuro ICU post op  --SBP <160   --Na >135  --Keppra 500mg BID, will need Neurology follow up for witnessed seizure  --Dex 4q6  --HOB >30  --Scheduled for OR 11/22; NPO @ [place]  --MRI omnicient completed and uploaded  --PPI while on steroids  --PT/OT/OOB  --Continue to monitor clinically, notify NSGY immediately with any changes in neuro status    Dispo: Per primary team

## 2023-11-21 NOTE — NURSING TRANSFER
Nursing Transfer Note      11/20/2023   7:49 PM    Nurse giving handoff: Karen  Nurse receiving handoff: Kalyn    Reason patient is being transferred: Step-down    Transfer To: 931 From: 9068    Transfer via wheelchair    Transfer with cardiac monitoring    Transported by ERNESTO Jones    Transfer Vital Signs:  Blood Pressure: 107/65  Heart Rate: 68  O2: 98%   Temperature: 98.5  Respirations:14    Telemetry: Box Number [id number]  Order for Tele Monitor? Yes    4eyes on Skin:     Medicines sent: N/A    Chart send with patient: Yes    Notified: family at bedside    Patient reassessed at: 11/20 1925    Upon arrival to floor: cardiac monitor applied, patient oriented to room, call bell in reach, and bed in lowest position

## 2023-11-21 NOTE — TELEPHONE ENCOUNTER
Appointment scheduled. Pls include in discharge ppwk.    ----- Message from Mary Rutledge PA-C sent at 11/21/2023 10:56 AM CST -----  Can you schedule patient for 3 months virtual follow up with me or with [facility]?

## 2023-11-21 NOTE — SUBJECTIVE & OBJECTIVE
Interval History: 11/21: NAEO. Patient downgraded to NPU yesterday. MRI Omnicient completed. Patient was tilted in MRI so planning for CTH Stealth for operative navigation. Patient denies headache or any other complaints this AM.     Medications:  Continuous Infusions:  Scheduled Meds:   dexAMETHasone  4 mg Intravenous Q6H    famotidine (PF)  20 mg Intravenous BID    levETIRAcetam (Keppra) IV (PEDS and ADULTS)  500 mg Intravenous Q12H    polyethylene glycol  17 g Oral Daily     PRN Meds:acetaminophen, calcium gluconate IVPB, calcium gluconate IVPB, calcium gluconate IVPB, fentaNYL, magnesium sulfate IVPB, magnesium sulfate IVPB, ondansetron, potassium chloride **AND** potassium chloride **AND** potassium chloride, sodium phosphate 15 mmol in dextrose 5 % (D5W) 250 mL IVPB, sodium phosphate 20.01 mmol in dextrose 5 % (D5W) 250 mL IVPB, sodium phosphate 30 mmol in dextrose 5 % (D5W) 250 mL IVPB     Review of Systems  Objective:     Weight: 61.2 kg (135 lb)  Body mass index is 26.37 kg/m².  Vital Signs (Most Recent):  Temp: 98.1 °F (36.7 °C) (11/21/23 1126)  Pulse: 60 (11/21/23 1156)  Resp: 18 (11/21/23 1126)  BP: (!) 98/56 (11/21/23 1126)  SpO2: 95 % (11/21/23 1126) Vital Signs (24h Range):  Temp:  [98.1 °F (36.7 °C)-98.8 °F (37.1 °C)] 98.1 °F (36.7 °C)  Pulse:  [52-92] 60  Resp:  [14-27] 18  SpO2:  [95 %-100 %] 95 %  BP: ()/(55-78) 98/56     Female External Urinary Catheter 11/17/23 2112 (Active)   Skin no redness;no breakdown 11/21/23 1200   Tolerance no signs/symptoms of discomfort 11/21/23 1200   Suction Continuous suction at 70 mmHg 11/21/23 0800   Date of last wick change 11/21/23 11/21/23 0800   Time of last wick change 0742 11/20/23 0701   Output (mL) 400 mL 11/21/23 0600      Physical Exam   General: well developed, well nourished, no distress.   Head: normocephalic, atraumatic  Mental Status: Awake, Alert, Oriented  Speech: Clear with content appropriate to conversation  Cranial nerves: face symmetric,  CN II-XII grossly intact.   Eyes: pupils equal, round, reactive to light, EOMI.  Sensory: intact to light touch throughout    Motor Strength: Moves all extremities spontaneously with good tone.  Full strength upper and lower extremities. No abnormal movements seen.     Pronator Drift: no drift noted  Finger-to-nose: Intact bilaterally    Significant Labs:  Recent Labs   Lab 11/20/23  0005 11/21/23  0609   * 127*    137   K 4.0 3.9    104   CO2 24 25   BUN 14 13   CREATININE 0.7 0.7   CALCIUM 8.7 8.8   MG 1.8 2.0     Recent Labs   Lab 11/20/23  0005 11/21/23  0609   WBC 13.54* 17.35*   HGB 10.3* 11.8*   HCT 33.1* 36.8*    258     Recent Labs   Lab 11/20/23  0005   INR 1.1   APTT 24.3     Microbiology Results (last 7 days)       ** No results found for the last 168 hours. **          All pertinent labs from the last 24 hours have been reviewed.    Significant Diagnostics:  11/19/23 MRI Brain Synaptive W/WO Contrast  - Enhancing 3.9 x 3.2 x 3.4 cm lesion in the L basal ganglia with surrounding vasogenic edema. Additional enhancing 1.1 x 1.6 x 1.4 cm lesion in the L parietal horn.     I have personally reviewed the above mentioned imaging.

## 2023-11-21 NOTE — PROGRESS NOTES
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 Patient had been complaining prior of headache which was described as bitemporal frontal for the last 7 days. Patient then noted to have seizure activity in ED and intubated for airway protection. CTH with 3.5 x 3.5 cm  heterogeneous isoattenuating structure centered at the level of the left caudate head/basal ganglia at the level of the left frontal horn measuring with significant vasogenic edema which results in approximately 1cm MLS. Family bedside and deny AC/AP.     Post-Op Info:  Procedure(s) (LRB):  CRANIOTOMY, WITH NEOPLASM EXCISION USING COMPUTER-ASSISTED NAVIGATION (LEFT MIS CRANIOTOMY WITH VYCOR TUBES) (Left)       Interval History: 11/21: NAEO. Patient downgraded to NPU yesterday. MRI Omnicient completed. Patient was tilted in MRI so planning for CTH Stealth for operative navigation. Patient denies headache or any other complaints this AM.     Medications:  Continuous Infusions:  Scheduled Meds:   dexAMETHasone  4 mg Intravenous Q6H    famotidine (PF)  20 mg Intravenous BID    levETIRAcetam (Keppra) IV (PEDS and ADULTS)  500 mg Intravenous Q12H    polyethylene glycol  17 g Oral Daily     PRN Meds:acetaminophen, calcium gluconate IVPB, calcium gluconate IVPB, calcium gluconate IVPB, fentaNYL, magnesium sulfate IVPB, magnesium sulfate IVPB, ondansetron, potassium chloride **AND** potassium chloride **AND** potassium chloride, sodium phosphate 15 mmol in dextrose 5 % (D5W) 250 mL IVPB, sodium phosphate 20.01 mmol in dextrose 5 % (D5W) 250 mL IVPB, sodium phosphate 30 mmol in dextrose 5 % (D5W) 250 mL IVPB     Review of Systems  Objective:     Weight: 61.2 kg (135 lb)  Body mass index is 26.37  kg/m².  Vital Signs (Most Recent):  Temp: 98.1 °F (36.7 °C) (11/21/23 1126)  Pulse: 60 (11/21/23 1156)  Resp: 18 (11/21/23 1126)  BP: (!) 98/56 (11/21/23 1126)  SpO2: 95 % (11/21/23 1126) Vital Signs (24h Range):  Temp:  [98.1 °F (36.7 °C)-98.8 °F (37.1 °C)] 98.1 °F (36.7 °C)  Pulse:  [52-92] 60  Resp:  [14-27] 18  SpO2:  [95 %-100 %] 95 %  BP: ()/(55-78) 98/56     Female External Urinary Catheter 11/17/23 2112 (Active)   Skin no redness;no breakdown 11/21/23 1200   Tolerance no signs/symptoms of discomfort 11/21/23 1200   Suction Continuous suction at 70 mmHg 11/21/23 0800   Date of last wick change 11/21/23 11/21/23 0800   Time of last wick change 0742 11/20/23 0701   Output (mL) 400 mL 11/21/23 0600      Physical Exam   General: well developed, well nourished, no distress.   Head: normocephalic, atraumatic  Mental Status: Awake, Alert, Oriented  Speech: Clear with content appropriate to conversation  Cranial nerves: face symmetric, CN II-XII grossly intact.   Eyes: pupils equal, round, reactive to light, EOMI.  Sensory: intact to light touch throughout    Motor Strength: Moves all extremities spontaneously with good tone.  Full strength upper and lower extremities. No abnormal movements seen.     Pronator Drift: no drift noted  Finger-to-nose: Intact bilaterally    Significant Labs:  Recent Labs   Lab 11/20/23  0005 11/21/23  0609   * 127*    137   K 4.0 3.9    104   CO2 24 25   BUN 14 13   CREATININE 0.7 0.7   CALCIUM 8.7 8.8   MG 1.8 2.0     Recent Labs   Lab 11/20/23  0005 11/21/23  0609   WBC 13.54* 17.35*   HGB 10.3* 11.8*   HCT 33.1* 36.8*    258     Recent Labs   Lab 11/20/23  0005   INR 1.1   APTT 24.3     Microbiology Results (last 7 days)       ** No results found for the last 168 hours. **          All pertinent labs from the last 24 hours have been reviewed.    Significant Diagnostics:  11/19/23 MRI Brain Synaptive W/WO Contrast  - Enhancing 3.9 x 3.2 x 3.4 cm lesion in  the L basal ganglia with surrounding vasogenic edema. Additional enhancing 1.1 x 1.6 x 1.4 cm lesion in the L parietal horn.     I have personally reviewed the above mentioned imaging.   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 CTH with 3.5 x 3.5 cm  heterogeneous isoattenuating structure centered at the level of the left caudate head/basal ganglia at the level of the left frontal horn with significant vasogenic edema which results in approximately 1cm MLS.     Imaging:  CTH 11/17: CTH with 3.5 x 3.5 cm  heterogeneous isoattenuating structure centered at the level of the left caudate head/basal ganglia at the level of the left frontal horn measuring with significant vasogenic edema which results in approximately 1cm MLS.  MRI brain w/wo 11/18: L BG 4cm enhancing mass, 8mm MLS, 1.2cm frontoparietal enhancing lesion  MRI synaptive 11/18: completed for OR  CT C/A/P 11/18: pending read    --Patient downgraded to NPU, will need Neuro ICU post op  --SBP <160   --Na >135  --Keppra 500mg BID, will need Neurology follow up for witnessed seizure  --Dex 4q6  --HOB >30  --Scheduled for OR 11/22; NPO @ [facility]  --MRI omnicient completed and uploaded  --PPI while on steroids  --PT/OT/OOB  --Continue to monitor clinically, notify NSGY immediately with any changes in neuro status    Dispo: Per primary team          Suzi Rosa PA-C  Neurosurgery  Trevor Mendoza - Neurosurgery ([facility])

## 2023-11-21 NOTE — PLAN OF CARE
Problem: Fall Injury Risk  Goal: Absence of Fall and Fall-Related Injury  Outcome: Ongoing, Progressing     Problem: Skin Injury Risk Increased  Goal: Skin Health and Integrity  Outcome: Ongoing, Progressing     Discussed POC with patient. Pt verbalizes understanding. Surgery for 11/22. Pt denies pain, maintain bed alarm as patient attempts ambulation independently and occasionally has dizziness. No s/s of distress noted. Safety to be maintained, will continue to monitor.

## 2023-11-21 NOTE — PROGRESS NOTES
[facility] Medicine  Progress note    Team: [facility] N Lilly Machuca MD  Admit Date: 11/17/2023    Principal Problem:  Brain mass    Interval hx:  No new complaint    PEx  Temp:  [98.1 °F (36.7 °C)-98.8 °F (37.1 °C)]   Pulse:  [52-92]   Resp:  [14-26]   BP: ()/(55-78)   SpO2:  [95 %-99 %]     Intake/Output Summary (Last 24 hours) at 11/21/2023 1654  Last data filed at 11/21/2023 0600  Gross per 24 hour   Intake 368.9 ml   Output 1100 ml   Net -731.1 ml       General Appearance: no acute distress  Heart: regular rate and rhythm, no heave  Respiratory: Normal respiratory effort, symmetric excursion, bilateral vesicular breath sounds   Abdomen: Soft, non-tender; bowel sounds active  Skin: intact, no rash, no ulcers  Neurologic:  No focal numbness or weakness  Mental status: Alert, oriented x 4, affect appropriate     Recent Labs   Lab 11/19/23  0337 11/20/23  0005 11/21/23  0609   WBC 12.77* 13.54* 17.35*   HGB 11.1* 10.3* 11.8*   HCT 35.6* 33.1* 36.8*    198 258     Recent Labs   Lab 11/19/23  0035 11/20/23  0005 11/21/23  0609    140 137   K 4.3 4.0 3.9   * 107 104   CO2 20* 24 25   BUN 6 14 13   CREATININE 0.7 0.7 0.7   * 169* 127*   CALCIUM 8.4* 8.7 8.8   MG 2.0 1.8 2.0   PHOS 2.9 2.4* 2.8     Recent Labs   Lab 11/19/23  0035 11/19/23  0811 11/20/23  0005 11/21/23  0609   ALKPHOS 39*  --  37* 41*   ALT 16  --  21 35   AST 41*  --  30 28   ALBUMIN 3.2*  --  3.0* 3.1*   PROT 6.4  --  6.3 6.7   BILITOT 0.4  --  0.2 0.1   INR  --  1.1 1.1  --         Recent Labs   Lab 11/17/23  2104   POCTGLUCOSE 119*       Scheduled Meds:   dexAMETHasone  4 mg Intravenous Q6H    famotidine (PF)  20 mg Intravenous BID    levETIRAcetam (Keppra) IV (PEDS and ADULTS)  500 mg Intravenous Q12H    polyethylene glycol  17 g Oral Daily     Continuous Infusions:  As Needed:  acetaminophen, calcium gluconate IVPB, calcium gluconate IVPB, calcium gluconate IVPB, fentaNYL, magnesium sulfate IVPB, magnesium sulfate  IVPB, ondansetron, potassium chloride **AND** potassium chloride **AND** potassium chloride, sodium phosphate 15 mmol in dextrose 5 % (D5W) 250 mL IVPB, sodium phosphate 20.01 mmol in dextrose 5 % (D5W) 250 mL IVPB, sodium phosphate 30 mmol in dextrose 5 % (D5W) 250 mL IVPB    Assessment and Plan  / Problems managed today    * Brain mass  Brain compression  Vasogenic brain edema  Dexamethasone 4 mg QID  Neurosurgery consulted  Resection tomorrow    Observed seizure-like activity  EEG negative  Levetiracetam 500 mg BID    Acute metabolic encephalopathy  Due to mass  improved    Malignant neoplasm of overlapping sites of left female breast  Tamoxifen 20 mg daily at home    Discharge Planning   SAFIA: 11/23/2023     Code Status: Full Code   Is the patient medically ready for discharge?:     Reason for patient still in hospital (select all that apply): Patient trending condition and Treatment  Discharge Plan A: Home        Diet:  regular diet  GI PPx: not needed  DVT PPx:  scd  Airways: room air  Wounds: none    Goals of Care:  Return to prior functional status     Lilly Machuca MD

## 2023-11-21 NOTE — NURSING
Nurses Note -- 4 Eyes      11/21/2023   7:43 AM      Skin assessed during: Transfer      [x] No Altered Skin Integrity Present    []Prevention Measures Documented      [] Yes- Altered Skin Integrity Present or Discovered   [] LDA Added if Not in Epic (Describe Wound)   [] New Altered Skin Integrity was Present on Admit and Documented in LDA   [] Wound Image Taken    Wound Care Consulted? No    Attending Nurse:  Kalyn OROZCO    Second RN/Staff Member:  Doris OROZCO

## 2023-11-21 NOTE — TREATMENT PLAN
Hospital Medicine ICU Acceptance Note    Date of Admit: 11/17/2023  Date of Transfer / Stepdown: 11/20/2023  Accepting  team: N    Brief History of Present Illness:    Esperanza Peters is a 32-year-old female with PMHx of HER2 positive breast cancer s/p bilateral mastectomy (Jan 2022) and neoadjuvant therapy w/ perjeta and herceptin (completed Oct 2022; declined radiation) and chronic HA who presents to [facility] as a transfer from [place] for NSGY and NCC evaluation of new finding of brain mass of L caudate head/L BG with frontal lobe vasogenic edema on CTH. She presented after a syncopal episode at work    Hospital/ICU Course:   Intubated at OSH for airway protection. Had seizure like event, but EEG negative for seizure. Extubated 11/19. Brain mass resection planned for tomorrow.     Transfer Information:   * Brain mass  Brain compression  Vasogenic brain edema  Dexamethasone 4 mg QID  Neurosurgery consulted  Resection tomorrow    Observed seizure-like activity  EEG negative  Levetiracetam 500 mg BID    Acute metabolic encephalopathy  Due to mass  improved    Malignant neoplasm of overlapping sites of left female breast  Tamoxifen 20 mg daily at home      Patient has been accepted by [facility] Medicine Team N, who will assume care of the patient upon arrival to the floor from the ICU.

## 2023-11-21 NOTE — ASSESSMENT & PLAN NOTE
Brain compression  Vasogenic brain edema  Dexamethasone 4 mg QID  Neurosurgery consulted  Resection tomorrow

## 2023-11-22 NOTE — ANESTHESIA PROCEDURE NOTES
Arterial    Diagnosis: Brain mass    Patient location during procedure: done in OR    Staffing  Authorizing Provider: Tavo Woods MD  Performing Provider: Joesph Malloy MD    Staffing  Performed by: Joesph Malloy MD  Authorized by: Tavo Woods MD    Anesthesiologist was present at the time of the procedure.    Preanesthetic Checklist  Completed: patient identified, IV checked, site marked, risks and benefits discussed, surgical consent, monitors and equipment checked, pre-op evaluation, timeout performed and anesthesia consent givenArterial  Skin Prep: chlorhexidine gluconate and isopropyl alcohol  Local Infiltration: none  Orientation: left  Location: radial    Catheter Size: 20 G  Catheter placement by Anatomical landmarks. Heme positive aspiration all ports. Insertion Attempts: 1  Assessment  Dressing: secured with tape and tegaderm  Patient: Tolerated well

## 2023-11-22 NOTE — NURSING
Nurses Note -- 4 Eyes      11/22/2023   2:48 AM      Skin assessed during: Q Shift Change      [x] No Altered Skin Integrity Present    [x]Prevention Measures Documented      [] Yes- Altered Skin Integrity Present or Discovered   [] LDA Added if Not in Epic (Describe Wound)   [] New Altered Skin Integrity was Present on Admit and Documented in LDA   [] Wound Image Taken    Wound Care Consulted? No    Attending Nurse:  Kalyn Perez RN/Staff Member:  ERNESTO Cruz

## 2023-11-22 NOTE — PLAN OF CARE
Problem: Fall Injury Risk  Goal: Absence of Fall and Fall-Related Injury  Outcome: Ongoing, Progressing     Problem: Skin Injury Risk Increased  Goal: Skin Health and Integrity  Outcome: Ongoing, Progressing     Patient mother at bed side. Pt denies pain. Low grade temp at 99.4. Notified physician on call, tylenol given. Notify if temp increases to 100.4 then infection work up per provider. No s/s of distress.

## 2023-11-22 NOTE — NURSING
END OF SHIFT    Pt AAOx4. Craniotomy scheduled for tomorrow @1030. Pt walked in room with stand-by assist today multiple times - no complaints. No PRNs given. Pt resting in bed with call light in reach. ISAURO Clements

## 2023-11-22 NOTE — PLAN OF CARE
11/22/23 1358   Post-Acute Status   Post-Acute Authorization Home Health   Home Health Status Referrals Sent   Coverage Medicaid - [employer]   Patient choice form signed by patient/caregiver List from System Post-Acute Care   Discharge Delays (!) Change in Medical Condition   Discharge Plan   Discharge Plan A Home Health   Discharge Plan B Home with family     Patient's insurance, Medicaid [employer], is a barrier to home health care. SW sent a blast out to potential agencies, however, all have denied. MD notified that the Pt may not be eligible to receive home health. Pt will be re-evaluated by PT/OT after brain biopsy procedure.  CM will re-evaluate discharge plan.     DAPHNEY Pelaez, BETOSW [facility]  [id number]

## 2023-11-23 NOTE — PLAN OF CARE
PT Eval complete, appropriate goals created    Problem: Physical Therapy  Goal: Physical Therapy Goal  Description: Goals to be completed by: 12/15/23    Pt will perform sup<>sit transfers w/ independence  Pt will have sufficient dynamic balance to sit EOB while performing ADLs/therex w/ independence  Pt will be able to stand up from EOB w/ independence using no AD  Pt will ambulate 350 feet w/ independence using no AD  Pt will be independent w/ HEP therex on BLE w/ good form and ROM   Pt will be independent w/ ascending/descending 1 flight of stairs w/ unilateral railing  Outcome: Ongoing, Progressing

## 2023-11-23 NOTE — SUBJECTIVE & OBJECTIVE
Interval History:  As above     Review of Systems   Constitutional:  Negative for chills and fever.   HENT:  Negative for trouble swallowing.    Eyes:  Negative for visual disturbance.   Respiratory:  Negative for shortness of breath.    Cardiovascular:  Negative for chest pain and palpitations.   Gastrointestinal:  Negative for abdominal pain, nausea and vomiting.   Genitourinary:  Negative for difficulty urinating.   Musculoskeletal:  Negative for back pain, neck pain and neck stiffness.   Neurological:  Positive for facial asymmetry, weakness and headaches. Negative for numbness.   Psychiatric/Behavioral:  Negative for confusion and decreased concentration.        Objective:     Vitals:  Temp: 98.8 °F (37.1 °C)  Pulse: (!) 56  Rhythm: normal sinus rhythm  BP: (!) 95/53  MAP (mmHg): 68  Resp: 14  SpO2: 97 %    Temp  Min: 98.1 °F (36.7 °C)  Max: 98.8 °F (37.1 °C)  Pulse  Min: 49  Max: 104  BP  Min: 92/58  Max: 112/71  MAP (mmHg)  Min: 68  Max: 88  Resp  Min: 11  Max: 20  SpO2  Min: 97 %  Max: 100 %    11/21 0701 - 11/22 0700  In: -   Out: 500 [Urine:500]   Unmeasured Output  Urine Occurrence: 1  Stool Occurrence: 1  Pad Count: 1        Physical Exam  Vitals and nursing note reviewed.   Constitutional:       General: She is not in acute distress.     Appearance: She is normal weight.   HENT:      Head:      Comments: L frontal crani dressing c/d/i     Right Ear: External ear normal.      Left Ear: External ear normal.      Nose: Nose normal.      Mouth/Throat:      Mouth: Mucous membranes are dry.      Pharynx: Oropharynx is clear.   Eyes:      Extraocular Movements: Extraocular movements intact.      Pupils: Pupils are equal, round, and reactive to light.   Cardiovascular:      Rate and Rhythm: Normal rate and regular rhythm.      Heart sounds: Normal heart sounds.   Pulmonary:      Effort: Pulmonary effort is normal.      Breath sounds: Normal breath sounds.   Abdominal:      General: There is no distension.       Palpations: Abdomen is soft.      Tenderness: There is no abdominal tenderness.   Musculoskeletal:      Cervical back: Neck supple.      Right lower leg: No edema.      Left lower leg: No edema.   Skin:     General: Skin is warm and dry.      Capillary Refill: Capillary refill takes less than 2 seconds.   Neurological:      Mental Status: She is alert.      Comments: E4 V4 M6  Alert. Oriented x2 (not time). No dysarthria or aphasia. Slow to respond. Able to identify objects (glove, phone). Following commands.   CN II-XII grossly intact, specifically:   PERRL  EOMI   R facial droop. Facial sensation intact in V1-V3.  Tongue midline   Shoulder shrug symmetric.  + Cough  Motor:  RUE 3/5  RLE 3/5  LUE 5/5  LLE 5/5  Coordination intact, no dysmetria or dysdiadochokinesia  Sensation intact x4   Psychiatric:         Mood and Affect: Mood normal.            Medications:  Continuous ScheduledceFAZolin (ANCEF) IVPB, 2 g, Q8H  dexAMETHasone, 4 mg, Q6H  famotidine (PF), 20 mg, BID  levETIRAcetam (Keppra) IV (PEDS and ADULTS), 500 mg, Q12H  mupirocin, , BID  polyethylene glycol, 17 g, Daily    PRNacetaminophen, 650 mg, Q4H PRN  metoclopramide HCl, 5 mg, Q6H PRN  ondansetron, 4 mg, Q8H PRN  oxyCODONE, 10 mg, Q4H PRN  oxyCODONE, 5 mg, Q4H PRN      Today I personally reviewed pertinent medications, lines/drains/airways, imaging, cardiology results, laboratory results, microbiology results, notably:    [facility] 11/22  Interval postoperative change of left basal ganglia mass resection.  Postoperative pneumocephalus and small volume of blood products within the operative bed.  No large intracranial hematoma.  No midline shift.    Persistent vasogenic edema through the left frontotemporal lobe, similar to prior.    Small volume intraventricular blood products.  No evidence of worsening hydrocephalus.    Diet  Diet Adult Regular (IDDSI Level 7)  Diet Adult Regular (IDDSI Level 7)

## 2023-11-23 NOTE — ASSESSMENT & PLAN NOTE
- At OSH  - s/p 1mg IV Ativan, 1.5g IV Keppra, 6mg IV dexamethasone  - No PMHx  - See Brain mass   - Continue Keppra 500 BID

## 2023-11-23 NOTE — PT/OT/SLP EVAL
Physical Therapy Evaluation and Treatment    Patient Name: Esperanza Peters   MRN: [id number]  Recent Surgery: Procedure(s) (LRB):  CRANIOTOMY, WITH NEOPLASM EXCISION USING COMPUTER-ASSISTED NAVIGATION (Left MIS) (Left) 1 Day Post-Op    Recommendations:     Discharge Recommendations: Low Intensity Therapy    Discharge Equipment Recommendations: none   Barriers to discharge: Increased level of assist    Assessment:     Esperanza Peters is a 32 y.o. female admitted with a medical diagnosis of Brain mass. She presents with the following impairments/functional limitations: weakness, impaired self care skills, impaired functional mobility, gait instability, impaired balance, impaired cognition, decreased coordination, decreased safety awareness. Pt w/ good strength and mobility, w/ mild confusion noted throughout session. She had difficulty locating the cup in front of her to rinse after brushing her teeth, and appeared to go to drink the water she had just spit out into the basin. Pt is currently mobilizing well enough to safely return home w/ family upon d/c but will benefit from continued treatment here and upon d/c to address the above listed impairments in order to progress back to PLOF.     Rehab Prognosis: Good; patient would benefit from acute skilled PT services to address these deficits and reach maximum level of function.  Recent Surgery: Procedure(s) (LRB):  CRANIOTOMY, WITH NEOPLASM EXCISION USING COMPUTER-ASSISTED NAVIGATION (Left MIS) (Left) 1 Day Post-Op    Plan:     During this hospitalization, patient to be seen 4 x/week to address the identified rehab impairments via gait training, therapeutic activities, therapeutic exercises, neuromuscular re-education and progress toward the following goals:    Plan of Care Expires: 12/15/23    Subjective     Chief Complaint: none  Patient/Family Comments/Goals: pt hoping to progress back to going home w/ family  Pain/Comfort:  Pain Rating 1: 0/10  Pain Rating  Post-Intervention 1: 0/10    Patients cultural, spiritual, Protestant conflicts given the current situation: no    Social History:  Living Environment: Patient lives with their family in a 2-story house, number of outside stairs: 0, can live on first level.  Prior Level of Function: Prior to admission, patient was independent with ADLs, driving and working as a wound care RN .  Equipment Used at Home: none    DME owned (not currently used): none  Assistance Upon Discharge: family    Objective:     Communicated with RN prior to session. Patient found HOB elevated with peripheral IV, telemetry, pulse ox (continuous), arterial line, blood pressure cuff, pacheco catheter upon PT entry to room.    General Precautions: Standard, aspiration, fall   Orthopedic Precautions:N/A   Braces: N/A  Respiratory Status: Room air    Exams:  Cognitive Exam: Patient is oriented to Person, Place, Time, Situation, follows commands 100% of the time  RLE ROM: WFL  RLE Strength: WFL  LLE ROM: WFL  LLE Strength: WFL  Sensation: Intact light touch to BLEs    Functional Mobility:  Bed Mobility:  Supine to Sit: stand by assistance  Transfers:  Sit to Stand: stand by assistance with rolling walker  Bed to Chair: stand by assistance with rolling walker using Step Transfer  Gait:   Patient ambulated 20' x2 with rolling walker and stand by assistance. Patient demonstrates decreased step length, narrow base of support, decreased foot clearance, ambulates outside SHARATH of RW, flexed posture, and decreased melody. All lines remained intact throughout ambulation trial.  Balance:   Static Sitting: supervision at EOB  Dynamic Sitting: stand by assistance at EOB  Static Standing: stand by assistance with no AD  Dynamic Standing: contact guard assistance with no AD  Tinetti Total Score: 23  < 18 = High Risk of Falls, 19-23 = Moderate Risk of Falls, > 24 = Low Risk of Falls    Therapeutic Activities and Exercises:  Patient educated on role of acute care PT and  PT POC, safety while in hospital including calling nurse for mobility, and call light usage  Patient educated about importance of OOB mobility  Gait training w/ verbal cueing for proper use of AD, step length, postural control, safety awareness, obstacle avoidance, width of SHARATH, and proximity to AD   Dynamic standing balance at sink while brushing teeth and washing face w/ cueing for visual scanning, task sequencing, and postural control/safety awareness    Patient clear to ambulate to/from bathroom with RN/PCT, assist x1 .    AM-PAC 6 CLICK MOBILITY  Turning over in bed (including adjusting bedclothes, sheets and blankets)?: 4  Sitting down on and standing up from a chair with arms (e.g., wheelchair, bedside commode, etc.): 3  Moving from lying on back to sitting on the side of the bed?: 3  Moving to and from a bed to a chair (including a wheelchair)?: 3  Need to walk in hospital room?: 3  Climbing 3-5 steps with a railing?: 3  Basic Mobility Total Score: 19     Patient left up in chair with all lines intact, call button in reach, RN notified, chair alarm on, and family present.    GOALS:   Multidisciplinary Problems       Physical Therapy Goals          Problem: Physical Therapy    Goal Priority Disciplines Outcome Goal Variances Interventions   Physical Therapy Goal     PT, PT/OT Ongoing, Progressing     Description: Goals to be completed by: 12/15/23    Pt will perform sup<>sit transfers w/ independence  Pt will have sufficient dynamic balance to sit EOB while performing ADLs/therex w/ independence  Pt will be able to stand up from EOB w/ independence using no AD  Pt will ambulate 350 feet w/ independence using no AD  Pt will be independent w/ HEP therex on BLE w/ good form and ROM   Pt will be independent w/ ascending/descending 1 flight of stairs w/ unilateral railing                       History:     Past Medical History:   Diagnosis Date    ER+ (estrogen receptor positive status) 9/29/2021    HER2-positive  carcinoma of breast 2021    Malignant neoplasm of overlapping sites of left female breast 2021    Mass of lower inner quadrant of left breast 2021    Mass of upper outer quadrant of left breast 2021    Mass overlapping multiple quadrants of left breast 2021       Past Surgical History:   Procedure Laterality Date     SECTION  2008    x2 2008 & 2019    INSERTION OF TUNNELED CENTRAL VENOUS CATHETER (CVC) WITH SUBCUTANEOUS PORT N/A 2021    Procedure: INSERTION, PORT-A-CATH;  Surgeon: Latanya Smith MD;  Location: [facility];  Service: General;  Laterality: N/A;       Time Tracking:     PT Received On: 23  PT Start Time: 075  PT Stop Time: 08  PT Total Time (min): 29 min     Billable Minutes: Evaluation 6, Gait Training 8, and Therapeutic Activity 15    2023

## 2023-11-23 NOTE — HOSPITAL COURSE
11/18/2023 - continues on mechanical ventilation. No acute events overnight. Plan for CT Chest/abdomen/pelvis  11/19/2023 - intervally extubated. No acute events overnight. Pending OR with neurosurgery tomorrow.  11/20/2023 - pending OR. Noted to be rescheduled for 11/22. No acute events overnight. stepped down to hospital medicine     11/22/2023 stepped up to NCC s/p L frontal crani for resection of L BG mass w Dr. Miles. New R facial droop, RUE 3/5, RLE 3/5, Ox2. Post op CTH stable. Plan for MRI in AM.  11/23/2023: brain imaging reviewed  11/24/2023  Recurrent seizures and progressively worsening exam overnight. Intubated and CTH consistent with diffuse cerebral edema. Given hts and mannitol and taken class A to OR.   11/25/2023: Examination continued to deteriorate post-op over yesterday evening despite maximal medical and surgical therapy for diffuse malignant cerebral edema. Brainstem reflexes noted to be lost overnight despite hyperventilation, hyperosmolar therapy, craniectomy and EVD placement, also developed diabetes insipidus and hypovolemia being replaced 1:1.  Planning on formal brain death study today, discussed with family at bedside extensively.  11/26/2023: Patient declared braindead 11/25 evening. Somatic support until all family arrives.   11/27/2023: Somatic support discontinued.

## 2023-11-23 NOTE — ASSESSMENT & PLAN NOTE
32-year-old female with PMHx of HER2 positive breast cancer s/p bilateral mastectomy (Jan 2022) and chemo (completed Oct 2022; declined radiation) and chronic HA presents as transfer from [place] w/ brain mass of L caudate head/L BG with frontal lobe vasogenic edema on CTH. Presented to OSH c/o syncopal episode earlier today, intermittent AMS since. Hyperreflexia noted to RUE and RLE, GCS was 12 and had dysconjugate gaze. Treated for seizure. Given 1mg Ativan IV (apparently w/ improvement), 1.5g Keppra and 6mg Decadron IV. No PMHx seizures. Intubated for airway protection. Transported without issue. Arrived on propofol w/ intermittent BLE tremors and full body tremors. EEG cap placed and Epilepsy reported negative prelim read. Holding further steroids until MRI results in event that lymphoma present. MRI W/WO w L basal ganglia mass.    11/22: s/p L frontal crani for resection of L BG mass w Dr. Miles.   Post op exam w new R facial droop, RUE 3/5, RLE 3/5, Ox2      - Admit to NCC post op   - Q1H neuro checks, I/O, VS  - NSGY following  - Post op CTH stable  - MRI in AM  - Keppra 500mg BID  - Dex 4q6h  - Famotidine  - SBP goal <160   - PRN labetalol, hydralazine  - Daily CBC, CMP, Mg, Phos  - mIVF NS @ 50 until PO intake in AM  - SCDs placed, SQH held in acute phase   - PT/OT/SLP consulted  - Nutrition, PMR, and SW/CM consulted

## 2023-11-23 NOTE — PROGRESS NOTES
[facility]  Neurocritical Care  Progress Note    Admit Date: 11/17/2023  Service Date: 11/22/2023  Length of Stay: 5    Subjective:     Chief Complaint: Brain mass    History of Present Illness: Esperanza Peters is a 32-year-old female with PMHx of HER2 positive breast cancer s/p bilateral mastectomy (Jan 2022) and neoadjuvant therapy w/ perjeta and herceptin (completed Oct 2022; declined radiation) and chronic HA who presents to [facility] [place] as a transfer from [place] for NSGY and NCC evaluation of new finding of brain mass of L caudate head/L BG with frontal lobe vasogenic edema on CTH.     Patient presented to OSH c/o syncopal episode earlier today while at work with waxing and waning mentation since the episode. On initial OSH exam, hyperreflexia was noted to RUE and RLE. GCS was 12 and she had dysconjugate gaze. She was treated for seizure, and given 1mg Ativan IV, which was later followed by 1.5g Keppra and 6mg Decadron IV. Mentation reportedly improved after Ativan. She does not have a seizure history prior to this event.    On arrival to [facility] [place], patient is post-intubation for airway protection during transport. She arrived on propofol gtt and exhibiting intermittent BLE tremors and full body tremors. EEG cap placed and Epilepsy reported no seizure activity on prelim read. STAT MRI W/WO is pending and NSGY has been contacted regarding patient's arrival. Labs and CXR and KUB confirming ETT and OGT placement are pending.     Patient's mother presents to bedside and notes that the patient frequently has severe HA, which the patient confirmed to be bitemporal frontal earlier today. Patient is unable to follow commands, though her mother noted a hand squeeze to LUE which included the movement of the second digit. Patient first localized to noxious stimuli on arrival and gradually began to move extremities spontaneously more frequently. Eyelids persistently slit open bilaterally and gaze dysconjugate.  Propofol gtt continued at 35 mcg/kg/min for now, titrating as indicated by RASS goals. Holding further steroids until MRI results in event that lymphoma present.    Hospital Course: 11/18/2023 - continues on mechanical ventilation. No acute events overnight. Plan for CT Chest/abdomen/pelvis  11/19/2023 - intervally extubated. No acute events overnight. Pending OR with neurosurgery tomorrow.  11/20/2023 - pending OR. Noted to be rescheduled for 11/22. No acute events overnight. stepped down to hospital medicine     11/22/2023 stepped up to NCC s/p L frontal crani for resection of L BG mass w Dr. Miles. New R facial droop, RUE 3/5, RLE 3/5, Ox2. Post op CTH stable. Plan for MRI in AM.    Interval History:  As above     Review of Systems   Constitutional:  Negative for chills and fever.   HENT:  Negative for trouble swallowing.    Eyes:  Negative for visual disturbance.   Respiratory:  Negative for shortness of breath.    Cardiovascular:  Negative for chest pain and palpitations.   Gastrointestinal:  Negative for abdominal pain, nausea and vomiting.   Genitourinary:  Negative for difficulty urinating.   Musculoskeletal:  Negative for back pain, neck pain and neck stiffness.   Neurological:  Positive for facial asymmetry, weakness and headaches. Negative for numbness.   Psychiatric/Behavioral:  Negative for confusion and decreased concentration.        Objective:     Vitals:  Temp: 98.8 °F (37.1 °C)  Pulse: (!) 56  Rhythm: normal sinus rhythm  BP: (!) 95/53  MAP (mmHg): 68  Resp: 14  SpO2: 97 %    Temp  Min: 98.1 °F (36.7 °C)  Max: 98.8 °F (37.1 °C)  Pulse  Min: 49  Max: 104  BP  Min: 92/58  Max: 112/71  MAP (mmHg)  Min: 68  Max: 88  Resp  Min: 11  Max: 20  SpO2  Min: 97 %  Max: 100 %    11/21 0701 - 11/22 0700  In: -   Out: 500 [Urine:500]   Unmeasured Output  Urine Occurrence: 1  Stool Occurrence: 1  Pad Count: 1        Physical Exam  Vitals and nursing note reviewed.   Constitutional:       General: She is not in  acute distress.     Appearance: She is normal weight.   HENT:      Head:      Comments: L frontal crani dressing c/d/i     Right Ear: External ear normal.      Left Ear: External ear normal.      Nose: Nose normal.      Mouth/Throat:      Mouth: Mucous membranes are dry.      Pharynx: Oropharynx is clear.   Eyes:      Extraocular Movements: Extraocular movements intact.      Pupils: Pupils are equal, round, and reactive to light.   Cardiovascular:      Rate and Rhythm: Normal rate and regular rhythm.      Heart sounds: Normal heart sounds.   Pulmonary:      Effort: Pulmonary effort is normal.      Breath sounds: Normal breath sounds.   Abdominal:      General: There is no distension.      Palpations: Abdomen is soft.      Tenderness: There is no abdominal tenderness.   Musculoskeletal:      Cervical back: Neck supple.      Right lower leg: No edema.      Left lower leg: No edema.   Skin:     General: Skin is warm and dry.      Capillary Refill: Capillary refill takes less than 2 seconds.   Neurological:      Mental Status: She is alert.      Comments: E4 V4 M6  Alert. Oriented x2 (not time). No dysarthria or aphasia. Slow to respond. Able to identify objects (glove, phone). Following commands.   CN II-XII grossly intact, specifically:   PERRL  EOMI   R facial droop. Facial sensation intact in V1-V3.  Tongue midline   Shoulder shrug symmetric.  + Cough  Motor:  RUE 3/5  RLE 3/5  LUE 5/5  LLE 5/5  Coordination intact, no dysmetria or dysdiadochokinesia  Sensation intact x4   Psychiatric:         Mood and Affect: Mood normal.            Medications:  Continuous ScheduledceFAZolin (ANCEF) IVPB, 2 g, Q8H  dexAMETHasone, 4 mg, Q6H  famotidine (PF), 20 mg, BID  levETIRAcetam (Keppra) IV (PEDS and ADULTS), 500 mg, Q12H  mupirocin, , BID  polyethylene glycol, 17 g, Daily    PRNacetaminophen, 650 mg, Q4H PRN  metoclopramide HCl, 5 mg, Q6H PRN  ondansetron, 4 mg, Q8H PRN  oxyCODONE, 10 mg, Q4H PRN  oxyCODONE, 5 mg, Q4H  PRN      Today I personally reviewed pertinent medications, lines/drains/airways, imaging, cardiology results, laboratory results, microbiology results, notably:    CTH 11/22  Interval postoperative change of left basal ganglia mass resection.  Postoperative pneumocephalus and small volume of blood products within the operative bed.  No large intracranial hematoma.  No midline shift.    Persistent vasogenic edema through the left frontotemporal lobe, similar to prior.    Small volume intraventricular blood products.  No evidence of worsening hydrocephalus.    Diet  Diet Adult Regular (IDDSI Level 7)  Diet Adult Regular (IDDSI Level 7)    Assessment/Plan:     Neuro  * Brain mass  32-year-old female with PMHx of HER2 positive breast cancer s/p bilateral mastectomy (Jan 2022) and chemo (completed Oct 2022; declined radiation) and chronic HA presents as transfer from [place] w/ brain mass of L caudate head/L BG with frontal lobe vasogenic edema on CTH. Presented to OSH c/o syncopal episode earlier today, intermittent AMS since. Hyperreflexia noted to RUE and RLE, GCS was 12 and had dysconjugate gaze. Treated for seizure. Given 1mg Ativan IV (apparently w/ improvement), 1.5g Keppra and 6mg Decadron IV. No PMHx seizures. Intubated for airway protection. Transported without issue. Arrived on propofol w/ intermittent BLE tremors and full body tremors. EEG cap placed and Epilepsy reported negative prelim read. Holding further steroids until MRI results in event that lymphoma present. MRI W/WO w L basal ganglia mass.    11/22: s/p L frontal crani for resection of L BG mass w Dr. Miles.   Post op exam w new R facial droop, RUE 3/5, RLE 3/5, Ox2      - Admit to [facility] post op   - Q1H neuro checks, I/O, VS  - NSGY following  - Post op CTH stable  - MRI in AM  - Keppra 500mg BID  - Dex 4q6h  - Famotidine  - SBP goal <160   - PRN labetalol, hydralazine  - Daily CBC, CMP, Mg, Phos  - mIVF NS @ 50 until PO intake in AM  - SCDs  placed, SQH held in acute phase   - PT/OT/SLP consulted  - Nutrition, PMR, and SW/CM consulted    Observed seizure-like activity  - At OSH  - s/p 1mg IV Ativan, 1.5g IV Keppra, 6mg IV dexamethasone  - No PMHx  - See Brain mass   - Continue Keppra 500 BID    Brain compression  - 2/2 vasogenic edema  - 1cm MLS on CTH  - See Brain mass     Acute metabolic encephalopathy  - Arrived GCS 7 on admission  - See Brain mass     Vasogenic brain edema  - Resume steroids as appropriate  - Eunatremia  - See Brain mass     Pulmonary  On mechanically assisted ventilation  - For airway protection  - See Brain mass     -Extubated, on room air    Renal/  Alteration in electrolyte and fluid balance  - CK elevated on admit >200   - Obtained for observed body tremors  - Tachycardia to 120s-30s  - Na 135, improved to 137 after 500mL IVF  - BUN/Cr normal    Hyponatremia  - Na 135 prior to transfer  - See Alteration in electrolyte and fluid balance     Oncology  Malignant neoplasm of overlapping sites of left female breast  - Bilateral mastectomy (Jan 2022)  - Completed chemo (Oct 2022)  - Declined radiation  - CT C/A/P with contrast completed          The patient is being Prophylaxed for:  Venous Thromboembolism with: Mechanical  Stress Ulcer with: H2B  Ventilator Pneumonia with: not applicable    Activity Orders            Diet Adult Regular (IDDSI Level 7): Regular starting at 11/22 1928    Progressive Mobility Protocol (mobilize patient to their highest level of functioning at least twice daily) starting at 11/18 0800          Full Code    Critical care time spent on the evaluation and treatment of severe organ dysfunction, review of pertinent labs and imaging studies, discussions with consulting providers and discussions with patient/family: 45 minutes.    Noah Saez PA-C  Neurocritical Care  [facility] - Neuro Critical [facility]

## 2023-11-23 NOTE — ASSESSMENT & PLAN NOTE
32-year-old female with PMHx of HER2 positive breast cancer s/p bilateral mastectomy (Jan 2022) and chemo (completed Oct 2022; declined radiation) and chronic HA presents as transfer from [place] w/ brain mass of L caudate head/L BG with frontal lobe vasogenic edema on CTH. Presented to OSH c/o syncopal episode earlier today, intermittent AMS since. Hyperreflexia noted to RUE and RLE, GCS was 12 and had dysconjugate gaze. Treated for seizure. Given 1mg Ativan IV (apparently w/ improvement), 1.5g Keppra and 6mg Decadron IV. No PMHx seizures. Intubated for airway protection. Transported without issue. Arrived on propofol w/ intermittent BLE tremors and full body tremors. EEG cap placed and Epilepsy reported negative prelim read. Holding further steroids until MRI results in event that lymphoma present. MRI W/WO w L basal ganglia mass.    11/22: s/p L frontal crani for resection of L BG mass w Dr. Miles.   Post op exam w new R facial droop, RUE 3/5, RLE 3/5, Ox2      - Admit to NCC post op   - Q1H neuro checks, I/O, VS  - NSGY following  - Post op CTH stable  - MRI in AM  - Keppra 500mg BID  - Dex 4q6h  - Famotidine  - SBP goal <160   - PRN labetalol, hydralazine  - Daily CBC, CMP, Mg, Phos  - mIVF NS @ 50 until PO intake in AM  - SCDs placed, SQH held in acute phase   - PT/OT/SLP consulted  - Nutrition, PMR, and SW/CM consulted  -11/23/2023 MRI brain reviewed

## 2023-11-23 NOTE — SUBJECTIVE & OBJECTIVE
Interval History: 11/23 POD1 s/p L frontal craniotomy for tumor resection. Post op CT and MRI completed with satisfactory resection, exam improving overnight. To be monitored in ICU today, PTOT OOB and continue to monitor in post acuter period. Ok to DC A line and pacheco    Medications:  Continuous Infusions:   sodium chloride 0.9% 50 mL/hr at 11/23/23 0700     Scheduled Meds:   dexAMETHasone  4 mg Intravenous Q6H    famotidine (PF)  20 mg Intravenous BID    levETIRAcetam (Keppra) IV (PEDS and ADULTS)  500 mg Intravenous Q12H    mupirocin   Nasal BID    polyethylene glycol  17 g Oral Daily     PRN Meds:acetaminophen, metoclopramide HCl, ondansetron, oxyCODONE, oxyCODONE     Review of Systems  Objective:     Weight: 61.2 kg (135 lb)  Body mass index is 26.37 kg/m².  Vital Signs (Most Recent):  Temp: 98.2 °F (36.8 °C) (11/23/23 0701)  Pulse: (!) 55 (11/23/23 0701)  Resp: 17 (11/23/23 0701)  BP: 119/65 (11/23/23 0701)  SpO2: 99 % (11/23/23 0701) Vital Signs (24h Range):  Temp:  [98.2 °F (36.8 °C)-98.8 °F (37.1 °C)] 98.2 °F (36.8 °C)  Pulse:  [] 55  Resp:  [11-21] 17  SpO2:  [97 %-100 %] 99 %  BP: ()/(53-71) 119/65  Arterial Line BP: (104-129)/(60-76) 119/69     Female External Urinary Catheter 11/17/23 2112 (Active)   Skin no redness;no breakdown 11/21/23 1200   Tolerance no signs/symptoms of discomfort 11/21/23 1200   Suction Continuous suction at 70 mmHg 11/21/23 0800   Date of last wick change 11/21/23 11/21/23 0800   Time of last wick change 0742 11/20/23 0701   Output (mL) 400 mL 11/21/23 0600      Physical Exam   General: well developed, well nourished, no distress.   Head: normocephalic, atraumatic  Mental Status: Awake, Alert, Oriented  Speech: Clear with content appropriate to conversation  Cranial nerves: face symmetric, CN II-XII grossly intact.   Eyes: pupils equal, round, reactive to light, EOMI.  Sensory: intact to light touch throughout    Motor Strength: Moves all extremities spontaneously  "with good tone.  Full strength left hemibody, 4+/5 right hemibody    Pronator Drift: no drift noted  Finger-to-nose: Intact bilaterally    Significant Labs:  Recent Labs   Lab 11/22/23  0409 11/23/23  0205   * 171*   * 146*   K 4.2 4.1    112*   CO2 27 24   BUN 12 14   CREATININE 0.8 0.8   CALCIUM 9.0 8.3*   MG 2.0 2.4       Recent Labs   Lab 11/22/23  0409 11/22/23  1626 11/23/23  0205   WBC 16.82*  --  17.15*   HGB 12.0  --  10.4*   HCT 38.7 36 32.7*     --  229       No results for input(s): "LABPT", "INR", "APTT" in the last 48 hours.    Microbiology Results (last 7 days)       ** No results found for the last 168 hours. **          All pertinent labs from the last 24 hours have been reviewed.    Significant Diagnostics:  11/19/23 MRI Brain Synaptive W/WO Contrast  - Enhancing 3.9 x 3.2 x 3.4 cm lesion in the L basal ganglia with surrounding vasogenic edema. Additional enhancing 1.1 x 1.6 x 1.4 cm lesion in the L parietal horn.     I have personally reviewed the above mentioned imaging.   Neurosurgery Physical Exam  "

## 2023-11-23 NOTE — PLAN OF CARE
Goals remain appropriate.  Problem: Occupational Therapy  Goal: Occupational Therapy Goal  Description: Goals set 11/23 to be addressed for 14 days with expiration date, 12/6:  Patient will increase functional independence with ADLs by performing:    Patient will demonstrate rolling to the right with modified independence.  Not met   Patient will demonstrate rolling to the left with modified independence.   Not met  Patient will demonstrate supine -sit with modified independence.   Not met  Patient will demonstrate stand pivot transfers with modified independence.   Not met  Patient will demonstrate grooming while standing with modified independence.   Not met  Patient will demonstrate upper body dressing with modified independence while seated EOB.   Not met  Patient will demonstrate lower body dressing with modified independence while seated EOB.   Not met  Patient will demonstrate toileting with modified independence.   Not met  Patient will demonstrate bathing while seated EOB with modified independence.   Not met  Patient's family / caregiver will demonstrate independence and safety with assisting patient with self-care skills and functional mobility.     Not met          Outcome: Ongoing, Progressing

## 2023-11-23 NOTE — TRANSFER OF CARE
Anesthesia Transfer of Care Note    Patient: Esperanza Peters    Procedure(s) Performed: Procedure(s) (LRB):  CRANIOTOMY, WITH NEOPLASM EXCISION USING COMPUTER-ASSISTED NAVIGATION (Left MIS) (Left)    Patient location: ICU    Anesthesia Type: general    Transport from OR: Transported from OR on 6-10 L/min O2 by face mask with adequate spontaneous ventilation. Continuous ECG monitoring in transport. Continuous SpO2 monitoring in transport. Continuos invasive BP monitoring in transport    Post pain: adequate analgesia    Post assessment: no apparent anesthetic complications and tolerated procedure well    Post vital signs: stable    Level of consciousness: awake and alert    Nausea/Vomiting: no nausea/vomiting    Complications: none    Transfer of care protocol was followed      Last vitals: Visit Vitals  /71   Pulse (!) 52   Temp 36.9 °C (98.5 °F) (Oral)   Resp 14   Ht 5' (1.524 m)   Wt 61.2 kg (135 lb)   LMP  (LMP Unknown)   SpO2 99%   Breastfeeding No   BMI 26.37 kg/m²

## 2023-11-23 NOTE — NURSING
Patient arrived to [facility] from OR      Report received from: CRNA,    in bed    Type of stroke/diagnosis:  left frontal tumor resection    Current symptoms: na    Skin Assessment done: Y  Wounds noted: none  *If wounds noted, was Wound Care consulted? na  *If wounds noted, LDA placed? na  Skin Assessment Verified by:  Romario Moore Completed? Not appropriate at this time    Patient Belongings on Admit: LOLIS  NCC notified: name of person notified Love EMPERATRIZ

## 2023-11-23 NOTE — PROGRESS NOTES
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     Patient presented to OSH c/o syncopal episode earlier today while at work with waxing and waning mentation since the episode. On initial OSH exam, hyperreflexia was noted to RUE and RLE. GCS was 12 and she had dysconjugate gaze. She was treated for seizure, and given 1mg Ativan IV, which was later followed by 1.5g Keppra and 6mg Decadron IV. Mentation reportedly improved after Ativan. She does not have a seizure history prior to this event.    On arrival to [facility], patient is post-intubation for airway protection during transport. She arrived on propofol gtt and exhibiting intermittent BLE tremors and full body tremors. EEG cap placed and Epilepsy reported no seizure activity on prelim read. STAT MRI W/WO is pending and NSGY has been contacted regarding patient's arrival. Labs and CXR and KUB confirming ETT and OGT placement are pending.     Patient's mother presents to bedside and notes that the patient frequently has severe HA, which the patient confirmed to be bitemporal frontal earlier today. Patient is unable to follow commands, though her mother noted a hand squeeze to LUE which included the movement of the second digit. Patient first localized to noxious stimuli on arrival and gradually began to move extremities spontaneously more frequently. Eyelids persistently slit open bilaterally and gaze dysconjugate.  Propofol gtt continued at 35 mcg/kg/min for now, titrating as indicated by RASS goals. Holding further steroids until MRI results in event that lymphoma present.    Hospital Course: 11/18/2023 - continues on mechanical ventilation. No acute events overnight. Plan for CT Chest/abdomen/pelvis  11/19/2023 - intervally extubated. No acute events overnight. Pending OR with neurosurgery tomorrow.  11/20/2023 - pending OR. Noted to be rescheduled for 11/22. No acute events overnight. stepped down to hospital medicine     11/22/2023 stepped up to NCC s/p L frontal crani for resection of L BG mass w Dr. Miles. New R facial droop, RUE 3/5, RLE 3/5, Ox2. Post op CTH stable. Plan for MRI in AM.  11/23/2023: brain imaging reviewed        Review of Systems  Constitutional: Denies fevers, weight loss, chills, or weakness.  Eyes: Denies changes in vision.  ENT: Denies dysphagia, nasal discharge, ear pain or discharge.  Cardiovascular: Denies chest pain, palpitations, orthopnea, or claudication.  Respiratory: Denies shortness of breath, cough, hemoptysis, or wheezing.  GI: Denies nausea/vomitting, hematochezia, melena, abd pain, or changes in appetite.  : Denies dysuria, incontinence, or hematuria.  Musculoskeletal: Denies joint pain or myalgias.  Skin/breast: Denies rashes, lumps, lesions, or discharge.  Neurologic: Denies headache, dizziness, vertigo, or paresthesias.  Psychiatric: Denies changes in mood or hallucinations.  Endocrine: Denies polyuria, polydipsia, heat/cold intolerance.  Hematologic/Lymph: Denies lymphadenopathy, easy bruising or easy bleeding.  Allergic/Immunologic: Denies rash, rhinitis.   Objective:     Vitals:  Temp: 98.6 °F (37 °C)  Pulse: 65  Rhythm: sinus bradycardia  BP: 106/67  MAP (mmHg): 82  Resp: (!) 24  SpO2: 97 %    Temp  Min: 98.2 °F (36.8 °C)  Max: 98.8 °F (37.1 °C)  Pulse  Min: 51  Max: 104  BP  Min: 92/58  Max: 119/65  MAP (mmHg)  Min: 68  Max: 94  Resp  Min: 11  Max: 34  SpO2  Min: 97 %  Max:  100 %    11/22 0701 - 11/23 0700  In: 3277.2 [P.O.:400; I.V.:1778.6]  Out: 4950 [Urine:4950]   Unmeasured Output  Urine Occurrence: 1  Stool Occurrence: 1  Pad Count: 1        Physical Exam  GA: Alert, comfortable, no acute distress.   HEENT: No scleral icterus or JVD.   Pulmonary: Clear to auscultation A/L.   Cardiac: RRR S1 & S2 w/o rubs/murmurs/gallops.   Abdominal: Bowel sounds present x 4. No appreciable hepatosplenomegaly.  Skin: No jaundice, rashes, or visible lesions.  Neuro:  --GCS: E4 V5 M6  --Mental Status:  awake, oriented X4, follows commands  --CN II-XII grossly intact.   --Pupils 3mm, PERRL.   --Corneal reflex, gag, cough intact.  --MA spont    Medications:  Continuoussodium chloride 0.9%, Last Rate: 50 mL/hr at 11/23/23 1400    ScheduleddexAMETHasone, 4 mg, Q6H  famotidine, 20 mg, BID  levETIRAcetam, 500 mg, BID  mupirocin, , BID  polyethylene glycol, 17 g, Daily    PRNacetaminophen, 650 mg, Q4H PRN  metoclopramide HCl, 5 mg, Q6H PRN  ondansetron, 4 mg, Q8H PRN  oxyCODONE, 10 mg, Q4H PRN  oxyCODONE, 5 mg, Q4H PRN      Today I personally reviewed pertinent medications, lines/drains/airways, imaging, cardiology results, laboratory results, microbiology results,     Diet  Diet Adult Regular (IDDSI Level 7)  Diet Adult Regular (IDDSI Level 7)        Assessment/Plan:     Neuro  * Brain mass  32-year-old female with PMHx of HER2 positive breast cancer s/p bilateral mastectomy (Jan 2022) and chemo (completed Oct 2022; declined radiation) and chronic HA presents as transfer from [place] w/ brain mass of L caudate head/L BG with frontal lobe vasogenic edema on CTH. Presented to OSH c/o syncopal episode earlier today, intermittent AMS since. Hyperreflexia noted to RUE and RLE, GCS was 12 and had dysconjugate gaze. Treated for seizure. Given 1mg Ativan IV (apparently w/ improvement), 1.5g Keppra and 6mg Decadron IV. No PMHx seizures. Intubated for airway protection. Transported without issue. Arrived on  propofol w/ intermittent BLE tremors and full body tremors. EEG cap placed and Epilepsy reported negative prelim read. Holding further steroids until MRI results in event that lymphoma present. MRI W/WO w L basal ganglia mass.    11/22: s/p L frontal crani for resection of L BG mass w Dr. Miles.   Post op exam w new R facial droop, RUE 3/5, RLE 3/5, Ox2      - Admit to NCC post op   - Q1H neuro checks, I/O, VS  - NSGY following  - Post op CTH stable  - MRI in AM  - Keppra 500mg BID  - Dex 4q6h  - Famotidine  - SBP goal <160   - PRN labetalol, hydralazine  - Daily CBC, CMP, Mg, Phos  - mIVF NS @ 50 until PO intake in AM  - SCDs placed, SQH held in acute phase   - PT/OT/SLP consulted  - Nutrition, PMR, and SW/CM consulted  -11/23/2023 MRI brain reviewed    Acute metabolic encephalopathy  - Arrived GCS 7 on admission  - See Brain mass     Vasogenic brain edema  - Resume steroids as appropriate  - Eunatremia  - See Brain mass           The patient is being Prophylaxed for:  Venous Thromboembolism with: Mechanical  Stress Ulcer with: H2B  Ventilator Pneumonia with: not applicable    Activity Orders            Diet Adult Regular (IDDSI Level 7): Regular starting at 11/22 1928    Progressive Mobility Protocol (mobilize patient to their highest level of functioning at least twice daily) starting at 11/18 0800          Full Code    Wendy Leo NP  Neurocritical Care  [facility] - Neuro Critical Care

## 2023-11-23 NOTE — SUBJECTIVE & OBJECTIVE
Review of Systems  Constitutional: Denies fevers, weight loss, chills, or weakness.  Eyes: Denies changes in vision.  ENT: Denies dysphagia, nasal discharge, ear pain or discharge.  Cardiovascular: Denies chest pain, palpitations, orthopnea, or claudication.  Respiratory: Denies shortness of breath, cough, hemoptysis, or wheezing.  GI: Denies nausea/vomitting, hematochezia, melena, abd pain, or changes in appetite.  : Denies dysuria, incontinence, or hematuria.  Musculoskeletal: Denies joint pain or myalgias.  Skin/breast: Denies rashes, lumps, lesions, or discharge.  Neurologic: Denies headache, dizziness, vertigo, or paresthesias.  Psychiatric: Denies changes in mood or hallucinations.  Endocrine: Denies polyuria, polydipsia, heat/cold intolerance.  Hematologic/Lymph: Denies lymphadenopathy, easy bruising or easy bleeding.  Allergic/Immunologic: Denies rash, rhinitis.   Objective:     Vitals:  Temp: 98.6 °F (37 °C)  Pulse: 65  Rhythm: sinus bradycardia  BP: 106/67  MAP (mmHg): 82  Resp: (!) 24  SpO2: 97 %    Temp  Min: 98.2 °F (36.8 °C)  Max: 98.8 °F (37.1 °C)  Pulse  Min: 51  Max: 104  BP  Min: 92/58  Max: 119/65  MAP (mmHg)  Min: 68  Max: 94  Resp  Min: 11  Max: 34  SpO2  Min: 97 %  Max: 100 %    11/22 0701 - 11/23 0700  In: 3277.2 [P.O.:400; I.V.:1778.6]  Out: 4950 [Urine:4950]   Unmeasured Output  Urine Occurrence: 1  Stool Occurrence: 1  Pad Count: 1        Physical Exam  GA: Alert, comfortable, no acute distress.   HEENT: No scleral icterus or JVD.   Pulmonary: Clear to auscultation A/L.   Cardiac: RRR S1 & S2 w/o rubs/murmurs/gallops.   Abdominal: Bowel sounds present x 4. No appreciable hepatosplenomegaly.  Skin: No jaundice, rashes, or visible lesions.  Neuro:  --GCS: E4 V5 M6  --Mental Status:  awake, oriented X4, follows commands  --CN II-XII grossly intact.   --Pupils 3mm, PERRL.   --Corneal reflex, gag, cough intact.  --MA spont    Medications:  Continuoussodium chloride 0.9%, Last Rate: 50  mL/hr at 11/23/23 1400    ScheduleddexAMETHasone, 4 mg, Q6H  famotidine, 20 mg, BID  levETIRAcetam, 500 mg, BID  mupirocin, , BID  polyethylene glycol, 17 g, Daily    PRNacetaminophen, 650 mg, Q4H PRN  metoclopramide HCl, 5 mg, Q6H PRN  ondansetron, 4 mg, Q8H PRN  oxyCODONE, 10 mg, Q4H PRN  oxyCODONE, 5 mg, Q4H PRN      Today I personally reviewed pertinent medications, lines/drains/airways, imaging, cardiology results, laboratory results, microbiology results,     Diet  Diet Adult Regular (IDDSI Level 7)  Diet Adult Regular (IDDSI Level 7)

## 2023-11-23 NOTE — PLAN OF CARE
[facility] Care Plan    POC reviewed with Esperanza Peters and family at 0300. Pt and fiance' verbalized understanding. Questions and concerns addressed. No acute events overnight. Pt progressing toward goals. Will continue to monitor. See below and flowsheets for full assessment and VS info.           Is this a stroke patient? no    Neuro:  Madison Coma Scale  Best Eye Response: 4-->(E4) spontaneous  Best Motor Response: 6-->(M6) obeys commands  Best Verbal Response: 5-->(V5) oriented  Madison Coma Scale Score: 15  Assessment Qualifiers: no eye obstruction present, patient not sedated/intubated  Pupil PERRLA: yes     24hr Temp:  [98.5 °F (36.9 °C)-98.8 °F (37.1 °C)]     CV:   Rhythm: normal sinus rhythm  BP goals:   SBP < 140  MAP > 65    Resp:      Vent Mode: Spont  Set Rate: 18 BPM  Oxygen Concentration (%): 40  Vt Set: 340 mL  PEEP/CPAP: 5 cmH20  Pressure Support: 5 cmH20    Plan: N/A    GI/:     Diet/Nutrition Received: NPO  Last Bowel Movement: 11/22/23  Voiding Characteristics: urethral catheter (bladder)    Intake/Output Summary (Last 24 hours) at 11/23/2023 0427  Last data filed at 11/23/2023 0426  Gross per 24 hour   Intake 2951.63 ml   Output 5150 ml   Net -2198.37 ml     Unmeasured Output  Urine Occurrence: 1  Stool Occurrence: 1  Pad Count: 1    Labs/Accuchecks:  Recent Labs   Lab 11/23/23  0205   WBC 17.15*   RBC 4.12   HGB 10.4*   HCT 32.7*         Recent Labs   Lab 11/23/23  0205   *   K 4.1   CO2 24   *   BUN 14   CREATININE 0.8   ALKPHOS 32*   ALT 40   AST 19   BILITOT 0.2      Recent Labs   Lab 11/20/23  0005   INR 1.1   APTT 24.3      Recent Labs   Lab 11/18/23  0127   *       Electrolytes: N/A - electrolytes WDL  Accuchecks: none    Gtts:   sodium chloride 0.9% 50 mL/hr at 11/23/23 0426       LDA/Wounds:  Lines/Drains/Airways       Drain  Duration                  Urethral Catheter 11/22/23 1305 Non-latex;Straight-tip;Silicone 16 Fr. <1 day              Arterial Line   Duration             Arterial Line 11/22/23 1342 Left Radial <1 day              Peripheral Intravenous Line  Duration                  Peripheral IV - Single Lumen 11/20/23 0156 20 G Right Wrist 3 days         Peripheral IV - Single Lumen 11/20/23 0157 20 G Right Antecubital 3 days         Peripheral IV - Single Lumen 11/22/23 1315 20 G Left Hand <1 day                  Wounds: No  Wound care consulted: No

## 2023-11-23 NOTE — ASSESSMENT & PLAN NOTE
- CK elevated on admit >200   - Obtained for observed body tremors  - Tachycardia to 120s-30s  - Na 135, improved to 137 after 500mL IVF  - BUN/Cr normal

## 2023-11-23 NOTE — PROGRESS NOTES
[facility] Medicine  Progress note    Team: [facility] HOSP MED N Lilly Machuca MD  Admit Date: 11/17/2023    Principal Problem:  Brain mass    Interval hx:  No new complaint. Waiting for surgery    PEx  Temp:  [98.1 °F (36.7 °C)-99.3 °F (37.4 °C)]   Pulse:  []   Resp:  [11-20]   BP: ()/(58-71)   SpO2:  [97 %-100 %]   Arterial Line BP: (117-129)/(63-67)     Intake/Output Summary (Last 24 hours) at 11/22/2023 2044  Last data filed at 11/22/2023 1817  Gross per 24 hour   Intake 2500 ml   Output 4150 ml   Net -1650 ml         General Appearance: no acute distress  Heart: regular rate and rhythm, no heave  Respiratory: Normal respiratory effort, symmetric excursion, bilateral vesicular breath sounds   Abdomen: Soft, non-tender; bowel sounds active  Skin: intact, no rash, no ulcers  Neurologic:  No focal numbness or weakness  Mental status: Alert, oriented x 4, affect appropriate     Recent Labs   Lab 11/20/23  0005 11/21/23  0609 11/22/23  0409   WBC 13.54* 17.35* 16.82*   HGB 10.3* 11.8* 12.0   HCT 33.1* 36.8* 38.7    258 277       Recent Labs   Lab 11/20/23  0005 11/21/23  0609 11/22/23  0409    137 135*   K 4.0 3.9 4.2    104 103   CO2 24 25 27   BUN 14 13 12   CREATININE 0.7 0.7 0.8   * 127* 131*   CALCIUM 8.7 8.8 9.0   MG 1.8 2.0 2.0   PHOS 2.4* 2.8 3.1       Recent Labs   Lab 11/19/23  0811 11/20/23  0005 11/21/23  0609 11/22/23  0409   ALKPHOS  --  37* 41* 41*   ALT  --  21 35 52*   AST  --  30 28 27   ALBUMIN  --  3.0* 3.1* 3.1*   PROT  --  6.3 6.7 6.9   BILITOT  --  0.2 0.1 0.2   INR 1.1 1.1  --   --           Recent Labs   Lab 11/17/23 2104   POCTGLUCOSE 119*         Scheduled Meds:   ceFAZolin (ANCEF) IVPB  2 g Intravenous Q8H    dexAMETHasone  4 mg Intravenous Q6H    famotidine (PF)  20 mg Intravenous BID    levETIRAcetam (Keppra) IV (PEDS and ADULTS)  500 mg Intravenous Q12H    mupirocin   Nasal BID    polyethylene glycol  17 g Oral Daily     Continuous Infusions:  As Needed:   acetaminophen, metoclopramide HCl, ondansetron, oxyCODONE, oxyCODONE    Assessment and Plan  / Problems managed today    * Brain mass  Brain compression  Vasogenic brain edema  Dexamethasone 4 mg QID  Neurosurgery consulted  Resection tomorrow    Observed seizure-like activity  EEG negative  Levetiracetam 500 mg BID    Acute metabolic encephalopathy  Due to mass  improved    Malignant neoplasm of overlapping sites of left female breast  Tamoxifen 20 mg daily at home    Discharge Planning   SAFIA: 11/23/2023     Code Status: Full Code   Is the patient medically ready for discharge?: No    Reason for patient still in hospital (select all that apply): Patient trending condition and Treatment  Discharge Plan A: Home Health   Discharge Delays: (!) Change in Medical Condition    Diet:  regular diet  GI PPx: not needed  DVT PPx:  scd  Airways: room air  Wounds: none    Goals of Care:  Return to prior functional status     Lilly Machuca MD

## 2023-11-23 NOTE — PROGRESS NOTES
[facility] Neuro Critical Care  Neurosurgery  Progress Note    Subjective:     History of Present Illness: 32F h/o HER2 positive carcinoma of the breast requiring lumpectomy and chemotherapy in remission since 10/2022, presented to [facility] with syncopal episode which was witnessed by coworkers while at work. Patient had been complaining prior of headache which was described as bitemporal frontal for the last 7 days. Patient then noted to have seizure activity in ED and intubated for airway protection. CTH with 3.5 x 3.5 cm  heterogeneous isoattenuating structure centered at the level of the left caudate head/basal ganglia at the level of the left frontal horn measuring with significant vasogenic edema which results in approximately 1cm MLS. Family bedside and deny AC/AP.     Post-Op Info:  Procedure(s) (LRB):  CRANIOTOMY, WITH NEOPLASM EXCISION USING COMPUTER-ASSISTED NAVIGATION (Left MIS) (Left)   1 Day Post-Op   Interval History: 11/23 POD1 s/p L frontal craniotomy for tumor resection. Post op CT and MRI completed with satisfactory resection, exam improving overnight. To be monitored in ICU today, PTOT OOB and continue to monitor in post acuter period. Ok to DC A line and pacheco    Medications:  Continuous Infusions:   sodium chloride 0.9% 50 mL/hr at 11/23/23 0700     Scheduled Meds:   dexAMETHasone  4 mg Intravenous Q6H    famotidine (PF)  20 mg Intravenous BID    levETIRAcetam (Keppra) IV (PEDS and ADULTS)  500 mg Intravenous Q12H    mupirocin   Nasal BID    polyethylene glycol  17 g Oral Daily     PRN Meds:acetaminophen, metoclopramide HCl, ondansetron, oxyCODONE, oxyCODONE     Review of Systems  Objective:     Weight: 61.2 kg (135 lb)  Body mass index is 26.37 kg/m².  Vital Signs (Most Recent):  Temp: 98.2 °F (36.8 °C) (11/23/23 0701)  Pulse: (!) 55 (11/23/23 0701)  Resp: 17 (11/23/23 0701)  BP: 119/65 (11/23/23 0701)  SpO2: 99 % (11/23/23 0701) Vital Signs (24h Range):  Temp:  [98.2 °F (36.8 °C)-98.8 °F (37.1  "°C)] 98.2 °F (36.8 °C)  Pulse:  [] 55  Resp:  [11-21] 17  SpO2:  [97 %-100 %] 99 %  BP: ()/(53-71) 119/65  Arterial Line BP: (104-129)/(60-76) 119/69     Female External Urinary Catheter 11/17/23 2112 (Active)   Skin no redness;no breakdown 11/21/23 1200   Tolerance no signs/symptoms of discomfort 11/21/23 1200   Suction Continuous suction at 70 mmHg 11/21/23 0800   Date of last wick change 11/21/23 11/21/23 0800   Time of last wick change 0742 11/20/23 0701   Output (mL) 400 mL 11/21/23 0600     Physical Exam   General: well developed, well nourished, no distress.   Head: normocephalic, atraumatic  Mental Status: Awake, Alert, Oriented  Speech: Clear with content appropriate to conversation  Cranial nerves: face symmetric, CN II-XII grossly intact.   Eyes: pupils equal, round, reactive to light, EOMI.  Sensory: intact to light touch throughout    Motor Strength: Moves all extremities spontaneously with good tone.  Full strength left hemibody, 4+/5 right hemibody    Pronator Drift: no drift noted  Finger-to-nose: Intact bilaterally    Significant Labs:  Recent Labs   Lab 11/22/23  0409 11/23/23  0205   * 171*   * 146*   K 4.2 4.1    112*   CO2 27 24   BUN 12 14   CREATININE 0.8 0.8   CALCIUM 9.0 8.3*   MG 2.0 2.4       Recent Labs   Lab 11/22/23  0409 11/22/23  1626 11/23/23  0205   WBC 16.82*  --  17.15*   HGB 12.0  --  10.4*   HCT 38.7 36 32.7*     --  229       No results for input(s): "LABPT", "INR", "APTT" in the last 48 hours.    Microbiology Results (last 7 days)       ** No results found for the last 168 hours. **          All pertinent labs from the last 24 hours have been reviewed.    Significant Diagnostics:  11/19/23 MRI Brain Synaptive W/WO Contrast  - Enhancing 3.9 x 3.2 x 3.4 cm lesion in the L basal ganglia with surrounding vasogenic edema. Additional enhancing 1.1 x 1.6 x 1.4 cm lesion in the L parietal horn.     I have personally reviewed the above mentioned " imaging.   Neurosurgery Physical Exam  Assessment/Plan:     * Brain mass  32F h/o HER2 positive carcinoma of the breast requiring lumpectomy and chemotherapy in remission since 10/2022, presented to [facility] with syncopal episode and subsequent seizure. CTH with 3.5 x 3.5 cm  heterogeneous isoattenuating structure centered at the level of the left caudate head/basal ganglia at the level of the left frontal horn with significant vasogenic edema which results in approximately 1cm MLS.     Now s/p left craniotomy for tumor resection on 11/22    Imaging:  CTH 11/17: CTH with 3.5 x 3.5 cm  heterogeneous isoattenuating structure centered at the level of the left caudate head/basal ganglia at the level of the left frontal horn measuring with significant vasogenic edema which results in approximately 1cm MLS.  MRI brain w/wo 11/18: L BG 4cm enhancing mass, 8mm MLS, 1.2cm frontoparietal enhancing lesion  MRI synaptive 11/18: completed for OR  CT C/A/P 11/18: pending read  CTH 11/22 post op good resection  MRI brain w w/o contrast 11/23 post op with gross total resection    Plan  --Admitted to [facility] post op, to stay today for neuromonitoring in post op period  --SBP <160   --Na >135  --Keppra 500mg BID, will need Neurology follow up for witnessed seizure  --Dex 4q6  --HOB >30  -- PTOT OOB today  --PPI while on steroids  -- ADAT to regular diet  -- pain control  --Continue to monitor clinically, notify NSGY immediately with any changes in neuro status    Dispo: ICU today          Kurt Harrison MD  Neurosurgery  [facility] Neuro Critical [facility]

## 2023-11-23 NOTE — PLAN OF CARE
[facility] Care Plan    POC reviewed with Esperanza Peters and family at 1400. Pt verbalized understanding. Questions and concerns addressed. No acute events today. Pt progressing toward goals. Will continue to monitor. See below and flowsheets for full assessment and VS info.   - Pt ambulated to bathroom  - Mosley removed.   - A line removed  - PRN tylenol given x 1        Is this a stroke patient? no    Neuro:  [place] Coma Scale  Best Eye Response: 4-->(E4) spontaneous  Best Motor Response: 6-->(M6) obeys commands  Best Verbal Response: 5-->(V5) oriented  [place] Coma Scale Score: 15  Assessment Qualifiers: no eye obstruction present, patient not sedated/intubated  Pupil PERRLA: yes     24 hr Temp:  [98.2 °F (36.8 °C)-98.8 °F (37.1 °C)]     CV:   Rhythm: sinus bradycardia  BP goals:   SBP < 140  MAP > 65    Resp:      Vent Mode: Spont  Set Rate: 18 BPM  Oxygen Concentration (%): 40  Vt Set: 340 mL  PEEP/CPAP: 5 cmH20  Pressure Support: 5 cmH20    Plan: N/A    GI/:     Diet/Nutrition Received: regular  Last Bowel Movement: 11/22/23  Voiding Characteristics: urethral catheter (bladder)    Intake/Output Summary (Last 24 hours) at 11/23/2023 1405  Last data filed at 11/23/2023 1400  Gross per 24 hour   Intake 4077.28 ml   Output 5450 ml   Net -1372.72 ml     Unmeasured Output  Urine Occurrence: 1  Stool Occurrence: 1  Pad Count: 1    Labs/Accuchecks:  Recent Labs   Lab 11/23/23  0205   WBC 17.15*   RBC 4.12   HGB 10.4*   HCT 32.7*         Recent Labs   Lab 11/23/23  0205   *   K 4.1   CO2 24   *   BUN 14   CREATININE 0.8   ALKPHOS 32*   ALT 40   AST 19   BILITOT 0.2      Recent Labs   Lab 11/20/23  0005   INR 1.1   APTT 24.3      Recent Labs   Lab 11/18/23  0127   *       Electrolytes: N/A - electrolytes WDL  Accuchecks: none    Gtts:   sodium chloride 0.9% 50 mL/hr at 11/23/23 1400       LDA/Wounds:  Lines/Drains/Airways       Peripheral Intravenous Line  Duration                  Peripheral  IV - Single Lumen 11/20/23 0156 20 G Right Wrist 3 days         Peripheral IV - Single Lumen 11/20/23 0157 20 G Right Antecubital 3 days         Peripheral IV - Single Lumen 11/22/23 1315 20 G Left Hand 1 day                  Wounds: No  Wound care consulted: No

## 2023-11-23 NOTE — ASSESSMENT & PLAN NOTE
32F h/o HER2 positive carcinoma of the breast requiring lumpectomy and chemotherapy in remission since 10/2022, presented to [facility] with syncopal episode and subsequent seizure. CTH with 3.5 x 3.5 cm  heterogeneous isoattenuating structure centered at the level of the left caudate head/basal ganglia at the level of the left frontal horn with significant vasogenic edema which results in approximately 1cm MLS.     Now s/p left craniotomy for tumor resection on 11/22    Imaging:  CTH 11/17: CTH with 3.5 x 3.5 cm  heterogeneous isoattenuating structure centered at the level of the left caudate head/basal ganglia at the level of the left frontal horn measuring with significant vasogenic edema which results in approximately 1cm MLS.  MRI brain w/wo 11/18: L BG 4cm enhancing mass, 8mm MLS, 1.2cm frontoparietal enhancing lesion  MRI synaptive 11/18: completed for OR  CT C/A/P 11/18: pending read  CTH 11/22 post op good resection  MRI brain w w/o contrast 11/23 post op with gross total resection    Plan  --Admitted to [facility] post op, to stay today for neuromonitoring in post op period  --SBP <160   --Na >135  --Keppra 500mg BID, will need Neurology follow up for witnessed seizure  --Dex 4q6  --HOB >30  -- PTOT OOB today  --PPI while on steroids  -- ADAT to regular diet  -- pain control  --Continue to monitor clinically, notify NSGY immediately with any changes in neuro status    Dispo: ICU today

## 2023-11-23 NOTE — BRIEF OP NOTE
Trevor Mendoza - Surgery ([facility])  Brief Operative Note    SUMMARY     Surgery Date: 11/22/2023     Surgeon(s) and Role:     * Nishant Boggs MD - Primary     * Kurt Harrison MD - Resident - Assisting        Pre-op Diagnosis:  Brain mass [G93.89]    Post-op Diagnosis:  Post-Op Diagnosis Codes:     * Brain mass [G93.89]    Procedure(s) (LRB):  CRANIOTOMY, WITH NEOPLASM EXCISION USING COMPUTER-ASSISTED NAVIGATION (Left MIS) (Left)    Anesthesia: General    Implants:  Implant Name Type Inv. Item Serial No.  Lot No. LRB No. Used Action   DURA MATRIX ONLAY PLUS 2X2 - [id number]  DURA MATRIX ONLAY PLUS 2X2  [employer] [id number] Left 1 Implanted   PLATE BONE 2X2 HOLE SM BOX - [id number]  PLATE BONE 2X2 HOLE SM BOX  [employer].  Left 2 Implanted   SCREW UN3 AXS SD 1.5X4MM - [id number]  SCREW UN3 AXS SD 1.5X4MM  [employer]  Left 14 Implanted       Operative Findings: left frontal mini craniotomy for tumor resection with tubular retractor, microscope. See full op report for further details    Estimated Blood Loss: * No values recorded between 11/22/2023  2:26 PM and 11/22/2023  6:20 PM *    Estimated Blood Loss has been documented.         Specimens:   Specimen (24h ago, onward)       Start     Ordered    11/22/23 1624  Specimen to Pathology, Surgery Neurosurgery  Once        Comments: Pre-op Diagnosis: Brain mass [G93.89]Procedure(s):CRANIOTOMY, WITH NEOPLASM EXCISION USING COMPUTER-ASSISTED NAVIGATION (Left MIS craniotomy w/ Vycor tubes) Number of specimens: 2Name of specimens: 1. Left frontal brain mass - Frozen (to pathology with ORA @ 1534)  2. Left frontal brain mass--permanent     References:    Click here for ordering Quick Tip   Question Answer Comment   Procedure Type: Neurosurgery    Which provider would you like to cc? NISHATN BOGGS    Release to patient Immediate        11/22/23 1721    11/22/23 1545  Specimen to Pathology, Surgery Neurosurgery  Once,   Status:   Canceled        Comments: Pre-op Diagnosis: Brain mass [G93.89]Procedure(s):CRANIOTOMY, WITH NEOPLASM EXCISION USING COMPUTER-ASSISTED NAVIGATION (Left MIS craniotomy w/ Vycor tubes) Number of specimens: 1Name of specimens: 1. Left frontal brain mass - Frozen (to pathology with ORA @ 1534)     References:    Click here for ordering Quick Tip   Question Answer Comment   Procedure Type: Neurosurgery    Which provider would you like to cc? NISHANT BOGGS.    Release to patient Immediate        11/22/23 1619                    [id number]

## 2023-11-23 NOTE — PT/OT/SLP RE-EVAL
"Occupational Therapy   Re-evaluation    Name: Esperanza Peters  MRN: [id number]  Admitting Diagnosis:  Brain mass  Recent Surgery: Procedure(s) (LRB):  CRANIOTOMY, WITH NEOPLASM EXCISION USING COMPUTER-ASSISTED NAVIGATION (Left MIS) (Left) 1 Day Post-Op    Recommendations:     Discharge Recommendations: Low Intensity Therapy  Discharge Equipment Recommendations: none  Barriers to discharge:  None    Assessment:     Esperanza Peters is a 32 y.o. female with a medical diagnosis of Brain mass.  She presents with performance deficits affecting function are weakness, impaired endurance, impaired self care skills, impaired functional mobility.      Rehab Prognosis:  Good; patient would benefit from acute skilled OT services to address these deficits and reach maximum level of function.       Plan:     Patient to be seen 4 x/week to address the above listed problems via neuromuscular re-education, therapeutic exercises, therapeutic activities, self-care/home management, cognitive retraining  Plan of Care Expires: 12/18/23  Plan of Care Reviewed with: patient    Subjective     Patient:  "Thank you."  Communicated with: Nurse prior to session.  Pain/Comfort:  Pain Rating 1: 0/10  Pain Rating Post-Intervention 1: 0/10    Objective:     Communicated with: Nurse prior to session.  Patient found supine with: bed alarm, peripheral IV, telemetry, pulse ox (continuous), PureWick, pacheco catheter, arterial line, blood pressure cuff upon OT entry to room.    General Precautions: Standard, aspiration, fall  Orthopedic Precautions: N/A  Braces: N/A  Respiratory Status: Room air    Occupational Performance:    Bed Mobility:    Patient completed Rolling/Turning to Left with  supervision  Patient completed Rolling/Turning to Right with supervision  Patient completed Supine to Sit with CGA  Patient completed Sit to Supine with CGA    Functional Mobility/Transfers:  Patient completed Sit <> Stand Transfer with stand by assistance  with  no " assistive device   Patient completed Bed <> Chair Transfer using Stand Pivot technique with contact guard assistance with no assistive device    Activities of Daily Living:  Grooming: contact guard assistance while standing  Upper Body Dressing: contact guard assistance    Lower Body Dressing: contact guard assistance      Cognitive/Visual Perceptual:  Cognitive/Psychosocial Skills:     -       Oriented to: Person and Place   -       Follows Commands/attention:Follows one-step commands    Physical Exam:  Postural examination/scapula alignment:    -       Rounded shoulders  Upper Extremity Range of Motion:     -       Right Upper Extremity: WNL  -       Left Upper Extremity: WNL  Upper Extremity Strength:    -       Right Upper Extremity: WNL  -       Left Upper Extremity: WNL    AMPAC 6 Click:  [facility] Total Score: 18    Treatment & Education:  Patient alert and oriented x person and place; able to follow 4/4 one step commands.  Patient attentive and interactive throughout the session.  Patient able to identify 5/5 body parts.  Able to name 5/5 objects.  Able to sequence 6/7 days of the week and 10/12 months of the year.      Patient left supine with all lines intact, call button in reach, and bed alarm on    GOALS:   Multidisciplinary Problems       Occupational Therapy Goals          Problem: Occupational Therapy    Goal Priority Disciplines Outcome Interventions   Occupational Therapy Goal     OT, PT/OT Ongoing, Progressing    Description: Goals set 11/19 to be addressed for 14 days with expiration date, 12/3:  Patient will increase functional independence with ADLs by performing:    Patient will demonstrate rolling to the right with modified independence.  Not met   Patient will demonstrate rolling to the left with modified independence.   Not met  Patient will demonstrate supine -sit with modified independence.   Not met  Patient will demonstrate stand pivot transfers with modified independence.   Not  met  Patient will demonstrate grooming while standing with modified independence.   Not met  Patient will demonstrate upper body dressing with modified independence while seated EOB.   Not met  Patient will demonstrate lower body dressing with modified independence while seated EOB.   Not met  Patient will demonstrate toileting with modified independence.   Not met  Patient will demonstrate bathing while seated EOB with modified independence.   Not met  Patient's family / caregiver will demonstrate independence and safety with assisting patient with self-care skills and functional mobility.     Not met                               History:     Past Medical History:   Diagnosis Date    ER+ (estrogen receptor positive status) 2021    HER2-positive carcinoma of breast 2021    Malignant neoplasm of overlapping sites of left female breast 2021    Mass of lower inner quadrant of left breast 2021    Mass of upper outer quadrant of left breast 2021    Mass overlapping multiple quadrants of left breast 2021         Past Surgical History:   Procedure Laterality Date     SECTION  2008    x2 2008 & 2019    INSERTION OF TUNNELED CENTRAL VENOUS CATHETER (CVC) WITH SUBCUTANEOUS PORT N/A 2021    Procedure: INSERTION, PORT-A-CATH;  Surgeon: Latanya Smith MD;  Location: [facility];  Service: General;  Laterality: N/A;       Time Tracking:     OT Date of Treatment: 23  OT Start Time: 050  OT Stop Time: 537  OT Total Time (min): 28 min    Billable Minutes:Re-eval 5  Self Care/Home Management 10  Cognitive Retraining 13    2023

## 2023-11-24 PROBLEM — G40.901 STATUS EPILEPTICUS: Status: ACTIVE | Noted: 2023-01-01

## 2023-11-24 NOTE — NURSING
Manometry for Central Line Procedure     Guidewire Removal:  Y    Manometry Performed: Y    Manometry performed by: Chiki HIGGINS

## 2023-11-24 NOTE — SIGNIFICANT EVENT
Called to bedside by RN around 1:30am for suspected seizure witnessed by pt's significant other. Pt was no longer responding to noxious stimuli, had an episode of urinary incontinence, and tachycardia. On arrival to room, episode had self resolved and pt back to her baseline.    Called to bedside again around 2am for witnessed seizure by RN. On arrival, pt was tahcycardic with L shoulder and LLE rhythmic movements and L gaze deviation. She was given 2mg ativan without resolution. Another 2mg ativan given without resolution. Pt appeared to start convulsing with SpO2 high 80s-90%. Placed on face mask. Pt was given an additional 2g IV keppra and an additional 2mg ativan. Pt stopped convulsing. Anesthesia was called and pt was intubated. Started on propofol and fentanyl gtts. Pt went down for STAT CTH.    CTH with increasing edema. Given 3% HTS bolus. Increased dex to 6mg q6h per nsgy.     Noah Saez PA-C  Neurocritical Care

## 2023-11-24 NOTE — ASSESSMENT & PLAN NOTE
- For airway protection due to recurrent clinical seizures without return to baseline   - See Brain mass

## 2023-11-24 NOTE — PLAN OF CARE
[facility] Care Plan    POC reviewed with Esperanza Peters and family at 0300. Pt's family verbalized understanding. Questions and concerns addressed. Will continue to monitor. See below and flowsheets for full assessment and VS info.           Is this a stroke patient? no    Neuro:  [place] Coma Scale  Best Eye Response: 1-->(E1) none  Best Motor Response: 1-->(M1) none  Best Verbal Response: 1-->(V1) none  [place] Coma Scale Score: 3  Assessment Qualifiers: no eye obstruction present, patient not sedated/intubated  Pupil PERRLA: no     24hr Temp:  [98.2 °F (36.8 °C)-99.6 °F (37.6 °C)]     CV:   Rhythm: normal sinus rhythm  BP goals:   SBP < 140  MAP > 65    Resp:      Vent Mode: A/C  Set Rate: 14 BPM  Oxygen Concentration (%): 50  Vt Set: 400 mL  PEEP/CPAP: 5 cmH20  Pressure Support: 5 cmH20    Plan: wean to extubate    GI/:     Diet/Nutrition Received: regular  Last Bowel Movement: 11/22/23  Voiding Characteristics: urethral catheter (bladder)    Intake/Output Summary (Last 24 hours) at 11/24/2023 0545  Last data filed at 11/24/2023 0541  Gross per 24 hour   Intake 2310.42 ml   Output 800 ml   Net 1510.42 ml     Unmeasured Output  Urine Occurrence: 1  Stool Occurrence: 1  Pad Count: 3    Labs/Accuchecks:  Recent Labs   Lab 11/24/23  0354   WBC 18.66*   RBC 4.00   HGB 9.9*   HCT 31.1*         Recent Labs   Lab 11/24/23  0354   *   K 3.9   CO2 23      BUN 12   CREATININE 0.6   ALKPHOS 32*   ALT 61*   AST 26   BILITOT 0.4      Recent Labs   Lab 11/20/23  0005   INR 1.1   APTT 24.3      Recent Labs   Lab 11/18/23  0127   *       Electrolytes: N/A - electrolytes WDL  Accuchecks: none    Gtts:   sodium chloride 0.9% Stopped (11/24/23 0248)    fentanyl      propofoL 20 mcg/kg/min (11/24/23 0541)       LDA/Wounds:  Lines/Drains/Airways       Airway  Duration                Airway Anesthesia 11/24/23 <1 day              Peripheral Intravenous Line  Duration                  Peripheral IV - Single  Lumen 11/24/23 0355 20 G Anterior;Right Forearm <1 day         Peripheral IV - Single Lumen 11/24/23 0355 Anterior;Left Forearm <1 day                  Wounds: No  Wound care consulted: No

## 2023-11-24 NOTE — SUBJECTIVE & OBJECTIVE
Interval History: 11/24/2023: Seizure noted overnight with resolution upon 6mg ativan and 2g Keppra. RSI performed for airway protection. STAT CTH with interval increase brenden-resection vasogenic edema. AFVSS. Exam worsened. On EEG, HTS, sedation.    Medications:  Continuous Infusions:   sodium chloride 0.9% 50 mL/hr at 11/24/23 0706    fentanyl      propofoL 20 mcg/kg/min (11/24/23 0700)     Scheduled Meds:   dexAMETHasone  6 mg Intravenous Q6H    etomidate        famotidine  20 mg Oral BID    levETIRAcetam        levETIRAcetam        levETIRAcetam  1,000 mg Oral BID    mupirocin   Nasal BID    phenylephrine HCl in 0.9% NaCl        polyethylene glycol  17 g Oral Daily    succinylcholine         PRN Meds:acetaminophen, etomidate, levETIRAcetam, levETIRAcetam, metoclopramide HCl, ondansetron, oxyCODONE, oxyCODONE, phenylephrine HCl in 0.9% NaCl, succinylcholine     Review of Systems  Objective:     Weight: 61.2 kg (135 lb)  Body mass index is 26.37 kg/m².  Vital Signs (Most Recent):  Temp: 97 °F (36.1 °C) (11/24/23 0701)  Pulse: (!) 59 (11/24/23 0701)  Resp: 14 (11/24/23 0701)  BP: 112/84 (11/24/23 0701)  SpO2: 100 % (11/24/23 0701) Vital Signs (24h Range):  Temp:  [97 °F (36.1 °C)-99.6 °F (37.6 °C)] 97 °F (36.1 °C)  Pulse:  [] 59  Resp:  [14-34] 14  SpO2:  [96 %-100 %] 100 %  BP: (102-129)/(55-84) 112/84     Date 11/24/23 0700 - 11/25/23 0659   Shift 5162-1905 9726-3387 0338-4482 24 Hour Total   INTAKE   I.V.(mL/kg) 1.7(0)   1.7(0)   Shift Total(mL/kg) 1.7(0)   1.7(0)   OUTPUT   Shift Total(mL/kg)       Weight (kg) 61.2 61.2 61.2 61.2              Vent Mode: A/C  Oxygen Concentration (%):  [50-70] 50  Resp Rate Total:  [14 br/min-15 br/min] 14 br/min  Vt Set:  [400 mL] 400 mL  PEEP/CPAP:  [5 cmH20] 5 cmH20  Mean Airway Pressure:  [7.8 cmH20-7.9 cmH20] 7.9 cmH20        Female External Urinary Catheter 11/24/23 0200 (Active)   Skin no redness;no breakdown 11/24/23 0701   Tolerance no signs/symptoms of discomfort  "11/24/23 0701   Suction Continuous suction at 70 mmHg 11/24/23 0701   Date of last wick change 11/24/23 11/24/23 0701   Time of last wick change 0726 11/24/23 0701   Output (mL) 400 mL 11/24/23 0641          Physical Exam         Neurosurgery Physical Exam    E1VtM1  RP/LP 5mm and sluggish  +cough/gag  Absent corneal  No movement in extremities to noxious stim.    Significant Labs:  Recent Labs   Lab 11/23/23 0205 11/24/23 0354   * 116*   * 135*   K 4.1 3.9   * 104   CO2 24 23   BUN 14 12   CREATININE 0.8 0.6   CALCIUM 8.3* 8.2*   MG 2.4 1.8     Recent Labs   Lab 11/23/23 0205 11/24/23 0224 11/24/23 0354   WBC 17.15*  --  18.66*   HGB 10.4*  --  9.9*   HCT 32.7* 30* 31.1*     --  203     No results for input(s): "LABPT", "INR", "APTT" in the last 48 hours.  Microbiology Results (last 7 days)       ** No results found for the last 168 hours. **          All pertinent labs from the last 24 hours have been reviewed.    Significant Diagnostics:  I have reviewed all pertinent imaging results/findings within the past 24 hours.  CT Head Without Contrast    Result Date: 11/24/2023  Patient is status post left frontal craniotomy for resection of mass centered in the left basal ganglia. Interval increased edema through the left frontal lobe.  This results in increased mass effect with near complete effacement of the left lateral ventricle, effacement of the 3rd ventricle, and 6 mm rightward midline shift (although midline shift is not significantly changed from prior, overall mass effect appears increased).  There is effacement of the basal cisterns at the level of the midbrain. This report was flagged in Epic as abnormal. The critical information above was 1st observed on 11/24/2023 at 04:14 by Teagan Jean MD, who reported the results by telephone to EVARISTO Carter, on 11/24/2023 at 04:17.  Patient name and medical record number were verified, and read-back was performed. Electronically " signed by resident: Teagan Jean Date:    11/24/2023 Time:    03:39 Electronically signed by: Shivam Packer Date:    11/24/2023 Time:    04:27    X-Ray Chest AP Portable    Result Date: 11/24/2023  Low lying endotracheal tube terminating at the fartun.  Recommend retracting 3-4 cm. No confluent airspace consolidation. This report was flagged in Epic as abnormal. Electronically signed by: Jonathan Hutton MD Date:    11/24/2023 Time:    03:55

## 2023-11-24 NOTE — ANESTHESIA PREPROCEDURE EVALUATION
[facility]  Anesthesia Pre-Operative Evaluation         Patient Name: Esperanza Peters  YOB: 1990  MRN: [id number]    SUBJECTIVE:     Pre-operative evaluation for Procedure(s) (LRB):  L hemicrani with EVD placement (Left)     11/24/2023    Esperanza Peters is a 32 y.o. 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 Patient intubated in ED for airway protection after seizure activity. CTH significant for 3.5 x 3.5 cm  heterogeneous isoattenuating structure centered at the level of the left caudate head/basal ganglia at the level of the left frontal horn measuring with significant vasogenic edema s/p resection with neurosurgery. Patient noted with deterioration in status with seizure overnight; STAT CTH with interval increase in brenden-resection vasogenic edema.     Patient now presents for the above procedure(s).      LDA:   Percutaneous Central Line Insertion/Assessment - Triple Lumen  11/24/23 1117 Femoral Vein Right;Femoral Right (Active)   $ Central Line Charges (Upon insertion) Catheter - Triple Lumen (Supply);Bedside Insertion Performed Pt > 5 Years Old 11/24/23 1110   Number of days: 0            Peripheral IV - Single Lumen 11/24/23 0355 20 G Anterior;Right Forearm (Active)   Site Assessment Clean;Dry;Intact 11/24/23 0901   Extremity Assessment Distal to IV No abnormal discoloration 11/24/23 0901   Line Status Infusing 11/24/23 0901   Dressing Status Clean;Dry;Intact 11/24/23 0901   Dressing Intervention First dressing 11/24/23 0901   Dressing Change Due 11/28/23 11/24/23 0901   Site Change Due 11/28/23 11/24/23 0701   Reason Not Rotated Not due 11/24/23 0901   Number of days: 0            Peripheral IV - Single Lumen 11/24/23 0355 Anterior;Left Forearm (Active)   Site Assessment Clean;Dry;Intact  11/24/23 0901   Extremity Assessment Distal to IV No abnormal discoloration 11/24/23 0901   Line Status Infusing 11/24/23 0901   Dressing Status Clean;Dry;Intact 11/24/23 0901   Dressing Intervention First dressing 11/24/23 0901   Dressing Change Due 11/28/23 11/24/23 0901   Site Change Due 11/28/23 11/24/23 0701   Reason Not Rotated Not due 11/24/23 0901   Number of days: 0       Arterial Line 11/24/23 1110 Right Radial (Active)   $ Arterial Line Charges (Upon Insertion) Bedside Insertion Performed 11/24/23 1110   Number of days: 0       Female External Urinary Catheter 11/24/23 0200 (Active)   Skin no redness;no breakdown 11/24/23 0901   Tolerance no signs/symptoms of discomfort 11/24/23 0901   Suction Continuous suction at 70 mmHg 11/24/23 0701   Date of last wick change 11/24/23 11/24/23 0701   Time of last wick change 0726 11/24/23 0701   Output (mL) 400 mL 11/24/23 0641   Number of days: 0       Prev airway:     Intubation     Date/Time: 11/22/2023 1:02 PM     Performed by: Joesph Malloy MD  Authorized by: Tavo Woods MD    Intubation:     Induction:  Intravenous    Intubated:  Postinduction    Mask Ventilation:  Easy mask    Attempts:  1    Attempted By:  Resident anesthesiologist    Method of Intubation:  Direct    Blade:  Cardoso 2    Laryngeal View Grade: Grade IIA - cords partially seen      Difficult Airway Encountered?: No      Complications:  None    Airway Device:  Oral endotracheal tube    Airway Device Size:  7.0    Style/Cuff Inflation:  Cuffed (inflated to minimal occlusive pressure)    Placement Verified By:  Capnometry    Complicating Factors:  None    Findings Post-Intubation:  BS equal bilateral and atraumatic/condition of teeth unchanged          Drips:    sodium chloride 0.9% 50 mL/hr at 11/24/23 0900    propofoL 20 mcg/kg/min (11/24/23 0900)       Patient Active Problem List   Diagnosis    Routine gynecological examination    Malignant neoplasm of overlapping sites of left female  breast    Mass of upper outer quadrant of left breast    Mass of lower inner quadrant of left breast    Mass overlapping multiple quadrants of left breast    HER2-positive carcinoma of breast    ER+ (estrogen receptor positive status)    Chemotherapy-induced neutropenia    Dehydration    Fever in immunocompromised patient    Hypokalemia    Microcytic anemia    Port-A-Cath in place    Brain mass    Vasogenic brain edema    Acute metabolic encephalopathy    On mechanically assisted ventilation    Hyponatremia    Alteration in electrolyte and fluid balance    Brain compression    Observed seizure-like activity       Review of patient's allergies indicates:  No Known Allergies    Current Inpatient Medications:   dexAMETHasone  6 mg Intravenous Q6H    etomidate        famotidine (PF)  20 mg Intravenous BID    fentaNYL  100 mcg Intravenous Once    heparin (porcine)  5,000 Units Subcutaneous Q8H    levETIRAcetam        levETIRAcetam        levETIRAcetam (Keppra) IV (PEDS and ADULTS)  1,000 mg Intravenous Q12H    LIDOcaine-EPINEPHrine 1%-1:100,000  10 mL Intradermal Once    mannitol 20%  75 g Intravenous Once    mupirocin   Nasal BID    phenylephrine HCl in 0.9% NaCl        polyethylene glycol  17 g Oral Daily    succinylcholine           No current facility-administered medications on file prior to encounter.     Current Outpatient Medications on File Prior to Encounter   Medication Sig Dispense Refill    acetaminophen (TYLENOL) 500 MG tablet Take 500 mg by mouth once as needed for Pain.      clindamycin (CLEOCIN) 300 MG capsule Take 1 capsule three times a day for 7 days 21 capsule 0    clindamycin (CLEOCIN) 300 MG capsule Take 1 capsule by mouth three times a day for 7 days 21 capsule 0    diazePAM (VALIUM) 5 MG tablet Take 1 tablet every 8 hours as needed 20 tablet 0    diazePAM (VALIUM) 5 MG tablet Take 1 tablet by mouth every 8 hours as needed 20 tablet 0    magic mouthwash diphen/antac/lidoc/nysta Take 10 mLs by  mouth 4 (four) times daily. (Patient not taking: Reported on 2022) 120 mL 1    HYDROcodone-acetaminophen (NORCO) 5-325 mg per tablet Take 1 tablet by mouth 3 (three) times daily as needed for pain. (Patient not taking: Reported on 2022) 8 tablet 0    ondansetron (ZOFRAN-ODT) 8 MG TbDL Let 1 tablet dissolve in mouth every 8 hours as needed for nausea 20 tablet 0    ondansetron (ZOFRAN-ODT) 8 MG TbDL Dissolve 1 tablet in mouth every 8 hours  as needed 20 tablet 0    oxyCODONE-acetaminophen (PERCOCET) 5-325 mg per tablet Take 1 tablet every 6 hours as needed for pain. 28 tablet 0    oxyCODONE-acetaminophen (PERCOCET) 5-325 mg per tablet Take 1 tablet every 6 hours as needed for pain. 28 tablet 0    tamoxifen (NOLVADEX) 20 MG Tab Take 1 tablet (20 mg total) by mouth once daily. 30 tablet 11       Past Surgical History:   Procedure Laterality Date     SECTION  2008    x2 2008 & 2019    INSERTION OF TUNNELED CENTRAL VENOUS CATHETER (CVC) WITH SUBCUTANEOUS PORT N/A 2021    Procedure: INSERTION, PORT-A-CATH;  Surgeon: Latanya Smith MD;  Location: [facility];  Service: General;  Laterality: N/A;       Social History:  Tobacco Use: Low Risk  (2023)    Patient History     Smoking Tobacco Use: Never     Smokeless Tobacco Use: Never     Passive Exposure: Not on file      Alcohol Use: Not At Risk (2023)    AUDIT-C     Frequency of Alcohol Consumption: Never     Average Number of Drinks: Patient does not drink     Frequency of Binge Drinking: Never        OBJECTIVE:     Vital Signs Range (Last 24H):  Temp:  [36.1 °C (97 °F)-37.6 °C (99.6 °F)]   Pulse:  []   Resp:  [14-34]   BP: (102-129)/(55-84)   SpO2:  [96 %-100 %]       Significant Labs:  Lab Results   Component Value Date    WBC 18.66 (H) 2023    HGB 9.9 (L) 2023    HCT 31.1 (L) 2023     2023    CHOL 144 2023    TRIG 52 2023    HDL 56 2023    ALT 61 (H) 2023    AST 26  11/24/2023     11/24/2023    K 3.9 11/24/2023     11/24/2023    CREATININE 0.6 11/24/2023    BUN 12 11/24/2023    CO2 23 11/24/2023    TSH 1.121 11/18/2023    INR 1.1 11/20/2023    HGBA1C 4.9 11/18/2023       Diagnostic Studies: No relevant studies.    EKG:   Results for orders placed or performed during the hospital encounter of 11/17/23   EKG 12-lead    Collection Time: 11/17/23  6:54 PM    Narrative    Test Reason : G93.89,    Vent. Rate : 115 BPM     Atrial Rate : 115 BPM     P-R Int : 138 ms          QRS Dur : 078 ms      QT Int : 322 ms       P-R-T Axes : 051 077 032 degrees     QTc Int : 445 ms    Sinus tachycardia  Nonspecific T wave abnormality  Abnormal ECG  When compared with ECG of 17-NOV-2023 13:56,  Vent. rate has increased BY  43 BPM    Referred By: TEX LYNCH           Confirmed By:        2D ECHO:  TTE:  Results for orders placed or performed during the hospital encounter of 11/17/23   Echo   Result Value Ref Range    RA Width 2.64 cm    LA volume (mod) 25.11 cm3    Left Atrium Major Axis 4.33 cm    Left Atrium Minor Axis 5.01 cm    RA Major Axis 3.50 cm    LV Diastolic Volume 70.23 mL    LV Systolic Volume 30.56 mL    MV Peak A Marvel 0.82 m/s    MV stenosis pressure 1/2 time 29.98 ms    TR Max Marvel 1.81 m/s    MV Peak E Marvel 0.92 m/s    Ao VTI 25.77 cm    Ao peak marvel 1.41 m/s    LVOT peak VTI 18.66 cm    LVOT peak marvel 1.10 m/s    LVOT diameter 1.87 cm    IVRT 78.02 msec    E wave deceleration time 103.36 msec    AV mean gradient 4 mmHg    TAPSE 2.08 cm    LA size 2.33 cm    Ascending aorta 2.34 cm    STJ 2.19 cm    Sinus 2.54 cm    LVIDs 2.84 2.1 - 4.0 cm    Posterior Wall 0.64 0.6 - 1.1 cm    IVS 0.64 0.6 - 1.1 cm    LVIDd 4.01 3.5 - 6.0 cm    TDI LATERAL 0.18 m/s    LA WIDTH 2.95 cm    TDI SEPTAL 0.14 m/s    LV LATERAL E/E' RATIO 5.11 m/s    LV SEPTAL E/E' RATIO 6.57 m/s    FS 29 28 - 44 %    LA volume 27.14 cm3    LV mass 70.12 g    ZLVIDD -1.21     ZLVIDS 0.09     Left Ventricle  Relative Wall Thickness 0.32 cm    AV valve area 1.99 cm²    AV Velocity Ratio 0.78     AV index (prosthetic) 0.72     MV valve area p 1/2 method 7.34 cm2    E/A ratio 1.12     Mean e' 0.16 m/s    LVOT area 2.7 cm2    LVOT stroke volume 51.22 cm3    AV peak gradient 8 mmHg    E/E' ratio 5.75 m/s    LV Systolic Volume Index 19.3 mL/m2    LV Diastolic Volume Index 44.45 mL/m2    LA Volume Index 17.2 mL/m2    LV Mass Index 44 g/m2    Triscuspid Valve Regurgitation Peak Gradient 13 mmHg    LA Volume Index (Mod) 15.9 mL/m2    SABINA by Velocity Ratio 2.14 cm²    BSA 1.61 m2    Narrative      Left Ventricle: The left ventricle is normal in size. Ventricular mass   is normal. Normal wall thickness. Normal wall motion. There is normal   systolic function with a visually estimated ejection fraction of 55 - 60%.   There is normal diastolic function.    Right Ventricle: Normal right ventricular cavity size. Wall thickness   is normal. Right ventricle wall motion  is normal. Systolic function is   normal.    Aortic Valve: The aortic valve is a trileaflet valve. There is mild   aortic valve sclerosis. There is mild annular calcification present.    IVC/SVC: Patient is ventilated, cannot use IVC diameter to estimate   right atrial pressure. RVSP is normal.         AGNES:  No results found for this or any previous visit.    ASSESSMENT/PLAN:        Pre-op Assessment    I have reviewed the Patient Summary Reports.     I have reviewed the Nursing Notes. I have reviewed the NPO Status.   I have reviewed the Medications.     Review of Systems  Anesthesia Hx:  No problems with previous Anesthesia               Denies Personal Hx of Anesthesia complications.                    Cardiovascular:  Cardiovascular Normal                                            Pulmonary:  Pulmonary Normal                       Renal/:  Renal/ Normal                 Hepatic/GI:  Hepatic/GI Normal                 Neurological:  Neurology Normal                                           Physical Exam  General: Unconscious    Airway:  Mallampati: unable to assess   Mouth Opening: Normal  Tongue: Normal  Neck ROM: Normal ROM  Pre-Existing Airway: Oral Endotracheal tube    Dental:  Dentition unable to assess      Anesthesia Plan  Type of Anesthesia, risks & benefits discussed:    Anesthesia Type: Gen ETT  Intra-op Monitoring Plan: Standard ASA Monitors, Art Line and Central Line  Post Op Pain Control Plan: multimodal analgesia and IV/PO Opioids PRN  Induction:  IV  Airway Plan: Direct, Post-Induction  Informed Consent: Informed consent signed with the Patient and all parties understand the risks and agree with anesthesia plan.  All questions answered.   ASA Score: 4  Day of Surgery Review of History & Physical: H&P Update referred to the surgeon/provider.    Ready For Surgery From Anesthesia Perspective.     .

## 2023-11-24 NOTE — BRIEF OP NOTE
[facility]  Brief Operative Note    SUMMARY     Surgery Date: 11/24/2023     Surgeon(s) and Role:     * Greyson Archuleta MD - Primary     * Kurt Harrison MD - Resident - Assisting     * Samir Bernard MD - Resident - Assisting     * Jassi Nguyen MD - Resident - Assisting        Pre-op Diagnosis:  Brain mass [G93.89]    Post-op Diagnosis:  Post-Op Diagnosis Codes:     * Brain mass [G93.89]    Procedure(s) (LRB):  Left hemicraniectomy with EVD placement (Left)    Anesthesia: General    Implants:  Implant Name Type Inv. Item Serial No.  Lot No. LRB No. Used Action   MEMBRANE SEPRAFILM 5 X 6 - [id number]  MEMBRANE SEPRAFILM 5 X 6  [employer] [id number] Left 1 Implanted   DURAMATRIX ONLAY 5X7 MEMBRANE - [id number]  DURAMATRIX ONLAY 5X7 MEMBRANE  [employer]. [id number] Left 1 Implanted   CODMAN BACTISEAL EVD CATHETER    [employer] [id number] Left 1 Implanted       Operative Findings: Left decompressive hemicraniectomy with ventricular drain placement    Estimated Blood Loss: 200cc         Specimens:   Specimen (24h ago, onward)      None            [id number]

## 2023-11-24 NOTE — ASSESSMENT & PLAN NOTE
32F h/o HER2 positive carcinoma of the breast requiring lumpectomy and chemotherapy in remission since 10/2022, presented to [facility] with syncopal episode and subsequent seizure. CTH with 3.5 x 3.5 cm  heterogeneous isoattenuating structure centered at the level of the left caudate head/basal ganglia at the level of the left frontal horn with significant vasogenic edema which results in approximately 1cm MLS.     Now s/p left craniotomy for tumor resection on 11/22    Imaging:  CTH 11/17: CTH with 3.5 x 3.5 cm  heterogeneous isoattenuating structure centered at the level of the left caudate head/basal ganglia at the level of the left frontal horn measuring with significant vasogenic edema which results in approximately 1cm MLS.  MRI brain w/wo 11/18: L BG 4cm enhancing mass, 8mm MLS, 1.2cm frontoparietal enhancing lesion  MRI synaptive 11/18: completed for OR  CT C/A/P 11/18: pending read  CTH 11/22 post op good resection  MRI brain w w/o contrast 11/23 post op with gross total resection  CTH 11/24: interval increase in vasogenic edema; study obtained in the setting of refractory seizures.    Plan  --Admitted to [facility] post op, to stay today for neuromonitoring in post op period  --SBP <160   --Na 140-150  --AED and cvEEG per [facility]  --Dex to 6q6  --HOB >30  --PTOT when able  --WTE  --PPI while on steroids  --Nutrition per [facility]  --Pain control  --Continue to monitor clinically, notify NSGY immediately with any changes in neuro status    Dispo: ICU today    Plan d/w Dr. Miles.

## 2023-11-24 NOTE — TRANSFER OF CARE
Anesthesia Transfer of Care Note    Patient: Esperanza Peters    Procedure(s) Performed: Procedure(s) (LRB):  Left hemicraniectomy with EVD placement (Left)    Patient location: Other: CT Scanner    Anesthesia Type: general    Transport from OR: Transported from OR intubated on 100% O2 by AMBU with assisted ventilation. Continuous ECG monitoring in transport. Continuous SpO2 monitoring in transport. Continuos invasive BP monitoring in transport    Post pain: adequate analgesia    Post assessment: no apparent anesthetic complications    Post vital signs: stable    Level of consciousness: sedated    Nausea/Vomiting: no nausea/vomiting    Complications: none    Transfer of care protocol was followed      Last vitals: Visit Vitals  /81 (BP Location: Left arm, Patient Position: Lying)   Pulse 71   Temp 36.3 °C (97.3 °F) (Axillary)   Resp (!) 28   Ht 5' (1.524 m)   Wt 61.2 kg (135 lb)   LMP  (LMP Unknown)   SpO2 99%   Breastfeeding No   BMI 26.37 kg/m²

## 2023-11-24 NOTE — PROGRESS NOTES
[facility] - Neuro Critical Care  Neurocritical Care  Progress Note    Admit Date: 11/17/2023  Service Date: 11/24/2023  Length of Stay: 7    Subjective:     Chief Complaint: Brain mass    History of Present Illness: Esperanza Peters is a 32-year-old female with PMHx of HER2 positive breast cancer s/p bilateral mastectomy (Jan 2022) and neoadjuvant therapy w/ perjeta and herceptin (completed Oct 2022; declined radiation) and chronic HA who presents to [facility] as a transfer from [place] for NSGY and NCC evaluation of new finding of brain mass of L caudate head/L BG with frontal lobe vasogenic edema on CTH.     Patient presented to OSH c/o syncopal episode earlier today while at work with waxing and waning mentation since the episode. On initial OSH exam, hyperreflexia was noted to RUE and RLE. GCS was 12 and she had dysconjugate gaze. She was treated for seizure, and given 1mg Ativan IV, which was later followed by 1.5g Keppra and 6mg Decadron IV. Mentation reportedly improved after Ativan. She does not have a seizure history prior to this event.    On arrival to [facility], patient is post-intubation for airway protection during transport. She arrived on propofol gtt and exhibiting intermittent BLE tremors and full body tremors. EEG cap placed and Epilepsy reported no seizure activity on prelim read. STAT MRI W/WO is pending and NSGY has been contacted regarding patient's arrival. Labs and CXR and KUB confirming ETT and OGT placement are pending.     Patient's mother presents to bedside and notes that the patient frequently has severe HA, which the patient confirmed to be bitemporal frontal earlier today. Patient is unable to follow commands, though her mother noted a hand squeeze to LUE which included the movement of the second digit. Patient first localized to noxious stimuli on arrival and gradually began to move extremities spontaneously more frequently. Eyelids persistently slit open bilaterally and gaze dysconjugate.  Propofol gtt continued at 35 mcg/kg/min for now, titrating as indicated by RASS goals. Holding further steroids until MRI results in event that lymphoma present.    Hospital Course: 11/18/2023 - continues on mechanical ventilation. No acute events overnight. Plan for CT Chest/abdomen/pelvis  11/19/2023 - intervally extubated. No acute events overnight. Pending OR with neurosurgery tomorrow.  11/20/2023 - pending OR. Noted to be rescheduled for 11/22. No acute events overnight. stepped down to hospital medicine     11/22/2023 stepped up to NCC s/p L frontal crani for resection of L BG mass w Dr. Miles. New R facial droop, RUE 3/5, RLE 3/5, Ox2. Post op CTH stable. Plan for MRI in AM.  11/23/2023: brain imaging reviewed  11/24/2023  Recurrent seizures and progressively worsening exam overnight. Intubated and CTH consistent with diffuse cerebral edema. Given hts and mannitol and taken class A to OR.     Interval History:  see hospital course    Review of Systems  Unable to obtain a complete ROS due to level of consciousness.  Objective:     Vitals:  Temp: 97.3 °F (36.3 °C)  Pulse: 71  Rhythm: normal sinus rhythm  BP: 109/81  MAP (mmHg): 92  Resp: (!) 28  SpO2: 99 %  Oxygen Concentration (%): 40  Vent Mode: A/C  Set Rate: 28 BPM  Vt Set: 400 mL  PEEP/CPAP: 5 cmH20  Peak Airway Pressure: 20 cmH20  Mean Airway Pressure: 10 cmH20  Plateau Pressure: 0 cmH20    Temp  Min: 97 °F (36.1 °C)  Max: 99.6 °F (37.6 °C)  Pulse  Min: 48  Max: 110  BP  Min: 102/61  Max: 129/61  MAP (mmHg)  Min: 77  Max: 94  Resp  Min: 14  Max: 34  SpO2  Min: 96 %  Max: 100 %  Oxygen Concentration (%)  Min: 40  Max: 70    11/23 0701 - 11/24 0700  In: 2366.8 [P.O.:816; I.V.:1550.8]  Out: 900 [Urine:900]   Unmeasured Output  Urine Occurrence: 1  Stool Occurrence: 1  Pad Count: 3        Physical Exam  Physical Exam:  GA: unresponsive, comfortable, no acute distress.   HEENT: No scleral icterus  Pulmonary: mechanical bs throughout  Cardiac: regular  rate  Abdominal: non-tender  Skin: No grossly noticeable rashes    Neuro:  --sedation: propofol  --GCS: [id number]  --Mental Status:unresponsive    --CN II-XII grossly unable to accurately examine except as below  --Pupils 6mm and fixed BL  --brainstem: absent gag, weak cough, absent OC, not breathing over vent, absent corneal BL, not breathing over vent (therapeutic hyperventilation underway)  --Motor: flaccid to noxious stimulation throughout  --Deep tendon Reflexes: not tested  --Gait: deferred         Medications:  Continuoussodium chloride 0.9%, Last Rate: Stopped (11/24/23 0950)  propofoL, Last Rate: 20 mcg/kg/min (11/24/23 1200)    ScheduleddexAMETHasone, 6 mg, Q6H  etomidate, ,   famotidine (PF), 20 mg, BID  fentaNYL, 100 mcg, Once  heparin (porcine), 5,000 Units, Q8H  levETIRAcetam, ,   levETIRAcetam, ,   levETIRAcetam (Keppra) IV (PEDS and ADULTS), 250 mg, Q12H  LIDOcaine-EPINEPHrine 1%-1:100,000, 10 mL, Once  mupirocin, , BID  phenylephrine HCl in 0.9% NaCl, ,   polyethylene glycol, 17 g, Daily  succinylcholine, ,     PRNbacitracin, , PRN  etomidate, ,   gelatin adsorbable 100cm top sponge, , PRN  levETIRAcetam, ,   levETIRAcetam, ,   LIDOcaine-EPINEPHrine 1%-1:100,000, , PRN  lorazepam, 2 mg, Q10 Min PRN  magnesium oxide, 800 mg, PRN  magnesium oxide, 800 mg, PRN  metoclopramide HCl, 5 mg, Q6H PRN  ondansetron, 4 mg, Q8H PRN  oxyCODONE, 10 mg, Q4H PRN  oxyCODONE, 5 mg, Q4H PRN  phenylephrine HCl in 0.9% NaCl, ,   potassium bicarbonate, 35 mEq, PRN  potassium bicarbonate, 50 mEq, PRN  potassium bicarbonate, 60 mEq, PRN  potassium, sodium phosphates, 2 packet, PRN  potassium, sodium phosphates, 2 packet, PRN  potassium, sodium phosphates, 2 packet, PRN  succinylcholine, ,   thrombin (bovine), , PRN        Diet  Diet NPO Except for: Medication, Ice Chips  Diet NPO Except for: Medication, Ice Chips        Assessment/Plan:     Neuro  * Brain mass  32-year-old female with PMHx of HER2 positive breast cancer s/p  bilateral mastectomy (Jan 2022) and chemo (completed Oct 2022; declined radiation) and chronic HA presents as transfer from [place] w/ brain mass of L caudate head/L BG with frontal lobe vasogenic edema on CTH. Presented to OSH c/o syncopal episode earlier today, intermittent AMS since. Hyperreflexia noted to RUE and RLE, GCS was 12 and had dysconjugate gaze. Treated for seizure. Given 1mg Ativan IV (apparently w/ improvement), 1.5g Keppra and 6mg Decadron IV. No PMHx seizures. Intubated for airway protection. Transported without issue. Arrived on propofol w/ intermittent BLE tremors and full body tremors. EEG cap placed and Epilepsy reported negative prelim read. Holding further steroids until MRI results in event that lymphoma present. MRI W/WO w L basal ganglia mass.    11/22: s/p L frontal crani for resection of L BG mass w Dr. Miles.   Post op exam w new R facial droop, RUE 3/5, RLE 3/5, Ox2      - Admitted to NCC post op   - Q1H neuro checks, I/O, VS  - NSGY following  - Post op CTH stable  - Keppra 1000mg BID - given 2g overnight  - Dex 4q6h - increased to 6q6h  - Famotidine  - SBP goal <160   - PRN labetalol, hydralazine  - Daily CBC, CMP, Mg, Phos  - mIVF NS @ 50 until PO intake in AM  - SCDs placed, SQH held in acute phase   - PT/OT/SLP consulted  - Nutrition, PMR, and SW/CM consulted    -significant event overnight of rapidly worsening exam with clinical seizure activity and CTH with rapidly progressively worsening cerebral edema. Patient given hts bolus and mannitol as well as increased dexamethasone. Taken to the OR class A for crani and EVD. Family updated at bedside in great detail with NSGY and NCC teams present. Hyperventilated as temporizing measure while in transport to OR. CVC and a-line placed rapidly at bedside    Status epilepticus  Due to diffuse cerebral edema  Loaded with keppra, intubated and placed on propofol non-titrating infusion 20mcg/kg/hr  Will get formal eeg post-operatively  depending upon surgical site dressing    Brain compression  - 2/2 vasogenic edema  - See Brain mass     Vasogenic brain edema  - See Brain mass     Pulmonary  On mechanically assisted ventilation  - For airway protection due to recurrent clinical seizures without return to baseline   - See Brain mass             The patient is being Prophylaxed for:  Venous Thromboembolism with: Mechanical or Chemical  Stress Ulcer with: H2B  Ventilator Pneumonia with: chlorhexidine oral care    Activity Orders            Diet NPO Except for: Medication, Ice Chips: NPO starting at 11/24 0446    Progressive Mobility Protocol (mobilize patient to their highest level of functioning at least twice daily) starting at 11/18 0800          Full Code    Jb Zambrano MD  Neurocritical Care  [facility] - Neuro Critical Care      Critical condition in that Patient has a condition that poses threat to life and bodily function: diffuse cerebral edema with compression and herniation, status epilepticus, brain mass     94 minutes of Critical care time was spent personally by me on the following activities: development of treatment plan with patient or surrogate and bedside caregivers, discussions with consultants, evaluation of patient's response to treatment, examination of patient, ordering and performing treatments and interventions, ordering and review of laboratory studies, ordering and review of radiographic studies, pulse oximetry, antibiotic titration if applicable, vasopressor titration if applicable, re-evaluation of patient's condition. This critical care time did not overlap with that of any other provider or involve time for any procedures. There is high probability for acute neurological change leading to clinical and possibly life-threatening deterioration requiring highest level of physician preparedness for urgent intervention.

## 2023-11-24 NOTE — PT/OT/SLP PROGRESS
Occupational Therapy   Re-Eval 2* intubation/ Treatment    Name: Esperanza Peters  MRN: [id number]  Admitting Diagnosis:  Brain mass  2 Days Post-Op    Recommendations:     Discharge Recommendations:  (pending extubation)  Discharge Equipment Recommendations:   (pending extubation)  Barriers to discharge:  None    Assessment:     Esperanza ePters is a 32 y.o. female with a medical diagnosis of Brain mass.  She presents with performance deficits affecting function are weakness, impaired self care skills, impaired functional mobility, impaired endurance, impaired cognition.     Rehab Prognosis:  Good; patient would benefit from acute skilled OT services to address these deficits and reach maximum level of function.       Plan:     Patient to be seen 3 x/week to address the above listed problems via neuromuscular re-education, therapeutic exercises, therapeutic activities, self-care/home management  Plan of Care Expires: 12/21/23  Plan of Care Reviewed with: patient    Subjective     Patient:  Nonverbal  Pain/Comfort:  Pain Rating 1: 0/10  Pain Rating Post-Intervention 1: 0/10    Objective:     Communicated with: Nurse prior to session.  Patient found supine with peripheral IV, telemetry, pulse ox (continuous), arterial line, bed alarm, blood pressure cuff, pacheco catheter upon OT entry to room.    General Precautions: Standard, aspiration, fall    Orthopedic Precautions:N/A  Braces: N/A  Respiratory Status: Ventilator     Occupational Performance:     Bed Mobility:    Patient completed Rolling/Turning to Left with  total assistance  Patient completed Rolling/Turning to Right with total assistance     Functional Mobility/Transfers:  Dependent drawsheet    Activities of Daily Living:  Feeding:  NPO    Grooming: total assistance while supine    AMPAC 6 Click ADL: 6    Treatment & Education:  Daily orientation provided.  PROM performed bilateral UE/LEs one set x 10 rep in all planes of motion with stretches provided at  end range. Provided multi-sensory stimulation to prevent sensory deprivation and improve clinical outcomes.  Positioning provided for midline orientation with bilateral UEs elevated and heels lifted off mattress.   Gentle cervical rotation provided.   Family  present during the session.    Patient left supine with all lines intact, call button in reach, and bed alarm on    GOALS:   Multidisciplinary Problems       Occupational Therapy Goals          Problem: Occupational Therapy    Goal Priority Disciplines Outcome Interventions   Occupational Therapy Goal     OT, PT/OT Ongoing, Progressing    Description: Goals set 11/24 to be addressed for 14 days with expiration date, 12/8:  Patient will increase functional independence with ADLs by performing:    Patient will demonstrate rolling to the right with mod assist.  Not met   Patient will demonstrate rolling to the left with mod assist.   Not met  Patient will demonstrate supine -sit with mod assist.   Not met  Patient will demonstrate stand pivot transfers with mod assist.   Not met  Patient will demonstrate grooming while standing with mod assist.   Not met  Patient will demonstrate upper body dressing with mod assist while seated EOB.   Not met  Patient will demonstrate lower body dressing with mod assist while seated EOB.   Not met  Patient will demonstrate toileting with mod assist.   Not met  Patient will demonstrate bathing while seated EOB with mod assist.   Not met  Patient's family / caregiver will demonstrate independence and safety with assisting patient with self-care skills and functional mobility.     Not met                               Time Tracking:     OT Date of Treatment: 11/24/23  OT Start Time: 0400  OT Stop Time: 0423  OT Total Time (min): 23 min    Billable Minutes:Re-eval 5  Neuromuscular Re-education 8    OT/VIRY: OT          11/24/2023

## 2023-11-24 NOTE — OP NOTE
Stereotactic Tumor Resection with Monitoring      Procedure performed on the: Left Frontal Lobe  None     DATE OF SURGERY:  SURGEON: Inocente Miles MD,MSc  ASSISTANT: Kurt Shirley MD - Neurosurgery Resident assisting    PREOPERATIVE DIAGNOSIS:   Left Frontal lobe intra-axial lesion.    POSTOPERATIVE DIAGNOSIS:    Left Frontallobe intra-axial lesion.    PROCEDURES PERFORMED:  1. Stereotactic Minimally invasive LEFT frontal lobe craniotomy  with volumetric resection of tumor.   2. Magnetic resonance image-guided, computer assisted minimally invasive, stereotactic volumetric   resection of Left frontal lobe intra-axial lesion.  3. Intraoperative electrophysiological monitoring, motor mapping, motor-evoked and somatosensory-evoked potentials.  4. Duraplasty.    CPT   63044 Craniectomy, trephination, bone flap craniotomy; for excision of brain tumor, supratentorial, except meningioma   46028 Stereotactic computer-assisted volumetric (navigational) procedure, intracranial, extracranial, or spinal   87473 Microsurgical dissection with use of operative microscope.   ANESTHESIA: General endotracheal anesthesia (GETA).  ESTIMATED BLOOD LOSS:100 cc.  FINDINGS: Frozen section was consistent with metastatic disease  .   DRAINS: none  COMPLICATIONS: none.  DISPOSITION: Stable to the PACU.    INDICATIONS FOR THE PROCEDURE  Ms. Peters is a 32 year old woman, with a history of breast carcinoma with previous lumpectomy and in remission from 2022. She presented with new onset seizure. MRI was notable for a large left frontal mass with associated mass effect.  Given the clinical presentation and the radiographic findings, the decision was made for a minimally invasive craniotomy   The patient and family was well apprised of all objectives, benefits, risks and potential complications of the procedure, including but not limited to: worsening of current status, the possible need for further procedures, the risk of infection,  headaches, CSF leak, seizures, hemorrhage, stroke, loss of language function, paralysis, coma and even death.Informed consent was obtained and secured in the chart after the patient and family voiced understanding of these risks and decided to proceed with the operation.     DESCRIPTION OF THE PROCEDURE  The patient was transferred to the operating room. She was given preoperative prophylactic IV antibiotics.  The patient was sedated and intubated without difficulty by the anesthesia service. Eyes were taped shut after ointment was applied to prevent corneal abrasion. A Kiley Hugger was placed over the exposed lower body to maintain control of core body temperature. A Mosley catheter was inserted.  A Brady head clamp was applied. The patient was positioned supine/lateral with the head rotated approximately 20 degrees in mild  Extension. All pressure points were carefully padded. The hair was clipped over the area where she was to undergo the incision. Pre-prepping was done with alcohol (chlorhexidine solution, alcohol). The electrophysiology monitoring team inserted needles in their proper locations and baseline SSEPs and motor evoked potentials were obtained. Stereotactic MRI was done prior to surgery and the images were transferred to the neuronavigational system. Next, three-dimensional images were reconstructed. The patient underwent co-registration of the preoperative stereotactic MRI with her surface landmarks. Accuracy was within 2mm.  Appropriate partial lesion areas, as well as functional and regions fiber tracts were identified in relation to the mass.   Vital structure landmarks including the motor area/speech area/superior sagittal sinus were identified and mapped out. The planned craniotomy was outlined.    OPERATIVE TECNIQUE  The patient was prepped and draped in standard sterile fashion. Local anesthesic was infiltrated along the marked incision. A curvilinear  incision was made, utilizing a # 10  scalpel blade to the level of the periosteum. Hemostasis was achieved utilizing bipolar electrocautery, as well as monopolar electrocautery. The scalp was reflected anteriorly and held in place utilizing fish-hooks. The neuronavigational probe was utilized to plan the craniotomy by outlining the area of the lesion and surrounding vital structures, motor area/speech area/ superior sagittal sinus which were identified and mapped out.    Left frontal lobe craniotomy was performed with a electric high speed drill and a 4mm match stick sonido keeping the intra-axial lesion in the center of the craniotomy. A #3 Penfield was utilized to detach the dura from the bone. The sonido holes were interconnected with a B1 bit and footplate. The bone flap was centrally detached from the dura and removed. The dura was visualized intact. We tacked up the dura on all edges; medial, lateral, anterior and posterior edges. The wound was irrigated with saline solution and hemostasis achieved with bipolar electro-cautery.    The neuronavigational probe was utilized to plan the dural opening over the lesion. The dura was opened sharply in a cruciate fashion over the the lesion and reflected out of the field.    The arachnoid on the surface of the superior frontal -lobe gyrus was opened sharply in the sulcus, and deeply was coagulated with bipolar electrocautery forceps and a corticectomy performed.  The 7 cm minimally invasive tubular retractor was then placed in a stereotactic fashion and secured in place with the fixed retractor.  The lesion was promptly visualized and was biopsied with micropituitary rongeurs. The lesion was noted to be firm with abnormal color, and well defined planes.  Using a combination of 5 and 7-Greenlandic suction, bipolar electrocautery, as well as micro-scissors and (CUSA) a plane was developed around the lesion and the lesion dissected in its anterior, posterior, medial and lateral borders. All visualized major blood  vessels were preserved. Again the neuronavigational probe was utilized to carefully guide the volumetric resection.    The field was subsequently irrigated with saline solution and hemostasis achieved utilizing bipolar electrocautery and gelatin hemostatic matrix (FloSeal). The biopsy site was lined with oxidized cellulose absorbable hemostat (Surgicel). The cavity was irrigated full and the dura reapproximated utilizing interrupted 4-0 non-absorbable braided polyamide suture (Nurolon) to achieve a water-tight dural closure. A thin layer of hydrogel dural sealant (DuraSeal) was sprayed over the dura. A 2 cm x 2 cm collagen dural matrix was placed over the craniotomy. Using 4 mm screws, sonido hole covers were screwed into place over each sonido hole. The bone was fastened to the skull. The wound again was irrigated with antibiotic irrigation until clear. Hemostasis was achieved and the galea approximated utilizing interrupted 2-0 polyglactin synthetic absorbable suture (Vicryl). The skin was then closed with Monocryl. A sterile head dressing was placed over the closed wound.    All needle counts, sponge counts and instrument counts were correct at the end of the case times two. The electrophysiological monitoring remained stable from baseline through the end of the procedure. The patient tolerated the procedure well and was transferred to the recovery room in stable condition. Dr. Miles was present during the critical portions of this case.

## 2023-11-24 NOTE — ASSESSMENT & PLAN NOTE
Due to diffuse cerebral edema  Loaded with keppra, intubated and placed on propofol non-titrating infusion 20mcg/kg/hr  Will get formal eeg post-operatively depending upon surgical site dressing

## 2023-11-24 NOTE — PROCEDURES
"Esperanza Peters is a 32 y.o. female patient.    Temp: 97 °F (36.1 °C) (23 0701)  Pulse: 71 (23 1140)  Resp: (!) 28 (23 1140)  BP: 105/75 (23 0901)  SpO2: 99 % (23 1140)  Weight: 61.2 kg (135 lb) (23 1201)  Height: 5' (152.4 cm) (23 1140)       Arterial Line    Date/Time: 2023 11:49 AM  Location procedure was performed: [place] [facility]    Performed by: Chico Haque NP  Authorized by: Chico Haque NP  Consent Done: Yes  Consent: Written consent obtained.  Risks and benefits: risks, benefits and alternatives were discussed  Consent given by: mother  Patient understanding: patient states understanding of the procedure being performed (mother)  Patient consent: the patient's understanding of the procedure matches consent given (mother)  Procedure consent: procedure consent matches procedure scheduled  Relevant documents: relevant documents present and verified  Patient identity confirmed: , MRN and name  Time out: Immediately prior to procedure a "time out" was called to verify the correct patient, procedure, equipment, support staff and site/side marked as required.  Preparation: Patient was prepped and draped in the usual sterile fashion.  Indications: multiple ABGs and hemodynamic monitoring  Location: right radial  Nilesh's test normal: yes  Needle gauge: 20  Number of attempts: 1  Complications: No  Estimated blood loss (mL): 1  Specimens: No  Implants: No  Post-procedure: dressing applied  Post-procedure CMS: unchanged  Patient tolerance: Patient tolerated the procedure well with no immediate complications          2023    "

## 2023-11-24 NOTE — PT/OT/SLP DISCHARGE
Physical Therapy Discharge Summary    Name: Esperanza Peters  MRN: [id number]   Principal Problem: Brain mass     Patient Discharged from acute Physical Therapy on 11/24/23.  Please refer to prior PT noted date on 11/23/23 for functional status.     Assessment:     Pt  w/ recurrent seizures and progressively worsening exam overnight. Intubated and taken class A to OR.      Objective:     GOALS:   Multidisciplinary Problems       Physical Therapy Goals          Problem: Physical Therapy    Goal Priority Disciplines Outcome Goal Variances Interventions   Physical Therapy Goal     PT, PT/OT Ongoing, Progressing     Description: Goals to be completed by: 12/15/23    Pt will perform sup<>sit transfers w/ independence  Pt will have sufficient dynamic balance to sit EOB while performing ADLs/therex w/ independence  Pt will be able to stand up from EOB w/ independence using no AD  Pt will ambulate 350 feet w/ independence using no AD  Pt will be independent w/ HEP therex on BLE w/ good form and ROM   Pt will be independent w/ ascending/descending 1 flight of stairs w/ unilateral railing                       Reasons for Discontinuation of Therapy Services  Patient is unable to continue work toward goals because of medical or psychosocial complications.      Plan:     Patient Discharged to:  Surgery .      11/24/2023

## 2023-11-24 NOTE — SUBJECTIVE & OBJECTIVE
Interval History:  see hospital course    Review of Systems  Unable to obtain a complete ROS due to level of consciousness.  Objective:     Vitals:  Temp: 97.3 °F (36.3 °C)  Pulse: 71  Rhythm: normal sinus rhythm  BP: 109/81  MAP (mmHg): 92  Resp: (!) 28  SpO2: 99 %  Oxygen Concentration (%): 40  Vent Mode: A/C  Set Rate: 28 BPM  Vt Set: 400 mL  PEEP/CPAP: 5 cmH20  Peak Airway Pressure: 20 cmH20  Mean Airway Pressure: 10 cmH20  Plateau Pressure: 0 cmH20    Temp  Min: 97 °F (36.1 °C)  Max: 99.6 °F (37.6 °C)  Pulse  Min: 48  Max: 110  BP  Min: 102/61  Max: 129/61  MAP (mmHg)  Min: 77  Max: 94  Resp  Min: 14  Max: 34  SpO2  Min: 96 %  Max: 100 %  Oxygen Concentration (%)  Min: 40  Max: 70    11/23 0701 - 11/24 0700  In: 2366.8 [P.O.:816; I.V.:1550.8]  Out: 900 [Urine:900]   Unmeasured Output  Urine Occurrence: 1  Stool Occurrence: 1  Pad Count: 3        Physical Exam  Physical Exam:  GA: unresponsive, comfortable, no acute distress.   HEENT: No scleral icterus  Pulmonary: mechanical bs throughout  Cardiac: regular rate  Abdominal: non-tender  Skin: No grossly noticeable rashes    Neuro:  --sedation: propofol  --GCS: [id number]  --Mental Status:unresponsive    --CN II-XII grossly unable to accurately examine except as below  --Pupils 6mm and fixed BL  --brainstem: absent gag, weak cough, absent OC, not breathing over vent, absent corneal BL, not breathing over vent (therapeutic hyperventilation underway)  --Motor: flaccid to noxious stimulation throughout  --Deep tendon Reflexes: not tested  --Gait: deferred         Medications:  Continuoussodium chloride 0.9%, Last Rate: Stopped (11/24/23 0950)  propofoL, Last Rate: 20 mcg/kg/min (11/24/23 1200)    ScheduleddexAMETHasone, 6 mg, Q6H  etomidate, ,   famotidine (PF), 20 mg, BID  fentaNYL, 100 mcg, Once  heparin (porcine), 5,000 Units, Q8H  levETIRAcetam, ,   levETIRAcetam, ,   levETIRAcetam (Keppra) IV (PEDS and ADULTS), 250 mg, Q12H  LIDOcaine-EPINEPHrine 1%-1:100,000, 10  mL, Once  mupirocin, , BID  phenylephrine HCl in 0.9% NaCl, ,   polyethylene glycol, 17 g, Daily  succinylcholine, ,     PRNbacitracin, , PRN  etomidate, ,   gelatin adsorbable 100cm top sponge, , PRN  levETIRAcetam, ,   levETIRAcetam, ,   LIDOcaine-EPINEPHrine 1%-1:100,000, , PRN  lorazepam, 2 mg, Q10 Min PRN  magnesium oxide, 800 mg, PRN  magnesium oxide, 800 mg, PRN  metoclopramide HCl, 5 mg, Q6H PRN  ondansetron, 4 mg, Q8H PRN  oxyCODONE, 10 mg, Q4H PRN  oxyCODONE, 5 mg, Q4H PRN  phenylephrine HCl in 0.9% NaCl, ,   potassium bicarbonate, 35 mEq, PRN  potassium bicarbonate, 50 mEq, PRN  potassium bicarbonate, 60 mEq, PRN  potassium, sodium phosphates, 2 packet, PRN  potassium, sodium phosphates, 2 packet, PRN  potassium, sodium phosphates, 2 packet, PRN  succinylcholine, ,   thrombin (bovine), , PRN        Diet  Diet NPO Except for: Medication, Ice Chips  Diet NPO Except for: Medication, Ice Chips

## 2023-11-24 NOTE — ANESTHESIA PROCEDURE NOTES
Ad Hoc Intubation    Date/Time: 11/24/2023 2:50 AM    Performed by: Deyvi Sotelo MD  Authorized by: Deyvi Sotelo MD    Indications:  Airway protection  Diagnosis:  Metastatic breast cancer  Patient Location:  ICU  Timeout:  11/24/2023 2:53 AM  Procedure Start Time:  11/24/2023 2:53 AM  Procedure End Time:  11/24/2023 2:53 AM  Staff:     Anesthesiologist Present: No    Intubation:     Induction:  Intravenous    Intubated:  Postinduction    Mask Ventilation:  Easy mask    Attempts:  1    Attempted By:  Resident anesthesiologist    Method of Intubation:  Video laryngoscopy    Blade:  Marsh 3    Laryngeal View Grade: Grade I - full view of chords      Difficult Airway Encountered?: No      Complications:  None    Airway Device:  Oral endotracheal tube    Airway Device Size:  7.5    Style/Cuff Inflation:  Cuffed (inflated to minimal occlusive pressure)    Tube secured:  22    Secured at:  The teeth    Placement Verified By:  Colorimetric ETCO2 device    Complicating Factors:  None    Findings Post-Intubation:  BS equal bilateral and atraumatic/condition of teeth unchanged      
No

## 2023-11-24 NOTE — NURSING
0857 - Cough reflex negative. EMPERATRIZ Haque notified. Stated that he would let attending. Know. No new orders given. WCTM.

## 2023-11-24 NOTE — ASSESSMENT & PLAN NOTE
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 Presented to OSH c/o syncopal episode earlier today, intermittent AMS since. Hyperreflexia noted to RUE and RLE, GCS was 12 and had dysconjugate gaze. Treated for seizure. Given 1mg Ativan IV (apparently w/ improvement), 1.5g Keppra and 6mg Decadron IV. No PMHx seizures. Intubated for airway protection. Transported without issue. Arrived on propofol w/ intermittent BLE tremors and full body tremors. EEG cap placed and Epilepsy reported negative prelim read. Holding further steroids until MRI results in event that lymphoma present. MRI W/WO w L basal ganglia mass.    11/22: s/p L frontal crani for resection of L BG mass w Dr. Miles.   Post op exam w new R facial droop, RUE 3/5, RLE 3/5, Ox2      - Admitted to NCC post op   - Q1H neuro checks, I/O, VS  - NSGY following  - Post op CTH stable  - Keppra 1000mg BID - given 2g overnight  - Dex 4q6h - increased to 6q6h  - Famotidine  - SBP goal <160   - PRN labetalol, hydralazine  - Daily CBC, CMP, Mg, Phos  - mIVF NS @ 50 until PO intake in AM  - SCDs placed, SQH held in acute phase   - PT/OT/SLP consulted  - Nutrition, PMR, and SW/CM consulted    -significant event overnight of rapidly worsening exam with clinical seizure activity and CTH with rapidly progressively worsening cerebral edema. Patient given hts bolus and mannitol as well as increased dexamethasone. Taken to the OR class A for crani and EVD. Family updated at bedside in great detail with NSGY and NCC teams present. Hyperventilated as temporizing measure while in transport to OR. CVC and a-line placed rapidly at bedside

## 2023-11-24 NOTE — PROGRESS NOTES
[facility]  Neurosurgery  Progress Note    Subjective:     History of Present Illness: 32F h/o HER2 positive carcinoma of the breast requiring lumpectomy and chemotherapy in remission since 10/2022, presented to [place] with syncopal episode which was witnessed by coworkers while at work. Patient had been complaining prior of headache which was described as bitemporal frontal for the last 7 days. Patient then noted to have seizure activity in ED and intubated for airway protection. CTH with 3.5 x 3.5 cm  heterogeneous isoattenuating structure centered at the level of the left caudate head/basal ganglia at the level of the left frontal horn measuring with significant vasogenic edema which results in approximately 1cm MLS. Family bedside and deny AC/AP.     Post-Op Info:  Procedure(s) (LRB):  CRANIOTOMY, WITH NEOPLASM EXCISION USING COMPUTER-ASSISTED NAVIGATION (Left MIS) (Left)   2 Days Post-Op   Interval History: 11/24/2023: Seizure noted overnight with resolution upon 6mg ativan and 2g Keppra. RSI performed for airway protection. STAT CTH with interval increase brenden-resection vasogenic edema. AFVSS. Exam worsened. On EEG, HTS, sedation.    Medications:  Continuous Infusions:   sodium chloride 0.9% 50 mL/hr at 11/24/23 0706    fentanyl      propofoL 20 mcg/kg/min (11/24/23 0700)     Scheduled Meds:   dexAMETHasone  6 mg Intravenous Q6H    etomidate        famotidine  20 mg Oral BID    levETIRAcetam        levETIRAcetam        levETIRAcetam  1,000 mg Oral BID    mupirocin   Nasal BID    phenylephrine HCl in 0.9% NaCl        polyethylene glycol  17 g Oral Daily    succinylcholine         PRN Meds:acetaminophen, etomidate, levETIRAcetam, levETIRAcetam, metoclopramide HCl, ondansetron, oxyCODONE, oxyCODONE, phenylephrine HCl in 0.9% NaCl, succinylcholine     Review of Systems  Objective:     Weight: 61.2 kg (135 lb)  Body mass index is 26.37 kg/m².  Vital Signs (Most Recent):  Temp: 97 °F (36.1 °C)  "(11/24/23 0701)  Pulse: (!) 59 (11/24/23 0701)  Resp: 14 (11/24/23 0701)  BP: 112/84 (11/24/23 0701)  SpO2: 100 % (11/24/23 0701) Vital Signs (24h Range):  Temp:  [97 °F (36.1 °C)-99.6 °F (37.6 °C)] 97 °F (36.1 °C)  Pulse:  [] 59  Resp:  [14-34] 14  SpO2:  [96 %-100 %] 100 %  BP: (102-129)/(55-84) 112/84     Date 11/24/23 0700 - 11/25/23 0659   Shift 7262-9187 2143-0186 6724-7442 24 Hour Total   INTAKE   I.V.(mL/kg) 1.7(0)   1.7(0)   Shift Total(mL/kg) 1.7(0)   1.7(0)   OUTPUT   Shift Total(mL/kg)       Weight (kg) 61.2 61.2 61.2 61.2              Vent Mode: A/C  Oxygen Concentration (%):  [50-70] 50  Resp Rate Total:  [14 br/min-15 br/min] 14 br/min  Vt Set:  [400 mL] 400 mL  PEEP/CPAP:  [5 cmH20] 5 cmH20  Mean Airway Pressure:  [7.8 cmH20-7.9 cmH20] 7.9 cmH20        Female External Urinary Catheter 11/24/23 0200 (Active)   Skin no redness;no breakdown 11/24/23 0701   Tolerance no signs/symptoms of discomfort 11/24/23 0701   Suction Continuous suction at 70 mmHg 11/24/23 0701   Date of last wick change 11/24/23 11/24/23 0701   Time of last wick change 0726 11/24/23 0701   Output (mL) 400 mL 11/24/23 0641          Physical Exam         Neurosurgery Physical Exam    [id number]  RP/LP 5mm and sluggish  +cough/gag  Absent corneal  No movement in extremities to noxious stim.    Significant Labs:  Recent Labs   Lab 11/23/23  0205 11/24/23  0354   * 116*   * 135*   K 4.1 3.9   * 104   CO2 24 23   BUN 14 12   CREATININE 0.8 0.6   CALCIUM 8.3* 8.2*   MG 2.4 1.8     Recent Labs   Lab 11/23/23  0205 11/24/23  0224 11/24/23  0354   WBC 17.15*  --  18.66*   HGB 10.4*  --  9.9*   HCT 32.7* 30* 31.1*     --  203     No results for input(s): "LABPT", "INR", "APTT" in the last 48 hours.  Microbiology Results (last 7 days)       ** No results found for the last 168 hours. **          All pertinent labs from the last 24 hours have been reviewed.    Significant Diagnostics:  I have reviewed all pertinent " imaging results/findings within the past 24 hours.  CT Head Without Contrast    Result Date: 11/24/2023  Patient is status post left frontal craniotomy for resection of mass centered in the left basal ganglia. Interval increased edema through the left frontal lobe.  This results in increased mass effect with near complete effacement of the left lateral ventricle, effacement of the 3rd ventricle, and 6 mm rightward midline shift (although midline shift is not significantly changed from prior, overall mass effect appears increased).  There is effacement of the basal cisterns at the level of the midbrain. This report was flagged in Epic as abnormal. The critical information above was 1st observed on 11/24/2023 at 04:14 by Teagan Jean MD, who reported the results by telephone to EVARISTO Carter, on 11/24/2023 at 04:17.  Patient name and medical record number were verified, and read-back was performed. Electronically signed by resident: Teagan Jean Date:    11/24/2023 Time:    03:39 Electronically signed by: Shivam Packer Date:    11/24/2023 Time:    04:27    X-Ray Chest AP Portable    Result Date: 11/24/2023  Low lying endotracheal tube terminating at the fartun.  Recommend retracting 3-4 cm. No confluent airspace consolidation. This report was flagged in Epic as abnormal. Electronically signed by: Jonathan Hutton MD Date:    11/24/2023 Time:    03:55   Assessment/Plan:     * Brain mass  32F h/o HER2 positive carcinoma of the breast requiring lumpectomy and chemotherapy in remission since 10/2022, presented to [place] with syncopal episode and subsequent seizure. CTH with 3.5 x 3.5 cm  heterogeneous isoattenuating structure centered at the level of the left caudate head/basal ganglia at the level of the left frontal horn with significant vasogenic edema which results in approximately 1cm MLS.     Now s/p left craniotomy for tumor resection on 11/22    Imaging:  CTH 11/17: CTH with 3.5 x 3.5 cm  heterogeneous  isoattenuating structure centered at the level of the left caudate head/basal ganglia at the level of the left frontal horn measuring with significant vasogenic edema which results in approximately 1cm MLS.  MRI brain w/wo 11/18: L BG 4cm enhancing mass, 8mm MLS, 1.2cm frontoparietal enhancing lesion  MRI synaptive 11/18: completed for OR  CT C/A/P 11/18: pending read  CTH 11/22 post op good resection  MRI brain w w/o contrast 11/23 post op with gross total resection  CTH 11/24: interval increase in vasogenic edema; study obtained in the setting of refractory seizures.    Plan  --Admitted to NCC post op, to stay today for neuromonitoring in post op period  --SBP <160   --Na 140-150  --AED and cvEEG per NCC  --Dex to 6q6  --HOB >30  --PTOT when able  --WTE  --PPI while on steroids  --Nutrition per NCC  --Pain control  --Continue to monitor clinically, notify NSGY immediately with any changes in neuro status    Dispo: ICU today    Plan d/w Dr. Miles.        Ethan Lomas MD  Neurosurgery  [facility] - [facility]

## 2023-11-24 NOTE — CARE UPDATE
0246 Anesthesia at bedside preparing to intubated.     0247 2mg versed, 100mg propofol, 100mg rocuronium administered.    0249 Collar change noted; bilateral breath sounds noted, and chest xray ordered.

## 2023-11-24 NOTE — PROCEDURES
Esperanza Peters is a 32 y.o. female patient.    Temp: 97 °F (36.1 °C) (11/24/23 0701)  Pulse: 75 (11/24/23 0913)  Resp: 14 (11/24/23 0901)  BP: 105/75 (11/24/23 0901)  SpO2: 100 % (11/24/23 0901)  Weight: 61.2 kg (135 lb) (11/22/23 1201)  Height: 5' (152.4 cm) (11/24/23 0807)       Central Line    Date/Time: 11/24/2023 11:24 AM    Performed by: Jb Zambrano MD  Authorized by: Jb Zambrano MD    Location procedure was performed:  [facility] NEURO CRIT[facility]  Consent Done ?:  Yes  Time out complete?: Verified correct patient, procedure, equipment, staff, and site/side    Indications:  Med administration and vascular access  Preparation:  Skin prepped with ChloraPrep  Skin prep agent dried: Skin prep agent completely dried prior to procedure    Sterile barriers: All five maximal sterile barriers used - gloves, gown, cap, mask and large sterile sheet    Hand hygiene: Hand hygiene performed immediately prior to central venous catheter insertion    Location:  Right femoral  Site selection rationale:  Elevated ICP  Catheter type:  Triple lumen  Catheter size:  7 Fr  Inserted Catheter Length (cm):  20  Ultrasound guidance: Yes    Vessel Caliber:  Large   patent  Comprressibility:  Normal  Needle advanced into vessel with real time ultrasound guidance.    Guidewire confirmed in vessel.    Steril sheath on probe.    Sterile gel used.  Manometry: Yes    Number of attempts:  1  Securement:  Line sutured, chlorhexidine patch, sterile dressing applied and blood return through all ports  Complications: No    XRay:  Successful placement  Adverse Events:  None      11/24/2023

## 2023-11-24 NOTE — SIGNIFICANT EVENT
Patient taken emergently to OR, underwent L decompressive hemicraniectomy and EVD placement. Post-op CT/CTA w/ evidence of extensive loss of gray-white matter differentiation w/ obliteration of the 4th ventricle, extensive transcalvarial herniation. Prognosis poor, NSGY at bedside discussing OR course and imaging with family.     Will d/c propofol/all sedating agents, start 3% hypertonic gtt for goal Na 145-155. Monitor Na q6hrs. Goal normocarbia, normothermia, SBP<140, MAP>65. Re-hook to EEG given clinical seizures overnight. Pt high risk for progression to brain death, will continue to monitor closely, GOC discussions with family as appropriate.     Jesenia Ryan MD, MPH  Neurocritical Care Physician

## 2023-11-24 NOTE — ANESTHESIA POSTPROCEDURE EVALUATION
Anesthesia Post Evaluation    Patient: Esperanza Peters    Procedure(s) Performed: Procedure(s) (LRB):  CRANIOTOMY, WITH NEOPLASM EXCISION USING COMPUTER-ASSISTED NAVIGATION (Left MIS) (Left)    Final Anesthesia Type: general      Patient location during evaluation: ICU  Patient participation: Yes- Able to Participate  Level of consciousness: awake  Post-procedure vital signs: reviewed and stable  Pain management: adequate  Airway patency: patent    PONV status at discharge: No PONV  Anesthetic complications: no      Cardiovascular status: blood pressure returned to baseline  Respiratory status: unassisted, spontaneous ventilation and face mask            Vitals Value Taken Time   /75 11/24/23 1132   Temp 36.1 °C (97 °F) 11/24/23 0701   Pulse 71 11/24/23 1140   Resp 28 11/24/23 1140   SpO2 99 % 11/24/23 1140   Vitals shown include unvalidated device data.      No case tracking events are documented in the log.      Pain/Homer Score: Pain Rating Prior to Med Admin: 0 (11/24/2023  9:05 AM)  Pain Rating Post Med Admin: 0 (11/24/2023  9:17 AM)

## 2023-11-24 NOTE — OR NURSING
Bone flap was brought to the blood bank to be stored via Doris Shannon RN per Dr. Archuleta, Dr. Nguyen and received by William Millan [facility] from Blood Bank.

## 2023-11-25 NOTE — NURSING
At 7:40: pt's ICP was raised to 30s-40s , re- zeroed and re-leveled ,ICP came back to normal (9-12). No any output observed, notified to NCC and NSYG. NSGY will be at bedside  to flush EVD.

## 2023-11-25 NOTE — ASSESSMENT & PLAN NOTE
32F h/o HER2 positive carcinoma of the breast requiring lumpectomy and chemotherapy in remission since 10/2022, presented to [place] with syncopal episode and subsequent seizure. CTH with 3.5 x 3.5 cm  heterogeneous isoattenuating structure centered at the level of the left caudate head/basal ganglia at the level of the left frontal horn with significant vasogenic edema which results in approximately 1cm MLS.     Now s/p left craniotomy for tumor resection on 11/22  S/p L DHC on 11/24    Imaging:  CTH 11/17: CTH with 3.5 x 3.5 cm  heterogeneous isoattenuating structure centered at the level of the left caudate head/basal ganglia at the level of the left frontal horn measuring with significant vasogenic edema which results in approximately 1cm MLS.  MRI brain w/wo 11/18: L BG 4cm enhancing mass, 8mm MLS, 1.2cm frontoparietal enhancing lesion  MRI synaptive 11/18: completed for OR  CT C/A/P 11/18: pending read  CTH 11/22 post op good resection  MRI brain w w/o contrast 11/23 post op with gross total resection  CTH 11/24: interval increase in vasogenic edema; study obtained in the setting of refractory seizures.  CTH/CTA 11/24 post op: only cerebellum spared, still intracranial flow present, EVD in position    Plan  --Admitted to NCC post op, to stay today for neuromonitoring in post op period  --SBP <160   --Na 140-150  --AED per NCC  --Dex to 6q6  --HOB >30  --PPI while on steroids  --Ongoing GOC per NCC  --Pain control  --Continue to monitor clinically, notify NSGY immediately with any changes in neuro status    Dispo: per [facility]    Plan d/w Dr. Miles.

## 2023-11-25 NOTE — SUBJECTIVE & OBJECTIVE
Interval History: 11/25/2023 OR yesterday for [facility] for cerebral edema. Exam is 3T today. EVD in place likely nonfunctional ongoing GOC per NCC.    Medications:  Continuous Infusions:   dextrose 5 % (D5W) 75 mL/hr at 11/25/23 0759    phenylephrine 2 mcg/kg/min (11/25/23 0301)    vasopressin 0.04 Units/min (11/25/23 0301)     Scheduled Meds:   dexAMETHasone  6 mg Intravenous Q6H    famotidine (PF)  20 mg Intravenous BID    fentaNYL  100 mcg Intravenous Once    levETIRAcetam (Keppra) IV (PEDS and ADULTS)  1,000 mg Intravenous Q12H    LIDOcaine-EPINEPHrine 1%-1:100,000  10 mL Intradermal Once    mupirocin   Nasal BID    mupirocin   Nasal BID    polyethylene glycol  17 g Oral Daily     PRN Meds:magnesium oxide, magnesium oxide, metoclopramide HCl, ondansetron, potassium bicarbonate, potassium bicarbonate, potassium bicarbonate, potassium, sodium phosphates, potassium, sodium phosphates, potassium, sodium phosphates     Review of Systems  Objective:     Weight: 61.2 kg (135 lb)  Body mass index is 26.37 kg/m².  Vital Signs (Most Recent):  Temp: 97.2 °F (36.2 °C) (11/25/23 0734)  Pulse: 90 (11/25/23 0734)  Resp: 16 (11/25/23 0734)  BP: 90/68 (11/25/23 0734)  SpO2: 100 % (11/25/23 0734) Vital Signs (24h Range):  Temp:  [90.9 °F (32.7 °C)-100 °F (37.8 °C)] 97.2 °F (36.2 °C)  Pulse:  [] 90  Resp:  [14-28] 16  SpO2:  [98 %-100 %] 100 %  BP: ()/(53-96) 90/68  Arterial Line BP: ()/() 140/103     Date 11/25/23 0700 - 11/26/23 0659   Shift 7060-6627 6948-2963 1918-2243 24 Hour Total   INTAKE   Shift Total(mL/kg)       OUTPUT   Urine(mL/kg/hr) 90   90   Drains 2   2   Shift Total(mL/kg) 92(1.5)   92(1.5)   Weight (kg) 61.2 61.2 61.2 61.2                Vent Mode: A/C  Oxygen Concentration (%):  [30-40] 30  Resp Rate Total:  [14 br/min-28 br/min] 16 br/min  Vt Set:  [400 mL] 400 mL  PEEP/CPAP:  [5 cmH20] 5 cmH20  Mean Airway Pressure:  [7.6 cmH20-10 cmH20] 7.8 cmH20        Female External Urinary Catheter  11/24/23 0200 (Active)   Skin no redness;no breakdown 11/24/23 0701   Tolerance no signs/symptoms of discomfort 11/24/23 0701   Suction Continuous suction at 70 mmHg 11/24/23 0701   Date of last wick change 11/24/23 11/24/23 0701   Time of last wick change 0726 11/24/23 0701   Output (mL) 400 mL 11/24/23 0641          Physical Exam         Neurosurgery Physical Exam    E1VtM1  RP/LP 5mm NR  Absent corneal  No movement in extremities to noxious stim.    Significant Labs:  Recent Labs   Lab 11/24/23  0354 11/24/23  0824 11/24/23  1804 11/25/23  0448 11/25/23  0619   *  --   --  365*  --    *   < > 166* 156* 155*   K 3.9  --   --  3.7  --      --   --  130*  --    CO2 23  --   --  19*  --    BUN 12  --   --  21*  --    CREATININE 0.6  --   --  1.1  --    CALCIUM 8.2*  --   --  8.0*  --    MG 1.8  --   --   --   --     < > = values in this interval not displayed.       Recent Labs   Lab 11/24/23  0354 11/24/23  1226 11/25/23  0015   WBC 18.66*  --  32.98*   HGB 9.9*  --  9.5*   HCT 31.1* 30* 30.3*     --  157       Recent Labs   Lab 11/24/23  1136   INR 1.1   APTT 21.7     Microbiology Results (last 7 days)       ** No results found for the last 168 hours. **          All pertinent labs from the last 24 hours have been reviewed.    Significant Diagnostics:  I have reviewed all pertinent imaging results/findings within the past 24 hours.  CT Head Without Contrast    Result Date: 11/24/2023  Patient is status post left frontal craniotomy for resection of mass centered in the left basal ganglia. Interval increased edema through the left frontal lobe.  This results in increased mass effect with near complete effacement of the left lateral ventricle, effacement of the 3rd ventricle, and 6 mm rightward midline shift (although midline shift is not significantly changed from prior, overall mass effect appears increased).  There is effacement of the basal cisterns at the level of the midbrain. This  report was flagged in Epic as abnormal. The critical information above was 1st observed on 11/24/2023 at 04:14 by Teagan Jean MD, who reported the results by telephone to EVARISTO Carter, on 11/24/2023 at 04:17.  Patient name and medical record number were verified, and read-back was performed. Electronically signed by resident: Teagan Jean Date:    11/24/2023 Time:    03:39 Electronically signed by: Shivam Packer Date:    11/24/2023 Time:    04:27    X-Ray Chest AP Portable    Result Date: 11/24/2023  Low lying endotracheal tube terminating at the fartun.  Recommend retracting 3-4 cm. No confluent airspace consolidation. This report was flagged in Epic as abnormal. Electronically signed by: Jonathan Hutton MD Date:    11/24/2023 Time:    03:55

## 2023-11-25 NOTE — PROGRESS NOTES
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 Patient had been complaining prior of headache which was described as bitemporal frontal for the last 7 days. Patient then noted to have seizure activity in ED and intubated for airway protection. CTH with 3.5 x 3.5 cm  heterogeneous isoattenuating structure centered at the level of the left caudate head/basal ganglia at the level of the left frontal horn measuring with significant vasogenic edema which results in approximately 1cm MLS. Family bedside and deny AC/AP.     Post-Op Info:  Procedure(s) (LRB):  Left hemicraniectomy with EVD placement (Left)   1 Day Post-Op   Interval History: 11/25/2023 OR yesterday for [facility] for cerebral edema. Exam is 3T today. EVD in place likely nonfunctional ongoing GOC per NCC.    Medications:  Continuous Infusions:   dextrose 5 % (D5W) 75 mL/hr at 11/25/23 0759    phenylephrine 2 mcg/kg/min (11/25/23 0301)    vasopressin 0.04 Units/min (11/25/23 0301)     Scheduled Meds:   dexAMETHasone  6 mg Intravenous Q6H    famotidine (PF)  20 mg Intravenous BID    fentaNYL  100 mcg Intravenous Once    levETIRAcetam (Keppra) IV (PEDS and ADULTS)  1,000 mg Intravenous Q12H    LIDOcaine-EPINEPHrine 1%-1:100,000  10 mL Intradermal Once    mupirocin   Nasal BID    mupirocin   Nasal BID    polyethylene glycol  17 g Oral Daily     PRN Meds:magnesium oxide, magnesium oxide, metoclopramide HCl, ondansetron, potassium bicarbonate, potassium bicarbonate, potassium bicarbonate, potassium, sodium phosphates, potassium, sodium phosphates, potassium, sodium phosphates     Review of Systems  Objective:     Weight: 61.2 kg (135 lb)  Body mass index is 26.37 kg/m².  Vital Signs (Most Recent):  Temp: 97.2 °F  (36.2 °C) (11/25/23 0734)  Pulse: 90 (11/25/23 0734)  Resp: 16 (11/25/23 0734)  BP: 90/68 (11/25/23 0734)  SpO2: 100 % (11/25/23 0734) Vital Signs (24h Range):  Temp:  [90.9 °F (32.7 °C)-100 °F (37.8 °C)] 97.2 °F (36.2 °C)  Pulse:  [] 90  Resp:  [14-28] 16  SpO2:  [98 %-100 %] 100 %  BP: ()/(53-96) 90/68  Arterial Line BP: ()/() 140/103     Date 11/25/23 0700 - 11/26/23 0659   Shift 6191-7264 2823-4363 5279-4388 24 Hour Total   INTAKE   Shift Total(mL/kg)       OUTPUT   Urine(mL/kg/hr) 90   90   Drains 2   2   Shift Total(mL/kg) 92(1.5)   92(1.5)   Weight (kg) 61.2 61.2 61.2 61.2                Vent Mode: A/C  Oxygen Concentration (%):  [30-40] 30  Resp Rate Total:  [14 br/min-28 br/min] 16 br/min  Vt Set:  [400 mL] 400 mL  PEEP/CPAP:  [5 cmH20] 5 cmH20  Mean Airway Pressure:  [7.6 cmH20-10 cmH20] 7.8 cmH20        Female External Urinary Catheter 11/24/23 0200 (Active)   Skin no redness;no breakdown 11/24/23 0701   Tolerance no signs/symptoms of discomfort 11/24/23 0701   Suction Continuous suction at 70 mmHg 11/24/23 0701   Date of last wick change 11/24/23 11/24/23 0701   Time of last wick change 0726 11/24/23 0701   Output (mL) 400 mL 11/24/23 0641         Physical Exam         Neurosurgery Physical Exam    E1VtM1  RP/LP 5mm NR  Absent corneal  No movement in extremities to noxious stim.    Significant Labs:  Recent Labs   Lab 11/24/23  0354 11/24/23  0824 11/24/23  1804 11/25/23  0448 11/25/23  0619   *  --   --  365*  --    *   < > 166* 156* 155*   K 3.9  --   --  3.7  --      --   --  130*  --    CO2 23  --   --  19*  --    BUN 12  --   --  21*  --    CREATININE 0.6  --   --  1.1  --    CALCIUM 8.2*  --   --  8.0*  --    MG 1.8  --   --   --   --     < > = values in this interval not displayed.       Recent Labs   Lab 11/24/23  0354 11/24/23  1226 11/25/23  0015   WBC 18.66*  --  32.98*   HGB 9.9*  --  9.5*   HCT 31.1* 30* 30.3*     --  157       Recent Labs    Lab 11/24/23  1136   INR 1.1   APTT 21.7     Microbiology Results (last 7 days)       ** No results found for the last 168 hours. **          All pertinent labs from the last 24 hours have been reviewed.    Significant Diagnostics:  I have reviewed all pertinent imaging results/findings within the past 24 hours.  CT Head Without Contrast    Result Date: 11/24/2023  Patient is status post left frontal craniotomy for resection of mass centered in the left basal ganglia. Interval increased edema through the left frontal lobe.  This results in increased mass effect with near complete effacement of the left lateral ventricle, effacement of the 3rd ventricle, and 6 mm rightward midline shift (although midline shift is not significantly changed from prior, overall mass effect appears increased).  There is effacement of the basal cisterns at the level of the midbrain. This report was flagged in Epic as abnormal. The critical information above was 1st observed on 11/24/2023 at 04:14 by Teagan Jean MD, who reported the results by telephone to EVARISTO Carter, on 11/24/2023 at 04:17.  Patient name and medical record number were verified, and read-back was performed. Electronically signed by resident: Teagan Jean Date:    11/24/2023 Time:    03:39 Electronically signed by: Shivam Packer Date:    11/24/2023 Time:    04:27    X-Ray Chest AP Portable    Result Date: 11/24/2023  Low lying endotracheal tube terminating at the fartun.  Recommend retracting 3-4 cm. No confluent airspace consolidation. This report was flagged in Epic as abnormal. 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 CTH with 3.5 x 3.5 cm  heterogeneous isoattenuating structure centered at the level of  the left caudate head/basal ganglia at the level of the left frontal horn with significant vasogenic edema which results in approximately 1cm MLS.     Now s/p left craniotomy for tumor resection on 11/22  S/p L DHC on 11/24    Imaging:  CTH 11/17: CTH with 3.5 x 3.5 cm  heterogeneous isoattenuating structure centered at the level of the left caudate head/basal ganglia at the level of the left frontal horn measuring with significant vasogenic edema which results in approximately 1cm MLS.  MRI brain w/wo 11/18: L BG 4cm enhancing mass, 8mm MLS, 1.2cm frontoparietal enhancing lesion  MRI synaptive 11/18: completed for OR  CT C/A/P 11/18: pending read  CTH 11/22 post op good resection  MRI brain w w/o contrast 11/23 post op with gross total resection  CTH 11/24: interval increase in vasogenic edema; study obtained in the setting of refractory seizures.  CTH/CTA 11/24 post op: only cerebellum spared, still intracranial flow present, EVD in position    Plan  --Admitted to NCC post op, to stay today for neuromonitoring in post op period  --SBP <160   --Na 140-150  --AED per NCC  --Dex to 6q6  --HOB >30  --PPI while on steroids  --Ongoing GOC per NCC  --Pain control  --Continue to monitor clinically, notify NSGY immediately with any changes in neuro status    Dispo: per NCC    Plan d/w Dr. Miles.        Edwige Sandoval MD  Neurosurgery  [facility] - Neuro Critical Care

## 2023-11-25 NOTE — SIGNIFICANT EVENT
Brain Death Note  Neurocritical Care    Irreversible, proximate cause: Cerebral edema secondary to peritumoral swelling complicated by status epilepticus with brainstem herniation  [x] No paralytics or sedatives by history, levels, or testing, or expected clearance of paralytics or sedatives after >=5 half-lives  [x] No severe electrolyte, acid-base, or endocrine disturbances  [x] T >=36 C  [x] MAP >=65 or SBP >=100    Temp: 97.9 °F (36.6 °C) (23)  Pulse: 86 (23)  Resp: 11 (23)  BP: 105/65 (23)  SpO2: 100 % (23)    Chem:   Recent Labs   Lab 23  0448 23  0619 23  1232   *   < > 154*  154*   K 3.7  --  4.2   *  --  129*   CO2 19*  --  18*   BUN 21*  --  23*   CREATININE 1.1  --  1.0   *  --  338*   CALCIUM 8.0*  --  8.0*   MG 2.8*  --   --    PHOS 3.1  --   --    AST 40  --   --    ALT 45*  --   --    ALKPHOS 35*  --   --    BILITOT 0.5  --   --    ALBUMIN 2.3*  --   --    PROT 5.1*  --   --     < > = values in this interval not displayed.     Heme:   Recent Labs   Lab 23  0015   WBC 32.98*   HGB 9.5*            Neurologic examination:  Comatose. No response to central or peripheral pain in eyes, face, arms, or legs.  No pupillary reflex bilateral. No corneal reflex bilateral. No oculocephalic reflex bilateral. No cold caloric oculovestibular reflex bilateral. No gag reflex. No cough reflex.    Apnea test: After 8 min of apnea, no respirations were observed and PaCO2 paddy to >60 mmHg or by more than 20 mmH -> 76mmHg    Lab Results   Component Value Date/Time    PH 7.143 (LL) 2023 05:35 PM    PH 7.354 2023 03:17 PM    PCO2 76.2 (HH) 2023 05:35 PM    PCO2 38.2 2023 03:17 PM    PO2 342 (H) 2023 05:35 PM    PO2 135 (H) 2023 03:17 PM    HCO3 26.1 2023 05:35 PM    HCO3 21.2 (L) 2023 03:17 PM    BE -3 (L) 2023 05:35 PM    BE -4 (L) 2023 03:17 PM         Ancillary tests: None needed, tolerated apnea    Time of death: 17:31 on 11/25/2023.    This determination is diagnostic of brain death.      Patient's family and loved ones were present for entirety of determination process.    Juan Carlos Pascual,   Neuro Critical Care  11/25/2023  5:45 PM

## 2023-11-25 NOTE — NURSING
2300: pt's Na- 166. Notified EVARISTO Jensen at bedside. She ordered for D5W(5%) 500 ml/hr and continuous gtt of d5w 75 ml/hr.

## 2023-11-25 NOTE — PLAN OF CARE
[facility] Care Plan       POC reviewed with Esperanza Peters and family at 1400. Pt unable to verbalize understanding. Questions and concerns addressed. No acute events today.  Will continue to monitor. See below and flowsheets for full assessment and VS info.   - CT x 2  - Mosley placed  - A line inserted  - PRN tylenol given x 1   - central line placed.  - ns bolus x 1  -       Is this a stroke patient? no    Neuro:  [place] Coma Scale  Best Eye Response: 1-->(E1) none  Best Motor Response: 1-->(M1) none  Best Verbal Response: 1-->(V1) none  [place] Coma Scale Score: 3  Assessment Qualifiers: no eye obstruction present, patient chemically sedated or paralyzed, patient intubated  Pupil PERRLA: no     24 hr Temp:  [90.9 °F (32.7 °C)-99 °F (37.2 °C)]     CV:   Rhythm: normal sinus rhythm  BP goals:   SBP < 140  MAP > 65    Resp:      Vent Mode: A/C  Set Rate: 20 BPM  Oxygen Concentration (%): 40  Vt Set: 400 mL  PEEP/CPAP: 5 cmH20  Pressure Support: 5 cmH20    Plan: wean to extubate    GI/:     Diet/Nutrition Received: NPO  Last Bowel Movement: 11/22/23  Voiding Characteristics: external catheter    Intake/Output Summary (Last 24 hours) at 11/24/2023 1847  Last data filed at 11/24/2023 1800  Gross per 24 hour   Intake 3013.84 ml   Output 2500 ml   Net 513.84 ml     Unmeasured Output  Urine Occurrence: 1  Stool Occurrence: 1  Pad Count: 3    Labs/Accuchecks:  Recent Labs   Lab 11/24/23  0354   WBC 18.66*   RBC 4.00   HGB 9.9*   HCT 31.1*         Recent Labs   Lab 11/24/23  0354 11/24/23  0824 11/24/23  1543   *   < > 153*   K 3.9  --   --    CO2 23  --   --      --   --    BUN 12  --   --    CREATININE 0.6  --   --    ALKPHOS 32*  --   --    ALT 61*  --   --    AST 26  --   --    BILITOT 0.4  --   --     < > = values in this interval not displayed.      Recent Labs   Lab 11/24/23  1136   INR 1.1   APTT 21.7      Recent Labs   Lab 11/18/23  0127   *       Electrolytes: N/A - electrolytes  WDL  Accuchecks: none    Gtts:   sodium chloride 0.9% 75 mL/hr at 11/24/23 1800    phenylephrine 4 mcg/kg/min (11/24/23 1825)    vasopressin 0.04 Units/min (11/24/23 1823)       LDA/Wounds:  Lines/Drains/Airways       Central Venous Catheter Line  Duration             Percutaneous Central Line Insertion/Assessment - Triple Lumen  11/24/23 1117 Femoral Vein Right;Femoral Right <1 day              Drain  Duration                  Closed/Suction Drain 11/24/23 1245 Tube - 1 Left Other (Comment) Accordion 10 Fr. <1 day         ICP/Ventriculostomy 11/24/23 1239 Ventricular drainage catheter Left  <1 day         ICP/Ventriculostomy 11/24/23 1355 Ventricular drainage catheter with ICP microsensor Right <1 day         Urethral Catheter 11/24/23 1120 <1 day              Airway  Duration                Airway Anesthesia 11/24/23 <1 day              Arterial Line  Duration             Arterial Line 11/24/23 1110 Right Radial <1 day              Peripheral Intravenous Line  Duration                  Peripheral IV - Single Lumen 11/24/23 0355 20 G Anterior;Right Forearm <1 day         Peripheral IV - Single Lumen 11/24/23 0355 Anterior;Left Forearm <1 day                  Wounds: No  Wound care consulted: No

## 2023-11-26 NOTE — PT/OT/SLP DISCHARGE
Occupational Therapy Discharge Summary    Esperanza Peters  MRN: [id number]   Principal Problem: Brain mass      Patient Discharged from acute Occupational Therapy on 11/26.  Assessment:      Patient is no longer making progress.    Objective:     GOALS:   Multidisciplinary Problems       Occupational Therapy Goals          Problem: Occupational Therapy    Goal Priority Disciplines Outcome Interventions   Occupational Therapy Goal     OT, PT/OT Ongoing, Progressing    Description: Goals set 11/24 to be addressed for 14 days with expiration date, 12/8:  Patient will increase functional independence with ADLs by performing:    Patient will demonstrate rolling to the right with mod assist.  Not met   Patient will demonstrate rolling to the left with mod assist.   Not met  Patient will demonstrate supine -sit with mod assist.   Not met  Patient will demonstrate stand pivot transfers with mod assist.   Not met  Patient will demonstrate grooming while standing with mod assist.   Not met  Patient will demonstrate upper body dressing with mod assist while seated EOB.   Not met  Patient will demonstrate lower body dressing with mod assist while seated EOB.   Not met  Patient will demonstrate toileting with mod assist.   Not met  Patient will demonstrate bathing while seated EOB with mod assist.   Not met  Patient's family / caregiver will demonstrate independence and safety with assisting patient with self-care skills and functional mobility.     Not met                               Reasons for Discontinuation of Therapy Services  Transfer to alternate level of care.      Plan:     Patient Discharged to:  D/C OT services    11/26/2023

## 2023-11-26 NOTE — ASSESSMENT & PLAN NOTE
- Na 135 prior to transfer, now hypernatremic due to DI  - See Alteration in electrolyte and fluid balance

## 2023-11-26 NOTE — ASSESSMENT & PLAN NOTE
- For airway protection due to recurrent clinical seizures without return to baseline in setting of malignant cerebral edema  - See Brain mass

## 2023-11-26 NOTE — NURSING
Report received from ERNESTO Poe.  Patient remains on Levophed at 0.04 mcg/kg/min and vasopressin at 0.04 units/min. Vital signs stable.  Patient is a DNR.  No family present at bedside.

## 2023-11-26 NOTE — SUBJECTIVE & OBJECTIVE
Objective:     Vitals:  Temp: 97.9 °F (36.6 °C)  Pulse: 86  Rhythm: normal sinus rhythm  BP: 105/65  MAP (mmHg): 80  ICP Mean (mmHg): 2 mmHg  Resp: 11  SpO2: 100 %  Oxygen Concentration (%): 100  Vent Mode: A/C  Set Rate: (S) 9 BPM  Vt Set: 400 mL  PEEP/CPAP: 5 cmH20  Peak Airway Pressure: 21 cmH20  Mean Airway Pressure: 6.7 cmH20  Plateau Pressure: 0 cmH20    Temp  Min: 94.3 °F (34.6 °C)  Max: 100 °F (37.8 °C)  Pulse  Min: 83  Max: 121  BP  Min: 74/56  Max: 155/93  MAP (mmHg)  Min: 61  Max: 117  ICP Mean (mmHg)  Min: 1 mmHg  Max: 16 mmHg  Resp  Min: 9  Max: 17  SpO2  Min: 100 %  Max: 100 %  Oxygen Concentration (%)  Min: 30  Max: 100    11/24 0701 - 11/25 0700  In: 4416.9 [I.V.:2960.9]  Out: 3627 [Urine:3510; Drains:117]   Unmeasured Output  Urine Occurrence: 1  Stool Occurrence: 1  Pad Count: 3        Physical Exam  GA: unresponsive,  no acute distress noted. No response to external stimuli  HEENT: No scleral icterus, eyes slightly reddened  Pulmonary: mechanical bs throughout  Cardiac: regular rate, no clear arrhythmias  Abdominal: non-distended, soft  Skin: No grossly noticeable rashes     Neuro:  No sedation x25 hours at time of examination  --GCS: [id number]  --Mental Status:unresponsive to loud voice and noxious stimuli, no evidence of consciousness, does not regard  --Pupils 6mm and fixed bilaterally  --brainstem: Corneals absent bilaterally, VORs absent, cough absent, gag absent, noxious stim to face without pain response, no breaths over ventilator.   --Motor: no response to noxious stimulation throughout       Medications:  Continuousdextrose 5 % (D5W), Last Rate: 75 mL/hr at 11/25/23 1800  NORepinephrine bitartrate-D5W, Last Rate: 0.04 mcg/kg/min (11/25/23 1800)  vasopressin, Last Rate: 0.04 Units/min (11/25/23 1800)    ScheduleddexAMETHasone, 6 mg, Q6H  famotidine (PF), 20 mg, BID  levETIRAcetam (Keppra) IV (PEDS and ADULTS), 1,000 mg, Q12H  mupirocin, , BID  NORepinephrine bitartrate-D5W, ,      PRNsodium chloride 0.9%, , PRN  artificial tears, 1 drop, PRN  magnesium sulfate IVPB, 2 g, PRN  magnesium sulfate IVPB, 4 g, PRN  metoclopramide HCl, 5 mg, Q6H PRN  NORepinephrine bitartrate-D5W, ,   ondansetron, 4 mg, Q8H PRN  potassium chloride in water, 40 mEq, PRN   And  potassium chloride in water, 60 mEq, PRN   And  potassium chloride in water, 80 mEq, PRN      Today I personally reviewed pertinent medications, lines/drains/airways, imaging, cardiology results, laboratory results, microbiology results,     Diet  Diet NPO  Diet NPO    TF held

## 2023-11-26 NOTE — ASSESSMENT & PLAN NOTE
- At OSH prior to admission  - s/p 1mg IV Ativan, 1.5g IV Keppra, 6mg IV dexamethasone  - No PMHx of this  - See Brain mass   - Continue Keppra 500 BID    11/24: Development of recurrent generalized seizures and deteriorating neurological exam requiring intubation and high-dose anti seizure medications + anesthetic, with worsening cerebral edema on CT. See appropriate section.

## 2023-11-26 NOTE — ASSESSMENT & PLAN NOTE
Related to diffuse cerebral edema  Loaded with keppra, intubated and placed on propofol non-titrating infusion 20mcg/kg/hr  Will get formal eeg post-operatively depending upon surgical site dressing   - defer due to loss of brainstem reflexes

## 2023-11-26 NOTE — ASSESSMENT & PLAN NOTE
32-year-old female with PMHx of HER2 positive breast cancer s/p bilateral mastectomy (Jan 2022) and chemo (completed Oct 2022; declined radiation) and chronic HA presents as transfer from [place] w/ brain mass of L caudate head/L BG with frontal lobe vasogenic edema on CTH. Presented to OSH c/o syncopal episode earlier today, intermittent AMS since. Hyperreflexia noted to RUE and RLE, GCS was 12 and had dysconjugate gaze. Treated for seizure. Given 1mg Ativan IV (apparently w/ improvement), 1.5g Keppra and 6mg Decadron IV. No PMHx seizures. Intubated for airway protection. Transported without issue. Arrived on propofol w/ intermittent BLE tremors and full body tremors. EEG cap placed and Epilepsy reported negative prelim read. Holding further steroids until MRI results in event that lymphoma present. MRI W/WO w L basal ganglia mass.    11/22: s/p L frontal crani for resection of L BG mass w Dr. Miles.   Initial post op exam w new R facial droop, RUE 3/5, RLE 3/5, Ox2      - Admitted to NCC post op   - Q1H neuro checks, I/O, VS  - NSGY following  - Post op CTH stable  - Keppra 1000mg BID - given 2g overnight  - Dex 4q6h - increased to 6q6h  - Famotidine  - SBP goal <160   - PRN labetalol, hydralazine  - Daily CBC, CMP, Mg, Phos  - mIVF NS @ 50 until PO intake in AM  - SCDs placed, SQH held in acute phase   - PT/OT/SLP consulted  - Nutrition, PMR, and SW/CM consulted    -11/24: significant event overnight of rapidly worsening exam with clinical seizure activity and CTH with rapidly progressively worsening cerebral edema. Patient given hypertonic saline bolus and mannitol as well as increased dexamethasone. Taken to the OR class A for crani and EVD. Family updated at bedside in great detail with NSGY and NCC teams present. Hyperventilated as temporizing measure while in transport to OR. CVC and a-line placed rapidly at bedside    11/25: Continued deterioration of exam despite maximal medical and surgical therapy with  loss of brainstem reflexes concerning for progression to brain death. Planning for formal studies this afternoon once electrolytes, ABG, core temp and blood pressure optimized. Family aware and planning to be present.

## 2023-11-26 NOTE — PROGRESS NOTES
[facility]  Neurocritical Care  Progress Note    Admit Date: 11/17/2023  Service Date: 11/25/2023  Length of Stay: 8    Subjective:     Chief Complaint: Brain mass    History of Present Illness: Esperanza Peters is a 32-year-old female with PMHx of HER2 positive breast cancer s/p bilateral mastectomy (Jan 2022) and neoadjuvant therapy w/ perjeta and herceptin (completed Oct 2022; declined radiation) and chronic HA who presents to [facility] as a transfer from [place] for NSGY and NCC evaluation of new finding of brain mass of L caudate head/L BG with frontal lobe vasogenic edema on CTH.     Patient presented to OSH c/o syncopal episode earlier today while at work with waxing and waning mentation since the episode. On initial OSH exam, hyperreflexia was noted to RUE and RLE. GCS was 12 and she had dysconjugate gaze. She was treated for seizure, and given 1mg Ativan IV, which was later followed by 1.5g Keppra and 6mg Decadron IV. Mentation reportedly improved after Ativan. She does not have a seizure history prior to this event.    On arrival to [facility], patient is post-intubation for airway protection during transport. She arrived on propofol gtt and exhibiting intermittent BLE tremors and full body tremors. EEG cap placed and Epilepsy reported no seizure activity on prelim read. STAT MRI W/WO is pending and NSGY has been contacted regarding patient's arrival. Labs and CXR and KUB confirming ETT and OGT placement are pending.     Patient's mother presents to bedside and notes that the patient frequently has severe HA, which the patient confirmed to be bitemporal frontal earlier today. Patient is unable to follow commands, though her mother noted a hand squeeze to LUE which included the movement of the second digit. Patient first localized to noxious stimuli on arrival and gradually began to move extremities spontaneously more frequently. Eyelids persistently slit open bilaterally and gaze dysconjugate.  Propofol gtt continued at 35 mcg/kg/min for now, titrating as indicated by RASS goals. Holding further steroids until MRI results in event that lymphoma present.    Hospital Course: 11/18/2023 - continues on mechanical ventilation. No acute events overnight. Plan for CT Chest/abdomen/pelvis  11/19/2023 - intervally extubated. No acute events overnight. Pending OR with neurosurgery tomorrow.  11/20/2023 - pending OR. Noted to be rescheduled for 11/22. No acute events overnight. stepped down to hospital medicine     11/22/2023 stepped up to NCC s/p L frontal crani for resection of L BG mass w Dr. Miles. New R facial droop, RUE 3/5, RLE 3/5, Ox2. Post op CTH stable. Plan for MRI in AM.  11/23/2023: brain imaging reviewed  11/24/2023  Recurrent seizures and progressively worsening exam overnight. Intubated and CTH consistent with diffuse cerebral edema. Given hts and mannitol and taken class A to OR.   11/25/2023: Examination continued to deteriorate post-op over yesterday evening despite maximal medical and surgical therapy for diffuse malignant cerebral edema. Brainstem reflexes noted to be lost overnight despite hyperventilation, hyperosmolar therapy, craniectomy and EVD placement, also developed diabetes insipidus and hypovolemia being replaced 1:1.  Planning on formal brain death study today, discussed with family at bedside extensively.      Objective:     Vitals:  Temp: 97.9 °F (36.6 °C)  Pulse: 86  Rhythm: normal sinus rhythm  BP: 105/65  MAP (mmHg): 80  ICP Mean (mmHg): 2 mmHg  Resp: 11  SpO2: 100 %  Oxygen Concentration (%): 100  Vent Mode: A/C  Set Rate: (S) 9 BPM  Vt Set: 400 mL  PEEP/CPAP: 5 cmH20  Peak Airway Pressure: 21 cmH20  Mean Airway Pressure: 6.7 cmH20  Plateau Pressure: 0 cmH20    Temp  Min: 94.3 °F (34.6 °C)  Max: 100 °F (37.8 °C)  Pulse  Min: 83  Max: 121  BP  Min: 74/56  Max: 155/93  MAP (mmHg)  Min: 61  Max: 117  ICP Mean (mmHg)  Min: 1 mmHg  Max: 16 mmHg  Resp  Min: 9  Max: 17  SpO2  Min:  100 %  Max: 100 %  Oxygen Concentration (%)  Min: 30  Max: 100    11/24 0701 - 11/25 0700  In: 4416.9 [I.V.:2960.9]  Out: 3627 [Urine:3510; Drains:117]   Unmeasured Output  Urine Occurrence: 1  Stool Occurrence: 1  Pad Count: 3        Physical Exam  GA: unresponsive,  no acute distress noted. No response to external stimuli  HEENT: No scleral icterus, eyes slightly reddened  Pulmonary: mechanical bs throughout  Cardiac: regular rate, no clear arrhythmias  Abdominal: non-distended, soft  Skin: No grossly noticeable rashes     Neuro:  No sedation x25 hours at time of examination  --GCS: [id number]  --Mental Status:unresponsive to loud voice and noxious stimuli, no evidence of consciousness, does not regard  --Pupils 6mm and fixed bilaterally  --brainstem: Corneals absent bilaterally, VORs absent, cough absent, gag absent, noxious stim to face without pain response, no breaths over ventilator.   --Motor: no response to noxious stimulation throughout       Medications:  Continuousdextrose 5 % (D5W), Last Rate: 75 mL/hr at 11/25/23 1800  NORepinephrine bitartrate-D5W, Last Rate: 0.04 mcg/kg/min (11/25/23 1800)  vasopressin, Last Rate: 0.04 Units/min (11/25/23 1800)    ScheduleddexAMETHasone, 6 mg, Q6H  famotidine (PF), 20 mg, BID  levETIRAcetam (Keppra) IV (PEDS and ADULTS), 1,000 mg, Q12H  mupirocin, , BID  NORepinephrine bitartrate-D5W, ,     PRNsodium chloride 0.9%, , PRN  artificial tears, 1 drop, PRN  magnesium sulfate IVPB, 2 g, PRN  magnesium sulfate IVPB, 4 g, PRN  metoclopramide HCl, 5 mg, Q6H PRN  NORepinephrine bitartrate-D5W, ,   ondansetron, 4 mg, Q8H PRN  potassium chloride in water, 40 mEq, PRN   And  potassium chloride in water, 60 mEq, PRN   And  potassium chloride in water, 80 mEq, PRN      Today I personally reviewed pertinent medications, lines/drains/airways, imaging, cardiology results, laboratory results, microbiology results,     Diet  Diet NPO  Diet NPO    TF held    Assessment/Plan:     Neuro  *  Brain mass  32-year-old female with PMHx of HER2 positive breast cancer s/p bilateral mastectomy (Jan 2022) and chemo (completed Oct 2022; declined radiation) and chronic HA presents as transfer from [place] w/ brain mass of L caudate head/L BG with frontal lobe vasogenic edema on CTH. Presented to OSH c/o syncopal episode earlier today, intermittent AMS since. Hyperreflexia noted to RUE and RLE, GCS was 12 and had dysconjugate gaze. Treated for seizure. Given 1mg Ativan IV (apparently w/ improvement), 1.5g Keppra and 6mg Decadron IV. No PMHx seizures. Intubated for airway protection. Transported without issue. Arrived on propofol w/ intermittent BLE tremors and full body tremors. EEG cap placed and Epilepsy reported negative prelim read. Holding further steroids until MRI results in event that lymphoma present. MRI W/WO w L basal ganglia mass.    11/22: s/p L frontal crani for resection of L BG mass w Dr. Miles.   Initial post op exam w new R facial droop, RUE 3/5, RLE 3/5, Ox2      - Admitted to NCC post op   - Q1H neuro checks, I/O, VS  - NSGY following  - Post op CTH stable  - Keppra 1000mg BID - given 2g overnight  - Dex 4q6h - increased to 6q6h  - Famotidine  - SBP goal <160   - PRN labetalol, hydralazine  - Daily CBC, CMP, Mg, Phos  - mIVF NS @ 50 until PO intake in AM  - SCDs placed, SQH held in acute phase   - PT/OT/SLP consulted  - Nutrition, PMR, and SW/CM consulted    -11/24: significant event overnight of rapidly worsening exam with clinical seizure activity and CTH with rapidly progressively worsening cerebral edema. Patient given hypertonic saline bolus and mannitol as well as increased dexamethasone. Taken to the OR class A for crani and EVD. Family updated at bedside in great detail with NSGY and NCC teams present. Hyperventilated as temporizing measure while in transport to OR. CVC and a-line placed rapidly at bedside    11/25: Continued deterioration of exam despite maximal medical and surgical  therapy with loss of brainstem reflexes concerning for progression to brain death. Planning for formal studies this afternoon once electrolytes, ABG, core temp and blood pressure optimized. Family aware and planning to be present.     Status epilepticus  Related to diffuse cerebral edema  Loaded with keppra, intubated and placed on propofol non-titrating infusion 20mcg/kg/hr  Will get formal eeg post-operatively depending upon surgical site dressing   - defer due to loss of brainstem reflexes    Observed seizure-like activity  - At OSH prior to admission  - s/p 1mg IV Ativan, 1.5g IV Keppra, 6mg IV dexamethasone  - No PMHx of this  - See Brain mass   - Continue Keppra 500 BID    11/24: Development of recurrent generalized seizures and deteriorating neurological exam requiring intubation and high-dose anti seizure medications + anesthetic, with worsening cerebral edema on CT. See appropriate section.    Brain compression  - 2/2 vasogenic edema  - See Brain mass     Acute metabolic encephalopathy    - See Brain mass     Vasogenic brain edema  - See Brain mass     Pulmonary  On mechanically assisted ventilation  - For airway protection due to recurrent clinical seizures without return to baseline in setting of malignant cerebral edema  - See Brain mass       Renal/  Hyponatremia  - Na 135 prior to transfer, now hypernatremic due to DI  - See Alteration in electrolyte and fluid balance     Oncology  Malignant neoplasm of overlapping sites of left female breast  - Bilateral mastectomy (Jan 2022)  - Completed chemo (Oct 2022)  - Declined radiation  - CT C/A/P with contrast completed          The patient is being Prophylaxed for:  Venous Thromboembolism with: Mechanical  Stress Ulcer with: H2B  Ventilator Pneumonia with: chlorhexidine oral care    Activity Orders            Diet NPO: NPO starting at 11/25 1053    Turn patient starting at 11/25 0036    Progressive Mobility Protocol (mobilize patient to their highest level  of functioning at least twice daily) starting at 11/18 0800          Full Code    Critical condition in that Patient has a condition that poses threat to life and bodily function: see associated documentation     60 minutes of Critical care time was spent personally by me on the following activities: development of treatment plan with patient or surrogate and bedside caregivers, discussions with consultants, evaluation of patient's response to treatment, examination of patient, ordering and performing treatments and interventions, ordering and review of laboratory studies, ordering and review of radiographic studies, pulse oximetry, antibiotic titration if applicable, vasopressor titration if applicable, re-evaluation of patient's condition. This critical care time did not overlap with that of any other provider or involve time for any procedures. There is high probability for acute neurological change leading to clinical and possibly life-threatening deterioration requiring highest level of physician preparedness for urgent intervention.        Juan Carlos Pascual, DO  Neurocritical Care  [facility] Neuro Critical [facility]

## 2023-11-26 NOTE — ACP (ADVANCE CARE PLANNING)
Advance Care Planning     Today a voluntary meeting took place: ICU    Patient Participation: Patient is unable to participate     Attendees (Name and  Relationship to patient):  Patient family including mother, siblings, and other family members.     Staff attendees (Name and  Role): Myself (Juan Carlos Pascual DO neurocritical care staff physician), Wendy Leo (neuro critical care nurse practitioner), Gi Samano (neuro ICU registered nurse)    ACP Conversation (General): Understanding of current condition - Met with family at bedside to discuss patient's extremely challenging past 36 hours, from development of acute encephalopathy and status epilepticus, worsening cerebral edema refractory to maximal medical therapy requiring emergent neurosurgical decompressive hemicraniectomy and EVD, and progressive loss of brainstem reflexes despite all of above. Explained that we do not have a single clear cause of her profound edema and decline to point to, and agree with neurosurgery's assessment that this was likely multifactorial in setting of residual tumor burden despite maximal resection, development of severe seizures, and associated peritumoral edema. Discussed that regardless of underlying cause, patient has potentially progressed to brain death and that we are obliged to evaluate for this under the circumstances.  Patient's mother Rita expressed understanding and requested to allow some family members to be present for the formal evaluations, which I was agreeable to.       Code Status: Full Code pending brain death evaluation    Goals of care: The family endorses that what is most important right now is to focus on  recovery if possible but recognized that brain death, if determined, is medically and legally consistent with cardiopulmonary death.    Accordingly, we have decided that the best plan to meet the patient's goals includes  obtaining brain death evaluation, continuing care up through  this.      Length of ACP   conversation in minutes: 40 minutes     Juan Carlos Pascual DO  Neuro Critical Care  11/25/2023  7:04 PM

## 2023-11-27 NOTE — ASSESSMENT & PLAN NOTE
32-year-old female with PMHx of HER2 positive breast cancer s/p bilateral mastectomy (Jan 2022) and chemo (completed Oct 2022; declined radiation) and chronic HA presents as transfer from [place] w/ brain mass of L caudate head/L BG with frontal lobe vasogenic edema on CTH. Presented to OSH c/o syncopal episode earlier today, intermittent AMS since. Hyperreflexia noted to RUE and RLE, GCS was 12 and had dysconjugate gaze. Treated for seizure. Given 1mg Ativan IV (apparently w/ improvement), 1.5g Keppra and 6mg Decadron IV. No PMHx seizures. Intubated for airway protection. Transported without issue. Arrived on propofol w/ intermittent BLE tremors and full body tremors. EEG cap placed and Epilepsy reported negative prelim read. Holding further steroids until MRI results in event that lymphoma present. MRI W/WO w L basal ganglia mass.    11/22: s/p L frontal crani for resection of L BG mass w Dr. Milse.   Initial post op exam w new R facial droop, RUE 3/5, RLE 3/5, Ox2      - Admitted to NCC post op with hourly neuro checks, I/O, VS  - NSGY followed along throughout admission   - Post op CTH stable  - Keppra 1000mg BID - given 2g overnight  - Dex 4q6h - increased to 6q6h  - Famotidine  - SBP goal <160   - PRN labetalol, hydralazine  - Daily CBC, CMP, Mg, Phos  - mIVF NS @ 50 until PO intake in AM  - SCDs placed, SQH held in acute phase   - PT/OT/SLP consulted  - Nutrition, PMR, and SW/CM consulted    -11/24: significant event overnight of rapidly worsening exam with clinical seizure activity and CTH with rapidly progressively worsening cerebral edema. Patient given hypertonic saline bolus and mannitol as well as increased dexamethasone. Taken to the OR class A for crani and EVD. Family updated at bedside in great detail with NSGY and NCC teams present. Hyperventilated as temporizing measure while in transport to OR. CVC and a-line placed rapidly at bedside    11/25: Continued deterioration of exam despite maximal  medical and surgical therapy with loss of brainstem reflexes concerning for progression to brain death. Planning for formal studies this afternoon once electrolytes, ABG, core temp and blood pressure optimized. Family aware and planning to be present.

## 2023-11-27 NOTE — PLAN OF CARE
[facility] Neuro Critical [facility]  Discharge Final Note    Primary Care Provider: No, Primary Doctor    Expected Discharge Date: 2023]    Per MD: Time of death: 17:31 on 2023.     This determination is diagnostic of brain death.    Patient extubated per MD.     Final Discharge Note (most recent)       Final Note - 23 1011          Final Note    Assessment Type Final Discharge Note     Anticipated Discharge Disposition                    Juhi Pierson RN, CCRN-K, [facility]  Neuro-Critical Care   [phone]    Important Message from Medicare

## 2023-11-27 NOTE — PROGRESS NOTES
[facility] Neuro Critical Care  Adult Nutrition  Progress Note    SUMMARY       Recommendations    Please re-consult if nutrition intervention warranted.    Reason for Assessment    Reason For Assessment: RD follow-up  Diagnosis: cancer diagnosis/related complications (Brain Mass)  Relevant Medical History: Breast CA w/ mastectomy  Interdisciplinary Rounds: did not attend    General Information Comments: Pt extubated this AM for withdraw of care.    Nutrition Follow-Up    RD Follow-up?: No

## 2023-11-27 NOTE — DISCHARGE SUMMARY
Death Note  Neurocritical Care      Admit Date: 2023    Date of Death: 2023    Attending Physician: Jarred Pascual     Principal Diagnoses: Brain mass    Preliminary Cause of Death: Brain mass    Other Secondary Diagnoses:   Patient Active Problem List   Diagnosis    Routine gynecological examination    Malignant neoplasm of overlapping sites of left female breast    Mass of upper outer quadrant of left breast    Mass of lower inner quadrant of left breast    Mass overlapping multiple quadrants of left breast    HER2-positive carcinoma of breast    ER+ (estrogen receptor positive status)    Chemotherapy-induced neutropenia    Dehydration    Fever in immunocompromised patient    Hypokalemia    Microcytic anemia    Port-A-Cath in place    Brain mass    Vasogenic brain edema    Acute metabolic encephalopathy    On mechanically assisted ventilation    Hyponatremia    Alteration in electrolyte and fluid balance    Brain compression    Observed seizure-like activity    Status epilepticus       Discharged Condition:     HPI & Hospital Course: Esperanza Peters is a 32-year-old female with PMHx of HER2 positive breast cancer s/p bilateral mastectomy (2022) and neoadjuvant therapy w/ perjeta and herceptin (completed Oct 2022; declined radiation) and chronic HA who presents to [facility] as a transfer from [place] for NSGY and NCC evaluation of new finding of brain mass of L caudate head/L BG with frontal lobe vasogenic edema on CTH.      Patient presented to OSH c/o syncopal episode earlier today while at work with waxing and waning mentation since the episode. On initial OSH exam, hyperreflexia was noted to RUE and RLE. GCS was 12 and she had dysconjugate gaze. She was treated for seizure, and given 1mg Ativan IV, which was later followed by 1.5g Keppra and 6mg Decadron IV. Mentation reportedly improved after Ativan. She does not have a seizure history prior to this event.     On arrival to [facility], patient is  post-intubation for airway protection during transport. She arrived on propofol gtt and exhibiting intermittent BLE tremors and full body tremors. EEG cap placed and Epilepsy reported no seizure activity on prelim read. STAT MRI W/WO is pending and NSGY has been contacted regarding patient's arrival. Labs and CXR and KUB confirming ETT and OGT placement are pending.      Patient's mother presents to bedside and notes that the patient frequently has severe HA, which the patient confirmed to be bitemporal frontal earlier today. Patient is unable to follow commands, though her mother noted a hand squeeze to LUE which included the movement of the second digit. Patient first localized to noxious stimuli on arrival and gradually began to move extremities spontaneously more frequently. Eyelids persistently slit open bilaterally and gaze dysconjugate. Propofol gtt continued at 35 mcg/kg/min for now, titrating as indicated by RASS goals. Holding further steroids until MRI results in event that lymphoma present.       11/18/2023 - continues on mechanical ventilation. No acute events overnight. Plan for CT Chest/abdomen/pelvis  11/19/2023 - intervally extubated. No acute events overnight. Pending OR with neurosurgery tomorrow.  11/20/2023 - pending OR. Noted to be rescheduled for 11/22. No acute events overnight. stepped down to hospital medicine   11/22/2023 stepped up to [facility] s/p L frontal crani for resection of L BG mass w Dr. Miles. New R facial droop, RUE 3/5, RLE 3/5, Ox2. Post op CTH stable. Plan for MRI in AM.  11/23/2023: brain imaging reviewed  11/24/2023  Recurrent seizures and progressively worsening exam overnight. Intubated and CTH consistent with diffuse cerebral edema. Given hts and mannitol and taken class A to OR.   11/25/2023: Examination continued to deteriorate post-op over yesterday evening despite maximal medical and surgical therapy for diffuse malignant cerebral edema. Brainstem reflexes noted to be  lost overnight despite hyperventilation, hyperosmolar therapy, craniectomy and EVD placement, also developed diabetes insipidus and hypovolemia being replaced 1:1.  Planning on formal brain death study today, discussed with family at bedside extensively.  11/26/2023: Patient declared braindead 11/25 evening. Somatic support until all family arrives.   11/27/2023: Somatic support discontinued after family arrival.     See Dr. Pascual's documentation from 11/25 and 11/26 for detailed outline of end-of-life events.     Indira Huffman PA-C  Neuro Critical Care  [phone]

## 2023-11-27 NOTE — PROGRESS NOTES
[facility]  Neurocritical Care  Limited Progress Note    Admit Date: 2023  Service Date: 2023  Length of Stay: 9    Subjective:     Chief Complaint: Brain mass, brain herniation    Interval: Patient declared  yes via determination of brain death yesterday, please see appropriate documentation.  Decedent's body maintained on mechanical ventilation and non-titrating vasopressor support with DNR status, while adequate time provided for family to visit decedent at bedside prior to removal of somatic support.  Plan for removal of support tomorrow morning, confirmed with decedent's mother.      Objective:     Physical exam deferred due to  status.  Nursing vitals charted periodically per unit policy but otherwise no interventions or orders changed.    Vent Mode: A/C  Oxygen Concentration (%):  [30] 30  Resp Rate Total:  [9 br/min] 9 br/min  Vt Set:  [400 mL] 400 mL  PEEP/CPAP:  [5 cmH20] 5 cmH20  Mean Airway Pressure:  [6.6 cmH20-6.7 cmH20] 6.7 cmH20      Medications:  ContinuousNORepinephrine bitartrate-D5W, Last Rate: 0.04 mcg/kg/min (23 1800)  vasopressin, Last Rate: 0.04 Units/min (23 1800)    ScheduledlevETIRAcetam (Keppra) IV (PEDS and ADULTS), 1,000 mg, Q12H    PRNartificial tears, 1 drop, PRN          Assessment/Plan:     Brain death secondary to brain herniation and cerebral edema. No formal plan to be carried out for decedent, plan to remove somatic support in AM following family visitation.     No bill generated.    DNR    Juan Carlos Pascual, DO  Neurocritical Care  [facility]

## 2023-11-27 NOTE — NURSING
Report received from ERNESTO Poe.  Assume care.  Plan for extubation today when family arrives at 0900.  Patient is DNR

## 2023-11-29 LAB
COMMENT: NORMAL
FINAL PATHOLOGIC DIAGNOSIS: NORMAL
FROZEN SECTION DIAGNOSIS: NORMAL
FROZEN SECTION FOOTNOTE: NORMAL
GROSS: NORMAL
Lab: NORMAL
MICROSCOPIC EXAM: NORMAL

## 2023-12-01 NOTE — ANESTHESIA POSTPROCEDURE EVALUATION
Anesthesia Post Evaluation    Patient: Esperanza Peters    Procedure(s) Performed: Procedure(s) (LRB):  Left hemicraniectomy with EVD placement (Left)    Final Anesthesia Type: general      Patient location during evaluation: ICU  Patient participation: No - Unable to Participate, Intubation  Level of consciousness: sedated  Post-procedure vital signs: reviewed and stable  Pain management: adequate  Airway patency: patent    PONV status at discharge: No PONV  Anesthetic complications: no      Cardiovascular status: hemodynamically stable  Respiratory status: ETT and ventilator  Hydration status: euvolemic  Follow-up not needed.          lidated device data.      No case tracking events are documented in the log.      Pain/Homer Score: No data recorded

## 2023-12-04 NOTE — OP NOTE
DATE OF SURGERY: 11/24/23     PREOPERATIVE DIAGNOSIS:  1. Malignant edema with uncal herniation  2. Recent left craniotomy for tumor resection     POSTOPERATIVE DIAGNOSIS:  Same.     PROCEDURE PERFORMED:  1. Left hemicraniectomy for malignant edema  2. Placement of left extraventricular drain     SURGEON: Greyson Archuleta M.D.     ASSISTANT: Hero Nguyen M.D.     ANESTHESIA: GETA     ESTIMATED BLOOD LOSS: 200mL     COMPLICATIONS: None     DRAINS: Subgaleal drain and left EVD     SPECIMENS SENT: Bone flap     FINDINGS: None     INDICATIONS:     32F with recent left sided craniotomy for brain tumor performed by another surgeon. I evaluated the patient once I assumed call coverage and noted that she had fixed dilated pupils. A stat CTH showed uncal herniation. As a life-sparing intervention I recommended and emergent hemicraniectomy. R/B/A/I/M were reviewed with the family and they wished to proceed. Aggressive medical management was instituted for control of intracranial hypertension.  .  PROCEDURE:     The patient was brought into the operating room where she was intubated and placed under general anesthesia without difficulty.  All lines were placed. She was positioned supine onto the OR table with his head placed in a Brady in right lateral gaze and a bump under left shoulder to expose the left side of the head. Hair was clipped over the entire left side and a reverse question gonzales incision was marked, prepped and draped in the usual sterile fashion. A timeout was performed and 10mL of Lidocaine with epinephrine was injected into the skin along the planned incision.    A curvilinear incision was made from the 10 blade paramedian frontal hairline to the parieto-occipital region and brought anteriolaterally to in front of the left tragus. Xochitl clips were applied to skin edges. The pericranium was incised with Bovie and a myocutaneous flap was elevated in one piece with the Bovie and periosteal elevators. The  flap was retracted anteriorly with fish hooks.     The prior craniotomy was seen and elevated over removing the plates and screws. A sonido hole was made with the high speed drill at the pterion, the low temporal bone and in the parietal region. A large left sided hemicraniectomy was then performed with the craniotome in one large piece. The bone was scored and cracked at that sphenoid wing and the flap was elevated in one piece without dural breach. The bone flap was sent to the back table. Using the dural opening at the prior craniotomy and left sided EVD was placed using anatomic landmarks. CSF under pressure was after one pass with the catheter. It was attached steriley to the Bourne bag for CSF drainage.     Tack up sutures using 4-0 Nurilon was placed around the craniectomy defect. Incorporating the prior dural opening a C shaped dural opening was made with the base at the sphenoid wing using the 15 blade and dural scissors. The brain was swollen and herniating out of the craniectomy defect. We instituted standard intraoperative measures to control swelling and we began closure. Hemostasis was achieved with surgicel, Floseal, and bipolar electrocautery.    The dura was reapproximated but not closed over the left cerebral hemisphere. Sepra film was placed over top of the dura to facilitate cranioplasty at a later date. A subgaleal Hemovac drain was placed and tunneled through a separate incision. The myocutaneous flap was reapproximated and the galea was closed with interupted inverted 2-0 Vicryl sutures. Skin was closed with staples. Bacitracin ointment was placed over the wound and sterile dressing was applied.     The patient appeared to tolerate the procedure well from a hemodynamic standpoint. I was present for all critical portions of the case, and at the end of the case all counts were correct. The patient was removed from the [place] and repositioned supine onto the hospital bed where she was left  intubated. She was sent to the ICU in stable condition for recovery.

## 2023-12-05 LAB
GLUCOSE SERPL-MCNC: 154 MG/DL (ref 70–110)
HCO3 UR-SCNC: 23.1 MMOL/L (ref 24–28)
HCT VFR BLD CALC: 37 %PCV (ref 36–54)
PCO2 BLDA: 30.3 MMHG (ref 35–45)
PH SMN: 7.49 [PH] (ref 7.35–7.45)
PO2 BLDA: 146 MMHG (ref 80–100)
POC BE: 0 MMOL/L
POC IONIZED CALCIUM: 1.12 MMOL/L (ref 1.06–1.42)
POC SATURATED O2: 99 % (ref 95–100)
POC TCO2: 24 MMOL/L (ref 23–27)
POTASSIUM BLD-SCNC: 3.8 MMOL/L (ref 3.5–5.1)
SAMPLE: ABNORMAL
SODIUM BLD-SCNC: 137 MMOL/L (ref 136–145)

## 2024-08-21 NOTE — ANESTHESIA PROCEDURE NOTES
Intubation    Date/Time: 11/22/2023 1:02 PM    Performed by: Joesph Malloy MD  Authorized by: Tavo Woods MD    Intubation:     Induction:  Intravenous    Intubated:  Postinduction    Mask Ventilation:  Easy mask    Attempts:  1    Attempted By:  Resident anesthesiologist    Method of Intubation:  Direct    Blade:  Cardoso 2    Laryngeal View Grade: Grade IIA - cords partially seen      Difficult Airway Encountered?: No      Complications:  None    Airway Device:  Oral endotracheal tube    Airway Device Size:  7.0    Style/Cuff Inflation:  Cuffed (inflated to minimal occlusive pressure)    Placement Verified By:  Capnometry    Complicating Factors:  None    Findings Post-Intubation:  BS equal bilateral and atraumatic/condition of teeth unchanged       Received most recent office notes from patient's dental office. Copy placed in scanning.    Krissy Flores, RN, BSN  RN Care Coordinator - Oncology/Hematology  [facility]

## (undated) DEVICE — SOLUTION IRRI NS BOTTLE 1000ML R5200-01

## (undated) DEVICE — BAG DECANTER 10-102

## (undated) DEVICE — ELECTRODE REM PLYHSV RETURN 9

## (undated) DEVICE — DRESSING SURGICAL 1/2X1/2

## (undated) DEVICE — RUBBERBAND STERILE 3X1/8IN

## (undated) DEVICE — STAPLER SKIN ROTATING HEAD WIDE PRW35

## (undated) DEVICE — FORCEP SPETZLER SLM .5 TIP 9IN

## (undated) DEVICE — BUR BONE CUT MICRO TPS 3X3.8MM

## (undated) DEVICE — DRAPE CORETEMP FLD WRM 56X62IN

## (undated) DEVICE — DRAPE CRANIOTOMY T SURG STRL

## (undated) DEVICE — SUTURE SILK 3-0 SH 18 C013D

## (undated) DEVICE — Device

## (undated) DEVICE — SPONGE GAUZE 10S 4X4  442214

## (undated) DEVICE — SYRINGE 10ML 302995

## (undated) DEVICE — DRESSING TELFA N ADH 3X8

## (undated) DEVICE — DIFFUSER

## (undated) DEVICE — ROUTER TAPERED 2.3MM

## (undated) DEVICE — DRAPE OPMI STERILE

## (undated) DEVICE — CATH BACTISEAL EVD CODMAN

## (undated) DEVICE — TIP SONAPET IQ MICRO 12CM

## (undated) DEVICE — SUT 4/0 18IN NUROLON BLK B

## (undated) DEVICE — DRAPE T THYROID STERILE

## (undated) DEVICE — CARTRIDGE OIL

## (undated) DEVICE — SYRINGE HYPODERMC LL 22G 1.5 ECLIPSE 30

## (undated) DEVICE — DRESSING TEGADERM 4X4 3/4 TD1004

## (undated) DEVICE — BLADE CLIPPER NEURO / MDL 4411

## (undated) DEVICE — BLADE SCALPEL #11 371111

## (undated) DEVICE — CASSETTE SONOPET IQ IRRIGATION

## (undated) DEVICE — SUT VICRYL 2 0 CT 2

## (undated) DEVICE — BAG DRAINAGE(CRAINIAL) EDS

## (undated) DEVICE — DRESSING SURGICAL 1X3

## (undated) DEVICE — HEMOSTAT SURGICEL 4X8IN

## (undated) DEVICE — BIT PERFORATOR LARGE

## (undated) DEVICE — DRAPE C-ARM (FITS NEW C-ARM) 07-CA108

## (undated) DEVICE — DRESSING SURGICAL 3/4X3/4

## (undated) DEVICE — DRAPE THYROID 89255

## (undated) DEVICE — CHLORAPREP W TINT 26ML APPL

## (undated) DEVICE — BURR ROUTER TAPERED

## (undated) DEVICE — BLADE SURG CARBON STEEL SZ11

## (undated) DEVICE — DRESSING XEROFORM NONADH 1X8IN

## (undated) DEVICE — GLOVE BIOGEL MICRO SURGEON PINK SZ 7

## (undated) DEVICE — SUTURE VICRYL 4-0 SH 27 J415H

## (undated) DEVICE — KIT SURGIFLO HEMOSTATIC MATRIX

## (undated) DEVICE — BURR PRECISION ROUND 6.0MM

## (undated) DEVICE — TRAY NEURO OMC

## (undated) DEVICE — STAPLER SKIN PROXIMATE WIDE

## (undated) DEVICE — CONTAINER SPECIMEN STRL 4OZ

## (undated) DEVICE — CLIPS RANEY SCALP FFS/ASSY

## (undated) DEVICE — SPONGE GAUZE 4X4 12 PLY STRL

## (undated) DEVICE — HOOK LONE STAR BLUNT 12MM

## (undated) DEVICE — SPONGE PATTY SURGICAL .5X3IN

## (undated) DEVICE — PAD BOVIE ADULT

## (undated) DEVICE — VYCOR BRAIN ACCESS SYSTEM

## (undated) DEVICE — MARKERS SPHERZ PASSIVE

## (undated) DEVICE — DRESSING MEDIPORE PAD 3.5X4IN

## (undated) DEVICE — CATH BACTISEAL EVD CLEAR 1.9MM

## (undated) DEVICE — PINS SKULL ADULT MAYFIELD
Type: IMPLANTABLE DEVICE | Site: CRANIAL | Status: NON-FUNCTIONAL
Removed: 2023-11-24

## (undated) DEVICE — TRAY GENERAL SURGERY

## (undated) DEVICE — TUBE FRAZIER 5MM 2FT SOFT TIP

## (undated) DEVICE — COVER PROXIMA MAYO STAND

## (undated) DEVICE — PENCIL GOLF STERILE

## (undated) DEVICE — MICRO FEATHER BLADE

## (undated) DEVICE — SUT VICRYL PLUS 3-0 SH 18IN

## (undated) DEVICE — DRESSING SURGICAL 1X1

## (undated) DEVICE — TAPE SURG MEDIPORE 6X72IN

## (undated) DEVICE — DRAPE ORTH SPLIT 77X108IN

## (undated) DEVICE — CORD BIPOLAR 12 FOOT

## (undated) DEVICE — DRAPE SURGICAL STERI IRRG PCH

## (undated) DEVICE — TOWEL OR DISP STRL BLUE 4/PK

## (undated) DEVICE — DRAPE TOP 53X102IN

## (undated) DEVICE — SPONGE NEURO 1/4X1/4

## (undated) DEVICE — TIP BOVIE ENT 2.75      E1455

## (undated) DEVICE — CONTAINER SPECI STRL 32OZ 64OZ

## (undated) DEVICE — SPRAY MASTISOL

## (undated) DEVICE — MARKER SKIN STND TIP BLUE BARR